# Patient Record
Sex: FEMALE | Race: WHITE | NOT HISPANIC OR LATINO | Employment: OTHER | URBAN - METROPOLITAN AREA
[De-identification: names, ages, dates, MRNs, and addresses within clinical notes are randomized per-mention and may not be internally consistent; named-entity substitution may affect disease eponyms.]

---

## 2024-01-01 ENCOUNTER — TELEPHONE (OUTPATIENT)
Dept: ONCOLOGY | Facility: CLINIC | Age: 59
End: 2024-01-01

## 2024-01-01 ENCOUNTER — APPOINTMENT (OUTPATIENT)
Dept: CT IMAGING | Facility: HOSPITAL | Age: 59
DRG: 871 | End: 2024-01-01
Payer: MEDICARE

## 2024-01-01 ENCOUNTER — LAB (OUTPATIENT)
Dept: LAB | Facility: HOSPITAL | Age: 59
End: 2024-01-01
Payer: MEDICARE

## 2024-01-01 ENCOUNTER — OFFICE VISIT (OUTPATIENT)
Dept: ONCOLOGY | Facility: CLINIC | Age: 59
End: 2024-01-01
Payer: MEDICARE

## 2024-01-01 ENCOUNTER — APPOINTMENT (OUTPATIENT)
Dept: RADIATION ONCOLOGY | Facility: HOSPITAL | Age: 59
End: 2024-01-01
Payer: MEDICARE

## 2024-01-01 ENCOUNTER — HOSPITAL ENCOUNTER (INPATIENT)
Facility: HOSPITAL | Age: 59
LOS: 1 days | DRG: 951 | End: 2024-11-03
Attending: INTERNAL MEDICINE | Admitting: INTERNAL MEDICINE
Payer: MEDICARE

## 2024-01-01 ENCOUNTER — APPOINTMENT (OUTPATIENT)
Dept: MRI IMAGING | Facility: HOSPITAL | Age: 59
DRG: 871 | End: 2024-01-01
Payer: MEDICARE

## 2024-01-01 ENCOUNTER — APPOINTMENT (OUTPATIENT)
Dept: GENERAL RADIOLOGY | Facility: HOSPITAL | Age: 59
DRG: 871 | End: 2024-01-01
Payer: MEDICARE

## 2024-01-01 ENCOUNTER — APPOINTMENT (OUTPATIENT)
Dept: ULTRASOUND IMAGING | Facility: HOSPITAL | Age: 59
DRG: 871 | End: 2024-01-01
Payer: MEDICARE

## 2024-01-01 ENCOUNTER — READMISSION MANAGEMENT (OUTPATIENT)
Dept: CALL CENTER | Facility: HOSPITAL | Age: 59
End: 2024-01-01
Payer: MEDICARE

## 2024-01-01 ENCOUNTER — HOSPITAL ENCOUNTER (INPATIENT)
Facility: HOSPITAL | Age: 59
LOS: 3 days | Discharge: HOSPICE | DRG: 871 | End: 2024-11-02
Attending: EMERGENCY MEDICINE | Admitting: INTERNAL MEDICINE
Payer: MEDICARE

## 2024-01-01 ENCOUNTER — HOSPITAL ENCOUNTER (OUTPATIENT)
Dept: RADIATION ONCOLOGY | Facility: HOSPITAL | Age: 59
Setting detail: RADIATION/ONCOLOGY SERIES
End: 2024-01-01
Payer: MEDICARE

## 2024-01-01 ENCOUNTER — APPOINTMENT (OUTPATIENT)
Dept: CARDIOLOGY | Facility: HOSPITAL | Age: 59
DRG: 871 | End: 2024-01-01
Payer: MEDICARE

## 2024-01-01 VITALS
SYSTOLIC BLOOD PRESSURE: 129 MMHG | BODY MASS INDEX: 44.47 KG/M2 | DIASTOLIC BLOOD PRESSURE: 86 MMHG | HEIGHT: 63 IN | RESPIRATION RATE: 18 BRPM | TEMPERATURE: 98.6 F | WEIGHT: 251 LBS | HEART RATE: 104 BPM | OXYGEN SATURATION: 92 %

## 2024-01-01 VITALS
HEIGHT: 63 IN | DIASTOLIC BLOOD PRESSURE: 28 MMHG | HEART RATE: 153 BPM | TEMPERATURE: 97.5 F | SYSTOLIC BLOOD PRESSURE: 52 MMHG | WEIGHT: 264 LBS | BODY MASS INDEX: 46.78 KG/M2 | RESPIRATION RATE: 6 BRPM | OXYGEN SATURATION: 89 %

## 2024-01-01 VITALS
RESPIRATION RATE: 21 BRPM | DIASTOLIC BLOOD PRESSURE: 27 MMHG | HEART RATE: 111 BPM | OXYGEN SATURATION: 83 % | SYSTOLIC BLOOD PRESSURE: 60 MMHG | TEMPERATURE: 96.9 F

## 2024-01-01 DIAGNOSIS — R65.21 SEPTIC SHOCK: ICD-10-CM

## 2024-01-01 DIAGNOSIS — C78.00 MALIGNANT NEOPLASM METASTATIC TO LUNG, UNSPECIFIED LATERALITY: ICD-10-CM

## 2024-01-01 DIAGNOSIS — S09.90XA INJURY OF HEAD, INITIAL ENCOUNTER: ICD-10-CM

## 2024-01-01 DIAGNOSIS — E87.20 METABOLIC ACIDOSIS: ICD-10-CM

## 2024-01-01 DIAGNOSIS — D64.9 ANEMIA, UNSPECIFIED TYPE: Primary | ICD-10-CM

## 2024-01-01 DIAGNOSIS — I48.91 ATRIAL FIBRILLATION WITH RAPID VENTRICULAR RESPONSE: ICD-10-CM

## 2024-01-01 DIAGNOSIS — E87.1 HYPONATREMIA: ICD-10-CM

## 2024-01-01 DIAGNOSIS — N17.9 AKI (ACUTE KIDNEY INJURY): ICD-10-CM

## 2024-01-01 DIAGNOSIS — C54.1 MALIGNANT NEOPLASM OF ENDOMETRIUM: Primary | ICD-10-CM

## 2024-01-01 DIAGNOSIS — C54.9 MALIGNANT NEOPLASM OF BODY OF UTERUS, UNSPECIFIED SITE: Primary | ICD-10-CM

## 2024-01-01 DIAGNOSIS — D50.0 IRON DEFICIENCY ANEMIA DUE TO CHRONIC BLOOD LOSS: ICD-10-CM

## 2024-01-01 DIAGNOSIS — A41.9 SEPTIC SHOCK: ICD-10-CM

## 2024-01-01 DIAGNOSIS — Z45.2 ENCOUNTER FOR VENOUS ACCESS DEVICE CARE: ICD-10-CM

## 2024-01-01 LAB
ACETONE BLD QL: NEGATIVE
ALBUMIN SERPL-MCNC: 2.1 G/DL (ref 3.5–5.2)
ALBUMIN SERPL-MCNC: 2.3 G/DL (ref 3.5–5.2)
ALBUMIN SERPL-MCNC: 2.3 G/DL (ref 3.5–5.2)
ALBUMIN SERPL-MCNC: 2.4 G/DL (ref 3.5–5.2)
ALBUMIN SERPL-MCNC: 2.6 G/DL (ref 3.5–5.2)
ALBUMIN/GLOB SERPL: 0.9 G/DL
ALBUMIN/GLOB SERPL: 1 G/DL
ALBUMIN/GLOB SERPL: 1.1 G/DL
ALBUMIN/GLOB SERPL: 1.1 G/DL
ALP SERPL-CCNC: 67 U/L (ref 39–117)
ALP SERPL-CCNC: 77 U/L (ref 39–117)
ALP SERPL-CCNC: 82 U/L (ref 39–117)
ALP SERPL-CCNC: 84 U/L (ref 39–117)
ALT SERPL W P-5'-P-CCNC: 13 U/L (ref 1–33)
ALT SERPL W P-5'-P-CCNC: 14 U/L (ref 1–33)
ALT SERPL W P-5'-P-CCNC: 18 U/L (ref 1–33)
ALT SERPL W P-5'-P-CCNC: 18 U/L (ref 1–33)
ANION GAP SERPL CALCULATED.3IONS-SCNC: 14.2 MMOL/L (ref 5–15)
ANION GAP SERPL CALCULATED.3IONS-SCNC: 19.7 MMOL/L (ref 5–15)
ANION GAP SERPL CALCULATED.3IONS-SCNC: 20 MMOL/L (ref 10–20)
ANION GAP SERPL CALCULATED.3IONS-SCNC: 20 MMOL/L (ref 5–15)
ANION GAP SERPL CALCULATED.3IONS-SCNC: 24.1 MMOL/L (ref 5–15)
ANION GAP SERPL CALCULATED.3IONS-SCNC: 24.1 MMOL/L (ref 5–15)
ANION GAP SERPL CALCULATED.3IONS-SCNC: 29.9 MMOL/L (ref 5–15)
ANION GAP SERPL CALCULATED.3IONS-SCNC: 30 MMOL/L (ref 5–15)
ANION GAP SERPL CALCULATED.3IONS-SCNC: NORMAL MMOL/L
ANISOCYTOSIS BLD QL: ABNORMAL
AORTIC DIMENSIONLESS INDEX: 0.55 (DI)
ARTERIAL PATENCY WRIST A: POSITIVE
AST SERPL-CCNC: 23 U/L (ref 1–32)
AST SERPL-CCNC: 23 U/L (ref 1–32)
AST SERPL-CCNC: 24 U/L (ref 1–32)
AST SERPL-CCNC: 27 U/L (ref 1–32)
ATMOSPHERIC PRESS: ABNORMAL MM[HG]
B PARAPERT DNA SPEC QL NAA+PROBE: NOT DETECTED
B PERT DNA SPEC QL NAA+PROBE: NOT DETECTED
BACTERIA SPEC AEROBE CULT: ABNORMAL
BACTERIA UR QL AUTO: ABNORMAL /HPF
BASE EXCESS BLDA CALC-SCNC: -14.7 MMOL/L (ref 0–3)
BASOPHILS # BLD AUTO: 0.01 10*3/MM3 (ref 0–0.2)
BASOPHILS NFR BLD AUTO: 0.1 % (ref 0–1.5)
BDY SITE: ABNORMAL
BH CV ECHO MEAS - ACS: 1.5 CM
BH CV ECHO MEAS - AO MAX PG: 47.3 MMHG
BH CV ECHO MEAS - AO MEAN PG: 25 MMHG
BH CV ECHO MEAS - AO V2 MAX: 344 CM/SEC
BH CV ECHO MEAS - AO V2 VTI: 42.8 CM
BH CV ECHO MEAS - AVA(I,D): 1.15 CM2
BH CV ECHO MEAS - EDV(CUBED): 42.9 ML
BH CV ECHO MEAS - EDV(MOD-SP4): 42.2 ML
BH CV ECHO MEAS - EF(MOD-BP): 55 %
BH CV ECHO MEAS - EF(MOD-SP4): 55.2 %
BH CV ECHO MEAS - ESV(CUBED): 17.6 ML
BH CV ECHO MEAS - ESV(MOD-SP4): 18.9 ML
BH CV ECHO MEAS - FS: 25.7 %
BH CV ECHO MEAS - IVS/LVPW: 1.31 CM
BH CV ECHO MEAS - IVSD: 1.7 CM
BH CV ECHO MEAS - LA DIMENSION: 2.9 CM
BH CV ECHO MEAS - LAT PEAK E' VEL: 12 CM/SEC
BH CV ECHO MEAS - LV DIASTOLIC VOL/BSA (35-75): 19.4 CM2
BH CV ECHO MEAS - LV MASS(C)D: 193.4 GRAMS
BH CV ECHO MEAS - LV MAX PG: 14.4 MMHG
BH CV ECHO MEAS - LV MEAN PG: 7 MMHG
BH CV ECHO MEAS - LV SYSTOLIC VOL/BSA (12-30): 8.7 CM2
BH CV ECHO MEAS - LV V1 MAX: 190 CM/SEC
BH CV ECHO MEAS - LV V1 VTI: 19.3 CM
BH CV ECHO MEAS - LVIDD: 3.5 CM
BH CV ECHO MEAS - LVIDS: 2.6 CM
BH CV ECHO MEAS - LVOT AREA: 2.5 CM2
BH CV ECHO MEAS - LVOT DIAM: 1.8 CM
BH CV ECHO MEAS - LVPWD: 1.3 CM
BH CV ECHO MEAS - MED PEAK E' VEL: 8.5 CM/SEC
BH CV ECHO MEAS - MR MAX PG: 114.5 MMHG
BH CV ECHO MEAS - MR MAX VEL: 535 CM/SEC
BH CV ECHO MEAS - MV A MAX VEL: 104 CM/SEC
BH CV ECHO MEAS - MV DEC SLOPE: 408 CM/SEC2
BH CV ECHO MEAS - MV DEC TIME: 0.14 SEC
BH CV ECHO MEAS - MV E MAX VEL: 46.1 CM/SEC
BH CV ECHO MEAS - MV E/A: 0.44
BH CV ECHO MEAS - MV MAX PG: 2.9 MMHG
BH CV ECHO MEAS - MV MEAN PG: 1 MMHG
BH CV ECHO MEAS - MV P1/2T: 41.5 MSEC
BH CV ECHO MEAS - MV V2 VTI: 12.8 CM
BH CV ECHO MEAS - MVA(P1/2T): 5.3 CM2
BH CV ECHO MEAS - MVA(VTI): 3.8 CM2
BH CV ECHO MEAS - PA ACC TIME: 0.09 SEC
BH CV ECHO MEAS - PA V2 MAX: 133 CM/SEC
BH CV ECHO MEAS - RAP SYSTOLE: 3 MMHG
BH CV ECHO MEAS - RV MAX PG: 4.7 MMHG
BH CV ECHO MEAS - RV V1 MAX: 108 CM/SEC
BH CV ECHO MEAS - RV V1 VTI: 12 CM
BH CV ECHO MEAS - RVDD: 2.3 CM
BH CV ECHO MEAS - RVSP: 34.6 MMHG
BH CV ECHO MEAS - SV(LVOT): 49.1 ML
BH CV ECHO MEAS - SV(MOD-SP4): 23.3 ML
BH CV ECHO MEAS - SVI(LVOT): 22.6 ML/M2
BH CV ECHO MEAS - SVI(MOD-SP4): 10.7 ML/M2
BH CV ECHO MEAS - TAPSE (>1.6): 1.57 CM
BH CV ECHO MEAS - TR MAX PG: 31.6 MMHG
BH CV ECHO MEAS - TR MAX VEL: 281 CM/SEC
BH CV ECHO MEASUREMENTS AVERAGE E/E' RATIO: 4.5
BH CV ECHO SHUNT ASSESSMENT PERFORMED (HIDDEN SCRIPTING): 1
BH CV XLRA - TDI S': 15.3 CM/SEC
BILIRUB SERPL-MCNC: 0.2 MG/DL (ref 0–1.2)
BILIRUB SERPL-MCNC: 0.2 MG/DL (ref 0–1.2)
BILIRUB SERPL-MCNC: 0.3 MG/DL (ref 0–1.2)
BILIRUB SERPL-MCNC: 0.3 MG/DL (ref 0–1.2)
BILIRUB UR QL STRIP: NEGATIVE
BUN BLDA-MCNC: 49 MG/DL (ref 8–26)
BUN SERPL-MCNC: 35 MG/DL (ref 8–23)
BUN SERPL-MCNC: 59 MG/DL (ref 8–23)
BUN SERPL-MCNC: 59 MG/DL (ref 8–23)
BUN SERPL-MCNC: 67 MG/DL (ref 8–23)
BUN SERPL-MCNC: 67 MG/DL (ref 8–23)
BUN SERPL-MCNC: 68 MG/DL (ref 8–23)
BUN SERPL-MCNC: 77 MG/DL (ref 8–23)
BUN SERPL-MCNC: NORMAL MG/DL
BUN/CREAT SERPL: 10.2 (ref 7–25)
BUN/CREAT SERPL: 7.9 (ref 7–25)
BUN/CREAT SERPL: 8.9 (ref 7–25)
BUN/CREAT SERPL: 8.9 (ref 7–25)
BUN/CREAT SERPL: 9.1 (ref 7–25)
BUN/CREAT SERPL: 9.2 (ref 7–25)
BUN/CREAT SERPL: 9.2 (ref 7–25)
BUN/CREAT SERPL: NORMAL
BURR CELLS BLD QL SMEAR: ABNORMAL
C PNEUM DNA NPH QL NAA+NON-PROBE: NOT DETECTED
C3 FRG RBC-MCNC: ABNORMAL
CA-I BLDA-SCNC: 1.08 MMOL/L (ref 1.12–1.32)
CA-I SERPL ISE-MCNC: 0.94 MMOL/L (ref 1.15–1.3)
CALCIUM SPEC-SCNC: 7.1 MG/DL (ref 8.6–10.5)
CALCIUM SPEC-SCNC: 7.8 MG/DL (ref 8.6–10.5)
CALCIUM SPEC-SCNC: 9 MG/DL (ref 8.6–10.5)
CALCIUM SPEC-SCNC: 9.1 MG/DL (ref 8.6–10.5)
CALCIUM SPEC-SCNC: NORMAL MMOL/L
CANCER AG125 SERPL QL: 260 U/ML (ref 0–38.1)
CHLORIDE BLDA-SCNC: 99 MMOL/L (ref 98–109)
CHLORIDE SERPL-SCNC: 100 MMOL/L (ref 98–107)
CHLORIDE SERPL-SCNC: 92 MMOL/L (ref 98–107)
CHLORIDE SERPL-SCNC: 93 MMOL/L (ref 98–107)
CHLORIDE SERPL-SCNC: 95 MMOL/L (ref 98–107)
CHLORIDE SERPL-SCNC: 96 MMOL/L (ref 98–107)
CHLORIDE SERPL-SCNC: NORMAL MMOL/L
CHOLEST SERPL-MCNC: 66 MG/DL (ref 0–200)
CHOLEST SERPL-MCNC: 71 MG/DL (ref 0–200)
CK SERPL-CCNC: 35 U/L (ref 20–180)
CLARITY UR: ABNORMAL
CO2 BLDA-SCNC: 11 MMOL/L (ref 24–29)
CO2 BLDA-SCNC: 9.7 MMOL/L (ref 22–29)
CO2 SERPL-SCNC: 10 MMOL/L (ref 22–29)
CO2 SERPL-SCNC: 11.9 MMOL/L (ref 22–29)
CO2 SERPL-SCNC: 12 MMOL/L (ref 22–29)
CO2 SERPL-SCNC: 19.8 MMOL/L (ref 22–29)
CO2 SERPL-SCNC: 21.3 MMOL/L (ref 22–29)
CO2 SERPL-SCNC: 9.1 MMOL/L (ref 22–29)
CO2 SERPL-SCNC: 9.9 MMOL/L (ref 22–29)
CO2 SERPL-SCNC: NORMAL MMOL/L
COLOR UR: ABNORMAL
CREAT BLDA-MCNC: 8.3 MG/DL (ref 0.6–1.3)
CREAT SERPL-MCNC: 3.82 MG/DL (ref 0.57–1)
CREAT SERPL-MCNC: 6.43 MG/DL (ref 0.57–1)
CREAT SERPL-MCNC: 7.35 MG/DL (ref 0.57–1)
CREAT SERPL-MCNC: 7.47 MG/DL (ref 0.57–1)
CREAT SERPL-MCNC: 7.52 MG/DL (ref 0.57–1)
CREAT SERPL-MCNC: 7.56 MG/DL (ref 0.57–1)
CREAT SERPL-MCNC: 7.65 MG/DL (ref 0.57–1)
CREAT SERPL-MCNC: NORMAL MG/DL
CREAT UR-MCNC: 68.8 MG/DL
D-LACTATE SERPL-SCNC: 10 MMOL/L (ref 0.5–2)
D-LACTATE SERPL-SCNC: 2.6 MMOL/L (ref 0.5–2)
D-LACTATE SERPL-SCNC: 2.9 MMOL/L (ref 0.5–2)
D-LACTATE SERPL-SCNC: 4.2 MMOL/L (ref 0.5–2)
D-LACTATE SERPL-SCNC: 4.7 MMOL/L (ref 0.3–2)
D-LACTATE SERPL-SCNC: 6.2 MMOL/L (ref 0.5–2)
DEPRECATED RDW RBC AUTO: 55.3 FL (ref 37–54)
DEPRECATED RDW RBC AUTO: 57.2 FL (ref 37–54)
DEPRECATED RDW RBC AUTO: 59.4 FL (ref 37–54)
DEPRECATED RDW RBC AUTO: 60.8 FL (ref 37–54)
DIGOXIN SERPL-MCNC: 0.57 NG/ML (ref 0.6–1.2)
EGFRCR SERPLBLD CKD-EPI 2021: 12.9 ML/MIN/1.73
EGFRCR SERPLBLD CKD-EPI 2021: 5.1 ML/MIN/1.73
EGFRCR SERPLBLD CKD-EPI 2021: 5.6 ML/MIN/1.73
EGFRCR SERPLBLD CKD-EPI 2021: 5.7 ML/MIN/1.73
EGFRCR SERPLBLD CKD-EPI 2021: 5.7 ML/MIN/1.73
EGFRCR SERPLBLD CKD-EPI 2021: 5.8 ML/MIN/1.73
EGFRCR SERPLBLD CKD-EPI 2021: 5.9 ML/MIN/1.73
EGFRCR SERPLBLD CKD-EPI 2021: 6.9 ML/MIN/1.73
EGFRCR SERPLBLD CKD-EPI 2021: NORMAL ML/MIN/{1.73_M2}
ELLIPTOCYTES BLD QL SMEAR: ABNORMAL
EOSINOPHIL # BLD AUTO: 0.06 10*3/MM3 (ref 0–0.4)
EOSINOPHIL NFR BLD AUTO: 0.3 % (ref 0.3–6.2)
ERYTHROCYTE [DISTWIDTH] IN BLOOD BY AUTOMATED COUNT: 21.4 % (ref 12.3–15.4)
ERYTHROCYTE [DISTWIDTH] IN BLOOD BY AUTOMATED COUNT: 21.6 % (ref 12.3–15.4)
ERYTHROCYTE [DISTWIDTH] IN BLOOD BY AUTOMATED COUNT: 21.7 % (ref 12.3–15.4)
ERYTHROCYTE [DISTWIDTH] IN BLOOD BY AUTOMATED COUNT: 21.9 % (ref 12.3–15.4)
FLUAV SUBTYP SPEC NAA+PROBE: NOT DETECTED
FLUBV RNA ISLT QL NAA+PROBE: NOT DETECTED
GEN 5 2HR TROPONIN T REFLEX: 129 NG/L
GIANT PLATELETS: ABNORMAL
GLOBULIN UR ELPH-MCNC: 2.2 GM/DL
GLOBULIN UR ELPH-MCNC: 2.2 GM/DL
GLOBULIN UR ELPH-MCNC: 2.3 GM/DL
GLOBULIN UR ELPH-MCNC: 2.7 GM/DL
GLUCOSE BLDC GLUCOMTR-MCNC: 127 MG/DL (ref 70–105)
GLUCOSE BLDC GLUCOMTR-MCNC: 128 MG/DL (ref 70–105)
GLUCOSE BLDC GLUCOMTR-MCNC: 179 MG/DL (ref 70–105)
GLUCOSE BLDC GLUCOMTR-MCNC: 191 MG/DL (ref 70–105)
GLUCOSE BLDC GLUCOMTR-MCNC: 196 MG/DL (ref 70–105)
GLUCOSE BLDC GLUCOMTR-MCNC: 197 MG/DL (ref 70–105)
GLUCOSE BLDC GLUCOMTR-MCNC: 216 MG/DL (ref 70–105)
GLUCOSE BLDC GLUCOMTR-MCNC: 218 MG/DL (ref 70–105)
GLUCOSE BLDC GLUCOMTR-MCNC: 222 MG/DL (ref 70–105)
GLUCOSE BLDC GLUCOMTR-MCNC: 228 MG/DL (ref 70–105)
GLUCOSE SERPL-MCNC: 123 MG/DL (ref 65–99)
GLUCOSE SERPL-MCNC: 132 MG/DL (ref 65–99)
GLUCOSE SERPL-MCNC: 163 MG/DL (ref 65–99)
GLUCOSE SERPL-MCNC: 223 MG/DL (ref 65–99)
GLUCOSE SERPL-MCNC: 225 MG/DL (ref 65–99)
GLUCOSE SERPL-MCNC: 228 MG/DL (ref 65–99)
GLUCOSE SERPL-MCNC: 243 MG/DL (ref 65–99)
GLUCOSE SERPL-MCNC: NORMAL MG/DL
GLUCOSE UR STRIP-MCNC: ABNORMAL MG/DL
HADV DNA SPEC NAA+PROBE: NOT DETECTED
HBA1C MFR BLD: 6.68 % (ref 4.8–5.6)
HCO3 BLDA-SCNC: 9.1 MMOL/L (ref 21–28)
HCOV 229E RNA SPEC QL NAA+PROBE: NOT DETECTED
HCOV HKU1 RNA SPEC QL NAA+PROBE: NOT DETECTED
HCOV NL63 RNA SPEC QL NAA+PROBE: NOT DETECTED
HCOV OC43 RNA SPEC QL NAA+PROBE: NOT DETECTED
HCT VFR BLD AUTO: 37.4 % (ref 34–46.6)
HCT VFR BLD AUTO: 37.8 % (ref 34–46.6)
HCT VFR BLD AUTO: 39.1 % (ref 34–46.6)
HCT VFR BLD AUTO: 40.1 % (ref 34–46.6)
HCT VFR BLDA CALC: 40 % (ref 38–51)
HDLC SERPL-MCNC: 10 MG/DL (ref 40–60)
HDLC SERPL-MCNC: 12 MG/DL (ref 40–60)
HEMODILUTION: NO
HGB BLD-MCNC: 11.4 G/DL (ref 12–15.9)
HGB BLD-MCNC: 12.4 G/DL (ref 12–15.9)
HGB BLD-MCNC: 12.5 G/DL (ref 12–15.9)
HGB BLD-MCNC: 12.6 G/DL (ref 12–15.9)
HGB BLDA-MCNC: 13.6 G/DL (ref 12–17)
HGB UR QL STRIP.AUTO: ABNORMAL
HMPV RNA NPH QL NAA+NON-PROBE: NOT DETECTED
HOLD SPECIMEN: NORMAL
HPIV1 RNA ISLT QL NAA+PROBE: NOT DETECTED
HPIV2 RNA SPEC QL NAA+PROBE: NOT DETECTED
HPIV3 RNA NPH QL NAA+PROBE: NOT DETECTED
HPIV4 P GENE NPH QL NAA+PROBE: NOT DETECTED
HYALINE CASTS UR QL AUTO: ABNORMAL /LPF
INHALED O2 CONCENTRATION: 44 %
KETONES UR QL STRIP: ABNORMAL
L PNEUMO1 AG UR QL IA: POSITIVE
LARGE PLATELETS: ABNORMAL
LARGE PLATELETS: ABNORMAL
LDLC SERPL CALC-MCNC: 26 MG/DL (ref 0–100)
LDLC SERPL CALC-MCNC: 28 MG/DL (ref 0–100)
LDLC/HDLC SERPL: 1.6 {RATIO}
LDLC/HDLC SERPL: 2 {RATIO}
LEUKOCYTE ESTERASE UR QL STRIP.AUTO: ABNORMAL
LYMPHOCYTES # BLD AUTO: 0.66 10*3/MM3 (ref 0.7–3.1)
LYMPHOCYTES # BLD MANUAL: 0 10*3/MM3 (ref 0.7–3.1)
LYMPHOCYTES # BLD MANUAL: 0.44 10*3/MM3 (ref 0.7–3.1)
LYMPHOCYTES # BLD MANUAL: 0.89 10*3/MM3 (ref 0.7–3.1)
LYMPHOCYTES NFR BLD AUTO: 3.3 % (ref 19.6–45.3)
LYMPHOCYTES NFR BLD MANUAL: 3 % (ref 5–12)
LYMPHOCYTES NFR BLD MANUAL: 3 % (ref 5–12)
LYMPHOCYTES NFR BLD MANUAL: 8 % (ref 5–12)
Lab: ABNORMAL
M PNEUMO IGG SER IA-ACNC: NOT DETECTED
MAGNESIUM SERPL-MCNC: 1.8 MG/DL (ref 1.6–2.4)
MAGNESIUM SERPL-MCNC: 2.1 MG/DL (ref 1.6–2.4)
MCH RBC QN AUTO: 23.9 PG (ref 26.6–33)
MCH RBC QN AUTO: 24.5 PG (ref 26.6–33)
MCHC RBC AUTO-ENTMCNC: 30.5 G/DL (ref 31.5–35.7)
MCHC RBC AUTO-ENTMCNC: 31.2 G/DL (ref 31.5–35.7)
MCHC RBC AUTO-ENTMCNC: 31.7 G/DL (ref 31.5–35.7)
MCHC RBC AUTO-ENTMCNC: 33.3 G/DL (ref 31.5–35.7)
MCV RBC AUTO: 73.4 FL (ref 79–97)
MCV RBC AUTO: 77.3 FL (ref 79–97)
MCV RBC AUTO: 78.6 FL (ref 79–97)
MCV RBC AUTO: 78.6 FL (ref 79–97)
METAMYELOCYTES NFR BLD MANUAL: 2 % (ref 0–0)
METAMYELOCYTES NFR BLD MANUAL: 5 % (ref 0–0)
MICROCYTES BLD QL: ABNORMAL
MODALITY: ABNORMAL
MONOCYTES # BLD AUTO: 1.47 10*3/MM3 (ref 0.1–0.9)
MONOCYTES # BLD: 0.67 10*3/MM3 (ref 0.1–0.9)
MONOCYTES # BLD: 0.78 10*3/MM3 (ref 0.1–0.9)
MONOCYTES # BLD: 1.78 10*3/MM3 (ref 0.1–0.9)
MONOCYTES NFR BLD AUTO: 7.4 % (ref 5–12)
MYELOCYTES NFR BLD MANUAL: 1 % (ref 0–0)
NEUTROPHILS # BLD AUTO: 18.52 10*3/MM3 (ref 1.7–7)
NEUTROPHILS # BLD AUTO: 20.86 10*3/MM3 (ref 1.7–7)
NEUTROPHILS # BLD AUTO: 24.58 10*3/MM3 (ref 1.7–7)
NEUTROPHILS NFR BLD AUTO: 17.59 10*3/MM3 (ref 1.7–7)
NEUTROPHILS NFR BLD AUTO: 88.9 % (ref 42.7–76)
NEUTROPHILS NFR BLD MANUAL: 71 % (ref 42.7–76)
NEUTROPHILS NFR BLD MANUAL: 77 % (ref 42.7–76)
NEUTROPHILS NFR BLD MANUAL: 86 % (ref 42.7–76)
NEUTS BAND NFR BLD MANUAL: 12 % (ref 0–5)
NEUTS BAND NFR BLD MANUAL: 18 % (ref 0–5)
NEUTS BAND NFR BLD MANUAL: 8 % (ref 0–5)
NEUTS VAC BLD QL SMEAR: ABNORMAL
NITRITE UR QL STRIP: NEGATIVE
NOTIFIED WHO: ABNORMAL
NRBC SPEC MANUAL: 1 /100 WBC (ref 0–0.2)
NT-PROBNP SERPL-MCNC: 1560 PG/ML (ref 0–900)
OSMOLALITY UR: 244 MOSM/KG (ref 300–800)
PCO2 BLDA: 17.7 MM HG (ref 35–48)
PH BLDA: 7.32 PH UNITS (ref 7.35–7.45)
PH UR STRIP.AUTO: 5.5 [PH] (ref 5–8)
PHOSPHATE SERPL-MCNC: 6.8 MG/DL (ref 2.5–4.5)
PHOSPHATE SERPL-MCNC: 6.9 MG/DL (ref 2.5–4.5)
PHOSPHATE SERPL-MCNC: 7 MG/DL (ref 2.5–4.5)
PLAT MORPH BLD: NORMAL
PLATELET # BLD AUTO: 143 10*3/MM3 (ref 140–450)
PLATELET # BLD AUTO: 192 10*3/MM3 (ref 140–450)
PLATELET # BLD AUTO: 195 10*3/MM3 (ref 140–450)
PLATELET # BLD AUTO: 248 10*3/MM3 (ref 140–450)
PMV BLD AUTO: 10.5 FL (ref 6–12)
PMV BLD AUTO: 9.8 FL (ref 6–12)
PMV BLD AUTO: 9.8 FL (ref 6–12)
PMV BLD AUTO: ABNORMAL FL
PO2 BLD: 173 MM[HG] (ref 0–500)
PO2 BLDA: 76.3 MM HG (ref 83–108)
POIKILOCYTOSIS BLD QL SMEAR: ABNORMAL
POTASSIUM BLDA-SCNC: 3.8 MMOL/L (ref 3.5–4.9)
POTASSIUM SERPL-SCNC: 3.6 MMOL/L (ref 3.5–5.2)
POTASSIUM SERPL-SCNC: 3.7 MMOL/L (ref 3.5–5.2)
POTASSIUM SERPL-SCNC: 3.8 MMOL/L (ref 3.5–5.2)
POTASSIUM SERPL-SCNC: 4.1 MMOL/L (ref 3.5–5.2)
POTASSIUM SERPL-SCNC: 4.1 MMOL/L (ref 3.5–5.2)
POTASSIUM SERPL-SCNC: NORMAL MMOL/L
PROCALCITONIN SERPL-MCNC: 4.82 NG/ML (ref 0–0.25)
PROCALCITONIN SERPL-MCNC: 5.1 NG/ML (ref 0–0.25)
PROT ?TM UR-MCNC: 688 MG/DL
PROT SERPL-MCNC: 4.3 G/DL (ref 6–8.5)
PROT SERPL-MCNC: 4.6 G/DL (ref 6–8.5)
PROT SERPL-MCNC: 4.9 G/DL (ref 6–8.5)
PROT SERPL-MCNC: 5 G/DL (ref 6–8.5)
PROT UR QL STRIP: ABNORMAL
PROT/CREAT UR: ABNORMAL MG/G CREA (ref 0–200)
QT INTERVAL: 325 MS
QTC INTERVAL: 503 MS
RBC # BLD AUTO: 4.76 10*6/MM3 (ref 3.77–5.28)
RBC # BLD AUTO: 5.06 10*6/MM3 (ref 3.77–5.28)
RBC # BLD AUTO: 5.1 10*6/MM3 (ref 3.77–5.28)
RBC # BLD AUTO: 5.15 10*6/MM3 (ref 3.77–5.28)
RBC # UR STRIP: ABNORMAL /HPF
READ BACK: ABNORMAL
REF LAB TEST METHOD: ABNORMAL
RENAL EPI CELLS #/AREA URNS HPF: ABNORMAL /HPF
RHINOVIRUS RNA SPEC NAA+PROBE: NOT DETECTED
RSV RNA NPH QL NAA+NON-PROBE: NOT DETECTED
S PNEUM AG SPEC QL LA: NEGATIVE
SAO2 % BLDCOA: 94.5 % (ref 94–98)
SARS-COV-2 RNA NPH QL NAA+NON-PROBE: NOT DETECTED
SCAN SLIDE: NORMAL
SINUS: 2.7 CM
SMALL PLATELETS BLD QL SMEAR: ADEQUATE
SODIUM BLD-SCNC: 125 MMOL/L (ref 138–146)
SODIUM SERPL-SCNC: 126 MMOL/L (ref 136–145)
SODIUM SERPL-SCNC: 129 MMOL/L (ref 136–145)
SODIUM SERPL-SCNC: 130 MMOL/L (ref 136–145)
SODIUM SERPL-SCNC: 132 MMOL/L (ref 136–145)
SODIUM SERPL-SCNC: 132 MMOL/L (ref 136–145)
SODIUM SERPL-SCNC: 133 MMOL/L (ref 136–145)
SODIUM SERPL-SCNC: 136 MMOL/L (ref 136–145)
SODIUM SERPL-SCNC: NORMAL MMOL/L
SODIUM UR-SCNC: 67 MMOL/L
SP GR UR STRIP: 1.02 (ref 1–1.03)
SQUAMOUS #/AREA URNS HPF: ABNORMAL /HPF
STJ: 2.4 CM
TOXIC GRANULATION: ABNORMAL
TRANS CELLS #/AREA URNS HPF: ABNORMAL /HPF
TRIGL SERPL-MCNC: 150 MG/DL (ref 0–150)
TRIGL SERPL-MCNC: 225 MG/DL (ref 0–150)
TROPONIN T DELTA: -40 NG/L
TROPONIN T SERPL HS-MCNC: 151 NG/L
TROPONIN T SERPL HS-MCNC: 169 NG/L
TSH SERPL DL<=0.05 MIU/L-ACNC: 4.5 UIU/ML (ref 0.27–4.2)
URATE SERPL-MCNC: 9.4 MG/DL (ref 2.4–5.7)
UROBILINOGEN UR QL STRIP: ABNORMAL
VARIANT LYMPHS NFR BLD MANUAL: 0 % (ref 19.6–45.3)
VARIANT LYMPHS NFR BLD MANUAL: 2 % (ref 19.6–45.3)
VARIANT LYMPHS NFR BLD MANUAL: 4 % (ref 19.6–45.3)
VLDLC SERPL-MCNC: 26 MG/DL (ref 5–40)
VLDLC SERPL-MCNC: 35 MG/DL (ref 5–40)
WBC # UR STRIP: ABNORMAL /HPF
WBC MORPH BLD: NORMAL
WBC MORPH BLD: NORMAL
WBC NRBC COR # BLD AUTO: 19.79 10*3/MM3 (ref 3.4–10.8)
WBC NRBC COR # BLD AUTO: 22.19 10*3/MM3 (ref 3.4–10.8)
WBC NRBC COR # BLD AUTO: 22.31 10*3/MM3 (ref 3.4–10.8)
WBC NRBC COR # BLD AUTO: 25.87 10*3/MM3 (ref 3.4–10.8)
WHOLE BLOOD HOLD COAG: NORMAL
WHOLE BLOOD HOLD SPECIMEN: NORMAL

## 2024-01-01 PROCEDURE — 87449 NOS EACH ORGANISM AG IA: CPT | Performed by: INTERNAL MEDICINE

## 2024-01-01 PROCEDURE — 1125F AMNT PAIN NOTED PAIN PRSNT: CPT | Performed by: INTERNAL MEDICINE

## 2024-01-01 PROCEDURE — 25010000002 VASOPRESSIN 20-5 UT/100ML-% SOLUTION: Performed by: NURSE PRACTITIONER

## 2024-01-01 PROCEDURE — 80053 COMPREHEN METABOLIC PANEL: CPT | Performed by: INTERNAL MEDICINE

## 2024-01-01 PROCEDURE — 25010000002 HEPARIN (PORCINE) PER 1000 UNITS

## 2024-01-01 PROCEDURE — 99222 1ST HOSP IP/OBS MODERATE 55: CPT | Performed by: PSYCHIATRY & NEUROLOGY

## 2024-01-01 PROCEDURE — 77300 RADIATION THERAPY DOSE PLAN: CPT | Performed by: INTERNAL MEDICINE

## 2024-01-01 PROCEDURE — 25010000002 ALBUMIN HUMAN 5% PER 50 ML: Performed by: INTERNAL MEDICINE

## 2024-01-01 PROCEDURE — 83036 HEMOGLOBIN GLYCOSYLATED A1C: CPT | Performed by: STUDENT IN AN ORGANIZED HEALTH CARE EDUCATION/TRAINING PROGRAM

## 2024-01-01 PROCEDURE — 36415 COLL VENOUS BLD VENIPUNCTURE: CPT

## 2024-01-01 PROCEDURE — 25010000002 MORPHINE PER 10 MG: Performed by: INTERNAL MEDICINE

## 2024-01-01 PROCEDURE — 84145 PROCALCITONIN (PCT): CPT | Performed by: EMERGENCY MEDICINE

## 2024-01-01 PROCEDURE — 93005 ELECTROCARDIOGRAM TRACING: CPT | Performed by: EMERGENCY MEDICINE

## 2024-01-01 PROCEDURE — 84100 ASSAY OF PHOSPHORUS: CPT

## 2024-01-01 PROCEDURE — 63710000001 INSULIN LISPRO (HUMAN) PER 5 UNITS: Performed by: INTERNAL MEDICINE

## 2024-01-01 PROCEDURE — 85025 COMPLETE CBC W/AUTO DIFF WBC: CPT | Performed by: EMERGENCY MEDICINE

## 2024-01-01 PROCEDURE — 82570 ASSAY OF URINE CREATININE: CPT | Performed by: STUDENT IN AN ORGANIZED HEALTH CARE EDUCATION/TRAINING PROGRAM

## 2024-01-01 PROCEDURE — 83735 ASSAY OF MAGNESIUM: CPT

## 2024-01-01 PROCEDURE — 63710000001 INSULIN LISPRO (HUMAN) PER 5 UNITS

## 2024-01-01 PROCEDURE — 70551 MRI BRAIN STEM W/O DYE: CPT

## 2024-01-01 PROCEDURE — 80162 ASSAY OF DIGOXIN TOTAL: CPT | Performed by: INTERNAL MEDICINE

## 2024-01-01 PROCEDURE — 82803 BLOOD GASES ANY COMBINATION: CPT | Performed by: EMERGENCY MEDICINE

## 2024-01-01 PROCEDURE — 81001 URINALYSIS AUTO W/SCOPE: CPT

## 2024-01-01 PROCEDURE — 77387 GUIDANCE FOR RADJ TX DLVR: CPT | Performed by: INTERNAL MEDICINE

## 2024-01-01 PROCEDURE — 71045 X-RAY EXAM CHEST 1 VIEW: CPT

## 2024-01-01 PROCEDURE — 25010000002 LORAZEPAM PER 2 MG: Performed by: NURSE PRACTITIONER

## 2024-01-01 PROCEDURE — 85025 COMPLETE CBC W/AUTO DIFF WBC: CPT

## 2024-01-01 PROCEDURE — 82948 REAGENT STRIP/BLOOD GLUCOSE: CPT

## 2024-01-01 PROCEDURE — 80053 COMPREHEN METABOLIC PANEL: CPT | Performed by: EMERGENCY MEDICINE

## 2024-01-01 PROCEDURE — 77334 RADIATION TREATMENT AID(S): CPT | Performed by: INTERNAL MEDICINE

## 2024-01-01 PROCEDURE — 84443 ASSAY THYROID STIM HORMONE: CPT | Performed by: EMERGENCY MEDICINE

## 2024-01-01 PROCEDURE — 25010000002 HYDROMORPHONE 1 MG/ML SOLUTION: Performed by: NURSE PRACTITIONER

## 2024-01-01 PROCEDURE — 70544 MR ANGIOGRAPHY HEAD W/O DYE: CPT

## 2024-01-01 PROCEDURE — 25810000003 SODIUM CHLORIDE 0.9 % SOLUTION 250 ML FLEX CONT

## 2024-01-01 PROCEDURE — 71250 CT THORAX DX C-: CPT

## 2024-01-01 PROCEDURE — C1751 CATH, INF, PER/CENT/MIDLINE: HCPCS

## 2024-01-01 PROCEDURE — 25010000002 AZITHROMYCIN PER 500 MG

## 2024-01-01 PROCEDURE — 77412 RADIATION TX DELIVERY LVL 3: CPT | Performed by: INTERNAL MEDICINE

## 2024-01-01 PROCEDURE — 70450 CT HEAD/BRAIN W/O DYE: CPT

## 2024-01-01 PROCEDURE — 25010000002 CALCIUM GLUCONATE-NACL 1-0.675 GM/50ML-% SOLUTION: Performed by: NURSE PRACTITIONER

## 2024-01-01 PROCEDURE — 99233 SBSQ HOSP IP/OBS HIGH 50: CPT | Performed by: STUDENT IN AN ORGANIZED HEALTH CARE EDUCATION/TRAINING PROGRAM

## 2024-01-01 PROCEDURE — 83605 ASSAY OF LACTIC ACID: CPT | Performed by: EMERGENCY MEDICINE

## 2024-01-01 PROCEDURE — 0 DEXTROSE 5 % SOLUTION 1,000 ML FLEX CONT: Performed by: NURSE PRACTITIONER

## 2024-01-01 PROCEDURE — 80061 LIPID PANEL: CPT | Performed by: STUDENT IN AN ORGANIZED HEALTH CARE EDUCATION/TRAINING PROGRAM

## 2024-01-01 PROCEDURE — 99285 EMERGENCY DEPT VISIT HI MDM: CPT

## 2024-01-01 PROCEDURE — 77295 3-D RADIOTHERAPY PLAN: CPT | Performed by: INTERNAL MEDICINE

## 2024-01-01 PROCEDURE — 25010000002 PHENYLEPHRINE 10 MG/ML SOLUTION: Performed by: NURSE PRACTITIONER

## 2024-01-01 PROCEDURE — 25810000003 SEPSIS FLUID NS 0.9 % SOLUTION: Performed by: EMERGENCY MEDICINE

## 2024-01-01 PROCEDURE — 99231 SBSQ HOSP IP/OBS SF/LOW 25: CPT | Performed by: NURSE PRACTITIONER

## 2024-01-01 PROCEDURE — 80069 RENAL FUNCTION PANEL: CPT | Performed by: INTERNAL MEDICINE

## 2024-01-01 PROCEDURE — 99497 ADVNCD CARE PLAN 30 MIN: CPT | Performed by: NURSE PRACTITIONER

## 2024-01-01 PROCEDURE — 77014 CHG CT GUIDANCE RADIATION THERAPY FLDS PLACEMENT: CPT | Performed by: INTERNAL MEDICINE

## 2024-01-01 PROCEDURE — 1159F MED LIST DOCD IN RCRD: CPT | Performed by: INTERNAL MEDICINE

## 2024-01-01 PROCEDURE — 25010000002 FENTANYL CITRATE (PF) 50 MCG/ML SOLUTION: Performed by: NURSE PRACTITIONER

## 2024-01-01 PROCEDURE — 77280 THER RAD SIMULAJ FIELD SMPL: CPT | Performed by: INTERNAL MEDICINE

## 2024-01-01 PROCEDURE — 25010000002 VANCOMYCIN 1.75-0.9 GM/500ML-% SOLUTION: Performed by: EMERGENCY MEDICINE

## 2024-01-01 PROCEDURE — 25010000002 CEFEPIME PER 500 MG: Performed by: EMERGENCY MEDICINE

## 2024-01-01 PROCEDURE — 25010000002 CEFEPIME PER 500 MG

## 2024-01-01 PROCEDURE — 25010000002 PHENYLEPHRINE 10 MG/ML SOLUTION 5 ML VIAL: Performed by: NURSE PRACTITIONER

## 2024-01-01 PROCEDURE — P9041 ALBUMIN (HUMAN),5%, 50ML: HCPCS | Performed by: INTERNAL MEDICINE

## 2024-01-01 PROCEDURE — 76775 US EXAM ABDO BACK WALL LIM: CPT

## 2024-01-01 PROCEDURE — 84550 ASSAY OF BLOOD/URIC ACID: CPT | Performed by: STUDENT IN AN ORGANIZED HEALTH CARE EDUCATION/TRAINING PROGRAM

## 2024-01-01 PROCEDURE — 87040 BLOOD CULTURE FOR BACTERIA: CPT | Performed by: EMERGENCY MEDICINE

## 2024-01-01 PROCEDURE — 84300 ASSAY OF URINE SODIUM: CPT | Performed by: STUDENT IN AN ORGANIZED HEALTH CARE EDUCATION/TRAINING PROGRAM

## 2024-01-01 PROCEDURE — 25810000003 SODIUM CHLORIDE 0.9 % SOLUTION: Performed by: NURSE PRACTITIONER

## 2024-01-01 PROCEDURE — 83935 ASSAY OF URINE OSMOLALITY: CPT | Performed by: STUDENT IN AN ORGANIZED HEALTH CARE EDUCATION/TRAINING PROGRAM

## 2024-01-01 PROCEDURE — 84484 ASSAY OF TROPONIN QUANT: CPT | Performed by: EMERGENCY MEDICINE

## 2024-01-01 PROCEDURE — 77336 RADIATION PHYSICS CONSULT: CPT | Performed by: INTERNAL MEDICINE

## 2024-01-01 PROCEDURE — 82009 KETONE BODYS QUAL: CPT | Performed by: EMERGENCY MEDICINE

## 2024-01-01 PROCEDURE — 1160F RVW MEDS BY RX/DR IN RCRD: CPT | Performed by: INTERNAL MEDICINE

## 2024-01-01 PROCEDURE — 0202U NFCT DS 22 TRGT SARS-COV-2: CPT | Performed by: EMERGENCY MEDICINE

## 2024-01-01 PROCEDURE — 83880 ASSAY OF NATRIURETIC PEPTIDE: CPT | Performed by: EMERGENCY MEDICINE

## 2024-01-01 PROCEDURE — 74176 CT ABD & PELVIS W/O CONTRAST: CPT

## 2024-01-01 PROCEDURE — 92610 EVALUATE SWALLOWING FUNCTION: CPT

## 2024-01-01 PROCEDURE — 84156 ASSAY OF PROTEIN URINE: CPT | Performed by: STUDENT IN AN ORGANIZED HEALTH CARE EDUCATION/TRAINING PROGRAM

## 2024-01-01 PROCEDURE — 87086 URINE CULTURE/COLONY COUNT: CPT

## 2024-01-01 PROCEDURE — 93306 TTE W/DOPPLER COMPLETE: CPT | Performed by: INTERNAL MEDICINE

## 2024-01-01 PROCEDURE — 87899 AGENT NOS ASSAY W/OPTIC: CPT | Performed by: INTERNAL MEDICINE

## 2024-01-01 PROCEDURE — 87186 SC STD MICRODIL/AGAR DIL: CPT

## 2024-01-01 PROCEDURE — 80047 BASIC METABLC PNL IONIZED CA: CPT

## 2024-01-01 PROCEDURE — 99223 1ST HOSP IP/OBS HIGH 75: CPT | Performed by: INTERNAL MEDICINE

## 2024-01-01 PROCEDURE — 83735 ASSAY OF MAGNESIUM: CPT | Performed by: EMERGENCY MEDICINE

## 2024-01-01 PROCEDURE — 85007 BL SMEAR W/DIFF WBC COUNT: CPT

## 2024-01-01 PROCEDURE — 04HY32Z INSERTION OF MONITORING DEVICE INTO LOWER ARTERY, PERCUTANEOUS APPROACH: ICD-10-PCS | Performed by: NURSE PRACTITIONER

## 2024-01-01 PROCEDURE — 82330 ASSAY OF CALCIUM: CPT | Performed by: INTERNAL MEDICINE

## 2024-01-01 PROCEDURE — 84484 ASSAY OF TROPONIN QUANT: CPT

## 2024-01-01 PROCEDURE — 99221 1ST HOSP IP/OBS SF/LOW 40: CPT | Performed by: NURSE PRACTITIONER

## 2024-01-01 PROCEDURE — 80048 BASIC METABOLIC PNL TOTAL CA: CPT | Performed by: STUDENT IN AN ORGANIZED HEALTH CARE EDUCATION/TRAINING PROGRAM

## 2024-01-01 PROCEDURE — 70547 MR ANGIOGRAPHY NECK W/O DYE: CPT

## 2024-01-01 PROCEDURE — 83735 ASSAY OF MAGNESIUM: CPT | Performed by: INTERNAL MEDICINE

## 2024-01-01 PROCEDURE — 87077 CULTURE AEROBIC IDENTIFY: CPT

## 2024-01-01 PROCEDURE — 84145 PROCALCITONIN (PCT): CPT | Performed by: INTERNAL MEDICINE

## 2024-01-01 PROCEDURE — 85007 BL SMEAR W/DIFF WBC COUNT: CPT | Performed by: EMERGENCY MEDICINE

## 2024-01-01 PROCEDURE — 25810000003 SODIUM CHLORIDE 0.9 % SOLUTION: Performed by: INTERNAL MEDICINE

## 2024-01-01 PROCEDURE — 80061 LIPID PANEL: CPT

## 2024-01-01 PROCEDURE — 99291 CRITICAL CARE FIRST HOUR: CPT

## 2024-01-01 PROCEDURE — 80053 COMPREHEN METABOLIC PANEL: CPT

## 2024-01-01 PROCEDURE — 99221 1ST HOSP IP/OBS SF/LOW 40: CPT | Performed by: INTERNAL MEDICINE

## 2024-01-01 PROCEDURE — 25010000002 DIGOXIN PER 500 MCG: Performed by: NURSE PRACTITIONER

## 2024-01-01 PROCEDURE — 99231 SBSQ HOSP IP/OBS SF/LOW 25: CPT | Performed by: INTERNAL MEDICINE

## 2024-01-01 PROCEDURE — 36600 WITHDRAWAL OF ARTERIAL BLOOD: CPT | Performed by: EMERGENCY MEDICINE

## 2024-01-01 PROCEDURE — 82550 ASSAY OF CK (CPK): CPT | Performed by: NURSE PRACTITIONER

## 2024-01-01 PROCEDURE — 25010000002 LORAZEPAM PER 2 MG: Performed by: INTERNAL MEDICINE

## 2024-01-01 PROCEDURE — 86304 IMMUNOASSAY TUMOR CA 125: CPT | Performed by: INTERNAL MEDICINE

## 2024-01-01 PROCEDURE — 77427 RADIATION TX MANAGEMENT X5: CPT | Performed by: INTERNAL MEDICINE

## 2024-01-01 PROCEDURE — 99214 OFFICE O/P EST MOD 30 MIN: CPT | Performed by: INTERNAL MEDICINE

## 2024-01-01 PROCEDURE — 93306 TTE W/DOPPLER COMPLETE: CPT

## 2024-01-01 PROCEDURE — 51702 INSERT TEMP BLADDER CATH: CPT

## 2024-01-01 PROCEDURE — 85014 HEMATOCRIT: CPT

## 2024-01-01 PROCEDURE — 25810000003 SODIUM CHLORIDE 0.9 % SOLUTION 250 ML FLEX CONT: Performed by: NURSE PRACTITIONER

## 2024-01-01 RX ORDER — LORAZEPAM 2 MG/ML
2 INJECTION INTRAMUSCULAR
Status: DISCONTINUED | OUTPATIENT
Start: 2024-01-01 | End: 2024-01-01 | Stop reason: HOSPADM

## 2024-01-01 RX ORDER — INSULIN LISPRO 100 [IU]/ML
2-9 INJECTION, SOLUTION INTRAVENOUS; SUBCUTANEOUS EVERY 6 HOURS SCHEDULED
Status: DISCONTINUED | OUTPATIENT
Start: 2024-01-01 | End: 2024-01-01

## 2024-01-01 RX ORDER — CALCIUM GLUCONATE 20 MG/ML
1000 INJECTION, SOLUTION INTRAVENOUS ONCE
Status: COMPLETED | OUTPATIENT
Start: 2024-01-01 | End: 2024-01-01

## 2024-01-01 RX ORDER — SODIUM CHLORIDE 0.9 % (FLUSH) 0.9 %
10 SYRINGE (ML) INJECTION EVERY 12 HOURS SCHEDULED
Status: DISCONTINUED | OUTPATIENT
Start: 2024-01-01 | End: 2024-01-01

## 2024-01-01 RX ORDER — RISPERIDONE 1 MG/1
2 TABLET ORAL EVERY EVENING
Status: DISCONTINUED | OUTPATIENT
Start: 2024-01-01 | End: 2024-01-01

## 2024-01-01 RX ORDER — AMLODIPINE BESYLATE 5 MG/1
5 TABLET ORAL
Status: DISCONTINUED | OUTPATIENT
Start: 2024-01-01 | End: 2024-01-01 | Stop reason: HOSPADM

## 2024-01-01 RX ORDER — MORPHINE SULFATE 20 MG/ML
10 SOLUTION ORAL
Status: DISCONTINUED | OUTPATIENT
Start: 2024-01-01 | End: 2024-01-01 | Stop reason: HOSPADM

## 2024-01-01 RX ORDER — NOREPINEPHRINE BITARTRATE 0.03 MG/ML
.02-.5 INJECTION, SOLUTION INTRAVENOUS
Status: DISCONTINUED | OUTPATIENT
Start: 2024-01-01 | End: 2024-01-01

## 2024-01-01 RX ORDER — SODIUM CHLORIDE 0.9 % (FLUSH) 0.9 %
10 SYRINGE (ML) INJECTION AS NEEDED
Status: DISCONTINUED | OUTPATIENT
Start: 2024-01-01 | End: 2024-01-01

## 2024-01-01 RX ORDER — SODIUM CHLORIDE 9 MG/ML
40 INJECTION, SOLUTION INTRAVENOUS AS NEEDED
Status: DISCONTINUED | OUTPATIENT
Start: 2024-01-01 | End: 2024-01-01

## 2024-01-01 RX ORDER — MORPHINE SULFATE 2 MG/ML
1 INJECTION, SOLUTION INTRAMUSCULAR; INTRAVENOUS
Status: DISCONTINUED | OUTPATIENT
Start: 2024-01-01 | End: 2024-01-01 | Stop reason: HOSPADM

## 2024-01-01 RX ORDER — ALBUMIN, HUMAN INJ 5% 5 %
1000 SOLUTION INTRAVENOUS ONCE
Status: COMPLETED | OUTPATIENT
Start: 2024-01-01 | End: 2024-01-01

## 2024-01-01 RX ORDER — CALCIUM CHLORIDE, MAGNESIUM CHLORIDE, SODIUM CHLORIDE, SODIUM BICARBONATE, POTASSIUM CHLORIDE AND SODIUM PHOSPHATE DIBASIC DIHYDRATE 3.68; 3.05; 6.34; 3.09; .314; .187 G/L; G/L; G/L; G/L; G/L; G/L
1000 INJECTION INTRAVENOUS CONTINUOUS
Status: DISCONTINUED | OUTPATIENT
Start: 2024-01-01 | End: 2024-01-01

## 2024-01-01 RX ORDER — LORAZEPAM 1 MG/1
2 TABLET ORAL
Status: DISCONTINUED | OUTPATIENT
Start: 2024-01-01 | End: 2024-01-01 | Stop reason: HOSPADM

## 2024-01-01 RX ORDER — ACETAMINOPHEN 650 MG/1
650 SUPPOSITORY RECTAL EVERY 4 HOURS PRN
Status: DISCONTINUED | OUTPATIENT
Start: 2024-01-01 | End: 2024-01-01 | Stop reason: HOSPADM

## 2024-01-01 RX ORDER — LORAZEPAM 2 MG/ML
2 CONCENTRATE ORAL
Status: DISCONTINUED | OUTPATIENT
Start: 2024-01-01 | End: 2024-01-01 | Stop reason: HOSPADM

## 2024-01-01 RX ORDER — ACETAMINOPHEN 325 MG/1
650 TABLET ORAL EVERY 4 HOURS PRN
Status: DISCONTINUED | OUTPATIENT
Start: 2024-01-01 | End: 2024-01-01

## 2024-01-01 RX ORDER — HALOPERIDOL 1 MG/1
1 TABLET ORAL EVERY 4 HOURS PRN
Status: DISCONTINUED | OUTPATIENT
Start: 2024-01-01 | End: 2024-01-01 | Stop reason: HOSPADM

## 2024-01-01 RX ORDER — ACETAMINOPHEN 650 MG/1
650 SUPPOSITORY RECTAL EVERY 4 HOURS PRN
Status: DISCONTINUED | OUTPATIENT
Start: 2024-01-01 | End: 2024-01-01

## 2024-01-01 RX ORDER — LISINOPRIL 20 MG/1
40 TABLET ORAL DAILY
Status: DISCONTINUED | OUTPATIENT
Start: 2024-01-01 | End: 2024-01-01

## 2024-01-01 RX ORDER — BISACODYL 5 MG/1
5 TABLET, DELAYED RELEASE ORAL DAILY PRN
Status: DISCONTINUED | OUTPATIENT
Start: 2024-01-01 | End: 2024-01-01

## 2024-01-01 RX ORDER — LORAZEPAM 2 MG/ML
0.5 INJECTION INTRAMUSCULAR
Status: DISCONTINUED | OUTPATIENT
Start: 2024-01-01 | End: 2024-01-01 | Stop reason: HOSPADM

## 2024-01-01 RX ORDER — IBUPROFEN 600 MG/1
1 TABLET ORAL
Status: DISCONTINUED | OUTPATIENT
Start: 2024-01-01 | End: 2024-01-01

## 2024-01-01 RX ORDER — ONDANSETRON 4 MG/1
4 TABLET, ORALLY DISINTEGRATING ORAL EVERY 6 HOURS PRN
Start: 2024-01-01

## 2024-01-01 RX ORDER — LORAZEPAM 2 MG/ML
1 CONCENTRATE ORAL
Status: DISCONTINUED | OUTPATIENT
Start: 2024-01-01 | End: 2024-01-01 | Stop reason: HOSPADM

## 2024-01-01 RX ORDER — FENTANYL CITRATE 50 UG/ML
25 INJECTION, SOLUTION INTRAMUSCULAR; INTRAVENOUS
Status: DISCONTINUED | OUTPATIENT
Start: 2024-01-01 | End: 2024-01-01

## 2024-01-01 RX ORDER — ALUMINA, MAGNESIA, AND SIMETHICONE 2400; 2400; 240 MG/30ML; MG/30ML; MG/30ML
15 SUSPENSION ORAL EVERY 6 HOURS PRN
Status: DISCONTINUED | OUTPATIENT
Start: 2024-01-01 | End: 2024-01-01

## 2024-01-01 RX ORDER — SODIUM CHLORIDE 0.9 % (FLUSH) 0.9 %
20 SYRINGE (ML) INJECTION AS NEEDED
Status: DISCONTINUED | OUTPATIENT
Start: 2024-01-01 | End: 2024-01-01

## 2024-01-01 RX ORDER — VANCOMYCIN 1.75 GRAM/500 ML IN 0.9 % SODIUM CHLORIDE INTRAVENOUS
22 ONCE
Status: COMPLETED | OUTPATIENT
Start: 2024-01-01 | End: 2024-01-01

## 2024-01-01 RX ORDER — NICOTINE POLACRILEX 4 MG
15 LOZENGE BUCCAL
Status: DISCONTINUED | OUTPATIENT
Start: 2024-01-01 | End: 2024-01-01

## 2024-01-01 RX ORDER — METOPROLOL SUCCINATE 50 MG/1
50 TABLET, EXTENDED RELEASE ORAL
Status: DISCONTINUED | OUTPATIENT
Start: 2024-01-01 | End: 2024-01-01

## 2024-01-01 RX ORDER — SODIUM CHLORIDE 0.9 % (FLUSH) 0.9 %
10 SYRINGE (ML) INJECTION AS NEEDED
Status: DISCONTINUED | OUTPATIENT
Start: 2024-01-01 | End: 2024-01-01 | Stop reason: HOSPADM

## 2024-01-01 RX ORDER — LORAZEPAM 2 MG/ML
1 INJECTION INTRAMUSCULAR ONCE
Status: COMPLETED | OUTPATIENT
Start: 2024-01-01 | End: 2024-01-01

## 2024-01-01 RX ORDER — ASPIRIN 300 MG/1
300 SUPPOSITORY RECTAL DAILY
Status: DISCONTINUED | OUTPATIENT
Start: 2024-01-01 | End: 2024-01-01

## 2024-01-01 RX ORDER — DIGOXIN 0.25 MG/ML
250 INJECTION INTRAMUSCULAR; INTRAVENOUS ONCE
Status: COMPLETED | OUTPATIENT
Start: 2024-01-01 | End: 2024-01-01

## 2024-01-01 RX ORDER — TRAMADOL HYDROCHLORIDE 50 MG/1
50 TABLET ORAL EVERY 6 HOURS PRN
Status: DISCONTINUED | OUTPATIENT
Start: 2024-01-01 | End: 2024-01-01

## 2024-01-01 RX ORDER — MONTELUKAST SODIUM 10 MG/1
10 TABLET ORAL NIGHTLY
Status: DISCONTINUED | OUTPATIENT
Start: 2024-01-01 | End: 2024-01-01

## 2024-01-01 RX ORDER — ACETAMINOPHEN 160 MG/5ML
650 SOLUTION ORAL EVERY 4 HOURS PRN
Status: DISCONTINUED | OUTPATIENT
Start: 2024-01-01 | End: 2024-01-01 | Stop reason: HOSPADM

## 2024-01-01 RX ORDER — ONDANSETRON 2 MG/ML
4 INJECTION INTRAMUSCULAR; INTRAVENOUS EVERY 6 HOURS PRN
Status: DISCONTINUED | OUTPATIENT
Start: 2024-01-01 | End: 2024-01-01 | Stop reason: HOSPADM

## 2024-01-01 RX ORDER — DIPHENOXYLATE HYDROCHLORIDE AND ATROPINE SULFATE 2.5; .025 MG/1; MG/1
1 TABLET ORAL
Status: DISCONTINUED | OUTPATIENT
Start: 2024-01-01 | End: 2024-01-01 | Stop reason: HOSPADM

## 2024-01-01 RX ORDER — LORAZEPAM 2 MG/ML
1 INJECTION INTRAMUSCULAR
Status: DISCONTINUED | OUTPATIENT
Start: 2024-01-01 | End: 2024-01-01 | Stop reason: HOSPADM

## 2024-01-01 RX ORDER — ERGOCALCIFEROL 1.25 MG/1
50000 CAPSULE, LIQUID FILLED ORAL
Status: DISCONTINUED | OUTPATIENT
Start: 2024-01-01 | End: 2024-01-01

## 2024-01-01 RX ORDER — NITROGLYCERIN 0.4 MG/1
0.4 TABLET SUBLINGUAL
Status: DISCONTINUED | OUTPATIENT
Start: 2024-01-01 | End: 2024-01-01

## 2024-01-01 RX ORDER — DIPHENOXYLATE HYDROCHLORIDE AND ATROPINE SULFATE 2.5; .025 MG/1; MG/1
1 TABLET ORAL DAILY
Status: DISCONTINUED | OUTPATIENT
Start: 2024-01-01 | End: 2024-01-01

## 2024-01-01 RX ORDER — VASOPRESSIN IN DEXTROSE 5 % 20/100 ML
0.03 PLASTIC BAG, INJECTION (ML) INTRAVENOUS CONTINUOUS
Status: DISCONTINUED | OUTPATIENT
Start: 2024-01-01 | End: 2024-01-01

## 2024-01-01 RX ORDER — POTASSIUM CHLORIDE 29.8 MG/ML
20 INJECTION INTRAVENOUS ONCE
Status: CANCELLED | OUTPATIENT
Start: 2024-01-01 | End: 2024-01-01

## 2024-01-01 RX ORDER — HEPARIN SODIUM 1000 [USP'U]/ML
INJECTION, SOLUTION INTRAVENOUS; SUBCUTANEOUS AS NEEDED
Status: DISCONTINUED | OUTPATIENT
Start: 2024-01-01 | End: 2024-01-01

## 2024-01-01 RX ORDER — LIDOCAINE HYDROCHLORIDE 20 MG/ML
5 SOLUTION OROPHARYNGEAL EVERY 4 HOURS PRN
Status: DISCONTINUED | OUTPATIENT
Start: 2024-01-01 | End: 2024-01-01 | Stop reason: HOSPADM

## 2024-01-01 RX ORDER — LORAZEPAM 2 MG/ML
0.5 CONCENTRATE ORAL
Status: DISCONTINUED | OUTPATIENT
Start: 2024-01-01 | End: 2024-01-01 | Stop reason: HOSPADM

## 2024-01-01 RX ORDER — LORAZEPAM 0.5 MG/1
0.5 TABLET ORAL
Status: DISCONTINUED | OUTPATIENT
Start: 2024-01-01 | End: 2024-01-01 | Stop reason: HOSPADM

## 2024-01-01 RX ORDER — HEPARIN SODIUM 5000 [USP'U]/ML
5000 INJECTION, SOLUTION INTRAVENOUS; SUBCUTANEOUS EVERY 12 HOURS SCHEDULED
Status: DISCONTINUED | OUTPATIENT
Start: 2024-01-01 | End: 2024-01-01

## 2024-01-01 RX ORDER — PAROXETINE 20 MG/1
30 TABLET, FILM COATED ORAL DAILY
Status: DISCONTINUED | OUTPATIENT
Start: 2024-01-01 | End: 2024-01-01

## 2024-01-01 RX ORDER — LORAZEPAM 2 MG/ML
1 INJECTION INTRAMUSCULAR
Start: 2024-01-01 | End: 2024-11-06

## 2024-01-01 RX ORDER — AMOXICILLIN 250 MG
2 CAPSULE ORAL 2 TIMES DAILY
Status: DISCONTINUED | OUTPATIENT
Start: 2024-01-01 | End: 2024-01-01

## 2024-01-01 RX ORDER — INSULIN LISPRO 100 [IU]/ML
2-7 INJECTION, SOLUTION INTRAVENOUS; SUBCUTANEOUS
Status: DISCONTINUED | OUTPATIENT
Start: 2024-01-01 | End: 2024-01-01

## 2024-01-01 RX ORDER — HYDROXYZINE HYDROCHLORIDE 25 MG/1
50 TABLET, FILM COATED ORAL NIGHTLY
Status: DISCONTINUED | OUTPATIENT
Start: 2024-01-01 | End: 2024-01-01

## 2024-01-01 RX ORDER — LORAZEPAM 1 MG/1
1 TABLET ORAL
Status: DISCONTINUED | OUTPATIENT
Start: 2024-01-01 | End: 2024-01-01 | Stop reason: HOSPADM

## 2024-01-01 RX ORDER — SCOLOPAMINE TRANSDERMAL SYSTEM 1 MG/1
1 PATCH, EXTENDED RELEASE TRANSDERMAL
Status: DISCONTINUED | OUTPATIENT
Start: 2024-01-01 | End: 2024-01-01 | Stop reason: HOSPADM

## 2024-01-01 RX ORDER — ATORVASTATIN CALCIUM 40 MG/1
80 TABLET, FILM COATED ORAL NIGHTLY
Status: DISCONTINUED | OUTPATIENT
Start: 2024-01-01 | End: 2024-01-01

## 2024-01-01 RX ORDER — ONDANSETRON 2 MG/ML
4 INJECTION INTRAMUSCULAR; INTRAVENOUS EVERY 6 HOURS PRN
Start: 2024-01-01

## 2024-01-01 RX ORDER — ONDANSETRON 4 MG/1
4 TABLET, ORALLY DISINTEGRATING ORAL EVERY 6 HOURS PRN
Status: DISCONTINUED | OUTPATIENT
Start: 2024-01-01 | End: 2024-01-01 | Stop reason: HOSPADM

## 2024-01-01 RX ORDER — DILTIAZEM HCL/D5W 125 MG/125
5-15 PLASTIC BAG, INJECTION (ML) INTRAVENOUS CONTINUOUS
Status: DISCONTINUED | OUTPATIENT
Start: 2024-01-01 | End: 2024-01-01

## 2024-01-01 RX ORDER — ZINC SULFATE 50(220)MG
220 CAPSULE ORAL DAILY
Status: DISCONTINUED | OUTPATIENT
Start: 2024-01-01 | End: 2024-01-01

## 2024-01-01 RX ORDER — TRAMADOL HYDROCHLORIDE 50 MG/1
50 TABLET ORAL EVERY 6 HOURS PRN
COMMUNITY
End: 2024-01-01

## 2024-01-01 RX ORDER — BISACODYL 10 MG
10 SUPPOSITORY, RECTAL RECTAL DAILY PRN
Status: DISCONTINUED | OUTPATIENT
Start: 2024-01-01 | End: 2024-01-01

## 2024-01-01 RX ORDER — ACETAMINOPHEN 325 MG/1
650 TABLET ORAL EVERY 4 HOURS PRN
Status: DISCONTINUED | OUTPATIENT
Start: 2024-01-01 | End: 2024-01-01 | Stop reason: HOSPADM

## 2024-01-01 RX ORDER — HALOPERIDOL 5 MG/ML
1 INJECTION INTRAMUSCULAR EVERY 4 HOURS PRN
Status: DISCONTINUED | OUTPATIENT
Start: 2024-01-01 | End: 2024-01-01 | Stop reason: HOSPADM

## 2024-01-01 RX ORDER — ASPIRIN 81 MG/1
81 TABLET, CHEWABLE ORAL DAILY
Status: DISCONTINUED | OUTPATIENT
Start: 2024-01-01 | End: 2024-01-01

## 2024-01-01 RX ORDER — POLYETHYLENE GLYCOL 3350 17 G/17G
17 POWDER, FOR SOLUTION ORAL DAILY PRN
Status: DISCONTINUED | OUTPATIENT
Start: 2024-01-01 | End: 2024-01-01

## 2024-01-01 RX ORDER — MORPHINE SULFATE 2 MG/ML
2 INJECTION, SOLUTION INTRAMUSCULAR; INTRAVENOUS
Status: ACTIVE | OUTPATIENT
Start: 2024-01-01 | End: 2024-11-07

## 2024-01-01 RX ORDER — DEXTROSE MONOHYDRATE 25 G/50ML
25 INJECTION, SOLUTION INTRAVENOUS
Status: DISCONTINUED | OUTPATIENT
Start: 2024-01-01 | End: 2024-01-01

## 2024-01-01 RX ADMIN — HEPARIN SODIUM 5000 UNITS: 5000 INJECTION INTRAVENOUS; SUBCUTANEOUS at 10:48

## 2024-01-01 RX ADMIN — NOREPINEPHRINE BITARTRATE 0.4 MCG/KG/MIN: 0.06 INJECTION, SOLUTION INTRAVENOUS at 06:13

## 2024-01-01 RX ADMIN — Medication 1750 MG: at 07:46

## 2024-01-01 RX ADMIN — LORAZEPAM 1 MG: 2 INJECTION INTRAMUSCULAR; INTRAVENOUS at 21:50

## 2024-01-01 RX ADMIN — VASOPRESSIN 0.03 UNITS/MIN: 0.2 INJECTION INTRAVENOUS at 08:51

## 2024-01-01 RX ADMIN — Medication 10 ML: at 08:36

## 2024-01-01 RX ADMIN — INSULIN LISPRO 4 UNITS: 100 INJECTION, SOLUTION INTRAVENOUS; SUBCUTANEOUS at 13:30

## 2024-01-01 RX ADMIN — CALCIUM GLUCONATE 1000 MG: 20 INJECTION, SOLUTION INTRAVENOUS at 09:56

## 2024-01-01 RX ADMIN — Medication 10 ML: at 10:48

## 2024-01-01 RX ADMIN — Medication 10 ML: at 22:41

## 2024-01-01 RX ADMIN — NOREPINEPHRINE BITARTRATE 0.3 MCG/KG/MIN: 0.03 INJECTION, SOLUTION INTRAVENOUS at 02:46

## 2024-01-01 RX ADMIN — SODIUM CHLORIDE 2310 ML: 9 INJECTION, SOLUTION INTRAVENOUS at 06:39

## 2024-01-01 RX ADMIN — Medication 5 MG/HR: at 05:49

## 2024-01-01 RX ADMIN — Medication 10 ML: at 10:49

## 2024-01-01 RX ADMIN — VASOPRESSIN 0.03 UNITS/MIN: 0.2 INJECTION INTRAVENOUS at 21:07

## 2024-01-01 RX ADMIN — MORPHINE SULFATE 1 MG: 2 INJECTION, SOLUTION INTRAMUSCULAR; INTRAVENOUS at 00:42

## 2024-01-01 RX ADMIN — INSULIN LISPRO 4 UNITS: 100 INJECTION, SOLUTION INTRAVENOUS; SUBCUTANEOUS at 06:39

## 2024-01-01 RX ADMIN — Medication 10 ML: at 20:19

## 2024-01-01 RX ADMIN — SODIUM BICARBONATE 150 MEQ: 84 INJECTION INTRAVENOUS at 16:25

## 2024-01-01 RX ADMIN — HYDROMORPHONE HYDROCHLORIDE 1 MG: 1 INJECTION, SOLUTION INTRAMUSCULAR; INTRAVENOUS; SUBCUTANEOUS at 23:17

## 2024-01-01 RX ADMIN — INSULIN LISPRO 2 UNITS: 100 INJECTION, SOLUTION INTRAVENOUS; SUBCUTANEOUS at 00:22

## 2024-01-01 RX ADMIN — HYDROMORPHONE HYDROCHLORIDE 1 MG: 1 INJECTION, SOLUTION INTRAMUSCULAR; INTRAVENOUS; SUBCUTANEOUS at 06:14

## 2024-01-01 RX ADMIN — SODIUM BICARBONATE 150 MEQ: 84 INJECTION INTRAVENOUS at 07:50

## 2024-01-01 RX ADMIN — HYDROMORPHONE HYDROCHLORIDE 1 MG: 1 INJECTION, SOLUTION INTRAMUSCULAR; INTRAVENOUS; SUBCUTANEOUS at 00:29

## 2024-01-01 RX ADMIN — LORAZEPAM 2 MG: 2 INJECTION INTRAMUSCULAR; INTRAVENOUS at 15:46

## 2024-01-01 RX ADMIN — FENTANYL CITRATE 25 MCG: 50 INJECTION, SOLUTION INTRAMUSCULAR; INTRAVENOUS at 10:36

## 2024-01-01 RX ADMIN — PHENYLEPHRINE HYDROCHLORIDE 3 MCG/KG/MIN: 10 INJECTION INTRAVENOUS at 00:23

## 2024-01-01 RX ADMIN — LORAZEPAM 1 MG: 2 INJECTION INTRAMUSCULAR; INTRAVENOUS at 09:42

## 2024-01-01 RX ADMIN — SODIUM BICARBONATE 150 MEQ: 84 INJECTION INTRAVENOUS at 23:24

## 2024-01-01 RX ADMIN — MUPIROCIN 1 APPLICATION: 20 OINTMENT TOPICAL at 10:48

## 2024-01-01 RX ADMIN — LORAZEPAM 1 MG: 2 INJECTION INTRAMUSCULAR; INTRAVENOUS at 19:23

## 2024-01-01 RX ADMIN — HEPARIN SODIUM 5000 UNITS: 5000 INJECTION INTRAVENOUS; SUBCUTANEOUS at 08:35

## 2024-01-01 RX ADMIN — FENTANYL CITRATE 25 MCG: 50 INJECTION, SOLUTION INTRAMUSCULAR; INTRAVENOUS at 19:41

## 2024-01-01 RX ADMIN — SODIUM BICARBONATE 150 MEQ: 84 INJECTION INTRAVENOUS at 11:08

## 2024-01-01 RX ADMIN — HYDROMORPHONE HYDROCHLORIDE 1 MG: 1 INJECTION, SOLUTION INTRAMUSCULAR; INTRAVENOUS; SUBCUTANEOUS at 21:26

## 2024-01-01 RX ADMIN — Medication 10 ML: at 10:05

## 2024-01-01 RX ADMIN — HEPARIN SODIUM 5000 UNITS: 5000 INJECTION INTRAVENOUS; SUBCUTANEOUS at 23:26

## 2024-01-01 RX ADMIN — NOREPINEPHRINE BITARTRATE 0.5 MCG/KG/MIN: 0.06 INJECTION, SOLUTION INTRAVENOUS at 09:56

## 2024-01-01 RX ADMIN — THERA TABS 1 TABLET: TAB at 15:53

## 2024-01-01 RX ADMIN — HYDROMORPHONE HYDROCHLORIDE 1 MG: 1 INJECTION, SOLUTION INTRAMUSCULAR; INTRAVENOUS; SUBCUTANEOUS at 21:50

## 2024-01-01 RX ADMIN — FENTANYL CITRATE 25 MCG: 50 INJECTION, SOLUTION INTRAMUSCULAR; INTRAVENOUS at 13:03

## 2024-01-01 RX ADMIN — VASOPRESSIN 0.03 UNITS/MIN: 0.2 INJECTION INTRAVENOUS at 23:42

## 2024-01-01 RX ADMIN — HYDROMORPHONE HYDROCHLORIDE 1 MG: 1 INJECTION, SOLUTION INTRAMUSCULAR; INTRAVENOUS; SUBCUTANEOUS at 22:22

## 2024-01-01 RX ADMIN — MUPIROCIN 1 APPLICATION: 20 OINTMENT TOPICAL at 11:07

## 2024-01-01 RX ADMIN — NOREPINEPHRINE BITARTRATE 0.28 MCG/KG/MIN: 0.03 INJECTION, SOLUTION INTRAVENOUS at 22:17

## 2024-01-01 RX ADMIN — SODIUM CHLORIDE 1000 ML: 9 INJECTION, SOLUTION INTRAVENOUS at 11:09

## 2024-01-01 RX ADMIN — INSULIN LISPRO 2 UNITS: 100 INJECTION, SOLUTION INTRAVENOUS; SUBCUTANEOUS at 18:43

## 2024-01-01 RX ADMIN — PHENYLEPHRINE HYDROCHLORIDE 5 MCG/KG/MIN: 10 INJECTION INTRAVENOUS at 06:12

## 2024-01-01 RX ADMIN — PHENYLEPHRINE HYDROCHLORIDE 2.5 MCG/KG/MIN: 10 INJECTION INTRAVENOUS at 04:37

## 2024-01-01 RX ADMIN — PAROXETINE HYDROCHLORIDE 30 MG: 20 TABLET, FILM COATED ORAL at 15:53

## 2024-01-01 RX ADMIN — MORPHINE SULFATE 1 MG: 2 INJECTION, SOLUTION INTRAMUSCULAR; INTRAVENOUS at 18:24

## 2024-01-01 RX ADMIN — MUPIROCIN 1 APPLICATION: 20 OINTMENT TOPICAL at 21:51

## 2024-01-01 RX ADMIN — CEFEPIME 2000 MG: 2 INJECTION, POWDER, FOR SOLUTION INTRAVENOUS at 07:47

## 2024-01-01 RX ADMIN — MUPIROCIN 1 APPLICATION: 20 OINTMENT TOPICAL at 08:35

## 2024-01-01 RX ADMIN — NOREPINEPHRINE BITARTRATE 0.02 MCG/KG/MIN: 0.03 INJECTION, SOLUTION INTRAVENOUS at 08:57

## 2024-01-01 RX ADMIN — PHENYLEPHRINE HYDROCHLORIDE 0.5 MCG/KG/MIN: 10 INJECTION INTRAVENOUS at 01:14

## 2024-01-01 RX ADMIN — RISPERIDONE 2 MG: 1 TABLET, FILM COATED ORAL at 17:11

## 2024-01-01 RX ADMIN — CEFEPIME 2000 MG: 2 INJECTION, POWDER, FOR SOLUTION INTRAVENOUS at 15:53

## 2024-01-01 RX ADMIN — NOREPINEPHRINE BITARTRATE 0.3 MCG/KG/MIN: 0.06 INJECTION, SOLUTION INTRAVENOUS at 17:21

## 2024-01-01 RX ADMIN — SODIUM BICARBONATE 150 MEQ: 84 INJECTION INTRAVENOUS at 03:08

## 2024-01-01 RX ADMIN — INSULIN LISPRO 4 UNITS: 100 INJECTION, SOLUTION INTRAVENOUS; SUBCUTANEOUS at 11:46

## 2024-01-01 RX ADMIN — HYDROMORPHONE HYDROCHLORIDE 1 MG: 1 INJECTION, SOLUTION INTRAMUSCULAR; INTRAVENOUS; SUBCUTANEOUS at 09:42

## 2024-01-01 RX ADMIN — NOREPINEPHRINE BITARTRATE 0.25 MCG/KG/MIN: 0.06 INJECTION, SOLUTION INTRAVENOUS at 07:39

## 2024-01-01 RX ADMIN — PHENYLEPHRINE HYDROCHLORIDE 5 MCG/KG/MIN: 10 INJECTION INTRAVENOUS at 13:30

## 2024-01-01 RX ADMIN — PHENYLEPHRINE HYDROCHLORIDE 3 MCG/KG/MIN: 10 INJECTION INTRAVENOUS at 19:35

## 2024-01-01 RX ADMIN — INSULIN LISPRO 2 UNITS: 100 INJECTION, SOLUTION INTRAVENOUS; SUBCUTANEOUS at 17:21

## 2024-01-01 RX ADMIN — LORAZEPAM 1 MG: 2 INJECTION INTRAMUSCULAR; INTRAVENOUS at 00:29

## 2024-01-01 RX ADMIN — Medication 220 MG: at 15:53

## 2024-01-01 RX ADMIN — Medication 10 ML: at 20:20

## 2024-01-01 RX ADMIN — HYDROMORPHONE HYDROCHLORIDE 2 MG: 1 INJECTION, SOLUTION INTRAMUSCULAR; INTRAVENOUS; SUBCUTANEOUS at 18:45

## 2024-01-01 RX ADMIN — LORAZEPAM 1 MG: 2 INJECTION INTRAMUSCULAR; INTRAVENOUS at 06:14

## 2024-01-01 RX ADMIN — PHENYLEPHRINE HYDROCHLORIDE 2 MCG/KG/MIN: 10 INJECTION INTRAVENOUS at 11:48

## 2024-01-01 RX ADMIN — SODIUM BICARBONATE 50 MEQ: 84 INJECTION INTRAVENOUS at 11:05

## 2024-01-01 RX ADMIN — INSULIN LISPRO 3 UNITS: 100 INJECTION, SOLUTION INTRAVENOUS; SUBCUTANEOUS at 23:25

## 2024-01-01 RX ADMIN — VASOPRESSIN 0.03 UNITS/MIN: 0.2 INJECTION INTRAVENOUS at 08:35

## 2024-01-01 RX ADMIN — PHENYLEPHRINE HYDROCHLORIDE 3 MCG/KG/MIN: 10 INJECTION INTRAVENOUS at 05:14

## 2024-01-01 RX ADMIN — MORPHINE SULFATE 1 MG: 2 INJECTION, SOLUTION INTRAMUSCULAR; INTRAVENOUS at 20:14

## 2024-01-01 RX ADMIN — LORAZEPAM 1 MG: 2 INJECTION INTRAMUSCULAR; INTRAVENOUS at 20:22

## 2024-01-01 RX ADMIN — MUPIROCIN 1 APPLICATION: 20 OINTMENT TOPICAL at 23:26

## 2024-01-01 RX ADMIN — HEPARIN SODIUM 5000 UNITS: 5000 INJECTION INTRAVENOUS; SUBCUTANEOUS at 12:21

## 2024-01-01 RX ADMIN — SCOPALAMINE 1 PATCH: 1 PATCH, EXTENDED RELEASE TRANSDERMAL at 20:27

## 2024-01-01 RX ADMIN — HYDROMORPHONE HYDROCHLORIDE 1 MG: 1 INJECTION, SOLUTION INTRAMUSCULAR; INTRAVENOUS; SUBCUTANEOUS at 03:47

## 2024-01-01 RX ADMIN — HEPARIN SODIUM 5000 UNITS: 5000 INJECTION INTRAVENOUS; SUBCUTANEOUS at 21:52

## 2024-01-01 RX ADMIN — HYDROMORPHONE HYDROCHLORIDE 1 MG: 1 INJECTION, SOLUTION INTRAMUSCULAR; INTRAVENOUS; SUBCUTANEOUS at 20:22

## 2024-01-01 RX ADMIN — Medication 10 ML: at 10:04

## 2024-01-01 RX ADMIN — ASPIRIN 300 MG: 300 SUPPOSITORY RECTAL at 13:30

## 2024-01-01 RX ADMIN — SODIUM BICARBONATE 150 MEQ: 84 INJECTION INTRAVENOUS at 22:17

## 2024-01-01 RX ADMIN — SODIUM BICARBONATE 150 MEQ: 84 INJECTION INTRAVENOUS at 14:56

## 2024-01-01 RX ADMIN — ALBUMIN (HUMAN) 1000 ML: 12.5 INJECTION, SOLUTION INTRAVENOUS at 11:08

## 2024-01-01 RX ADMIN — MORPHINE SULFATE 1 MG: 2 INJECTION, SOLUTION INTRAMUSCULAR; INTRAVENOUS at 15:46

## 2024-01-01 RX ADMIN — DIGOXIN 250 MCG: 0.25 INJECTION INTRAMUSCULAR; INTRAVENOUS at 12:21

## 2024-01-01 RX ADMIN — AZITHROMYCIN MONOHYDRATE 500 MG: 500 INJECTION, POWDER, LYOPHILIZED, FOR SOLUTION INTRAVENOUS at 08:34

## 2024-01-01 RX ADMIN — LORAZEPAM 2 MG: 2 INJECTION INTRAMUSCULAR; INTRAVENOUS at 22:43

## 2024-01-01 RX ADMIN — PHENYLEPHRINE HYDROCHLORIDE 5 MCG/KG/MIN: 10 INJECTION INTRAVENOUS at 09:56

## 2024-01-01 RX ADMIN — SODIUM BICARBONATE 150 MEQ: 84 INJECTION INTRAVENOUS at 06:10

## 2024-01-01 RX ADMIN — NOREPINEPHRINE BITARTRATE 0.35 MCG/KG/MIN: 0.06 INJECTION, SOLUTION INTRAVENOUS at 00:23

## 2024-01-01 RX ADMIN — HYDROMORPHONE HYDROCHLORIDE 1 MG: 1 INJECTION, SOLUTION INTRAMUSCULAR; INTRAVENOUS; SUBCUTANEOUS at 20:58

## 2024-01-01 RX ADMIN — ASPIRIN 300 MG: 300 SUPPOSITORY RECTAL at 08:35

## 2024-01-01 RX ADMIN — INSULIN LISPRO 3 UNITS: 100 INJECTION, SOLUTION INTRAVENOUS; SUBCUTANEOUS at 07:30

## 2024-01-01 RX ADMIN — CEFEPIME 2000 MG: 2 INJECTION, POWDER, FOR SOLUTION INTRAVENOUS at 16:23

## 2024-01-01 RX ADMIN — NOREPINEPHRINE BITARTRATE 0.25 MCG/KG/MIN: 0.03 INJECTION, SOLUTION INTRAVENOUS at 18:43

## 2024-01-01 RX ADMIN — NOREPINEPHRINE BITARTRATE 0.2 MCG/KG/MIN: 0.03 INJECTION, SOLUTION INTRAVENOUS at 14:14

## 2024-08-06 ENCOUNTER — TELEPHONE (OUTPATIENT)
Dept: ONCOLOGY | Facility: CLINIC | Age: 59
End: 2024-08-06
Payer: MEDICARE

## 2024-08-19 NOTE — PROGRESS NOTES
HEMATOLOGY ONCOLOGY OUTPATIENT CONSULTATION       Patient name: Felisha Mukherjee  : 1965  MRN: [id number]  Primary Care Physician: Nichol Lopez  Referring Physician: Jesus Robbins MD  Reason For Consult:       History of Present Illness:  Patient is a 59 y.o. female who has been referred to us for further evaluation and management of anemia.  She was  Recently seen by Dr. Robbins for CKD and was noted to have significant anemia.  Most recent labs are reviewed as follows:    2024:  CBC: 13.5/7.1/27.6/300 [MCV 64, MCH 16.4, RDW 19.0] [ANC 9.3, ALC 2.7]  Iron 12, transferrin 371, iron saturation 2.3%    7/3/2024:  CBC: 9.0/7.2/26.5/322.  CMP: Unremarkable except for BUN/creat= 1.21 [eGFR 52]    Previously a urinalysis on 7/3/2024 was consistent with gross hematuria.  Workup for plasma cell disorders was reported negative    Patient has been started on oral iron supplementation for iron deficiency anemia.    Of note, the patient has history of persistent vaginal bleeding for almost 6 months to 1 year and patient reported that bleeding is significant at times.    She has been recently diagnosed with uterine Adenocarcinoma which was discovered incidentally on a Renal US while being evaluated for CKD. She has established care with [facility]yn On[facility] team for the same.    24: Endometrial Biopsy  Endometrial adenocarcinoma, endometrioid type,   FIGO grade 1.       FAMILY HISTORY:  MGM: ovarian cancer  GGM:: ovarian cancer     PMH: DM, HTN, CKD         Subjective:  Patient presents for initial consultation today. She reported having ongoing fatigue and weakness. No other significant symptoms reported. Denied any history of RBC and /or Iron infusions.    She is being worked up at [facility] as described and is planned for pelvic MRI with tentative plans for Surgery.      Past Medical History:   Diagnosis Date    Diabetes mellitus     Hypertension     Uterine mass         Past Surgical History:   Procedure Laterality Date    CHOLECYSTECTOMY           Current Outpatient Medications:     docusate sodium 100 MG capsule, Take 1 capsule by mouth Daily., Disp: , Rfl:     Farxiga 10 MG tablet, Take 10 mg by mouth Daily., Disp: , Rfl:     FeroSul 325 (65 Fe) MG tablet, Take  by mouth See Admin Instructions., Disp: , Rfl:     hydrOXYzine (ATARAX) 50 MG tablet, Take 1 tablet by mouth Every Night., Disp: , Rfl:     lisinopril (PRINIVIL,ZESTRIL) 40 MG tablet, Take 1 tablet by mouth Daily., Disp: , Rfl:     metFORMIN (GLUCOPHAGE) 500 MG tablet, Take 1 tablet by mouth 2 (Two) Times a Day., Disp: , Rfl:     metoprolol succinate XL (TOPROL-XL) 50 MG 24 hr tablet, Take 1 tablet by mouth every night at bedtime., Disp: , Rfl:     PARoxetine (PAXIL) 30 MG tablet, Take 1 tablet by mouth., Disp: , Rfl:     risperiDONE (risperDAL) 2 MG tablet, Take 2 tablets by mouth., Disp: , Rfl:     vitamin D (ERGOCALCIFEROL) 1.25 MG (55697 UT) capsule capsule, TAKE ONE CAPSULE BY MOUTH ONCE A WEEK ON WEDNESDAY morning, Disp: , Rfl:     No Known Allergies    Family History   Problem Relation Age of Onset    Diabetes Mother        Cancer-related family history is not on file.         Social History     Social History Narrative    Not on file       ROS:   Review of Systems   Constitutional:  Positive for fatigue and unexpected weight change.   HENT: Negative.     Eyes: Negative.    Respiratory:  Positive for shortness of breath.    Cardiovascular: Negative.    Gastrointestinal: Negative.    Endocrine: Negative.    Genitourinary:  Positive for pelvic pain and vaginal bleeding.   Musculoskeletal: Negative.    Skin: Negative.    Allergic/Immunologic: Negative.    Neurological:  Positive for dizziness and weakness.   Hematological: Negative.    Psychiatric/Behavioral: Negative.           Objective:    Vital Signs:  Vitals:    08/20/24 1059   BP: 116/84   Pulse: 74   SpO2: 97%   Weight: 115 kg (252 lb 9.6 oz)   Height:  "160 cm (63\")   PainSc: 0-No pain     Body mass index is 44.75 kg/m².    ECOG  (1) Restricted in physically strenuous activity, ambulatory and able to do work of light nature    Physical Exam:   Physical Exam  Constitutional:       Appearance: Normal appearance. She is normal weight.   HENT:      Head: Normocephalic and atraumatic.      Right Ear: External ear normal.      Left Ear: External ear normal.      Nose: Nose normal.      Mouth/Throat:      Mouth: Mucous membranes are moist.      Pharynx: Oropharynx is clear.   Eyes:      Extraocular Movements: Extraocular movements intact.      Conjunctiva/sclera: Conjunctivae normal.      Pupils: Pupils are equal, round, and reactive to light.   Cardiovascular:      Rate and Rhythm: Normal rate.      Pulses: Normal pulses.   Pulmonary:      Effort: Pulmonary effort is normal.   Abdominal:      General: Abdomen is flat.      Palpations: Abdomen is soft.   Musculoskeletal:         General: Normal range of motion.      Cervical back: Normal range of motion and neck supple.   Skin:     General: Skin is warm.   Neurological:      Mental Status: She is alert.   Psychiatric:         Mood and Affect: Mood normal.         Behavior: Behavior normal.         Thought Content: Thought content normal.         Judgment: Judgment normal.         Lab Results - Last 18 Months   Lab Units 08/20/24  1150   WBC 10*3/mm3 10.43   HEMOGLOBIN g/dL 9.2*   HEMATOCRIT % 32.6*   PLATELETS 10*3/mm3 377   MCV fL 69.2*     Lab Results - Last 18 Months   Lab Units 08/20/24  1150   SODIUM mmol/L 139   POTASSIUM mmol/L 4.2   CHLORIDE mmol/L 104   CO2 mmol/L 23.7   BUN mg/dL 9   CREATININE mg/dL 1.06*   CALCIUM mg/dL 9.5   BILIRUBIN mg/dL 0.4   ALK PHOS U/L 82   ALT (SGPT) U/L 8   AST (SGOT) U/L 12   GLUCOSE mg/dL 130*       No results found for: \"GLUCOSE\", \"BUN\", \"CREATININE\", \"EGFRIFNONA\", \"EGFRIFAFRI\", \"BCR\", \"K\", \"CO2\", \"CALCIUM\", \"PROTENTOTREF\", \"ALBUMIN\", \"LABIL2\", \"BILIRUBIN\", \"AST\", \"ALT\"    No " "results for input(s): \"APTT\", \"INR\", \"PTT\" in the last 80415 hours.    Lab Results   Component Value Date    IRON 25 (L) 08/20/2024    TIBC 477 08/20/2024    FERRITIN 18.20 08/20/2024       Lab Results   Component Value Date    FOLATE 8.06 08/20/2024       No results found for: \"OCCULTBLD\"    Lab Results   Component Value Date    RETICCTPCT 3.61 (H) 08/20/2024     Lab Results   Component Value Date    [id number] 331 08/20/2024     No results found for: \"SPEP\", \"UPEP\"  No results found for: \"LDH\", \"URICACID\"  No results found for: \"BLU\", \"RF\", \"SEDRATE\"  No results found for: \"FIBRINOGEN\", \"HAPTOGLOBIN\"  No results found for: \"PTT\", \"INR\"  No results found for: \"\"  No results found for: \"CEA\"  No components found for: \"CA-19-9\"  No results found for: \"PSA\"  No results found for: \"SEDRATE\"       Assessment & Plan     Iron Deficiency Anemia:  Likely secondary to Vaginal Bleeding secondary to endometrial cancer as above.  -CBC today showed Hb/hct 9.2/32.6, Iron panel consistent with Iron Deficiency anemia with S. Ferritin 18 and TSAT 5%  -She is on oral iron Supplementation for almost 1 month with ongoing persistent AMRIEL indicating poor/inadequate response to oral iron.   -Normal B12, Folate levels, hemolysis labs negative. Retic counts signifciantly elevated indicating adequate BM response.  -Will plan to start her on IV iron supplementation with Ferumoxytol X 2 doses.  -continue to montior CBC and iron panel      Endometrial Adenocarcinoma:  Incidental diagnosis on Renal US  -patient has ongoing significant vaginal bleeding associated with severe Iron Deficiency anemia  -continue to follow up with GYN ONC at [facility] as planned.      Follow up in 3 weeks with treatment, sooner as needed.      Thank you very much for providing the opportunity to participate in this patient’s care. Please do not hesitate to call if there are any other questions.    "

## 2024-08-20 ENCOUNTER — CONSULT (OUTPATIENT)
Dept: ONCOLOGY | Facility: CLINIC | Age: 59
End: 2024-08-20
Payer: MEDICARE

## 2024-08-20 ENCOUNTER — LAB (OUTPATIENT)
Dept: LAB | Facility: HOSPITAL | Age: 59
End: 2024-08-20
Payer: MEDICARE

## 2024-08-20 VITALS
SYSTOLIC BLOOD PRESSURE: 116 MMHG | OXYGEN SATURATION: 97 % | WEIGHT: 252.6 LBS | DIASTOLIC BLOOD PRESSURE: 84 MMHG | HEIGHT: 63 IN | BODY MASS INDEX: 44.76 KG/M2 | HEART RATE: 74 BPM

## 2024-08-20 DIAGNOSIS — D64.9 ANEMIA, UNSPECIFIED TYPE: ICD-10-CM

## 2024-08-20 DIAGNOSIS — D50.0 IRON DEFICIENCY ANEMIA DUE TO CHRONIC BLOOD LOSS: ICD-10-CM

## 2024-08-20 DIAGNOSIS — C54.9 MALIGNANT NEOPLASM OF BODY OF UTERUS, UNSPECIFIED SITE: ICD-10-CM

## 2024-08-20 DIAGNOSIS — D64.9 ANEMIA, UNSPECIFIED TYPE: Primary | ICD-10-CM

## 2024-08-20 LAB
ALBUMIN SERPL-MCNC: 3.9 G/DL (ref 3.5–5.2)
ALBUMIN/GLOB SERPL: 1.3 G/DL
ALP SERPL-CCNC: 82 U/L (ref 39–117)
ALT SERPL W P-5'-P-CCNC: 8 U/L (ref 1–33)
ANION GAP SERPL CALCULATED.3IONS-SCNC: 11.3 MMOL/L (ref 5–15)
AST SERPL-CCNC: 12 U/L (ref 1–32)
BASOPHILS # BLD AUTO: 0.03 10*3/MM3 (ref 0–0.2)
BASOPHILS NFR BLD AUTO: 0.3 % (ref 0–1.5)
BILIRUB SERPL-MCNC: 0.4 MG/DL (ref 0–1.2)
BUN SERPL-MCNC: 9 MG/DL (ref 8–23)
BUN/CREAT SERPL: 8.5 (ref 7–25)
CALCIUM SPEC-SCNC: 9.5 MG/DL (ref 8.6–10.5)
CHLORIDE SERPL-SCNC: 104 MMOL/L (ref 98–107)
CO2 SERPL-SCNC: 23.7 MMOL/L (ref 22–29)
CREAT SERPL-MCNC: 1.06 MG/DL (ref 0.57–1)
CRP SERPL-MCNC: 1.25 MG/DL (ref 0–0.5)
DEPRECATED RDW RBC AUTO: 67.8 FL (ref 37–54)
EGFRCR SERPLBLD CKD-EPI 2021: 60.3 ML/MIN/1.73
EOSINOPHIL # BLD AUTO: 0.18 10*3/MM3 (ref 0–0.4)
EOSINOPHIL NFR BLD AUTO: 1.7 % (ref 0.3–6.2)
ERYTHROCYTE [DISTWIDTH] IN BLOOD BY AUTOMATED COUNT: 28.8 % (ref 12.3–15.4)
ERYTHROCYTE [SEDIMENTATION RATE] IN BLOOD: 32 MM/HR (ref 0–30)
FERRITIN SERPL-MCNC: 18.2 NG/ML (ref 13–150)
FOLATE SERPL-MCNC: 8.06 NG/ML (ref 4.78–24.2)
GLOBULIN UR ELPH-MCNC: 2.9 GM/DL
GLUCOSE SERPL-MCNC: 130 MG/DL (ref 65–99)
HAPTOGLOB SERPL-MCNC: 212 MG/DL (ref 30–200)
HCT VFR BLD AUTO: 32.6 % (ref 34–46.6)
HGB BLD-MCNC: 9.2 G/DL (ref 12–15.9)
IRON 24H UR-MRATE: 25 MCG/DL (ref 37–145)
IRON SATN MFR SERPL: 5 % (ref 20–50)
LDH SERPL-CCNC: 192 U/L (ref 135–214)
LYMPHOCYTES # BLD AUTO: 2.21 10*3/MM3 (ref 0.7–3.1)
LYMPHOCYTES NFR BLD AUTO: 21.2 % (ref 19.6–45.3)
MCH RBC QN AUTO: 19.5 PG (ref 26.6–33)
MCHC RBC AUTO-ENTMCNC: 28.2 G/DL (ref 31.5–35.7)
MCV RBC AUTO: 69.2 FL (ref 79–97)
MONOCYTES # BLD AUTO: 0.99 10*3/MM3 (ref 0.1–0.9)
MONOCYTES NFR BLD AUTO: 9.5 % (ref 5–12)
NEUTROPHILS NFR BLD AUTO: 67.3 % (ref 42.7–76)
NEUTROPHILS NFR BLD AUTO: 7.02 10*3/MM3 (ref 1.7–7)
PLATELET # BLD AUTO: 377 10*3/MM3 (ref 140–450)
PMV BLD AUTO: 9.3 FL (ref 6–12)
POTASSIUM SERPL-SCNC: 4.2 MMOL/L (ref 3.5–5.2)
PROT SERPL-MCNC: 6.8 G/DL (ref 6–8.5)
RBC # BLD AUTO: 4.71 10*6/MM3 (ref 3.77–5.28)
RETICS # AUTO: 0.17 10*6/MM3 (ref 0.02–0.13)
RETICS/RBC NFR AUTO: 3.61 % (ref 0.7–1.9)
SODIUM SERPL-SCNC: 139 MMOL/L (ref 136–145)
TIBC SERPL-MCNC: 477 MCG/DL (ref 298–536)
TRANSFERRIN SERPL-MCNC: 320 MG/DL (ref 200–360)
VIT B12 BLD-MCNC: 331 PG/ML (ref 211–946)
WBC NRBC COR # BLD AUTO: 10.43 10*3/MM3 (ref 3.4–10.8)

## 2024-08-20 PROCEDURE — 36415 COLL VENOUS BLD VENIPUNCTURE: CPT

## 2024-08-20 PROCEDURE — 85652 RBC SED RATE AUTOMATED: CPT | Performed by: STUDENT IN AN ORGANIZED HEALTH CARE EDUCATION/TRAINING PROGRAM

## 2024-08-20 PROCEDURE — 85045 AUTOMATED RETICULOCYTE COUNT: CPT | Performed by: STUDENT IN AN ORGANIZED HEALTH CARE EDUCATION/TRAINING PROGRAM

## 2024-08-20 PROCEDURE — 83540 ASSAY OF IRON: CPT | Performed by: STUDENT IN AN ORGANIZED HEALTH CARE EDUCATION/TRAINING PROGRAM

## 2024-08-20 PROCEDURE — 82746 ASSAY OF FOLIC ACID SERUM: CPT | Performed by: STUDENT IN AN ORGANIZED HEALTH CARE EDUCATION/TRAINING PROGRAM

## 2024-08-20 PROCEDURE — 82607 VITAMIN B-12: CPT | Performed by: STUDENT IN AN ORGANIZED HEALTH CARE EDUCATION/TRAINING PROGRAM

## 2024-08-20 PROCEDURE — 84466 ASSAY OF TRANSFERRIN: CPT | Performed by: STUDENT IN AN ORGANIZED HEALTH CARE EDUCATION/TRAINING PROGRAM

## 2024-08-20 PROCEDURE — 85025 COMPLETE CBC W/AUTO DIFF WBC: CPT

## 2024-08-20 PROCEDURE — 82728 ASSAY OF FERRITIN: CPT | Performed by: STUDENT IN AN ORGANIZED HEALTH CARE EDUCATION/TRAINING PROGRAM

## 2024-08-20 PROCEDURE — 80053 COMPREHEN METABOLIC PANEL: CPT | Performed by: STUDENT IN AN ORGANIZED HEALTH CARE EDUCATION/TRAINING PROGRAM

## 2024-08-20 PROCEDURE — 83010 ASSAY OF HAPTOGLOBIN QUANT: CPT | Performed by: STUDENT IN AN ORGANIZED HEALTH CARE EDUCATION/TRAINING PROGRAM

## 2024-08-20 PROCEDURE — 83615 LACTATE (LD) (LDH) ENZYME: CPT | Performed by: STUDENT IN AN ORGANIZED HEALTH CARE EDUCATION/TRAINING PROGRAM

## 2024-08-20 PROCEDURE — 86140 C-REACTIVE PROTEIN: CPT | Performed by: STUDENT IN AN ORGANIZED HEALTH CARE EDUCATION/TRAINING PROGRAM

## 2024-08-20 RX ORDER — HYDROXYZINE 50 MG/1
1 TABLET, FILM COATED ORAL NIGHTLY
COMMUNITY
Start: 2024-06-27

## 2024-08-20 RX ORDER — FERROUS SULFATE 325(65) MG
TABLET ORAL SEE ADMIN INSTRUCTIONS
COMMUNITY
Start: 2024-07-16

## 2024-08-20 RX ORDER — DAPAGLIFLOZIN 10 MG/1
10 TABLET, FILM COATED ORAL DAILY
COMMUNITY
Start: 2024-06-26

## 2024-08-20 RX ORDER — LISINOPRIL 40 MG/1
40 TABLET ORAL DAILY
COMMUNITY

## 2024-08-20 RX ORDER — PAROXETINE 30 MG/1
30 TABLET, FILM COATED ORAL
COMMUNITY
Start: 2024-06-12

## 2024-08-20 RX ORDER — ERGOCALCIFEROL 1.25 MG/1
CAPSULE ORAL
COMMUNITY

## 2024-08-20 RX ORDER — PSEUDOEPHEDRINE HCL 30 MG
100 TABLET ORAL DAILY
COMMUNITY
Start: 2024-06-26

## 2024-08-20 RX ORDER — METOPROLOL SUCCINATE 50 MG/1
50 TABLET, EXTENDED RELEASE ORAL
COMMUNITY

## 2024-08-20 RX ORDER — RISPERIDONE 2 MG/1
4 TABLET ORAL
COMMUNITY
Start: 2024-05-30

## 2024-08-23 PROBLEM — C54.9 MALIGNANT NEOPLASM OF BODY OF UTERUS: Status: ACTIVE | Noted: 2024-08-23

## 2024-08-23 PROBLEM — D50.0 IRON DEFICIENCY ANEMIA DUE TO CHRONIC BLOOD LOSS: Status: ACTIVE | Noted: 2024-08-23

## 2024-08-23 RX ORDER — SODIUM CHLORIDE 9 MG/ML
20 INJECTION, SOLUTION INTRAVENOUS ONCE
OUTPATIENT
Start: 2024-08-28

## 2024-08-23 RX ORDER — SODIUM CHLORIDE 9 MG/ML
20 INJECTION, SOLUTION INTRAVENOUS ONCE
OUTPATIENT
Start: 2024-09-04

## 2024-08-26 ENCOUNTER — TELEPHONE (OUTPATIENT)
Dept: ONCOLOGY | Facility: CLINIC | Age: 59
End: 2024-08-26
Payer: MEDICARE

## 2024-08-28 ENCOUNTER — TELEPHONE (OUTPATIENT)
Dept: ONCOLOGY | Facility: CLINIC | Age: 59
End: 2024-08-28
Payer: MEDICARE

## 2024-08-29 ENCOUNTER — TELEPHONE (OUTPATIENT)
Dept: ONCOLOGY | Facility: CLINIC | Age: 59
End: 2024-08-29

## 2024-09-05 ENCOUNTER — TELEPHONE (OUTPATIENT)
Dept: ONCOLOGY | Facility: CLINIC | Age: 59
End: 2024-09-05

## 2024-09-05 NOTE — TELEPHONE ENCOUNTER
Caller: GIO FERRER    Relationship to patient:     Best call back number: [phone]    Type of visit: INFUSION    Requested date: PLEASE CALL TO R/S.     If rescheduling, when is the original appointment: 09/16

## 2024-09-10 ENCOUNTER — CONSULT (OUTPATIENT)
Dept: ONCOLOGY | Facility: CLINIC | Age: 59
End: 2024-09-10
Payer: MEDICARE

## 2024-09-10 VITALS
HEART RATE: 67 BPM | WEIGHT: 252 LBS | TEMPERATURE: 97.1 F | HEIGHT: 63 IN | OXYGEN SATURATION: 98 % | BODY MASS INDEX: 44.65 KG/M2

## 2024-09-10 DIAGNOSIS — D64.9 ANEMIA, UNSPECIFIED TYPE: Primary | ICD-10-CM

## 2024-09-10 DIAGNOSIS — D50.0 IRON DEFICIENCY ANEMIA DUE TO CHRONIC BLOOD LOSS: ICD-10-CM

## 2024-09-10 PROCEDURE — 1126F AMNT PAIN NOTED NONE PRSNT: CPT | Performed by: INTERNAL MEDICINE

## 2024-09-10 PROCEDURE — 1160F RVW MEDS BY RX/DR IN RCRD: CPT | Performed by: INTERNAL MEDICINE

## 2024-09-10 PROCEDURE — 99213 OFFICE O/P EST LOW 20 MIN: CPT | Performed by: INTERNAL MEDICINE

## 2024-09-10 PROCEDURE — 1159F MED LIST DOCD IN RCRD: CPT | Performed by: INTERNAL MEDICINE

## 2024-09-10 NOTE — PROGRESS NOTES
HEMATOLOGY ONCOLOGY OUTPATIENT FOLLOW-UP      Patient name: Felisha Mukherjee  : 1965  MRN: [id number]  Primary Care Physician: Nichol Lopez  Referring Physician: Nichol oLpez APRN  Reason For Consult:       History of Present Illness:    2024: Ms. Hwang was referred for the investigation and treatment of anemia.  Heme in 2024 for blood count revealed a hemoglobin of 7.1 g/dL with microcytic red cells.  There was clear evidence of iron deficiency with a total iron binding capacity saturation of 2.3%.  She was started on oral iron and was in early 2024 identified as having endometrial adenocarcinoma of the endometrioid type with a FIGO grade 1.  The iron deficiency was attributed to menorrhagia that was felt to be the result of the malignancy.  With persistent anemia a decision was made to treat her with intravenous iron.    9/10/2024: For the first time at the [place] office with the above.  She has yet to receive intravenous iron but is scheduled to do so in the near future.  She is also being followed by Dr. Matthews at the [facility] gynecology/oncology program.  Apparently surgery is not in the near future plans.  Generally she feels well at this time although she continues to be fatigued and weak at times.  She describes a good appetite for the most part and eats frequently during the day.  Her weight is stable.  She has not experienced any chest pains, cough or dyspnea.  She has intermittent abdominal pain and describes abnormal defecations that happen every 2 or 3 days but several times during that day at times with soft stools but never liquid stools.  She has some dysuria.  She has continued to have vaginal bleeding but it has not been as intense as before.  She has no peripheral edema.  On exam she seems chronically ill.  She is not in distress.  She is conversant and oriented.  No jaundice.  The lungs are diminished bilaterally  and the heart regular.  The abdomen is soft nontender and there is no edema.  Laboratory exams reviewed.  She has yet to receive intravenous iron.  I have asked her to see me again after completing the intravenous iron with new laboratory exams.    FAMILY HISTORY:  MGM: ovarian cancer  GGM:: ovarian cancer     PMH: DM, HTN, CKD     She is being worked up at [facility] On[facility] as described and is planned for pelvic MRI with tentative plans for Surgery.    Past Medical History:   Diagnosis Date    Diabetes mellitus     Hypertension     Uterine mass      Past Surgical History:   Procedure Laterality Date    CHOLECYSTECTOMY         Current Outpatient Medications:     docusate sodium 100 MG capsule, Take 1 capsule by mouth Daily., Disp: , Rfl:     Farxiga 10 MG tablet, Take 10 mg by mouth Daily., Disp: , Rfl:     FeroSul 325 (65 Fe) MG tablet, Take  by mouth See Admin Instructions., Disp: , Rfl:     hydrOXYzine (ATARAX) 50 MG tablet, Take 1 tablet by mouth Every Night., Disp: , Rfl:     lisinopril (PRINIVIL,ZESTRIL) 40 MG tablet, Take 1 tablet by mouth Daily., Disp: , Rfl:     metFORMIN (GLUCOPHAGE) 500 MG tablet, Take 1 tablet by mouth 2 (Two) Times a Day., Disp: , Rfl:     metoprolol succinate XL (TOPROL-XL) 50 MG 24 hr tablet, Take 1 tablet by mouth every night at bedtime., Disp: , Rfl:     PARoxetine (PAXIL) 30 MG tablet, Take 1 tablet by mouth., Disp: , Rfl:     risperiDONE (risperDAL) 2 MG tablet, Take 2 tablets by mouth., Disp: , Rfl:     vitamin D (ERGOCALCIFEROL) 1.25 MG (84934 UT) capsule capsule, TAKE ONE CAPSULE BY MOUTH ONCE A WEEK ON WEDNESDAY morning, Disp: , Rfl:     No Known Allergies    Family History   Problem Relation Age of Onset    Diabetes Mother      Cancer-related family history is not on file.    Social History     Tobacco Use    Smoking status: Never   Vaping Use    Vaping status: Never Used   Substance Use Topics    Alcohol use: Never    Drug use: Never     Social History     Social History  Narrative    Not on file     ROS:   Review of Systems   Constitutional:  Positive for fatigue and unexpected weight change. Negative for activity change, appetite change, chills, diaphoresis and fever.   HENT: Negative.  Negative for congestion, dental problem, drooling, ear discharge, ear pain, facial swelling, hearing loss, mouth sores, nosebleeds, postnasal drip, rhinorrhea, sinus pressure, sinus pain, sneezing, sore throat, tinnitus, trouble swallowing and voice change.    Eyes: Negative.  Negative for photophobia, pain, discharge, redness, itching and visual disturbance.   Respiratory:  Positive for shortness of breath. Negative for apnea, cough, choking, chest tightness, wheezing and stridor.    Cardiovascular: Negative.  Negative for chest pain, palpitations and leg swelling.   Gastrointestinal: Negative.  Negative for abdominal distention, abdominal pain, anal bleeding, blood in stool, constipation, diarrhea, nausea, rectal pain and vomiting.   Endocrine: Negative.  Negative for cold intolerance, heat intolerance, polydipsia and polyuria.   Genitourinary:  Positive for pelvic pain and vaginal bleeding. Negative for decreased urine volume, difficulty urinating, dysuria, flank pain, frequency, genital sores, hematuria and urgency.   Musculoskeletal: Negative.  Negative for arthralgias, back pain, gait problem, joint swelling, myalgias, neck pain and neck stiffness.   Skin: Negative.  Negative for color change, pallor and rash.   Allergic/Immunologic: Negative.    Neurological:  Positive for dizziness and weakness. Negative for tremors, seizures, syncope, facial asymmetry, speech difficulty, light-headedness, numbness and headaches.   Hematological: Negative.  Negative for adenopathy. Does not bruise/bleed easily.   Psychiatric/Behavioral: Negative.  Negative for agitation, behavioral problems, confusion, decreased concentration, hallucinations, self-injury, sleep disturbance and suicidal ideas. The patient is  "not nervous/anxious.      Objective:    Vital Signs:  Vitals:    09/10/24 1258   Pulse: 67   Temp: 97.1 °F (36.2 °C)   SpO2: 98%   Weight: 114 kg (252 lb)   Height: 160 cm (63\")   PainSc: 0-No pain     Body mass index is 44.64 kg/m².    ECOG  (1) Restricted in physically strenuous activity, ambulatory and able to do work of light nature    Physical Exam:   Physical Exam  Constitutional:       General: She is not in acute distress.     Appearance: She is normal weight. She is ill-appearing. She is not toxic-appearing or diaphoretic.      Comments: Conversant, oriented and ill-appearing.  No distress.  No jaundice but she is pale.   HENT:      Head: Normocephalic and atraumatic.      Right Ear: External ear normal.      Left Ear: External ear normal.      Nose: Nose normal.      Mouth/Throat:      Mouth: Mucous membranes are moist.      Pharynx: Oropharynx is clear. No oropharyngeal exudate or posterior oropharyngeal erythema.   Eyes:      General: No scleral icterus.        Right eye: No discharge.         Left eye: No discharge.      Extraocular Movements: Extraocular movements intact.      Conjunctiva/sclera: Conjunctivae normal.      Pupils: Pupils are equal, round, and reactive to light.   Cardiovascular:      Rate and Rhythm: Normal rate and regular rhythm.      Pulses: Normal pulses.      Heart sounds: No murmur heard.     No friction rub. No gallop.   Pulmonary:      Effort: Pulmonary effort is normal. No respiratory distress.      Breath sounds: No stridor. No wheezing, rhonchi or rales.   Abdominal:      General: Bowel sounds are normal. There is no distension.      Palpations: Abdomen is soft. There is no mass.      Tenderness: There is no abdominal tenderness. There is no right CVA tenderness, left CVA tenderness, guarding or rebound.      Hernia: No hernia is present.      Comments: Protuberant, soft nontender.  The liver and spleen do not appear enlarged.   Musculoskeletal:         General: No swelling, " tenderness, deformity or signs of injury. Normal range of motion.      Cervical back: Normal range of motion and neck supple. No rigidity.      Right lower leg: No edema.      Left lower leg: No edema.   Lymphadenopathy:      Cervical: No cervical adenopathy.   Skin:     General: Skin is warm.      Coloration: Skin is not jaundiced.      Findings: No bruising, lesion or rash.   Neurological:      General: No focal deficit present.      Mental Status: She is alert and oriented to person, place, and time.      Cranial Nerves: No cranial nerve deficit.      Motor: No weakness.      Gait: Gait normal.   Psychiatric:         Mood and Affect: Mood normal.         Behavior: Behavior normal.         Judgment: Judgment normal.     Jacinto Del Real MD performed the physical exam on 9/10/2024 as documented above.    Lab Results - Last 18 Months   Lab Units 08/20/24  1150   WBC 10*3/mm3 10.43   HEMOGLOBIN g/dL 9.2*   HEMATOCRIT % 32.6*   PLATELETS 10*3/mm3 377   MCV fL 69.2*     Lab Results - Last 18 Months   Lab Units 08/20/24  1150   SODIUM mmol/L 139   POTASSIUM mmol/L 4.2   CHLORIDE mmol/L 104   CO2 mmol/L 23.7   BUN mg/dL 9   CREATININE mg/dL 1.06*   CALCIUM mg/dL 9.5   BILIRUBIN mg/dL 0.4   ALK PHOS U/L 82   ALT (SGPT) U/L 8   AST (SGOT) U/L 12   GLUCOSE mg/dL 130*     Lab Results   Component Value Date    GLUCOSE 130 (H) 08/20/2024    BUN 9 08/20/2024    CREATININE 1.06 (H) 08/20/2024    BCR 8.5 08/20/2024    K 4.2 08/20/2024    CO2 23.7 08/20/2024    CALCIUM 9.5 08/20/2024    ALBUMIN 3.9 08/20/2024    AST 12 08/20/2024    ALT 8 08/20/2024     Lab Results   Component Value Date    IRON 25 (L) 08/20/2024    TIBC 477 08/20/2024    FERRITIN 18.20 08/20/2024     Lab Results   Component Value Date    FOLATE 8.06 08/20/2024     Lab Results   Component Value Date    RETICCTPCT 3.61 (H) 08/20/2024     Lab Results   Component Value Date    [id number] 331 08/20/2024     LDH   Date Value Ref Range Status   08/20/2024 133 803 - 466  U/L Final     Lab Results   Component Value Date    SEDRATE 32 (H) 08/20/2024     Lab Results   Component Value Date    HAPTOGLOBIN 212 (H) 08/20/2024     Lab Results   Component Value Date    SEDRATE 32 (H) 08/20/2024          Assessment & Plan     1.  Iron deficiency anemia: The oratory exams are unequivocal of iron deficiency.  Oral iron does not seem to have solved the problem.  She has been scheduled to receive intravenous iron and will do so approximately 48 hours from now.  Discussed with her.  Will follow closely.  2.  Adenocarcinoma of the endometrium: Undergoing investigations and treatment planning at the [facility] gynecologic oncology program.  I will continue to monitor this, as well.  3.  Reviewed all records including notes from [facility].  Reviewed the laboratory exams and discussed with her.  4.  See me again in approximately 6 weeks with results.    Jacinto Del Real MD on 9/10/2024 at 1337.

## 2024-09-23 ENCOUNTER — HOSPITAL ENCOUNTER (OUTPATIENT)
Dept: ONCOLOGY | Facility: HOSPITAL | Age: 59
Discharge: HOME | End: 2024-09-23
Admitting: STUDENT IN AN ORGANIZED HEALTH CARE EDUCATION/TRAINING PROGRAM
Payer: MEDICARE

## 2024-09-23 VITALS
BODY MASS INDEX: 43.94 KG/M2 | TEMPERATURE: 97.6 F | WEIGHT: 248 LBS | RESPIRATION RATE: 16 BRPM | OXYGEN SATURATION: 98 % | HEART RATE: 71 BPM | SYSTOLIC BLOOD PRESSURE: 136 MMHG | DIASTOLIC BLOOD PRESSURE: 87 MMHG | HEIGHT: 63 IN

## 2024-09-23 DIAGNOSIS — D50.0 IRON DEFICIENCY ANEMIA DUE TO CHRONIC BLOOD LOSS: Primary | ICD-10-CM

## 2024-09-23 DIAGNOSIS — C54.9 MALIGNANT NEOPLASM OF BODY OF UTERUS, UNSPECIFIED SITE: ICD-10-CM

## 2024-09-23 PROCEDURE — 96365 THER/PROPH/DIAG IV INF INIT: CPT

## 2024-09-23 PROCEDURE — 25010000002 FERUMOXYTOL 510 MG/17ML SOLUTION 17 ML VIAL: Performed by: STUDENT IN AN ORGANIZED HEALTH CARE EDUCATION/TRAINING PROGRAM

## 2024-09-23 PROCEDURE — 96374 THER/PROPH/DIAG INJ IV PUSH: CPT

## 2024-09-23 PROCEDURE — 25810000003 SODIUM CHLORIDE 0.9 % SOLUTION: Performed by: STUDENT IN AN ORGANIZED HEALTH CARE EDUCATION/TRAINING PROGRAM

## 2024-09-23 RX ORDER — SODIUM CHLORIDE 9 MG/ML
20 INJECTION, SOLUTION INTRAVENOUS ONCE
Status: COMPLETED | OUTPATIENT
Start: 2024-09-23 | End: 2024-09-23

## 2024-09-23 RX ADMIN — SODIUM CHLORIDE 20 ML/HR: 9 INJECTION, SOLUTION INTRAVENOUS at 13:44

## 2024-09-23 RX ADMIN — FERUMOXYTOL 510 MG: 510 INJECTION INTRAVENOUS at 13:44

## 2024-09-30 ENCOUNTER — HOSPITAL ENCOUNTER (OUTPATIENT)
Dept: ONCOLOGY | Facility: HOSPITAL | Age: 59
Discharge: HOME | End: 2024-09-30
Admitting: STUDENT IN AN ORGANIZED HEALTH CARE EDUCATION/TRAINING PROGRAM
Payer: MEDICARE

## 2024-09-30 ENCOUNTER — CONSULT (OUTPATIENT)
Dept: RADIATION ONCOLOGY | Facility: HOSPITAL | Age: 59
End: 2024-09-30
Payer: MEDICARE

## 2024-09-30 VITALS
TEMPERATURE: 97.8 F | SYSTOLIC BLOOD PRESSURE: 143 MMHG | OXYGEN SATURATION: 96 % | HEIGHT: 63 IN | RESPIRATION RATE: 20 BRPM | WEIGHT: 247.8 LBS | DIASTOLIC BLOOD PRESSURE: 81 MMHG | HEART RATE: 76 BPM | BODY MASS INDEX: 43.91 KG/M2

## 2024-09-30 VITALS
DIASTOLIC BLOOD PRESSURE: 75 MMHG | WEIGHT: 248 LBS | HEART RATE: 81 BPM | RESPIRATION RATE: 18 BRPM | OXYGEN SATURATION: 95 % | SYSTOLIC BLOOD PRESSURE: 119 MMHG | BODY MASS INDEX: 43.93 KG/M2

## 2024-09-30 DIAGNOSIS — C54.1 MALIGNANT NEOPLASM OF ENDOMETRIUM: Primary | ICD-10-CM

## 2024-09-30 DIAGNOSIS — D50.0 IRON DEFICIENCY ANEMIA DUE TO CHRONIC BLOOD LOSS: Primary | ICD-10-CM

## 2024-09-30 DIAGNOSIS — C54.9 MALIGNANT NEOPLASM OF BODY OF UTERUS, UNSPECIFIED SITE: ICD-10-CM

## 2024-09-30 PROCEDURE — 96374 THER/PROPH/DIAG INJ IV PUSH: CPT

## 2024-09-30 PROCEDURE — 96365 THER/PROPH/DIAG IV INF INIT: CPT

## 2024-09-30 PROCEDURE — 25810000003 SODIUM CHLORIDE 0.9 % SOLUTION: Performed by: STUDENT IN AN ORGANIZED HEALTH CARE EDUCATION/TRAINING PROGRAM

## 2024-09-30 PROCEDURE — 25010000002 FERUMOXYTOL 510 MG/17ML SOLUTION 17 ML VIAL: Performed by: STUDENT IN AN ORGANIZED HEALTH CARE EDUCATION/TRAINING PROGRAM

## 2024-09-30 PROCEDURE — G0463 HOSPITAL OUTPT CLINIC VISIT: HCPCS | Performed by: INTERNAL MEDICINE

## 2024-09-30 RX ORDER — SODIUM CHLORIDE 9 MG/ML
20 INJECTION, SOLUTION INTRAVENOUS ONCE
Status: COMPLETED | OUTPATIENT
Start: 2024-09-30 | End: 2024-09-30

## 2024-09-30 RX ADMIN — FERUMOXYTOL 510 MG: 510 INJECTION INTRAVENOUS at 12:57

## 2024-09-30 RX ADMIN — SODIUM CHLORIDE 20 ML/HR: 9 INJECTION, SOLUTION INTRAVENOUS at 12:56

## 2024-09-30 NOTE — PROGRESS NOTES
[facility] RADIATION ONCOLOGY  CONSULTATION NOTE    NAME: Felisha Mukherjee  YOB: 1965  MRN #: [id number]  DATE OF SERVICE: 9/30/2024  REFERRING PROVIDER: Provider, No Known   PRIMARY CARE PROVIDER: Provider, No Known    CHIEF COMPLAINT:    Endometrioid adenocarcinoma, not a surgical candidate.    DIAGNOSIS:    Encounter Diagnosis   Name Primary?    Malignant neoplasm of endometrium Yes      Cancer Staging   FIGO Stage IVB (cT3b, cN0, pM1) endometrioid endometrial adenocarcinoma  Pacemaker?:  no   Prior XRT?:  no   Contraindications?:  no  Pregnancy Test?:  No, patient is female and >55 years and/or has undergone hysterectomy       HISTORY OF PRESENT ILLNESS     Felisha Mukherjee is a 59 y.o. female who presents with a history of FIGO Stage IVB (cT3b, cN0, pM1) endometrioid endometrial adenocarcinoma.  She has been deemed a poor candidate for surgical management.  She was referred for definitive radiation therapy management.    Earlier this summer, an ultrasound of the kidneys was obtained by the patient's nephrologist.  An incidental finding of a uterus mass that measured 6.2 x 5.7 x 7.4 cm was noted.  There was no evidence of hydronephrosis.  She was referred to Rusty Shee[facility] for further workup.    8/2/2024 the patient was referred to [facility] for evaluation of a pelvic mass. Dr. Matthews recommended MRI of the pelvis for further evaluation followed by D&C, biopsy, cystoscopy, and proctoscopy for further diagnosis and staging.    8/2/2024 biopsy was performed in the office.  Pathology showed endometrial adenocarcinoma, endometrioid type, FIGO grade 1.    8/19/2024 MRI of the pelvis was performed.  Imaging showed a large cervical mass in keeping with primary malignancy.  The tumor showed parametrial invasion, involvement of the lower uterus with palpable infiltration of the endometrial canal.  There was no suspicious pelvic adenopathy.  Omental carcinomatosis was better seen on a prior CT  image.  A complex cystic lesion of the left ovary containing eccentric enhancing soft tissue nodule was present.  This could reflect ovarian metastasis versus a primary ovarian neoplasm.    8/29/2024 patient underwent exam under anesthesia with cystoscopy, D&C, and proctoscopy.  Surgical findings identified a large tumor extending through the cervix and filling the vagina.  The bladder and rectum were both noted to be free from tumor involvement.  Pathology noted endometrial adenocarcinoma, endometrioid type, FIGO grade 2.  MSI was intact.    9/16/2024 the patient had follow-up with Dr. Matthews to discuss work-up and treatment recommendations.  Surgery was not recommended due to the advanced nature of her tumor.  Definitive treatment with radiation was recommended.  The patient was referred to [facility] due to proximity to her home.    The patient reports symptoms of pelvic pressure, vaginal bleeding, and constipation.       IMAGING     CT AP With Contrast  Facility: [facility]  Date: 8/19/2024  Impression: 1. Lower uterine versus cervical mass. There is a nonspecific left ovarian cyst. See same-day pelvic MRI report for additional details. 2. Numerous mesenteric masses and nodules consistent with carcinomatosis. Electronically Signed: Kishore Stapleton MD 8/20/2024 8:38 EDT Workstation ID: [id number]    MRI Pelvis With and Without Contrast  Facility: [facility]  Date: 8/19/2024  Impression: 1. Large cervical mass in keeping with primary malignancy. Tumor shows parametrial invasion, involvement of lower uterus with probable infiltration into the endometrial canal.  2. No suspicious pelvic adenopathy. Omental carcinomatosis better seen on prior CT comparison.  3. Complex cystic lesion of the left ovary containing eccentric enhancing soft tissue nodule. This could reflect ovarian metastases versus potentially a primary ovarian neoplasm.  4. Probable small external hemorrhoid.  5. Other ancillary  findings as above.        Electronically Signed: Jose Francisco Cross MD  8/21/2024 9:09 EDT  Workstation ID: [id number]      PATHOLOGY     8/29/2024   Final Pathology  Diagnosis:   A.  Uterus, endometrial curettings:   - Endometrial adenocarcinoma, endometrioid type, FIGO grade 2, see note     Note:   - On immunostaining, the tumor cells are positive for PAX8, ER (70%, 2-3+), and vimentin supportive of above diagnosis.   - Immunohistochemical staining for mismatch repair proteins demonstrates INTACT expression of MLH1, PMS2, MSH2, and MSH6.   - Immunohistochemical staining for 53 demonstrates a wild-type expression.          LABS     HEMATOLOGY:  WBC   Date Value Ref Range Status   08/20/2024 10.43 3.40 - 10.80 10*3/mm3 Final     RBC   Date Value Ref Range Status   08/20/2024 4.71 3.77 - 5.28 10*6/mm3 Final     Hemoglobin   Date Value Ref Range Status   08/20/2024 9.2 (L) 12.0 - 15.9 g/dL Final     Hematocrit   Date Value Ref Range Status   08/20/2024 32.6 (L) 34.0 - 46.6 % Final     Platelets   Date Value Ref Range Status   08/20/2024 377 140 - 450 10*3/mm3 Final     CHEMISTRY:  Lab Results   Component Value Date    GLUCOSE 130 (H) 08/20/2024    BUN 9 08/20/2024    CREATININE 1.06 (H) 08/20/2024    BCR 8.5 08/20/2024    K 4.2 08/20/2024    CO2 23.7 08/20/2024    CALCIUM 9.5 08/20/2024    ALBUMIN 3.9 08/20/2024    AST 12 08/20/2024    ALT 8 08/20/2024       PROBLEM LIST     Patient Active Problem List   Diagnosis    Malignant neoplasm of body of uterus    Iron deficiency anemia due to chronic blood loss        CURRENT MEDICATIONS     Current Outpatient Medications   Medication Instructions    docusate sodium 100 mg, Oral, Daily    Farxiga 10 mg, Oral, Daily    FeroSul 325 (65 Fe) MG tablet Oral, See Admin Instructions    hydrOXYzine (ATARAX) 50 MG tablet 1 tablet, Oral, Nightly    lisinopril (PRINIVIL,ZESTRIL) 40 mg, Oral, Daily    metFORMIN (GLUCOPHAGE) 500 mg, Oral, 2 Times Daily    metoprolol succinate XL (TOPROL-XL)  50 mg, Oral, Every Night at Bedtime    PARoxetine (PAXIL) 30 mg, Oral    risperiDONE (RISPERDAL) 4 mg, Oral    vitamin D (ERGOCALCIFEROL) 1.25 MG (43745 UT) capsule capsule TAKE ONE CAPSULE BY MOUTH ONCE A WEEK ON WEDNESDAY morning        ALLERGIES     No Known Allergies      FAMILY HISTORY     Family History   Problem Relation Age of Onset    Diabetes Mother         SOCIAL HISTORY     Social History     Tobacco Use    Smoking status: Never   Vaping Use    Vaping status: Never Used   Substance Use Topics    Alcohol use: Never    Drug use: Never        REVIEW OF SYSTEMS     Review of Systems   Constitutional:  Positive for activity change and fatigue.   Gastrointestinal:  Positive for constipation.   Genitourinary:  Positive for pelvic pain, vaginal bleeding and vaginal discharge.   Musculoskeletal:  Positive for arthralgias.      Vitals:    09/30/24 1114   BP: 119/75   Pulse: 81   Resp: 18   SpO2: 95%      Physical Exam  Exam conducted with a chaperone present.   Constitutional:       General: She is not in acute distress.  HENT:      Head: Normocephalic and atraumatic.   Pulmonary:      Effort: Pulmonary effort is normal. No respiratory distress.   Genitourinary:     Comments: Digital exam identified a large, firm, mass at the cervix that filled the vaginal vault. Speculum exam identified the mass filling the vaginal vault with small amounts of old blood and discharge.   Lymphadenopathy:      Lower Body: No right inguinal adenopathy. No left inguinal adenopathy.   Neurological:      Mental Status: She is alert and oriented to person, place, and time. Mental status is at baseline.   Psychiatric:         Mood and Affect: Mood normal.         Behavior: Behavior normal.        ECOG:  Ambulatory and capable of all selfcare but unable to carry out any work activities; up and about more than 50% of waking hours = 2      ASSESSMENT AND PLAN     ASSESSMENT:    Felisha Mukherjee is a 59 y.o. female who presents with a history  of FIGO Stage IVB (cT3b, cN0, pM1) endometrioid endometrial adenocarcinoma.  She has been deemed a poor candidate for surgical management.  She was referred for definitive radiation therapy management.      Diagnoses and all orders for this visit:    1. Malignant neoplasm of endometrium (Primary)  -     NM Pet Skull Base To Mid Thigh; Future    Other orders  -     SCANNED PATHOLOGY  -     IMAGING SCANNED       PLAN:      Orders:  - PET imaging to complete staging  - Will review case in multidisciplinary fashion to finalize radiation and chemotherapy treatment options    We reviewed the patient's diagnosis and workup to date.  We discussed the findings of an endometrioid adenocarcinoma invading the cervix and vagina.  We discussed the patient does not appear to be a surgical candidate at the moment.  We discussed the role of radiation therapy in the definitive management of her disease.  We discussed plans to complete her staging with a PET scan and the potential need for biopsy of her omental masses.  We discussed plans to review her case with her team at [facility] including Dr. Matthews about chemotherapy options and Dr. Pritchett about brachytherapy and management options.  After staging is complete and treatment recommendations have been finalized, we will follow-up and review recommendations with the patient and her family. They are encouraged to reach out with questions or concerns prior to their next appointment.     I spent 60 minutes caring for Felisha on this date of service. This time includes time spent by me in the following activities:preparing for the visit, reviewing tests, obtaining and/or reviewing a separately obtained history, performing a medically appropriate examination and/or evaluation , counseling and educating the patient/family/caregiver, ordering medications, tests, or procedures, referring and communicating with other health care professionals , documenting information in the medical record, and  independently interpreting results and communicating that information with the patient/family/caregiver    FOLLOW UP     No follow-ups on file.     CC: Provider, No Known Provider, No Known

## 2024-10-03 ENCOUNTER — HOSPITAL ENCOUNTER (OUTPATIENT)
Dept: PET IMAGING | Facility: HOSPITAL | Age: 59
Discharge: HOME | End: 2024-10-03
Payer: MEDICARE

## 2024-10-03 DIAGNOSIS — C54.1 MALIGNANT NEOPLASM OF ENDOMETRIUM: ICD-10-CM

## 2024-10-03 LAB — GLUCOSE BLDC GLUCOMTR-MCNC: 121 MG/DL (ref 70–105)

## 2024-10-03 PROCEDURE — 78815 PET IMAGE W/CT SKULL-THIGH: CPT

## 2024-10-03 PROCEDURE — A9552 F18 FDG: HCPCS | Performed by: INTERNAL MEDICINE

## 2024-10-03 PROCEDURE — 82948 REAGENT STRIP/BLOOD GLUCOSE: CPT

## 2024-10-03 PROCEDURE — 0 FLUDEOXYGLUCOSE F18 SOLUTION: Performed by: INTERNAL MEDICINE

## 2024-10-03 RX ADMIN — FLUDEOXYGLUCOSE F 18 1 DOSE: 200 INJECTION, SOLUTION INTRAVENOUS at 13:32

## 2024-10-04 NOTE — PROGRESS NOTES
[facility] RADIATION ONCOLOGY  FOLLOW-UP NOTE    NAME: Felisha Mukherjee  YOB: 1965  MRN #: [id number]  DATE OF SERVICE: 10/08/2024  REFERRING PROVIDER: Provider, No Known   PRIMARY CARE PROVIDER: Hanh Granados APRN    CHIEF COMPLAINT:  PET/CT F/U    DIAGNOSIS:   Encounter Diagnosis   Name Primary?    Malignant neoplasm of endometrium Yes     INTERVAL HISTORY     Felisha Mukherjee is a 59 y.o. female who was last seen in our office on 09/30/2024 for consultation. The plan was to have PET/CT imaging to complete staging, discuss her case on multidisciplinary board to finalize radiation and chemotherapy treatment options, and follow up here after imaging.    Since her last appointment, the patient has developed a severe skin reaction in her grown region and under her panus. The skin is very raw and sensitive. She continues to have some discharge that is yellowish red. She is here with her sister and her  to discuss the PET and recommended treatment options.     IMAGING     NM PET/CT SKULL BASE TO MID THIGH  DATE OF EXAM: 10/03/2024  [facility]AC[facility]  FINDINGS:  Head & Neck:  No hypermetabolic adenopathy in the neck.  Chest:  Pre-carinal lymph node measuring 10 mm in short axis has SV measuring 2.8 which is similar to mediastinal blood pool. Subcarinal node measuring 9 mm in short axis has SUV 3.6 concerning for metastatic adenopathy (image 52). No axillary adenopathy.  Innumerable bilateral pulmonary nodules most consistent with pulmonary metastatic disease. Representative right lower lobe nodule measuring 10 mm with SUV measuring 3.1 (image 50). Right lower lobe nodule measuring 10 mm with SUV measuring 2.1 (image 55). Negative for pericardial effusion or pleural effusion. Trachea and mainstem bronchi are patent. No findings of pneumonia. No pneumothorax.  Abdomen & Pelvis:  Uterus is enlarged measuring up to 15.8 x 8.4 x 10.5 cm. Large  hypermetabolic mass again noted involving the majority of the uterus and cervix consistent with known malignancy with diffuse hypermetabolic uptake throughout the uterus with max SUV 23.8. Mass extends into the cervix and vagina.  There is new abnormal nodular soft tissue involving the posterior aspect of the bladder concerning for direct invasion of the bladder nodular component noted within the lumen of the posterior bladder on the right measuring 2.7 x 2.3 cm (image 132). Re-demonstration of a cystic lesion involving the left adnexa which measures 8.6 x 8.4 cm. This has a mural nodular hypermetabolic component along left aspect of this cystic lesion with SUV 16.9 (image 114).  Hypermetabolic right pelvic sidewall internal iliac chain lymph nodes for example measuring 2.3 x 1.3 cm with max SUV measuring 12 (image 125). Hypermetabolic left internal iliac chain node measuring 1.1 x 2.3 cm on image 121 with max SUV measuring 8.7. Hypermetabolic para-aortic and aortocaval lymph nodes are present for example left para-aortic node measuring 2.3 x 1.7 cm with SUV measuring 17.7.  There is new moderate bilateral hydroureteronephrosis likely secondary to mass involving the left and right UVJ. Multiple omental soft tissue nodules consistent with carcinomatosis largest in the left upper quadrant measures 1.4 x 3.0 cm on image 104 with SUV measuring 10.9.  The non-contrast liver, spleen, adrenal glands, and pancreas are without acute abnormality. Gallbladder absent. Mildly dilated common bile duct likely related to post-cholecystectomy state. Negative for pneumoperitoneum. No findings of bowel obstruction.  Osseous Structures:  No hypermetabolic osseous lesion or definite findings to osseous metastatic disease.  IMPRESSION:  Large hypermetabolic mass involving the uterus extending into the cervix and vagina consistent with known endometrial malignancy.  New hypermetabolic soft tissue involving posterior bladder wall  concerning for direct tumor invasion of the bladder.  New moderate bilateral hydroureteronephrosis likely secondary to mass involving left and right UVJ.  Hypermetabolic iliac and retroperitoneal adenopathy consistent with metastatic adenopathy. Hypermetabolic small subcarinal node also concerning for metastatic hypermetabolic adenopathy.  Multiple pulmonary metastatic nodules.  Omental carcinomatosis.  Cystic 8.6 x 8.4 cm left adnexal lesion with hypermetabolic peripheral nodule which could relate to ovarian metastasis or primary ovarian neoplasm as described on prior pelvic MRI.    PATHOLOGY     No recent pathology to review.    LABS     HEMATOLOGY:  WBC   Date Value Ref Range Status   10/08/2024 14.33 (H) 3.40 - 10.80 10*3/mm3 Final     RBC   Date Value Ref Range Status   10/08/2024 4.63 3.77 - 5.28 10*6/mm3 Final     Hemoglobin   Date Value Ref Range Status   10/08/2024 10.9 (L) 12.0 - 15.9 g/dL Final     Hematocrit   Date Value Ref Range Status   10/08/2024 36.1 34.0 - 46.6 % Final     Platelets   Date Value Ref Range Status   10/08/2024 393 140 - 450 10*3/mm3 Final     CHEMISTRY:  Lab Results   Component Value Date    GLUCOSE 121 (H) 10/08/2024    BUN 17 10/08/2024    CREATININE 1.40 (H) 10/08/2024    BCR 12.1 10/08/2024    K 4.3 10/08/2024    CO2 19.0 (L) 10/08/2024    CALCIUM 10.1 10/08/2024    ALBUMIN 3.2 (L) 10/08/2024    AST 29 10/08/2024    ALT 17 10/08/2024     PROBLEM LIST     Patient Active Problem List   Diagnosis    Malignant neoplasm of body of uterus    Iron deficiency anemia due to chronic blood loss    Cellulitis of abdominal wall      CURRENT MEDICATIONS     Current Outpatient Medications   Medication Instructions    docusate sodium 100 mg, Oral, Daily    Farxiga 10 mg, Oral, Daily    ferrous sulfate 325 mg, Oral, 5 Times Weekly, Monday, Tuesday, Wednesday, Thursday and Friday     hydrOXYzine (ATARAX) 50 MG tablet 1 tablet, Oral, Nightly    lisinopril (PRINIVIL,ZESTRIL) 40 mg, Oral, Daily     metFORMIN (GLUCOPHAGE) 500 mg, Oral, 2 Times Daily    metoprolol succinate XL (TOPROL-XL) 50 mg, Oral, Every Night at Bedtime    montelukast (SINGULAIR) 10 mg, Oral, Nightly    PARoxetine (PAXIL) 30 mg, Oral    risperiDONE (RISPERDAL) 4 mg, Oral, Every Evening    vitamin D (ERGOCALCIFEROL) 1.25 MG (11404 UT) capsule capsule Take 1 capsule by mouth Every 7 (Seven) Days. Wednesdays      ALLERGIES     No Known Allergies    REVIEW OF SYSTEMS     Review of Systems   Constitutional:  Positive for activity change and fatigue.   Genitourinary:  Positive for difficulty urinating, pelvic pain, vaginal bleeding, vaginal discharge and vaginal pain.   Skin:  Positive for rash and wound.        Vitals:    10/08/24 0929   BP: 102/66   Pulse: 93   Resp: 18   Temp: 98.1 °F (36.7 °C)   SpO2: 98%      Physical Exam  Exam conducted with a chaperone present.   Constitutional:       General: She is not in acute distress.  HENT:      Head: Normocephalic and atraumatic.   Pulmonary:      Effort: Pulmonary effort is normal. No respiratory distress.   Genitourinary:     Comments: Skin under panus and in grown is red, raw over a large area, and very tender.   Neurological:      Mental Status: She is alert and oriented to person, place, and time. Mental status is at baseline.   Psychiatric:         Mood and Affect: Mood normal.         Behavior: Behavior normal.           ECOG:  Restricted in physically strenuous activity but ambulatory and able to carry out work of a light or sedentary nature, e.g., light house work, office work = 1      ASSESSMENT AND PLAN     ASSESSMENT:      Felisha Mukherjee is a 59 y.o. female who presents with a history of FIGO Stage IVB (cT3b, cN0, pM1) endometrioid endometrial adenocarcinoma.  She has been deemed a poor candidate for surgical management.  She returns to review imaging.     Diagnoses and all orders for this visit:    1. Malignant neoplasm of endometrium (Primary)       PLAN:      Orders:  - Refer to ED  for work-up of skin lesion/potential cellulitis infection  - CT simulation for Quad Shot radiation of the pelvis  - Refer back to Med Onc at [facility] for initiation of systemic therapy    The patient has a significant skin reaction in her groin/panus region. Due to the concern for cellulitis, I recommended the patient go to the emergency department for further work-up and management. We also discussed plans for CT simulation to start planning of palliative radiation. We discussed plans for Quad Shot after discussion with Dr. Pritchett at [facility]. We discussed how this is planned and delivered. We will work with Social Work and our  to help plan logistics for treatment. We discussed potential acute and late side effects. The patient and family are in agreement with our plan.     I spent 40 minutes caring for Felisha on this date of service. This time includes time spent by me in the following activities:preparing for the visit, reviewing tests, obtaining and/or reviewing a separately obtained history, performing a medically appropriate examination and/or evaluation , counseling and educating the patient/family/caregiver, ordering medications, tests, or procedures, referring and communicating with other health care professionals , documenting information in the medical record, and independently interpreting results and communicating that information with the patient/family/caregiver    FOLLOW UP     No follow-ups on file.     CC: Provider, No Known Hanh Granados APRN

## 2024-10-08 ENCOUNTER — OFFICE VISIT (OUTPATIENT)
Dept: RADIATION ONCOLOGY | Facility: HOSPITAL | Age: 59
End: 2024-10-08
Payer: MEDICARE

## 2024-10-08 ENCOUNTER — HOSPITAL ENCOUNTER (INPATIENT)
Facility: HOSPITAL | Age: 59
LOS: 10 days | Discharge: HOME | DRG: 580 | End: 2024-10-18
Attending: INTERNAL MEDICINE | Admitting: INTERNAL MEDICINE
Payer: MEDICARE

## 2024-10-08 ENCOUNTER — APPOINTMENT (OUTPATIENT)
Dept: CT IMAGING | Facility: HOSPITAL | Age: 59
DRG: 580 | End: 2024-10-08
Payer: MEDICARE

## 2024-10-08 VITALS
OXYGEN SATURATION: 98 % | HEIGHT: 63 IN | BODY MASS INDEX: 44.61 KG/M2 | TEMPERATURE: 98.1 F | SYSTOLIC BLOOD PRESSURE: 102 MMHG | WEIGHT: 251.8 LBS | DIASTOLIC BLOOD PRESSURE: 66 MMHG | RESPIRATION RATE: 18 BRPM | HEART RATE: 93 BPM

## 2024-10-08 DIAGNOSIS — N85.8 UTERINE MASS: ICD-10-CM

## 2024-10-08 DIAGNOSIS — L03.314 CELLULITIS OF GROIN, LEFT: ICD-10-CM

## 2024-10-08 DIAGNOSIS — C54.1 MALIGNANT NEOPLASM OF ENDOMETRIUM: Primary | ICD-10-CM

## 2024-10-08 DIAGNOSIS — L03.311 ABDOMINAL WALL CELLULITIS: Primary | ICD-10-CM

## 2024-10-08 DIAGNOSIS — R33.9 URINARY RETENTION: ICD-10-CM

## 2024-10-08 LAB
ALBUMIN SERPL-MCNC: 3.2 G/DL (ref 3.5–5.2)
ALBUMIN/GLOB SERPL: 1.1 G/DL
ALP SERPL-CCNC: 68 U/L (ref 39–117)
ALT SERPL W P-5'-P-CCNC: 17 U/L (ref 1–33)
ANION GAP SERPL CALCULATED.3IONS-SCNC: 15 MMOL/L (ref 5–15)
AST SERPL-CCNC: 29 U/L (ref 1–32)
BASOPHILS # BLD AUTO: 0.03 10*3/MM3 (ref 0–0.2)
BASOPHILS NFR BLD AUTO: 0.2 % (ref 0–1.5)
BILIRUB SERPL-MCNC: 0.4 MG/DL (ref 0–1.2)
BUN SERPL-MCNC: 17 MG/DL (ref 8–23)
BUN/CREAT SERPL: 12.1 (ref 7–25)
CALCIUM SPEC-SCNC: 10.1 MG/DL (ref 8.6–10.5)
CHLORIDE SERPL-SCNC: 94 MMOL/L (ref 98–107)
CK SERPL-CCNC: 168 U/L (ref 20–180)
CO2 SERPL-SCNC: 19 MMOL/L (ref 22–29)
CREAT SERPL-MCNC: 1.4 MG/DL (ref 0.57–1)
D-LACTATE SERPL-SCNC: 1.6 MMOL/L (ref 0.5–2)
D-LACTATE SERPL-SCNC: 2.1 MMOL/L (ref 0.3–2)
DEPRECATED RDW RBC AUTO: 62.2 FL (ref 37–54)
EGFRCR SERPLBLD CKD-EPI 2021: 43.2 ML/MIN/1.73
EOSINOPHIL # BLD AUTO: 0.1 10*3/MM3 (ref 0–0.4)
EOSINOPHIL NFR BLD AUTO: 0.7 % (ref 0.3–6.2)
ERYTHROCYTE [DISTWIDTH] IN BLOOD BY AUTOMATED COUNT: 22.2 % (ref 12.3–15.4)
GLOBULIN UR ELPH-MCNC: 3 GM/DL
GLUCOSE BLDC GLUCOMTR-MCNC: 134 MG/DL (ref 70–105)
GLUCOSE BLDC GLUCOMTR-MCNC: 72 MG/DL (ref 70–105)
GLUCOSE SERPL-MCNC: 121 MG/DL (ref 65–99)
HCT VFR BLD AUTO: 36.1 % (ref 34–46.6)
HGB BLD-MCNC: 10.9 G/DL (ref 12–15.9)
IMM GRANULOCYTES # BLD AUTO: 0.05 10*3/MM3 (ref 0–0.05)
IMM GRANULOCYTES NFR BLD AUTO: 0.3 % (ref 0–0.5)
LYMPHOCYTES # BLD AUTO: 1.05 10*3/MM3 (ref 0.7–3.1)
LYMPHOCYTES NFR BLD AUTO: 7.3 % (ref 19.6–45.3)
MCH RBC QN AUTO: 23.5 PG (ref 26.6–33)
MCHC RBC AUTO-ENTMCNC: 30.2 G/DL (ref 31.5–35.7)
MCV RBC AUTO: 78 FL (ref 79–97)
MONOCYTES # BLD AUTO: 1.61 10*3/MM3 (ref 0.1–0.9)
MONOCYTES NFR BLD AUTO: 11.2 % (ref 5–12)
NEUTROPHILS NFR BLD AUTO: 11.49 10*3/MM3 (ref 1.7–7)
NEUTROPHILS NFR BLD AUTO: 80.3 % (ref 42.7–76)
NRBC BLD AUTO-RTO: 0 /100 WBC (ref 0–0.2)
PLATELET # BLD AUTO: 393 10*3/MM3 (ref 140–450)
PMV BLD AUTO: 8.5 FL (ref 6–12)
POTASSIUM SERPL-SCNC: 4.3 MMOL/L (ref 3.5–5.2)
PROCALCITONIN SERPL-MCNC: 0.45 NG/ML (ref 0–0.25)
PROT SERPL-MCNC: 6.2 G/DL (ref 6–8.5)
RBC # BLD AUTO: 4.63 10*6/MM3 (ref 3.77–5.28)
SODIUM SERPL-SCNC: 128 MMOL/L (ref 136–145)
WBC NRBC COR # BLD AUTO: 14.33 10*3/MM3 (ref 3.4–10.8)

## 2024-10-08 PROCEDURE — 25010000002 CLINDAMYCIN PER 300 MG: Performed by: PHYSICIAN ASSISTANT

## 2024-10-08 PROCEDURE — 87077 CULTURE AEROBIC IDENTIFY: CPT | Performed by: PHYSICIAN ASSISTANT

## 2024-10-08 PROCEDURE — 85025 COMPLETE CBC W/AUTO DIFF WBC: CPT | Performed by: PHYSICIAN ASSISTANT

## 2024-10-08 PROCEDURE — 82550 ASSAY OF CK (CPK): CPT | Performed by: PHYSICIAN ASSISTANT

## 2024-10-08 PROCEDURE — 84145 PROCALCITONIN (PCT): CPT | Performed by: PHYSICIAN ASSISTANT

## 2024-10-08 PROCEDURE — 36415 COLL VENOUS BLD VENIPUNCTURE: CPT

## 2024-10-08 PROCEDURE — 82948 REAGENT STRIP/BLOOD GLUCOSE: CPT

## 2024-10-08 PROCEDURE — 83605 ASSAY OF LACTIC ACID: CPT

## 2024-10-08 PROCEDURE — 74177 CT ABD & PELVIS W/CONTRAST: CPT

## 2024-10-08 PROCEDURE — 87040 BLOOD CULTURE FOR BACTERIA: CPT | Performed by: PHYSICIAN ASSISTANT

## 2024-10-08 PROCEDURE — 25010000002 VANCOMYCIN 1.75-0.9 GM/500ML-% SOLUTION: Performed by: PHYSICIAN ASSISTANT

## 2024-10-08 PROCEDURE — 25810000003 SODIUM CHLORIDE 0.9 % SOLUTION: Performed by: EMERGENCY MEDICINE

## 2024-10-08 PROCEDURE — 25010000002 PIPERACILLIN SOD-TAZOBACTAM PER 1 G: Performed by: PHYSICIAN ASSISTANT

## 2024-10-08 PROCEDURE — 51702 INSERT TEMP BLADDER CATH: CPT

## 2024-10-08 PROCEDURE — G0463 HOSPITAL OUTPT CLINIC VISIT: HCPCS | Performed by: INTERNAL MEDICINE

## 2024-10-08 PROCEDURE — 25510000001 IOPAMIDOL PER 1 ML: Performed by: PHYSICIAN ASSISTANT

## 2024-10-08 PROCEDURE — 25010000002 CEFTRIAXONE PER 250 MG: Performed by: INTERNAL MEDICINE

## 2024-10-08 PROCEDURE — 87070 CULTURE OTHR SPECIMN AEROBIC: CPT | Performed by: PHYSICIAN ASSISTANT

## 2024-10-08 PROCEDURE — 80053 COMPREHEN METABOLIC PANEL: CPT | Performed by: PHYSICIAN ASSISTANT

## 2024-10-08 PROCEDURE — 87205 SMEAR GRAM STAIN: CPT | Performed by: PHYSICIAN ASSISTANT

## 2024-10-08 PROCEDURE — 99285 EMERGENCY DEPT VISIT HI MDM: CPT

## 2024-10-08 PROCEDURE — 87186 SC STD MICRODIL/AGAR DIL: CPT | Performed by: PHYSICIAN ASSISTANT

## 2024-10-08 RX ORDER — SODIUM CHLORIDE 0.9 % (FLUSH) 0.9 %
10 SYRINGE (ML) INJECTION EVERY 12 HOURS SCHEDULED
Status: DISCONTINUED | OUTPATIENT
Start: 2024-10-08 | End: 2024-10-18 | Stop reason: HOSPADM

## 2024-10-08 RX ORDER — FERROUS SULFATE 325(65) MG
325 TABLET ORAL
COMMUNITY
End: 2024-11-02

## 2024-10-08 RX ORDER — INSULIN LISPRO 100 [IU]/ML
2-9 INJECTION, SOLUTION INTRAVENOUS; SUBCUTANEOUS
Status: DISCONTINUED | OUTPATIENT
Start: 2024-10-08 | End: 2024-10-18 | Stop reason: HOSPADM

## 2024-10-08 RX ORDER — POLYETHYLENE GLYCOL 3350 17 G/17G
17 POWDER, FOR SOLUTION ORAL DAILY PRN
Status: DISCONTINUED | OUTPATIENT
Start: 2024-10-08 | End: 2024-10-18 | Stop reason: HOSPADM

## 2024-10-08 RX ORDER — SODIUM CHLORIDE 0.9 % (FLUSH) 0.9 %
10 SYRINGE (ML) INJECTION AS NEEDED
Status: DISCONTINUED | OUTPATIENT
Start: 2024-10-08 | End: 2024-10-18 | Stop reason: HOSPADM

## 2024-10-08 RX ORDER — ACETAMINOPHEN 500 MG
1000 TABLET ORAL EVERY 8 HOURS PRN
Status: DISCONTINUED | OUTPATIENT
Start: 2024-10-08 | End: 2024-10-18 | Stop reason: HOSPADM

## 2024-10-08 RX ORDER — MONTELUKAST SODIUM 10 MG/1
10 TABLET ORAL NIGHTLY
COMMUNITY
End: 2024-11-02

## 2024-10-08 RX ORDER — ENOXAPARIN SODIUM 100 MG/ML
40 INJECTION SUBCUTANEOUS EVERY 12 HOURS
Status: DISCONTINUED | OUTPATIENT
Start: 2024-10-08 | End: 2024-10-18 | Stop reason: HOSPADM

## 2024-10-08 RX ORDER — RISPERIDONE 1 MG/1
4 TABLET ORAL EVERY EVENING
Status: DISCONTINUED | OUTPATIENT
Start: 2024-10-08 | End: 2024-10-18 | Stop reason: HOSPADM

## 2024-10-08 RX ORDER — HYDROXYZINE HYDROCHLORIDE 25 MG/1
50 TABLET, FILM COATED ORAL NIGHTLY
Status: DISCONTINUED | OUTPATIENT
Start: 2024-10-08 | End: 2024-10-18 | Stop reason: HOSPADM

## 2024-10-08 RX ORDER — MONTELUKAST SODIUM 10 MG/1
10 TABLET ORAL NIGHTLY
Status: DISCONTINUED | OUTPATIENT
Start: 2024-10-08 | End: 2024-10-18 | Stop reason: HOSPADM

## 2024-10-08 RX ORDER — CLINDAMYCIN PHOSPHATE 900 MG/50ML
900 INJECTION, SOLUTION INTRAVENOUS ONCE
Status: COMPLETED | OUTPATIENT
Start: 2024-10-08 | End: 2024-10-08

## 2024-10-08 RX ORDER — VANCOMYCIN 1.75 GRAM/500 ML IN 0.9 % SODIUM CHLORIDE INTRAVENOUS
1750 ONCE
Status: COMPLETED | OUTPATIENT
Start: 2024-10-08 | End: 2024-10-08

## 2024-10-08 RX ORDER — TRAMADOL HYDROCHLORIDE 50 MG/1
50 TABLET ORAL EVERY 6 HOURS PRN
Status: DISPENSED | OUTPATIENT
Start: 2024-10-08 | End: 2024-10-17

## 2024-10-08 RX ORDER — FERROUS SULFATE 324(65)MG
324 TABLET, DELAYED RELEASE (ENTERIC COATED) ORAL
Status: DISCONTINUED | OUTPATIENT
Start: 2024-10-09 | End: 2024-10-18 | Stop reason: HOSPADM

## 2024-10-08 RX ORDER — SODIUM CHLORIDE 9 MG/ML
40 INJECTION, SOLUTION INTRAVENOUS AS NEEDED
Status: DISCONTINUED | OUTPATIENT
Start: 2024-10-08 | End: 2024-10-18 | Stop reason: HOSPADM

## 2024-10-08 RX ORDER — BISACODYL 5 MG/1
5 TABLET, DELAYED RELEASE ORAL DAILY PRN
Status: DISCONTINUED | OUTPATIENT
Start: 2024-10-08 | End: 2024-10-18 | Stop reason: HOSPADM

## 2024-10-08 RX ORDER — AMOXICILLIN 250 MG
2 CAPSULE ORAL 2 TIMES DAILY PRN
Status: DISCONTINUED | OUTPATIENT
Start: 2024-10-08 | End: 2024-10-18 | Stop reason: HOSPADM

## 2024-10-08 RX ORDER — IOPAMIDOL 755 MG/ML
100 INJECTION, SOLUTION INTRAVASCULAR
Status: COMPLETED | OUTPATIENT
Start: 2024-10-08 | End: 2024-10-08

## 2024-10-08 RX ORDER — NICOTINE POLACRILEX 4 MG
15 LOZENGE BUCCAL
Status: DISCONTINUED | OUTPATIENT
Start: 2024-10-08 | End: 2024-10-18 | Stop reason: HOSPADM

## 2024-10-08 RX ORDER — BISACODYL 10 MG
10 SUPPOSITORY, RECTAL RECTAL DAILY PRN
Status: DISCONTINUED | OUTPATIENT
Start: 2024-10-08 | End: 2024-10-18 | Stop reason: HOSPADM

## 2024-10-08 RX ORDER — IBUPROFEN 600 MG/1
1 TABLET ORAL
Status: DISCONTINUED | OUTPATIENT
Start: 2024-10-08 | End: 2024-10-18 | Stop reason: HOSPADM

## 2024-10-08 RX ORDER — NYSTATIN 100000 [USP'U]/G
POWDER TOPICAL EVERY 12 HOURS SCHEDULED
Status: DISCONTINUED | OUTPATIENT
Start: 2024-10-08 | End: 2024-10-18 | Stop reason: HOSPADM

## 2024-10-08 RX ORDER — DEXTROSE MONOHYDRATE 25 G/50ML
25 INJECTION, SOLUTION INTRAVENOUS
Status: DISCONTINUED | OUTPATIENT
Start: 2024-10-08 | End: 2024-10-18 | Stop reason: HOSPADM

## 2024-10-08 RX ADMIN — CLINDAMYCIN PHOSPHATE 900 MG: 900 INJECTION, SOLUTION INTRAVENOUS at 12:31

## 2024-10-08 RX ADMIN — MONTELUKAST 10 MG: 10 TABLET, FILM COATED ORAL at 20:36

## 2024-10-08 RX ADMIN — CEFTRIAXONE 2000 MG: 2 INJECTION, POWDER, FOR SOLUTION INTRAMUSCULAR; INTRAVENOUS at 20:36

## 2024-10-08 RX ADMIN — Medication 10 ML: at 20:36

## 2024-10-08 RX ADMIN — IOPAMIDOL 100 ML: 755 INJECTION, SOLUTION INTRAVENOUS at 14:29

## 2024-10-08 RX ADMIN — Medication 1750 MG: at 13:23

## 2024-10-08 RX ADMIN — NYSTATIN: 100000 POWDER TOPICAL at 20:40

## 2024-10-08 RX ADMIN — RISPERIDONE 4 MG: 1 TABLET, FILM COATED ORAL at 20:40

## 2024-10-08 RX ADMIN — HYDROXYZINE HYDROCHLORIDE 50 MG: 25 TABLET, FILM COATED ORAL at 20:36

## 2024-10-08 RX ADMIN — PIPERACILLIN AND TAZOBACTAM 4.5 G: 4; .5 INJECTION, POWDER, FOR SOLUTION INTRAVENOUS at 12:31

## 2024-10-08 RX ADMIN — SODIUM CHLORIDE 500 ML: 9 INJECTION, SOLUTION INTRAVENOUS at 12:38

## 2024-10-08 RX ADMIN — ACETAMINOPHEN 1000 MG: 500 TABLET, FILM COATED ORAL at 18:42

## 2024-10-08 NOTE — Clinical Note
Level of Care: Telemetry [5]   Admitting Physician: VERNA ISLAS [[id number]]   Attending Physician: VERNA ISLAS [[id number]]

## 2024-10-08 NOTE — ED PROVIDER NOTES
Subjective   History of Present Illness  Chief Complaint: Rash    Patient is a 59-year-old female who appears older than her stated age with history of hypertension diabetes, endometrial cancer presents to the ER with complaints of rash to her lower abdomen and her upper legs for 6 days.  She is a very poor historian.  She reports no fall trauma or injury.  She states that she was seen at her oncologist office today and was told to come the ER for evaluation.  Patient states that she had a PET scan 9/30/2024 but has not had any radiation treatments yet.  She reports some burning to the skin in these areas but no sharp pain.  She denies placing any heating pad creams or lotions in this area.  No abdominal pain nausea vomiting diarrhea.  She reports some vaginal bleeding that she attributes to the known endometrial cancer.  No chest pain shortness breath headache or fever.    PCP: Robin Granados    History provided by:  Patient      Review of Systems   Constitutional:  Positive for fatigue. Negative for chills and fever.   HENT:  Negative for sore throat and trouble swallowing.    Respiratory:  Negative for shortness of breath and wheezing.    Cardiovascular:  Negative for chest pain.   Gastrointestinal:  Positive for abdominal pain and nausea. Negative for diarrhea and vomiting.   Genitourinary:  Negative for dysuria.   Musculoskeletal:  Negative for myalgias.   Skin:  Positive for color change, rash and wound.   Neurological:  Negative for headaches.   Psychiatric/Behavioral:  Negative for behavioral problems.    All other systems reviewed and are negative.      Past Medical History:   Diagnosis Date    Diabetes mellitus     Hypertension     Uterine mass        No Known Allergies    Past Surgical History:   Procedure Laterality Date    CHOLECYSTECTOMY         Family History   Problem Relation Age of Onset    Diabetes Mother        Social History     Socioeconomic History    Marital status:    Tobacco Use     "Smoking status: Never   Vaping Use    Vaping status: Never Used   Substance and Sexual Activity    Alcohol use: Never    Drug use: Never           Objective   Physical Exam  Vitals and nursing note reviewed.   Constitutional:       Appearance: Normal appearance. She is well-developed. She is obese. She is ill-appearing. She is not toxic-appearing.   HENT:      Head: Normocephalic and atraumatic.   Eyes:      Extraocular Movements: Extraocular movements intact.      Pupils: Pupils are equal, round, and reactive to light.   Cardiovascular:      Rate and Rhythm: Normal rate and regular rhythm.      Pulses: Normal pulses.      Heart sounds: Normal heart sounds. No murmur heard.  Pulmonary:      Effort: Pulmonary effort is normal. No respiratory distress.      Breath sounds: Normal breath sounds. No wheezing.   Abdominal:      General: Bowel sounds are normal. There is distension.      Palpations: Abdomen is soft.      Tenderness: There is abdominal tenderness. There is no right CVA tenderness or left CVA tenderness.   Skin:     General: Skin is warm and dry.      Capillary Refill: Capillary refill takes less than 2 seconds.      Findings: Erythema and rash present.      Comments: See below photo - severe cellulitic changes with diffuse erythema and drainage   Neurological:      General: No focal deficit present.      Mental Status: She is alert and oriented to person, place, and time.      Motor: No weakness.   Psychiatric:         Mood and Affect: Mood normal.         Behavior: Behavior normal.                   Procedures           ED Course  ED Course as of 10/08/24 2202   Tue Oct 08, 2024   1259 Waiting for patient to go to CT []   1313 Lactate(!!): 2.1 []   1550 I spoke with Dr. Castelan regarding hospital admission []      ED Course User Index  [] Rissa Feldman PA    /56 (BP Location: Right arm, Patient Position: Lying)   Pulse 86   Temp 97.9 °F (36.6 °C) (Oral)   Resp 14   Ht 160 cm (63\")   " "Wt 114 kg (251 lb 15.8 oz)   SpO2 94%   BMI 44.64 kg/m²   Labs Reviewed   COMPREHENSIVE METABOLIC PANEL - Abnormal; Notable for the following components:       Result Value    Glucose 121 (*)     Creatinine 1.40 (*)     Sodium 128 (*)     Chloride 94 (*)     CO2 19.0 (*)     Albumin 3.2 (*)     eGFR 43.2 (*)     All other components within normal limits    Narrative:     GFR Normal >60  Chronic Kidney Disease <60  Kidney Failure <15     PROCALCITONIN - Abnormal; Notable for the following components:    Procalcitonin 0.45 (*)     All other components within normal limits    Narrative:     As a Marker for Sepsis (Non-Neonates):    1. <0.5 ng/mL represents a low risk of severe sepsis and/or septic shock.  2. >2 ng/mL represents a high risk of severe sepsis and/or septic shock.    As a Marker for Lower Respiratory Tract Infections that require antibiotic therapy:    PCT on Admission    Antibiotic Therapy       6-12 Hrs later    >0.5                Strongly Recommended  >0.25 - <0.5        Recommended   0.1 - 0.25          Discouraged              Remeasure/reassess PCT  <0.1                Strongly Discouraged     Remeasure/reassess PCT    As 28 day mortality risk marker: \"Change in Procalcitonin Result\" (>80% or <=80%) if Day 0 (or Day 1) and Day 4 values are available. Refer to http://www.[employer]-pct-calculator.com    Change in PCT <=80%  A decrease of PCT levels below or equal to 80% defines a positive change in PCT test result representing a higher risk for 28-day all-cause mortality of patients diagnosed with severe sepsis for septic shock.    Change in PCT >80%  A decrease of PCT levels of more than 80% defines a negative change in PCT result representing a lower risk for 28-day all-cause mortality of patients diagnosed with severe sepsis or septic shock.      CBC WITH AUTO DIFFERENTIAL - Abnormal; Notable for the following components:    WBC 14.33 (*)     Hemoglobin 10.9 (*)     MCV 78.0 (*)     MCH 23.5 (*)     " MCHC 30.2 (*)     RDW 22.2 (*)     RDW-SD 62.2 (*)     Neutrophil % 80.3 (*)     Lymphocyte % 7.3 (*)     Neutrophils, Absolute 11.49 (*)     Monocytes, Absolute 1.61 (*)     All other components within normal limits   POC LACTATE - Abnormal; Notable for the following components:    Lactate 2.1 (*)     All other components within normal limits   POCT GLUCOSE FINGERSTICK - Abnormal; Notable for the following components:    Glucose 134 (*)     All other components within normal limits   CK - Normal   LACTIC ACID, REFLEX - Normal   POCT GLUCOSE FINGERSTICK - Normal   WOUND CULTURE   BLOOD CULTURE   BLOOD CULTURE   POC LACTATE   POCT GLUCOSE FINGERSTICK   POCT GLUCOSE FINGERSTICK   POCT GLUCOSE FINGERSTICK   POCT GLUCOSE FINGERSTICK   CBC AND DIFFERENTIAL    Narrative:     The following orders were created for panel order CBC & Differential.  Procedure                               Abnormality         Status                     ---------                               -----------         ------                     CBC Auto Differential[[id number]]        Abnormal            Final result                 Please view results for these tests on the individual orders.     Medications   sodium chloride 0.9 % flush 10 mL (has no administration in time range)   sodium chloride 0.9 % flush 10 mL (10 mL Intravenous Given 10/8/24 2036)   sodium chloride 0.9 % flush 10 mL (has no administration in time range)   sodium chloride 0.9 % infusion 40 mL (has no administration in time range)   Pharmacy to Dose enoxaparin (LOVENOX) (has no administration in time range)   Potassium Replacement - Follow Nurse / BPA Driven Protocol (has no administration in time range)   Magnesium Standard Dose Replacement - Follow Nurse / BPA Driven Protocol (has no administration in time range)   Phosphorus Replacement - Follow Nurse / BPA Driven Protocol (has no administration in time range)   Calcium Replacement - Follow Nurse / BPA Driven Protocol (has no  administration in time range)   sennosides-docusate (PERICOLACE) 8.6-50 MG per tablet 2 tablet (has no administration in time range)     And   polyethylene glycol (MIRALAX) packet 17 g (has no administration in time range)     And   bisacodyl (DULCOLAX) EC tablet 5 mg (has no administration in time range)     And   bisacodyl (DULCOLAX) suppository 10 mg (has no administration in time range)   acetaminophen (TYLENOL) tablet 1,000 mg (1,000 mg Oral Given 10/8/24 1842)   melatonin tablet 5 mg (has no administration in time range)   Enoxaparin Sodium (LOVENOX) syringe 40 mg (40 mg Subcutaneous Not Given 10/8/24 2105)   cefTRIAXone (ROCEPHIN) 2,000 mg in sodium chloride 0.9 % 100 mL MBP (2,000 mg Intravenous New Bag 10/8/24 2036)   ferrous sulfate EC tablet 324 mg (has no administration in time range)   hydrOXYzine (ATARAX) tablet 50 mg (50 mg Oral Given 10/8/24 2036)   montelukast (SINGULAIR) tablet 10 mg (10 mg Oral Given 10/8/24 2036)   PARoxetine (PAXIL) tablet 30 mg (has no administration in time range)   risperiDONE (risperDAL) tablet 4 mg (4 mg Oral Given 10/8/24 2040)   dextrose (GLUTOSE) oral gel 15 g (has no administration in time range)   dextrose (D50W) (25 g/50 mL) IV injection 25 g (has no administration in time range)   glucagon (GLUCAGEN) injection 1 mg (has no administration in time range)   insulin lispro (HUMALOG/ADMELOG) injection 2-9 Units ( Subcutaneous Not Given 10/8/24 2044)   nystatin (MYCOSTATIN) powder ( Topical Given 10/8/24 2040)   traMADol (ULTRAM) tablet 50 mg (has no administration in time range)   piperacillin-tazobactam (ZOSYN) 4.5 g IVPB in 100 mL NS MBP (CD) (0 g Intravenous Stopped 10/8/24 1306)   vancomycin IVPB 1750 mg in 0.9% Sodium Chloride (premix) 500 mL (0 mg Intravenous Stopped 10/8/24 1522)   clindamycin (CLEOCIN) 900 mg in sodium chloride 0.9% 50 mL IVPB (premix) (0 mg Intravenous Stopped 10/8/24 1306)   sodium chloride 0.9 % bolus 500 mL (0 mL Intravenous Stopped 10/8/24  1307)   iopamidol (ISOVUE-370) 76 % injection 100 mL (100 mL Intravenous Given 10/8/24 1425)     CT Abdomen Pelvis With Contrast    Result Date: 10/8/2024  Impression: 1.Superficial soft tissue edema/induration within the lower abdominal wall and left flank, compatible with cellulitis in the appropriate clinical setting. No subcutaneous gas is seen. No associated discrete fluid collection is seen. 2.Large, heterogeneous mass of the uterus/cervix with probable invasion of the right posterior urinary bladder. Please refer to the PET/CT from 5 days prior. 3.Mild bilateral hydroureteronephrosis with moderately distended urinary bladder. Ureteral obstruction or bladder outlet obstruction cannot be excluded. 4.Numerous pulmonary nodules, retroperitoneal/iliac chain lymphadenopathy, and left-sided omental metastatic implants as noted on recent PET/CT. 5.Multiple small hypoattenuating liver lesions, particularly within the right hepatic lobe, concerning for metastatic disease. These may be further characterized with liver MRI if clinically indicated. 6.9 cm hypodense lesion within the left adnexa. Please refer to the MRI of the pelvis from 8/19/2024. 7.Additional findings as detailed above. Electronically Signed: Sushil Benítez MD  10/8/2024 2:51 PM EDT  Workstation ID: [id number]                                            Medical Decision Making  Differential Dx (Includes but not limited to): Cellulitis necrotizing fasciitis abdominal wall abscess    Chart Review: Patient had PET scan 10/3/24  IMPRESSION:  Impression:  1. Large hypermetabolic mass involving uterus extending into cervix and vagina consistent with known endometrial malignancy.  2. New hypermetabolic soft tissue involving posterior bladder wall concerning for direct tumor invasion of the bladder.  3. New moderate bilateral hydroureteronephrosis likely secondary to mass involving left and right UVJ.  4. Hypermetabolic iliac and retroperitoneal adenopathy  consistent with metastatic adenopathy. Hypermetabolic small subcarinal node also concerning for metastatic hypermetabolic adenopathy.  5. Multiple pulmonary metastatic nodules.  6. Omental carcinomatosis.  7. Cystic 8.6 x 8.4 cm left adnexal lesion with hypermetabolic peripheral nodule which could relate to ovarian metastasis or primary ovarian neoplasm as described on prior pelvic MRI.     Electronically Signed: Tom Bennett MD       While in the ED IV was placed and labs were obtained appropriate PPE was worn during exam and throughout all encounters with the patient.  Patient had the above evaluation.  She is afebrile, she is ill-appearing vital signs are stable.  Patient has a severe erythematous abdominal wall cellulitis extends into the pannus, groin folds and between her legs.  There is no drainable abscess noted.  No crepitus under the skin.  Lactate 2.1 blood cultures pending.  CBC shows leukocytosis 14,000 with an elevated procalcitonin.  CT imaging obtained to rule out necrotizing fasciitis.  There is no subcutaneous gas that was seen but there is superficial soft tissue edema and induration consistent with cellulitis.  Mild bilateral hydroureteronephrosis with moderate distended urinary bladder, there is likely bladder outlet obstruction secondary to the known uterine mass.  Order for [place] placement was placed to alleviate the bladder distention and to prevent contamination of urine onto the this near patient's groin.  Patient started on triple antibiotic therapy for suspicion of necrotizing fasciitis initially, Zosyn Vanco and clindamycin.  These will likely be de-escalated.  Patient will be admitted for further IV antibiotics and treatment per medicine team.  Spoke with Dr. Dougherty regarding admission.    Problems Addressed:  Abdominal wall cellulitis: acute illness or injury  Cellulitis of groin, left: acute illness or injury  Urinary retention: acute illness or injury  Uterine mass: acute illness or  injury    Amount and/or Complexity of Data Reviewed  External Data Reviewed: radiology.  Labs: ordered. Decision-making details documented in ED Course.  Radiology: ordered. Decision-making details documented in ED Course.  ECG/medicine tests: ordered. Decision-making details documented in ED Course.    Risk  Prescription drug management.  Decision regarding hospitalization.        Final diagnoses:   Abdominal wall cellulitis   Cellulitis of groin, left   Urinary retention   Uterine mass       ED Disposition  ED Disposition       ED Disposition   Decision to Admit    Condition   --    Comment   Level of Care: Med/Surg [1]   Diagnosis: Cellulitis of abdominal wall [022708]   Admitting Physician: VERNA ISLAS [[id number]]   Attending Physician: VERNA ISLAS [[id number]]   Certification: I Certify That Inpatient Hospital Services Are Medically Necessary For Greater Than 2 Midnights                 No follow-up provider specified.       Medication List        ASK your doctor about these medications      ferrous sulfate 325 (65 FE) MG tablet  Ask about: Which instructions should I use?                 Rissa Feldman PA  10/08/24 0434       Rissa Feldman PA  10/08/24 1169

## 2024-10-08 NOTE — LETTER
EMS Transport Request  For use at [facility], [place], [place], [place], and [place] only   Patient Name: Felisha Mukherjee : 1965   Weight:114 kg (251 lb 15.8 oz) Pick-up Location:  ([facility]) BLS/ALS: BLS/ALS: BLS   Insurance: MEDICARE Auth End Date: 10/18/2024   Pre-Cert #: D/C Summary complete:    Destination: Other [facility]   Contact Precautions: None   Equipment (O2, Fluids, etc.): Urinary Catheter   Arrive By Date/Time: 10/18/2024 1500 Stretcher/WC: Wheelchair   CM Requesting: Sarah Cormier RN Ext: [phone]   Notes/Medical Necessity: 251 lbs, RA, A&O, assist of 1     ______________________________________________________________________    *Only 2 patient bags OR 1 carry-on size bag are permitted.  Wheelchairs and walkers CANNOT transported with the patient. Acknowledge: Yes

## 2024-10-08 NOTE — CASE MANAGEMENT/SOCIAL WORK
Discharge Planning Assessment   [place]     Patient Name: Felisha Mukherjee  MRN: [id number]  Today's Date: 10/8/2024    Admit Date: 10/8/2024    Plan: Return Home with caregiver, sister Andressa assists as needed   Discharge Needs Assessment       Row Name 10/08/24 1749       Discharge Needs Assessment    Outpatient/Agency/Support Group Needs other (see comments)  Caregiver services      Row Name 10/08/24 1748       Living Environment    People in Home alone    Current Living Arrangements apartment    In the past 12 months has the electric, gas, oil, or water company threatened to shut off services in your home? No    Primary Care Provided by homecare agency  Caregiver Crystal and sister- Andressa    Provides Primary Care For no one    Family Caregiver if Needed sibling(s)    Family Caregiver Names Andressa and caregiver    Quality of Family Relationships helpful;involved;supportive    Able to Return to Prior Arrangements yes       Resource/Environmental Concerns    Resource/Environmental Concerns none    Transportation Concerns none       Transportation Needs    In the past 12 months, has lack of transportation kept you from medical appointments or from getting medications? no    In the past 12 months, has lack of transportation kept you from meetings, work, or from getting things needed for daily living? No       Food Insecurity    Within the past 12 months, you worried that your food would run out before you got the money to buy more. Never true    Within the past 12 months, the food you bought just didn't last and you didn't have money to get more. Never true       Transition Planning    Patient/Family Anticipates Transition to home;home with help/services    Patient/Family Anticipated Services at Transition none    Transportation Anticipated family or friend will provide       Discharge Needs Assessment    Readmission Within the Last 30 Days no previous admission in last 30 days    Equipment Currently Used at Home  cane, quad    Concerns to be Addressed discharge planning                   Discharge Plan       Row Name 10/08/24 1745       Plan    Plan Return Home with caregiver, sister Andressa assists as needed    Plan Comments Spoke with patient at bedside, states lives alone.She has a caregiver daily 8-12. Her sister is availalbe to assist as needed.Currently uses a quad cane when needed. Verifieid PCP and pharmacy.DC Barrier: ID consult, IVAB's.PT Consult                  Continued Care and Services - Admitted Since 10/8/2024    No active coordination exists for this encounter.       Expected Discharge Date and Time       Expected Discharge Date Expected Discharge Time    Oct 10, 2024            Demographic Summary       Row Name 10/08/24 1741       General Information    Admission Type inpatient    Arrived From home    Required Notices Provided Important Message from Medicare    Referral Source patient    Reason for Consult discharge planning    Preferred Language English                   Functional Status       Row Name 10/08/24 1741       Functional Status    Usual Activity Tolerance moderate    Current Activity Tolerance moderate       Functional Status, IADL    Medications assistive person    Meal Preparation assistive person    Housekeeping assistive person    Laundry assistive person    Shopping assistive person       Mental Status    General Appearance WDL WDL                Patient Forms       Row Name 10/08/24 1740       Patient Forms    Important Message from Medicare (IMM) Delivered  10/08 Registration           Met with patient in room wearing PPE: mask, face shield/goggles, gloves, gown.      Maintained distance greater than six feet and spent less than 15 minutes in the room.  ;gisele Chu RN

## 2024-10-08 NOTE — LETTER
EMS Transport Request  For use at [facility], [place], [place], [place], and [place] only   Patient Name: Felisha Mukherjee : 1965   Weight:114 kg (251 lb 15.8 oz) Pick-up Location:  BLS/ALS: BLS/ALS: BLS   Insurance: MEDICARE Auth End Date: 10/17/2024   Pre-Cert #: D/C Summary complete:    [facility]   Contact Precautions: None   Equipment (O2, Fluids, etc.): None   Arrive By Date/Time: 10/17/2024 1130 prompt Stretcher/WC: Wheelchair   CM Requesting: Sarah Cormier RN Ext: [phone]   Notes/Medical Necessity: 251 lbs, RA, A&O, standby assist. [facility] for radiation with return to [facility] room      ______________________________________________________________________    *Only 2 patient bags OR 1 carry-on size bag are permitted.  Wheelchairs and walkers CANNOT transported with the patient. Acknowledge: Yes

## 2024-10-08 NOTE — H&P
[facility] Services  History & Physical    Patient Name: Felisha Mukherjee  : 1965  MRN: [id number]  Primary Care Physician:  Hanh Granados APRN  Date of admission: 10/8/2024  Date and Time of Service: 10/8/2024 at 1400    Subjective      Chief Complaint: Rash on lower abdomen legs    History of Present Illness: Felisha Mukherjee is a 59 y.o. female with a CMH of diabetes, hypertension, uterine cancer who presented to [facility] on 10/8/2024 with rash on abdomen and lower legs.    Patient with history of uterine cancer, was seen in office by her radiation oncologist today when he noticed a developing rash on abdomen legs and thighs.  Patient states that the rash started last Thursday and continues to worsen.  Of note, she did not start any radiation treatment in these areas at this time.  She was sent to ED for evaluation.    ED evaluation, workup-CT abdomen with evidence of abdominal wall cellulitis and leukocytosis.  Patient was given IV fluids empiric antibiotics, IV fluids, infectious workup.        Review of Systems   Constitutional:  Negative for chills and fever.   Skin:  Positive for rash.       Personal History     Past Medical History:   Diagnosis Date    Diabetes mellitus     Hypertension     Uterine mass        Past Surgical History:   Procedure Laterality Date    CHOLECYSTECTOMY         Family History: family history includes Diabetes in her mother. Otherwise pertinent FHx was reviewed and not pertinent to current issue.    Social History:  reports that she has never smoked. She does not have any smokeless tobacco history on file. She reports that she does not drink alcohol and does not use drugs.    Home Medications:  Prior to Admission Medications       Prescriptions Last Dose Informant Patient Reported? Taking?    docusate sodium 100 MG capsule 10/8/2024  Yes Yes    Take 1 capsule by mouth Daily.    Farxiga 10 MG tablet 10/8/2024  Yes Yes    Take 10 mg by  mouth Daily.    ferrous sulfate 325 (65 FE) MG tablet 10/8/2024  Yes Yes    Take 1 tablet by mouth 5 (Five) Times a Week. Monday, Tuesday, Wednesday, Thursday and Friday    hydrOXYzine (ATARAX) 50 MG tablet 10/7/2024  Yes Yes    Take 1 tablet by mouth Every Night.    lisinopril (PRINIVIL,ZESTRIL) 40 MG tablet 10/8/2024  Yes Yes    Take 1 tablet by mouth Daily.    metFORMIN (GLUCOPHAGE) 500 MG tablet 10/8/2024  Yes Yes    Take 1 tablet by mouth 2 (Two) Times a Day.    metoprolol succinate XL (TOPROL-XL) 50 MG 24 hr tablet 10/7/2024  Yes Yes    Take 1 tablet by mouth every night at bedtime.    montelukast (SINGULAIR) 10 MG tablet 10/7/2024  Yes Yes    Take 1 tablet by mouth Every Night.    PARoxetine (PAXIL) 30 MG tablet 10/7/2024  Yes Yes    Take 1 tablet by mouth.    risperiDONE (risperDAL) 2 MG tablet 10/7/2024  Yes Yes    Take 2 tablets by mouth Every Evening.    vitamin D (ERGOCALCIFEROL) 1.25 MG (43416 UT) capsule capsule 10/2/2024  Yes No    Take 1 capsule by mouth Every 7 (Seven) Days. Wednesdays              Allergies:  No Known Allergies    Objective      Vitals:   Temp:  [97.4 °F (36.3 °C)-98.1 °F (36.7 °C)] 97.4 °F (36.3 °C)  Heart Rate:  [77-93] 77  Resp:  [16-20] 18  BP: ()/(46-71) 116/47  Body mass index is 44.64 kg/m².  Physical Exam  Constitutional:       General: She is not in acute distress.     Appearance: She is obese.   Cardiovascular:      Rate and Rhythm: Normal rate and regular rhythm.      Heart sounds: No murmur heard.     No friction rub. No gallop.   Pulmonary:      Effort: Pulmonary effort is normal. No respiratory distress.      Breath sounds: Normal breath sounds. No wheezing or rales.   Abdominal:      General: Bowel sounds are normal.      Palpations: Abdomen is soft.   Skin:     Findings: Erythema and rash present.   Neurological:      Mental Status: She is alert.         Diagnostic Data:  Lab Results (last 24 hours)       Procedure Component Value Units Date/Time    STAT  "Lactic Acid, Reflex [[id number]]  (Normal) Collected: 10/08/24 1524    Specimen: Blood from Arm, Left Updated: 10/08/24 1553     Lactate 1.6 mmol/L     POC Lactate [[id number]]  (Abnormal) Collected: 10/08/24 1219    Specimen: Blood Updated: 10/08/24 1307     Lactate 2.1 mmol/L      Comment: Serial Number: [id number]:  [id number]       Comprehensive Metabolic Panel [[id number]]  (Abnormal) Collected: 10/08/24 1213    Specimen: Blood from Arm, Left Updated: 10/08/24 1257     Glucose 121 mg/dL      BUN 17 mg/dL      Creatinine 1.40 mg/dL      Sodium 128 mmol/L      Potassium 4.3 mmol/L      Chloride 94 mmol/L      CO2 19.0 mmol/L      Calcium 10.1 mg/dL      Total Protein 6.2 g/dL      Albumin 3.2 g/dL      ALT (SGPT) 17 U/L      AST (SGOT) 29 U/L      Alkaline Phosphatase 68 U/L      Total Bilirubin 0.4 mg/dL      Globulin 3.0 gm/dL      A/G Ratio 1.1 g/dL      BUN/Creatinine Ratio 12.1     Anion Gap 15.0 mmol/L      eGFR 43.2 mL/min/1.73     Narrative:      GFR Normal >60  Chronic Kidney Disease <60  Kidney Failure <15      Procalcitonin [[id number]]  (Abnormal) Collected: 10/08/24 1213    Specimen: Blood from Arm, Left Updated: 10/08/24 1257     Procalcitonin 0.45 ng/mL     Narrative:      As a Marker for Sepsis (Non-Neonates):    1. <0.5 ng/mL represents a low risk of severe sepsis and/or septic shock.  2. >2 ng/mL represents a high risk of severe sepsis and/or septic shock.    As a Marker for Lower Respiratory Tract Infections that require antibiotic therapy:    PCT on Admission    Antibiotic Therapy       6-12 Hrs later    >0.5                Strongly Recommended  >0.25 - <0.5        Recommended   0.1 - 0.25          Discouraged              Remeasure/reassess PCT  <0.1                Strongly Discouraged     Remeasure/reassess PCT    As 28 day mortality risk marker: \"Change in Procalcitonin Result\" (>80% or <=80%) if Day 0 (or Day 1) and Day 4 values are available. Refer to " http://www.[facility]-pct-calculator.com    Change in PCT <=80%  A decrease of PCT levels below or equal to 80% defines a positive change in PCT test result representing a higher risk for 28-day all-cause mortality of patients diagnosed with severe sepsis for septic shock.    Change in PCT >80%  A decrease of PCT levels of more than 80% defines a negative change in PCT result representing a lower risk for 28-day all-cause mortality of patients diagnosed with severe sepsis or septic shock.       CK [[id number]]  (Normal) Collected: 10/08/24 1213    Specimen: Blood from Arm, Left Updated: 10/08/24 1257     Creatine Kinase 168 U/L     Wound Culture - Swab, Abdominal Wall [[id number]] Collected: 10/08/24 1216    Specimen: Swab from Abdominal Wall Updated: 10/08/24 1256     Gram Stain Moderate (3+) WBCs per low power field      Few (2+) Gram positive bacilli resembling diphtheroids      Rare (1+) Gram positive cocci in pairs    Blood Culture - Blood, Arm, Left [[id number]] Collected: 10/08/24 1213    Specimen: Blood from Arm, Left Updated: 10/08/24 1225    Blood Culture - Blood, Wrist, Left [[id number]] Collected: 10/08/24 1213    Specimen: Blood from Wrist, Left Updated: 10/08/24 1225    CBC & Differential [[id number]]  (Abnormal) Collected: 10/08/24 1213    Specimen: Blood from Arm, Left Updated: 10/08/24 1224    Narrative:      The following orders were created for panel order CBC & Differential.  Procedure                               Abnormality         Status                     ---------                               -----------         ------                     CBC Auto Differential[[id number]]        Abnormal            Final result                 Please view results for these tests on the individual orders.    CBC Auto Differential [[id number]]  (Abnormal) Collected: 10/08/24 1213    Specimen: Blood from Arm, Left Updated: 10/08/24 1224     WBC 14.33 10*3/mm3      RBC 4.63 10*6/mm3      Hemoglobin 10.9 g/dL       Hematocrit 36.1 %      MCV 78.0 fL      MCH 23.5 pg      MCHC 30.2 g/dL      RDW 22.2 %      RDW-SD 62.2 fl      MPV 8.5 fL      Platelets 393 10*3/mm3      Neutrophil % 80.3 %      Lymphocyte % 7.3 %      Monocyte % 11.2 %      Eosinophil % 0.7 %      Basophil % 0.2 %      Immature Grans % 0.3 %      Neutrophils, Absolute 11.49 10*3/mm3      Lymphocytes, Absolute 1.05 10*3/mm3      Monocytes, Absolute 1.61 10*3/mm3      Eosinophils, Absolute 0.10 10*3/mm3      Basophils, Absolute 0.03 10*3/mm3      Immature Grans, Absolute 0.05 10*3/mm3      nRBC 0.0 /100 WBC              Imaging Results (Last 24 Hours)       Procedure Component Value Units Date/Time    CT Abdomen Pelvis With Contrast [[id number]] Collected: 10/08/24 1432     Updated: 10/08/24 1453    Narrative:      CT ABDOMEN PELVIS W CONTRAST    Date of Exam: 10/8/2024 2:26 PM EDT    Indication: Sever rash abdomen with necrotic tissue, r/o abscess/nec fas.    Comparison: CT of the abdomen and pelvis dated 8/19/2024, PET/CT dated 10/3/2024    Technique: Axial CT images were obtained of the abdomen and pelvis following the uneventful intravenous administration of iodinated contrast. Sagittal and coronal reconstructions were performed.  Automated exposure control and iterative reconstruction   methods were used.        Findings:    Liver: The liver is unremarkable in morphology. There are multiple small hypoattenuating liver lesions, particular within the right hepatic lobe, concerning for metastatic disease. Biliary prominence may be related to prior cholecystectomy.    Gallbladder: Surgically absent.    Pancreas: Unremarkable.    Spleen: Unremarkable.    Adrenal glands: Unremarkable.    Genitourinary tract: Enlarged uterus containing a large, heterogeneous uterine/cervical mass with probable invasion of the right posterior urinary bladder. This lesion is better characterized on the PET/CT from 10/8/2024. There is mild bilateral   hydroureteronephrosis, and there  is moderate distention of the urinary bladder. Ureteral obstruction or bladder outlet obstruction not excluded. 9 cm hypodense lesion within the left adnexa.    Gastrointestinal tract: Colonic diverticulosis is present. There is no evidence of bowel obstruction.    Appendix: No findings to suggest acute appendicitis.    Other findings: Multiple omental metastatic implants are seen, largest within the left lower quadrant measuring approximately 4.5 cm. Enlarged retroperitoneal and iliac chain lymph nodes are seen. The abdominal aorta and IVC appear unremarkable.    Bones and soft tissues: No acute osseous lesion is identified. Superficial soft tissue edema/induration within the lower abdominal wall and left flank, compatible with cellulitis in the appropriate clinical setting. No subcutaneous gas is seen. No   associated discrete fluid collection is seen. Prominent inguinal lymph nodes, left greater than right.    Lung bases: Innumerable bilateral lung base nodules, concerning for metastatic disease.      Impression:      Impression:  1.Superficial soft tissue edema/induration within the lower abdominal wall and left flank, compatible with cellulitis in the appropriate clinical setting. No subcutaneous gas is seen. No associated discrete fluid collection is seen.  2.Large, heterogeneous mass of the uterus/cervix with probable invasion of the right posterior urinary bladder. Please refer to the PET/CT from 5 days prior.  3.Mild bilateral hydroureteronephrosis with moderately distended urinary bladder. Ureteral obstruction or bladder outlet obstruction cannot be excluded.  4.Numerous pulmonary nodules, retroperitoneal/iliac chain lymphadenopathy, and left-sided omental metastatic implants as noted on recent PET/CT.  5.Multiple small hypoattenuating liver lesions, particularly within the right hepatic lobe, concerning for metastatic disease. These may be further characterized with liver MRI if clinically indicated.  6.9  cm hypodense lesion within the left adnexa. Please refer to the MRI of the pelvis from 8/19/2024.  7.Additional findings as detailed above.      Electronically Signed: Sushil Benítez MD    10/8/2024 2:51 PM EDT    Workstation ID: [id number]              Assessment & Plan        This is a 59 y.o. female with:    Active and Resolved Problems  Active Hospital Problems    Diagnosis  POA    **Cellulitis of abdominal wall [L03.311]  Yes      Resolved Hospital Problems   No resolved problems to display.       Diagnoses  Cellulitis of abdominal wall  Leukocytosis  Lactic acidosis, resolved  Hyponatremia  HASMUKH  Mild bilateral hydroureteronephrosis with moderately distended urinary bladder  Microcytic anemia  Numerous pulmonary nodules, retroperitoneal/iliac chain lymphadenopathy, and left-sided omental metastatic implants as noted on recent PET/CT   Multiple small hypoattenuating liver lesions   History of colon diverticulitis    //PLAN  Patient with cellulitis of abdominal wall with leukocytosis.  Initial elevated lactate resolved with IV fluid hydration.  CT with soft tissue edema lower abdominal wall and left flank consistent with cellulitis.  No evidence of subcutaneous gas on CT, no concern for necrotizing fasciitis at this time.  Patient follows with oncology and was planned for radiation however she did not have radiation on the areas of cellulitis.  Wound culture and blood cultures pending. IV antibiotics for treatment of cellulitis, wound care consult and ID consult.         VTE Prophylaxis:  Pharmacologic VTE prophylaxis orders are present.        The patient desires to be as follows:    CODE STATUS:    Level Of Support Discussed With: Patient  Code Status (Patient has no pulse and is not breathing): CPR (Attempt to Resuscitate)  Medical Interventions (Patient has pulse or is breathing): Full Support        Andressa her sister, who can be contacted at [phone], is the designated person to make medical decisions on  the patient's behalf if She is incapable of doing so. This was clarified with patient and/or next of kin on 10/8/2024 during the course of this H&P.    Admission Status:  I believe this patient meets admission status.    Expected Length of Stay: 2 days    PDMP and Medication Dispenses via Sidebar reviewed and consistent with patient reported medications.    I discussed the patient's findings and my recommendations with patient and family.      Signature:     This document has been electronically signed by Samm Castelan MD on October 8, 2024 17:50 EDT   [facility] Hospitalist Team

## 2024-10-08 NOTE — PLAN OF CARE
Goal Outcome Evaluation:         Pt A&Ox4, VS stable, pt on RA. Pt here for cellulitis. ID and wound care consulted. Dr. Garzon is pt's radiation oncologist and Dr. Del Real is medical oncologist. Will continue to monitor.

## 2024-10-09 ENCOUNTER — APPOINTMENT (OUTPATIENT)
Dept: ULTRASOUND IMAGING | Facility: HOSPITAL | Age: 59
DRG: 580 | End: 2024-10-09
Payer: MEDICARE

## 2024-10-09 ENCOUNTER — TELEPHONE (OUTPATIENT)
Dept: RADIATION ONCOLOGY | Facility: HOSPITAL | Age: 59
End: 2024-10-09
Payer: MEDICARE

## 2024-10-09 LAB
ANION GAP SERPL CALCULATED.3IONS-SCNC: 9.6 MMOL/L (ref 5–15)
BASOPHILS # BLD AUTO: 0.03 10*3/MM3 (ref 0–0.2)
BASOPHILS NFR BLD AUTO: 0.3 % (ref 0–1.5)
BUN SERPL-MCNC: 18 MG/DL (ref 8–23)
BUN/CREAT SERPL: 13.1 (ref 7–25)
CALCIUM SPEC-SCNC: 9.3 MG/DL (ref 8.6–10.5)
CHLORIDE SERPL-SCNC: 100 MMOL/L (ref 98–107)
CO2 SERPL-SCNC: 22.4 MMOL/L (ref 22–29)
CREAT SERPL-MCNC: 1.37 MG/DL (ref 0.57–1)
DEPRECATED RDW RBC AUTO: 63.5 FL (ref 37–54)
EGFRCR SERPLBLD CKD-EPI 2021: 44.3 ML/MIN/1.73
EOSINOPHIL # BLD AUTO: 0.39 10*3/MM3 (ref 0–0.4)
EOSINOPHIL NFR BLD AUTO: 4.1 % (ref 0.3–6.2)
ERYTHROCYTE [DISTWIDTH] IN BLOOD BY AUTOMATED COUNT: 21.8 % (ref 12.3–15.4)
GLUCOSE BLDC GLUCOMTR-MCNC: 110 MG/DL (ref 70–105)
GLUCOSE BLDC GLUCOMTR-MCNC: 116 MG/DL (ref 70–105)
GLUCOSE BLDC GLUCOMTR-MCNC: 149 MG/DL (ref 70–105)
GLUCOSE BLDC GLUCOMTR-MCNC: 229 MG/DL (ref 70–105)
GLUCOSE SERPL-MCNC: 123 MG/DL (ref 65–99)
HCT VFR BLD AUTO: 31.2 % (ref 34–46.6)
HGB BLD-MCNC: 9.4 G/DL (ref 12–15.9)
IMM GRANULOCYTES # BLD AUTO: 0.05 10*3/MM3 (ref 0–0.05)
IMM GRANULOCYTES NFR BLD AUTO: 0.5 % (ref 0–0.5)
LYMPHOCYTES # BLD AUTO: 1.36 10*3/MM3 (ref 0.7–3.1)
LYMPHOCYTES NFR BLD AUTO: 14.4 % (ref 19.6–45.3)
MAGNESIUM SERPL-MCNC: 1.9 MG/DL (ref 1.6–2.4)
MCH RBC QN AUTO: 23.8 PG (ref 26.6–33)
MCHC RBC AUTO-ENTMCNC: 30.1 G/DL (ref 31.5–35.7)
MCV RBC AUTO: 79 FL (ref 79–97)
MONOCYTES # BLD AUTO: 1.27 10*3/MM3 (ref 0.1–0.9)
MONOCYTES NFR BLD AUTO: 13.4 % (ref 5–12)
NEUTROPHILS NFR BLD AUTO: 6.37 10*3/MM3 (ref 1.7–7)
NEUTROPHILS NFR BLD AUTO: 67.3 % (ref 42.7–76)
NRBC BLD AUTO-RTO: 0 /100 WBC (ref 0–0.2)
PLATELET # BLD AUTO: 320 10*3/MM3 (ref 140–450)
PMV BLD AUTO: 8.6 FL (ref 6–12)
POTASSIUM SERPL-SCNC: 3.8 MMOL/L (ref 3.5–5.2)
RBC # BLD AUTO: 3.95 10*6/MM3 (ref 3.77–5.28)
SODIUM SERPL-SCNC: 132 MMOL/L (ref 136–145)
WBC NRBC COR # BLD AUTO: 9.47 10*3/MM3 (ref 3.4–10.8)

## 2024-10-09 PROCEDURE — 97162 PT EVAL MOD COMPLEX 30 MIN: CPT

## 2024-10-09 PROCEDURE — 77334 RADIATION TREATMENT AID(S): CPT | Performed by: INTERNAL MEDICINE

## 2024-10-09 PROCEDURE — 25010000002 ENOXAPARIN PER 10 MG: Performed by: INTERNAL MEDICINE

## 2024-10-09 PROCEDURE — 85025 COMPLETE CBC W/AUTO DIFF WBC: CPT | Performed by: INTERNAL MEDICINE

## 2024-10-09 PROCEDURE — 63710000001 INSULIN LISPRO (HUMAN) PER 5 UNITS: Performed by: INTERNAL MEDICINE

## 2024-10-09 PROCEDURE — 97166 OT EVAL MOD COMPLEX 45 MIN: CPT

## 2024-10-09 PROCEDURE — 77290 THER RAD SIMULAJ FIELD CPLX: CPT | Performed by: INTERNAL MEDICINE

## 2024-10-09 PROCEDURE — 77263 THER RADIOLOGY TX PLNG CPLX: CPT | Performed by: INTERNAL MEDICINE

## 2024-10-09 PROCEDURE — 82948 REAGENT STRIP/BLOOD GLUCOSE: CPT | Performed by: INTERNAL MEDICINE

## 2024-10-09 PROCEDURE — 80048 BASIC METABOLIC PNL TOTAL CA: CPT | Performed by: INTERNAL MEDICINE

## 2024-10-09 PROCEDURE — 76775 US EXAM ABDO BACK WALL LIM: CPT

## 2024-10-09 PROCEDURE — 82948 REAGENT STRIP/BLOOD GLUCOSE: CPT

## 2024-10-09 PROCEDURE — 83735 ASSAY OF MAGNESIUM: CPT | Performed by: INTERNAL MEDICINE

## 2024-10-09 RX ORDER — HYDROCODONE BITARTRATE AND ACETAMINOPHEN 5; 325 MG/1; MG/1
1 TABLET ORAL EVERY 4 HOURS PRN
Status: DISPENSED | OUTPATIENT
Start: 2024-10-09 | End: 2024-10-14

## 2024-10-09 RX ORDER — FLUCONAZOLE 100 MG/1
200 TABLET ORAL EVERY 24 HOURS
Status: COMPLETED | OUTPATIENT
Start: 2024-10-09 | End: 2024-10-15

## 2024-10-09 RX ORDER — LISINOPRIL 20 MG/1
40 TABLET ORAL DAILY
Status: DISCONTINUED | OUTPATIENT
Start: 2024-10-09 | End: 2024-10-18 | Stop reason: HOSPADM

## 2024-10-09 RX ORDER — METOPROLOL SUCCINATE 50 MG/1
50 TABLET, EXTENDED RELEASE ORAL
Status: DISCONTINUED | OUTPATIENT
Start: 2024-10-09 | End: 2024-10-18 | Stop reason: HOSPADM

## 2024-10-09 RX ORDER — DOXYCYCLINE 100 MG/1
100 CAPSULE ORAL EVERY 12 HOURS SCHEDULED
Status: COMPLETED | OUTPATIENT
Start: 2024-10-09 | End: 2024-10-16

## 2024-10-09 RX ADMIN — ENOXAPARIN SODIUM 40 MG: 100 INJECTION SUBCUTANEOUS at 08:35

## 2024-10-09 RX ADMIN — TRAMADOL HYDROCHLORIDE 50 MG: 50 TABLET ORAL at 18:51

## 2024-10-09 RX ADMIN — ZINC OXIDE 1 APPLICATION: 200 OINTMENT TOPICAL at 11:43

## 2024-10-09 RX ADMIN — Medication 10 ML: at 08:38

## 2024-10-09 RX ADMIN — FLUCONAZOLE 200 MG: 100 TABLET ORAL at 18:10

## 2024-10-09 RX ADMIN — MONTELUKAST 10 MG: 10 TABLET, FILM COATED ORAL at 20:26

## 2024-10-09 RX ADMIN — DOXYCYCLINE 100 MG: 100 CAPSULE ORAL at 20:26

## 2024-10-09 RX ADMIN — FERROUS SULFATE TAB EC 324 MG (65 MG FE EQUIVALENT) 324 MG: 324 (65 FE) TABLET DELAYED RESPONSE at 08:36

## 2024-10-09 RX ADMIN — Medication 10 ML: at 21:21

## 2024-10-09 RX ADMIN — RISPERIDONE 4 MG: 1 TABLET, FILM COATED ORAL at 18:10

## 2024-10-09 RX ADMIN — CALCIUM ACETATE MONOHYDRATE AND ALUMINUM SULFATE TETRADECAHYDRATE 1000 ML: 952; 1347 POWDER, FOR SOLUTION TOPICAL at 21:43

## 2024-10-09 RX ADMIN — CALCIUM ACETATE MONOHYDRATE AND ALUMINUM SULFATE TETRADECAHYDRATE 1000 ML: 952; 1347 POWDER, FOR SOLUTION TOPICAL at 20:26

## 2024-10-09 RX ADMIN — HYDROXYZINE HYDROCHLORIDE 50 MG: 25 TABLET, FILM COATED ORAL at 20:26

## 2024-10-09 RX ADMIN — ZINC OXIDE 1 APPLICATION: 200 OINTMENT TOPICAL at 18:11

## 2024-10-09 RX ADMIN — INSULIN LISPRO 4 UNITS: 100 INJECTION, SOLUTION INTRAVENOUS; SUBCUTANEOUS at 08:35

## 2024-10-09 RX ADMIN — METOPROLOL SUCCINATE 50 MG: 50 TABLET, EXTENDED RELEASE ORAL at 11:43

## 2024-10-09 RX ADMIN — NYSTATIN: 100000 POWDER TOPICAL at 08:34

## 2024-10-09 RX ADMIN — LISINOPRIL 40 MG: 20 TABLET ORAL at 11:43

## 2024-10-09 RX ADMIN — ZINC OXIDE 1 APPLICATION: 200 OINTMENT TOPICAL at 20:26

## 2024-10-09 RX ADMIN — PAROXETINE 30 MG: 10 TABLET, FILM COATED ORAL at 08:36

## 2024-10-09 RX ADMIN — NYSTATIN: 100000 POWDER TOPICAL at 20:26

## 2024-10-09 RX ADMIN — CALCIUM ACETATE MONOHYDRATE AND ALUMINUM SULFATE TETRADECAHYDRATE 1000 ML: 952; 1347 POWDER, FOR SOLUTION TOPICAL at 18:10

## 2024-10-09 NOTE — CONSULTS
[facility] CONSULT      Patient Identification:  NAME:  Felisha Mukherjee  Age:  59 y.o.   Sex:  female   :  1965   MRN:  [id number]       Chief complaint/Reason for consult: Hydronephrosis    History of present illness:  59 y.o. female with known endometrial cancer admitted 10/8/2024 complaints of rash.   consulted for hydronephrosis found on CT scan      Past medical history:  Past Medical History:   Diagnosis Date    Diabetes mellitus     Hypertension     Uterine mass        Past surgical history:  Past Surgical History:   Procedure Laterality Date    CHOLECYSTECTOMY         Allergies:  Patient has no known allergies.    Home medications:  Medications Prior to Admission   Medication Sig Dispense Refill Last Dose    docusate sodium 100 MG capsule Take 1 capsule by mouth Daily.   10/8/2024    Farxiga 10 MG tablet Take 10 mg by mouth Daily.   10/8/2024    ferrous sulfate 325 (65 FE) MG tablet Take 1 tablet by mouth 5 (Five) Times a Week. Monday, Tuesday, Wednesday, Thursday and Friday   10/8/2024    hydrOXYzine (ATARAX) 50 MG tablet Take 1 tablet by mouth Every Night.   10/7/2024    lisinopril (PRINIVIL,ZESTRIL) 40 MG tablet Take 1 tablet by mouth Daily.   10/8/2024    metFORMIN (GLUCOPHAGE) 500 MG tablet Take 1 tablet by mouth 2 (Two) Times a Day.   10/8/2024    metoprolol succinate XL (TOPROL-XL) 50 MG 24 hr tablet Take 1 tablet by mouth every night at bedtime.   10/7/2024    montelukast (SINGULAIR) 10 MG tablet Take 1 tablet by mouth Every Night.   10/7/2024    PARoxetine (PAXIL) 30 MG tablet Take 1 tablet by mouth.   10/7/2024    risperiDONE (risperDAL) 2 MG tablet Take 2 tablets by mouth Every Evening.   10/7/2024    vitamin D (ERGOCALCIFEROL) 1.25 MG (23001 UT) capsule capsule Take 1 capsule by mouth Every 7 (Seven) Days. Wednesdays   10/2/2024        Hospital medications:  cefTRIAXone, 2,000 mg, Intravenous, Q24H  enoxaparin, 40 mg, Subcutaneous, Q12H  ferrous sulfate, 324 mg, Oral, Once per  day on   hydrocortisone-bacitracin-zinc oxide-nystatin, 1 Application, Topical, 4x Daily  hydrOXYzine, 50 mg, Oral, Nightly  insulin lispro, 2-9 Units, Subcutaneous, 4x Daily AC & at Bedtime  lisinopril, 40 mg, Oral, Daily  metoprolol succinate XL, 50 mg, Oral, Q24H  montelukast, 10 mg, Oral, Nightly  nystatin, , Topical, Q12H  PARoxetine, 30 mg, Oral, Daily  risperiDONE, 4 mg, Oral, Q PM  sodium chloride, 10 mL, Intravenous, Q12H      Pharmacy to Dose enoxaparin (LOVENOX),         acetaminophen    senna-docusate sodium **AND** polyethylene glycol **AND** bisacodyl **AND** bisacodyl    Calcium Replacement - Follow Nurse / BPA Driven Protocol    dextrose    dextrose    glucagon (human recombinant)    Magnesium Standard Dose Replacement - Follow Nurse / BPA Driven Protocol    melatonin    Pharmacy to Dose enoxaparin (LOVENOX)    Phosphorus Replacement - Follow Nurse / BPA Driven Protocol    Potassium Replacement - Follow Nurse / BPA Driven Protocol    [COMPLETED] Insert Peripheral IV **AND** sodium chloride    sodium chloride    sodium chloride    traMADol    Family history:  Family History   Problem Relation Age of Onset    Diabetes Mother        Social history:  Social History     Tobacco Use    Smoking status: Never   Vaping Use    Vaping status: Never Used   Substance Use Topics    Alcohol use: Never    Drug use: Never       Objective:  TMax 24 hours:   Temp (24hrs), Av °F (36.7 °C), Min:97.9 °F (36.6 °C), Max:98.1 °F (36.7 °C)      Vitals Ranges:   Temp:  [97.9 °F (36.6 °C)-98.1 °F (36.7 °C)] 97.9 °F (36.6 °C)  Heart Rate:  [69-86] 81  Resp:  [14-28] 28  BP: ()/(42-68) 112/53    Intake/Output Last 3 shifts:  I/O last 3 completed shifts:  In: 1150 [IV Piggyback:1150]  Out: 1500 [Urine:1500]     Physical Exam:    General Appearance:    Alert, cooperative, NAD   Lungs:     Respirations unlabored, no audible wheezing    Heart:    No cyanosis   Abdomen:     Soft, ND     :    No suprapubic distention, West catheter in place draining clear yellow urine              Results review:   I reviewed the patient's new clinical results.    Data review:  Lab Results (last 24 hours)       Procedure Component Value Units Date/Time    Blood Culture - Blood, Arm, Left [[id number]]  (Normal) Collected: 10/08/24 1213    Specimen: Blood from Arm, Left Updated: 10/09/24 1230     Blood Culture No growth at 24 hours    Blood Culture - Blood, Wrist, Left [[id number]]  (Normal) Collected: 10/08/24 1213    Specimen: Blood from Wrist, Left Updated: 10/09/24 1230     Blood Culture No growth at 24 hours    POC Glucose 4x Daily Before Meals & at Bedtime [[id number]]  (Abnormal) Collected: 10/09/24 1134    Specimen: Blood Updated: 10/09/24 1135     Glucose 116 mg/dL      Comment: Serial Number: [id number]:  [id number]       Wound Culture - Swab, Abdominal Wall [[id number]]  (Abnormal) Collected: 10/08/24 1216    Specimen: Swab from Abdominal Wall Updated: 10/09/24 0932     Wound Culture Rare growth Gram Negative Bacilli     Gram Stain Moderate (3+) WBCs per low power field      Few (2+) Gram positive bacilli resembling diphtheroids      Rare (1+) Gram positive cocci in pairs    POC Glucose 4x Daily Before Meals & at Bedtime [[id number]]  (Abnormal) Collected: 10/09/24 0818    Specimen: Blood Updated: 10/09/24 0821     Glucose 229 mg/dL      Comment: Serial Number: [id number]:  [id number]       Basic Metabolic Panel [[id number]]  (Abnormal) Collected: 10/09/24 0254    Specimen: Blood from Arm, Right Updated: 10/09/24 0349     Glucose 123 mg/dL      BUN 18 mg/dL      Creatinine 1.37 mg/dL      Sodium 132 mmol/L      Potassium 3.8 mmol/L      Chloride 100 mmol/L      CO2 22.4 mmol/L      Calcium 9.3 mg/dL      BUN/Creatinine Ratio 13.1     Anion Gap 9.6 mmol/L      eGFR 44.3 mL/min/1.73     Narrative:      GFR Normal >60  Chronic Kidney Disease <60  Kidney Failure <15      Magnesium [[id number]]   (Normal) Collected: 10/09/24 0254    Specimen: Blood from Arm, Right Updated: 10/09/24 0349     Magnesium 1.9 mg/dL     CBC & Differential [[id number]]  (Abnormal) Collected: 10/09/24 0254    Specimen: Blood from Arm, Right Updated: 10/09/24 0323    Narrative:      The following orders were created for panel order CBC & Differential.  Procedure                               Abnormality         Status                     ---------                               -----------         ------                     CBC Auto Differential[[id number]]        Abnormal            Final result                 Please view results for these tests on the individual orders.    CBC Auto Differential [[id number]]  (Abnormal) Collected: 10/09/24 0254    Specimen: Blood from Arm, Right Updated: 10/09/24 0323     WBC 9.47 10*3/mm3      RBC 3.95 10*6/mm3      Hemoglobin 9.4 g/dL      Hematocrit 31.2 %      MCV 79.0 fL      MCH 23.8 pg      MCHC 30.1 g/dL      RDW 21.8 %      RDW-SD 63.5 fl      MPV 8.6 fL      Platelets 320 10*3/mm3      Neutrophil % 67.3 %      Lymphocyte % 14.4 %      Monocyte % 13.4 %      Eosinophil % 4.1 %      Basophil % 0.3 %      Immature Grans % 0.5 %      Neutrophils, Absolute 6.37 10*3/mm3      Lymphocytes, Absolute 1.36 10*3/mm3      Monocytes, Absolute 1.27 10*3/mm3      Eosinophils, Absolute 0.39 10*3/mm3      Basophils, Absolute 0.03 10*3/mm3      Immature Grans, Absolute 0.05 10*3/mm3      nRBC 0.0 /100 WBC     POC Glucose Once [[id number]]  (Abnormal) Collected: 10/08/24 2041    Specimen: Blood Updated: 10/08/24 2044     Glucose 134 mg/dL      Comment: Serial Number: [id number]:  [id number]       POC Glucose Once [[id number]]  (Normal) Collected: 10/08/24 1824    Specimen: Blood Updated: 10/08/24 1826     Glucose 72 mg/dL      Comment: Serial Number: [id number]:  [id number]       STAT Lactic Acid, Reflex [[id number]]  (Normal) Collected: 10/08/24 1524    Specimen: Blood from Arm, Left Updated: 10/08/24  1553     Lactate 1.6 mmol/L              Imaging:  Imaging Results (Last 24 Hours)       Procedure Component Value Units Date/Time    US Renal Bilateral [[id number]] Resulted: 10/09/24 1352     Updated: 10/09/24 1352    CT Abdomen Pelvis With Contrast [[id number]] Collected: 10/08/24 1432     Updated: 10/08/24 1453    Narrative:      CT ABDOMEN PELVIS W CONTRAST    Date of Exam: 10/8/2024 2:26 PM EDT    Indication: Sever rash abdomen with necrotic tissue, r/o abscess/nec fas.    Comparison: CT of the abdomen and pelvis dated 8/19/2024, PET/CT dated 10/3/2024    Technique: Axial CT images were obtained of the abdomen and pelvis following the uneventful intravenous administration of iodinated contrast. Sagittal and coronal reconstructions were performed.  Automated exposure control and iterative reconstruction   methods were used.        Findings:    Liver: The liver is unremarkable in morphology. There are multiple small hypoattenuating liver lesions, particular within the right hepatic lobe, concerning for metastatic disease. Biliary prominence may be related to prior cholecystectomy.    Gallbladder: Surgically absent.    Pancreas: Unremarkable.    Spleen: Unremarkable.    Adrenal glands: Unremarkable.    Genitourinary tract: Enlarged uterus containing a large, heterogeneous uterine/cervical mass with probable invasion of the right posterior urinary bladder. This lesion is better characterized on the PET/CT from 10/8/2024. There is mild bilateral   hydroureteronephrosis, and there is moderate distention of the urinary bladder. Ureteral obstruction or bladder outlet obstruction not excluded. 9 cm hypodense lesion within the left adnexa.    Gastrointestinal tract: Colonic diverticulosis is present. There is no evidence of bowel obstruction.    Appendix: No findings to suggest acute appendicitis.    Other findings: Multiple omental metastatic implants are seen, largest within the left lower quadrant measuring  approximately 4.5 cm. Enlarged retroperitoneal and iliac chain lymph nodes are seen. The abdominal aorta and IVC appear unremarkable.    Bones and soft tissues: No acute osseous lesion is identified. Superficial soft tissue edema/induration within the lower abdominal wall and left flank, compatible with cellulitis in the appropriate clinical setting. No subcutaneous gas is seen. No   associated discrete fluid collection is seen. Prominent inguinal lymph nodes, left greater than right.    Lung bases: Innumerable bilateral lung base nodules, concerning for metastatic disease.      Impression:      Impression:  1.Superficial soft tissue edema/induration within the lower abdominal wall and left flank, compatible with cellulitis in the appropriate clinical setting. No subcutaneous gas is seen. No associated discrete fluid collection is seen.  2.Large, heterogeneous mass of the uterus/cervix with probable invasion of the right posterior urinary bladder. Please refer to the PET/CT from 5 days prior.  3.Mild bilateral hydroureteronephrosis with moderately distended urinary bladder. Ureteral obstruction or bladder outlet obstruction cannot be excluded.  4.Numerous pulmonary nodules, retroperitoneal/iliac chain lymphadenopathy, and left-sided omental metastatic implants as noted on recent PET/CT.  5.Multiple small hypoattenuating liver lesions, particularly within the right hepatic lobe, concerning for metastatic disease. These may be further characterized with liver MRI if clinically indicated.  6.9 cm hypodense lesion within the left adnexa. Please refer to the MRI of the pelvis from 8/19/2024.  7.Additional findings as detailed above.      Electronically Signed: Sushil Benítez MD    10/8/2024 2:51 PM EDT    Workstation ID: [id number]               Assessment:       Cellulitis of abdominal wall      Leland hydronephrosis due to endometrial cancer possibly invading bladder    Plan:     CT images reviewed   Continue West  catheter  Follow kidney function  If worsening kidney function consider bilateral ureteral stent placement inpatient      SANTI Menendez  [facility]  [address], [address]  [place] [address]  Office: [phone]  Available via "[employer]  10/09/24  13:58 EDT       Agree with above exam, assessment and plan.       Philipp Jackson MD  [facility]  Office: [phone]  10/09/24  14:06 EDT

## 2024-10-09 NOTE — PLAN OF CARE
Goal Outcome Evaluation:  Plan of Care Reviewed With: patient           Outcome Evaluation: 58 y/o female admitted on 10/8 from oncologist office due to rash to lower abd and bilateral LE; diagnosed with cellulitis. Patient with uterine Ca and in process of mapping treatment plan. Patient lives alone and has caregiver that comes inconsistently to assist with household chores. Patient normally does not use an a.d. but occasionally uses quad cane. At time of eval, patient needed min A for supine to sit mostly with LE. CGA for transfers. Ambulated 60 ft initially with HHA of 1 but able to ambulate without assistance safely with decreased gait speed. Patient is close to her baseline for mobility however will need  nursing to assist with care of rash at discharge. Will follow while in hospital.      Anticipated Discharge Disposition (PT): home with home health

## 2024-10-09 NOTE — PLAN OF CARE
Goal Outcome Evaluation:              Outcome Evaluation: 58 y/o female admitted on 10/8 from oncologist office due to rash to lower abd and bilateral LE; diagnosed with cellulitis. Patient with uterine Ca and in process of mapping treatment plan. Patient lives alone and has caregiver that comes inconsistently to assist with household chores. Patient normally does not use an a.d. but occasionally uses quad cane. Typically independent with ADLs and uses shower chair and wears slip on shoes due to difficulty reaching feet.  This date pt able to mobilize with min/cga.  She has kinney cath and significant skin breakdown on lower abdomen and upper thighs.  She requires assist for socks, which is her baseline. She will require HH therapy at discharge.

## 2024-10-09 NOTE — CONSULTS
Hematology/Oncology Inpatient Consultation    Patient name: Felisha Mukherjee  : 1965  MRN: [id number]  Referring Provider: Dr. Castañeda  Reason for Consultation: Uterine cancer    Hematology/Oncology History (from record):  - Retroperitoneal US of kidneys obtained by nephrologist with incidental finding of uterine mass measuring up to 6.2 x 5.7 x 7.4 cm.  Uterus otherwise heterogeneous  No hydronephrosis.       Exam findings:  Cervix replaced by a large friable tumor.       - Biopsy performed on 2024  Endometrial adenocarcinoma, endometrioid type, FIGO grade 1.     MRI of the pelvis on 2024 at [facility]   Impression:  Large cervical mass in keeping with primary malignancy.  Tumor shows parametrial invasion, involvement of lower uterus with probable infiltration into the endometrial canal.  No suspicious pelvic adenopathy.  Omental carcinomatosis better seen on prior CT comparison.  Complex cystic lesion of the left ovary containing eccentric enhancing soft tissue nodule.  This could reflect ovarian metastases versus potentially a primary ovarian neoplasm.  Probable small external hemorrhoid.     2024: Ms. Hwang was referred for the investigation and treatment of anemia.  Heme in 2024 for blood count revealed a hemoglobin of 7.1 g/dL with microcytic red cells.  There was clear evidence of iron deficiency with a total iron binding capacity saturation of 2.3%.  She was started on oral iron and was in early 2024 identified as having endometrial adenocarcinoma of the endometrioid type with a FIGO grade 1.  The iron deficiency was attributed to menorrhagia that was felt to be the result of the malignancy.  With persistent anemia a decision was made to treat her with intravenous iron.    -Underwent exam under anesthesia, cystoscopy, D & C on 2024.     Surgical Findings:  Large tumor extending through the cervix filling the vagina.  The bladder was noted to be free  Addended by: Georgiana Gracia on: 6/19/2019 10:33 AM     Modules accepted: Orders of lesions and both ureteral orifices were identified.      Final Pathology  Diagnosis:   A.  Uterus, endometrial curettings:   - Endometrial adenocarcinoma, endometrioid type, FIGO grade 2, see note     Note:   - On immunostaining, the tumor cells are positive for PAX8, ER (70%, 2-3+), and vimentin supportive of above diagnosis.   - Immunohistochemical staining for mismatch repair proteins demonstrates INTACT expression of MLH1, PMS2, MSH2, and MSH6.   - Immunohistochemical staining for 53 demonstrates a wild-type expression.          9/10/2024: For the first time at the [place] office with the above.  She has yet to receive intravenous iron but is scheduled to do so in the near future.  She is also being followed by Dr. Matthews at the [facility] gynecology/oncology program.  Apparently surgery is not in the near future plans.  Generally she feels well at this time although she continues to be fatigued and weak at times.  She describes a good appetite for the most part and eats frequently during the day.  Her weight is stable.  She has not experienced any chest pains, cough or dyspnea.  She has intermittent abdominal pain and describes abnormal defecations that happen every 2 or 3 days but several times during that day at times with soft stools but never liquid stools.  She has some dysuria.  She has continued to have vaginal bleeding but it has not been as intense as before.  She has no peripheral edema.  On exam she seems chronically ill.  She is not in distress.  She is conversant and oriented.  No jaundice.  The lungs are diminished bilaterally and the heart regular.  The abdomen is soft nontender and there is no edema.  Laboratory exams reviewed.  She has yet to receive intravenous iron.  I have asked her to see me again after completing the intravenous iron with new laboratory exams.     FAMILY HISTORY:  MGM: ovarian cancer  GGM:: ovarian cancer     10/3/2024 patient had PET/CT imaging that  showed large hypermetabolic mass involving uterus extending into the cervix and vagina consistent with her known endometrial malignancy.  New hypermetabolic soft tissue involving posterior bladder wall concerning for tumor invasion, new moderate bilateral hydroureteronephrosis is likely secondary to the mass involving left and right UVJ.  There are hypermetabolic iliac, retroperitoneal and small subcarinal lymph nodes concerning for metastatic adenopathy.  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  Cystic 8.6 x 8.4 cm left adnexal lesion with hypermetabolic peripheral nodule could relate to ovarian metastases or primary ovarian neoplasm.    Chief complaint: Rash    History of present illness:    Felisha Mukherjee is a 59 y.o. female who presented to [facility] on 10/8/2024 with complaints of rash on lower legs and abdomen.  Patient was seen by her radiation oncologist, Dr. Garzon, when he noticed rash to abdomen legs and thighs.  Patient states it began last Thursday and is significantly worsened over the last several days.  The skin is raw and sensitive.  She also reports discharge that is yellow in color.  She was recommended to go to the ED for further evaluation.    CT imaging of the abdomen pelvis with contrast was completed on 10/8/2024.  This showed superficial soft tissue edema/induration within the lower abdominal wall and left flank, compatible with cellulitis.  Additional findings as previously noted on PET/CT imaging.    10/09/24  Hematology/Oncology was consulted.    He/She  has a past medical history of Diabetes mellitus, Hypertension, and Uterine mass.    PCP: Hanh Granados APRN    History:  Past Medical History:   Diagnosis Date    Diabetes mellitus     Hypertension     Uterine mass    ,   Past Surgical History:   Procedure Laterality Date    CHOLECYSTECTOMY     ,   Family History   Problem Relation Age of Onset    Diabetes Mother    ,   Social History      Tobacco Use    Smoking status: Never   Vaping Use    Vaping status: Never Used   Substance Use Topics    Alcohol use: Never    Drug use: Never   ,   Medications Prior to Admission   Medication Sig Dispense Refill Last Dose    docusate sodium 100 MG capsule Take 1 capsule by mouth Daily.   10/8/2024    Farxiga 10 MG tablet Take 10 mg by mouth Daily.   10/8/2024    ferrous sulfate 325 (65 FE) MG tablet Take 1 tablet by mouth 5 (Five) Times a Week. Monday, Tuesday, Wednesday, Thursday and Friday   10/8/2024    hydrOXYzine (ATARAX) 50 MG tablet Take 1 tablet by mouth Every Night.   10/7/2024    lisinopril (PRINIVIL,ZESTRIL) 40 MG tablet Take 1 tablet by mouth Daily.   10/8/2024    metFORMIN (GLUCOPHAGE) 500 MG tablet Take 1 tablet by mouth 2 (Two) Times a Day.   10/8/2024    metoprolol succinate XL (TOPROL-XL) 50 MG 24 hr tablet Take 1 tablet by mouth every night at bedtime.   10/7/2024    montelukast (SINGULAIR) 10 MG tablet Take 1 tablet by mouth Every Night.   10/7/2024    PARoxetine (PAXIL) 30 MG tablet Take 1 tablet by mouth.   10/7/2024    risperiDONE (risperDAL) 2 MG tablet Take 2 tablets by mouth Every Evening.   10/7/2024    vitamin D (ERGOCALCIFEROL) 1.25 MG (87953 UT) capsule capsule Take 1 capsule by mouth Every 7 (Seven) Days. Wednesdays   10/2/2024   , Scheduled Meds:  cefTRIAXone, 2,000 mg, Intravenous, Q24H  enoxaparin, 40 mg, Subcutaneous, Q12H  ferrous sulfate, 324 mg, Oral, Once per day on Monday Tuesday Wednesday Thursday Friday  hydrocortisone-bacitracin-zinc oxide-nystatin, 1 Application, Topical, 4x Daily  hydrOXYzine, 50 mg, Oral, Nightly  insulin lispro, 2-9 Units, Subcutaneous, 4x Daily AC & at Bedtime  lisinopril, 40 mg, Oral, Daily  metoprolol succinate XL, 50 mg, Oral, Q24H  montelukast, 10 mg, Oral, Nightly  nystatin, , Topical, Q12H  PARoxetine, 30 mg, Oral, Daily  risperiDONE, 4 mg, Oral, Q PM  sodium chloride, 10 mL, Intravenous, Q12H    , Continuous Infusions:  Pharmacy to Dose  "enoxaparin (LOVENOX),     , PRN Meds:    acetaminophen    senna-docusate sodium **AND** polyethylene glycol **AND** bisacodyl **AND** bisacodyl    Calcium Replacement - Follow Nurse / BPA Driven Protocol    dextrose    dextrose    glucagon (human recombinant)    Magnesium Standard Dose Replacement - Follow Nurse / BPA Driven Protocol    melatonin    Pharmacy to Dose enoxaparin (LOVENOX)    Phosphorus Replacement - Follow Nurse / BPA Driven Protocol    Potassium Replacement - Follow Nurse / BPA Driven Protocol    [COMPLETED] Insert Peripheral IV **AND** sodium chloride    sodium chloride    sodium chloride    traMADol   Allergies:  Patient has no known allergies.    Subjective     ROS:  Review of Systems     Objective   Vital Signs:   /68 (BP Location: Right arm, Patient Position: Lying)   Pulse 86   Temp 98.1 °F (36.7 °C) (Oral)   Resp 23   Ht 160 cm (63\")   Wt 114 kg (251 lb 15.8 oz)   SpO2 99%   BMI 44.64 kg/m²     Physical Exam: (performed by MD)  Physical Exam    Results Review:  Lab Results (last 48 hours)       Procedure Component Value Units Date/Time    POC Glucose 4x Daily Before Meals & at Bedtime [[id number]]  (Abnormal) Collected: 10/09/24 1134    Specimen: Blood Updated: 10/09/24 1135     Glucose 116 mg/dL      Comment: Serial Number: [id number]:  [id number]       Wound Culture - Swab, Abdominal Wall [[id number]]  (Abnormal) Collected: 10/08/24 1216    Specimen: Swab from Abdominal Wall Updated: 10/09/24 0932     Wound Culture Rare growth Gram Negative Bacilli     Gram Stain Moderate (3+) WBCs per low power field      Few (2+) Gram positive bacilli resembling diphtheroids      Rare (1+) Gram positive cocci in pairs    POC Glucose 4x Daily Before Meals & at Bedtime [[id number]]  (Abnormal) Collected: 10/09/24 0818    Specimen: Blood Updated: 10/09/24 0821     Glucose 229 mg/dL      Comment: Serial Number: [id number]:  [id number]       Basic Metabolic Panel [[id number]]  (Abnormal) " Collected: 10/09/24 0254    Specimen: Blood from Arm, Right Updated: 10/09/24 0349     Glucose 123 mg/dL      BUN 18 mg/dL      Creatinine 1.37 mg/dL      Sodium 132 mmol/L      Potassium 3.8 mmol/L      Chloride 100 mmol/L      CO2 22.4 mmol/L      Calcium 9.3 mg/dL      BUN/Creatinine Ratio 13.1     Anion Gap 9.6 mmol/L      eGFR 44.3 mL/min/1.73     Narrative:      GFR Normal >60  Chronic Kidney Disease <60  Kidney Failure <15      Magnesium [[id number]]  (Normal) Collected: 10/09/24 0254    Specimen: Blood from Arm, Right Updated: 10/09/24 0349     Magnesium 1.9 mg/dL     CBC & Differential [[id number]]  (Abnormal) Collected: 10/09/24 0254    Specimen: Blood from Arm, Right Updated: 10/09/24 0323    Narrative:      The following orders were created for panel order CBC & Differential.  Procedure                               Abnormality         Status                     ---------                               -----------         ------                     CBC Auto Differential[[id number]]        Abnormal            Final result                 Please view results for these tests on the individual orders.    CBC Auto Differential [[id number]]  (Abnormal) Collected: 10/09/24 0254    Specimen: Blood from Arm, Right Updated: 10/09/24 0323     WBC 9.47 10*3/mm3      RBC 3.95 10*6/mm3      Hemoglobin 9.4 g/dL      Hematocrit 31.2 %      MCV 79.0 fL      MCH 23.8 pg      MCHC 30.1 g/dL      RDW 21.8 %      RDW-SD 63.5 fl      MPV 8.6 fL      Platelets 320 10*3/mm3      Neutrophil % 67.3 %      Lymphocyte % 14.4 %      Monocyte % 13.4 %      Eosinophil % 4.1 %      Basophil % 0.3 %      Immature Grans % 0.5 %      Neutrophils, Absolute 6.37 10*3/mm3      Lymphocytes, Absolute 1.36 10*3/mm3      Monocytes, Absolute 1.27 10*3/mm3      Eosinophils, Absolute 0.39 10*3/mm3      Basophils, Absolute 0.03 10*3/mm3      Immature Grans, Absolute 0.05 10*3/mm3      nRBC 0.0 /100 WBC     POC Glucose Once [[id number]]  (Abnormal)  Collected: 10/08/24 2041    Specimen: Blood Updated: 10/08/24 2044     Glucose 134 mg/dL      Comment: Serial Number: [id number]:  [id number]       POC Glucose Once [[id number]]  (Normal) Collected: 10/08/24 1824    Specimen: Blood Updated: 10/08/24 1826     Glucose 72 mg/dL      Comment: Serial Number: [id number]:  [id number]       STAT Lactic Acid, Reflex [[id number]]  (Normal) Collected: 10/08/24 1524    Specimen: Blood from Arm, Left Updated: 10/08/24 1553     Lactate 1.6 mmol/L     POC Lactate [[id number]]  (Abnormal) Collected: 10/08/24 1219    Specimen: Blood Updated: 10/08/24 1307     Lactate 2.1 mmol/L      Comment: Serial Number: [id number]:  [id number]       Comprehensive Metabolic Panel [[id number]]  (Abnormal) Collected: 10/08/24 1213    Specimen: Blood from Arm, Left Updated: 10/08/24 1257     Glucose 121 mg/dL      BUN 17 mg/dL      Creatinine 1.40 mg/dL      Sodium 128 mmol/L      Potassium 4.3 mmol/L      Chloride 94 mmol/L      CO2 19.0 mmol/L      Calcium 10.1 mg/dL      Total Protein 6.2 g/dL      Albumin 3.2 g/dL      ALT (SGPT) 17 U/L      AST (SGOT) 29 U/L      Alkaline Phosphatase 68 U/L      Total Bilirubin 0.4 mg/dL      Globulin 3.0 gm/dL      A/G Ratio 1.1 g/dL      BUN/Creatinine Ratio 12.1     Anion Gap 15.0 mmol/L      eGFR 43.2 mL/min/1.73     Narrative:      GFR Normal >60  Chronic Kidney Disease <60  Kidney Failure <15      Procalcitonin [[id number]]  (Abnormal) Collected: 10/08/24 1213    Specimen: Blood from Arm, Left Updated: 10/08/24 1257     Procalcitonin 0.45 ng/mL     Narrative:      As a Marker for Sepsis (Non-Neonates):    1. <0.5 ng/mL represents a low risk of severe sepsis and/or septic shock.  2. >2 ng/mL represents a high risk of severe sepsis and/or septic shock.    As a Marker for Lower Respiratory Tract Infections that require antibiotic therapy:    PCT on Admission    Antibiotic Therapy       6-12 Hrs later    >0.5                Strongly  "Recommended  >0.25 - <0.5        Recommended   0.1 - 0.25          Discouraged              Remeasure/reassess PCT  <0.1                Strongly Discouraged     Remeasure/reassess PCT    As 28 day mortality risk marker: \"Change in Procalcitonin Result\" (>80% or <=80%) if Day 0 (or Day 1) and Day 4 values are available. Refer to http://www.[facility]-pct-calculator.com    Change in PCT <=80%  A decrease of PCT levels below or equal to 80% defines a positive change in PCT test result representing a higher risk for 28-day all-cause mortality of patients diagnosed with severe sepsis for septic shock.    Change in PCT >80%  A decrease of PCT levels of more than 80% defines a negative change in PCT result representing a lower risk for 28-day all-cause mortality of patients diagnosed with severe sepsis or septic shock.       CK [[id number]]  (Normal) Collected: 10/08/24 1213    Specimen: Blood from Arm, Left Updated: 10/08/24 1257     Creatine Kinase 168 U/L     Blood Culture - Blood, Arm, Left [[id number]] Collected: 10/08/24 1213    Specimen: Blood from Arm, Left Updated: 10/08/24 1225    Blood Culture - Blood, Wrist, Left [[id number]] Collected: 10/08/24 1213    Specimen: Blood from Wrist, Left Updated: 10/08/24 1225    CBC & Differential [[id number]]  (Abnormal) Collected: 10/08/24 1213    Specimen: Blood from Arm, Left Updated: 10/08/24 1224    Narrative:      The following orders were created for panel order CBC & Differential.  Procedure                               Abnormality         Status                     ---------                               -----------         ------                     CBC Auto Differential[[id number]]        Abnormal            Final result                 Please view results for these tests on the individual orders.    CBC Auto Differential [[id number]]  (Abnormal) Collected: 10/08/24 1213    Specimen: Blood from Arm, Left Updated: 10/08/24 1224     WBC 14.33 10*3/mm3      RBC 4.63 10*6/mm3     "  Hemoglobin 10.9 g/dL      Hematocrit 36.1 %      MCV 78.0 fL      MCH 23.5 pg      MCHC 30.2 g/dL      RDW 22.2 %      RDW-SD 62.2 fl      MPV 8.5 fL      Platelets 393 10*3/mm3      Neutrophil % 80.3 %      Lymphocyte % 7.3 %      Monocyte % 11.2 %      Eosinophil % 0.7 %      Basophil % 0.2 %      Immature Grans % 0.3 %      Neutrophils, Absolute 11.49 10*3/mm3      Lymphocytes, Absolute 1.05 10*3/mm3      Monocytes, Absolute 1.61 10*3/mm3      Eosinophils, Absolute 0.10 10*3/mm3      Basophils, Absolute 0.03 10*3/mm3      Immature Grans, Absolute 0.05 10*3/mm3      nRBC 0.0 /100 WBC              Pending Results:     Imaging Reviewed:   CT Abdomen Pelvis With Contrast    Result Date: 10/8/2024  Impression: 1.Superficial soft tissue edema/induration within the lower abdominal wall and left flank, compatible with cellulitis in the appropriate clinical setting. No subcutaneous gas is seen. No associated discrete fluid collection is seen. 2.Large, heterogeneous mass of the uterus/cervix with probable invasion of the right posterior urinary bladder. Please refer to the PET/CT from 5 days prior. 3.Mild bilateral hydroureteronephrosis with moderately distended urinary bladder. Ureteral obstruction or bladder outlet obstruction cannot be excluded. 4.Numerous pulmonary nodules, retroperitoneal/iliac chain lymphadenopathy, and left-sided omental metastatic implants as noted on recent PET/CT. 5.Multiple small hypoattenuating liver lesions, particularly within the right hepatic lobe, concerning for metastatic disease. These may be further characterized with liver MRI if clinically indicated. 6.9 cm hypodense lesion within the left adnexa. Please refer to the MRI of the pelvis from 8/19/2024. 7.Additional findings as detailed above. Electronically Signed: Sushil Benítez MD  10/8/2024 2:51 PM EDT  Workstation ID: [id number]    NM PET/CT Skull Base to Mid Thigh    Result Date: 10/7/2024  Impression: 1. Large hypermetabolic mass  involving uterus extending into cervix and vagina consistent with known endometrial malignancy. 2. New hypermetabolic soft tissue involving posterior bladder wall concerning for direct tumor invasion of the bladder. 3. New moderate bilateral hydroureteronephrosis likely secondary to mass involving left and right UVJ. 4. Hypermetabolic iliac and retroperitoneal adenopathy consistent with metastatic adenopathy. Hypermetabolic small subcarinal node also concerning for metastatic hypermetabolic adenopathy. 5. Multiple pulmonary metastatic nodules. 6. Omental carcinomatosis. 7. Cystic 8.6 x 8.4 cm left adnexal lesion with hypermetabolic peripheral nodule which could relate to ovarian metastasis or primary ovarian neoplasm as described on prior pelvic MRI. Electronically Signed: Tom Bennett MD  10/7/2024 12:42 PM EDT  Workstation ID: [id number]          Assessment & Plan   ASSESSMENT  Adenocarcinoma of the endometrium (FIGO stage Ivb - cT3b, CN0, pM1).  She has been undergoing investigation and treatment planning at the [facility] gynecologic oncology program. She has been deemed poor surgical candidate and is undergoing planning for radiation. Will likely need chemotherapy treatment. Further discussion.   Cellulitis of abdomen: Continue antibiotics. Once improvement in infection, will start chemotherapy, radiation treatments.   Iron deficiency anemia: She has received ferumoxytol 510 mg IV x 2 weekly doses.    PLAN  As above  Further recommendations per Dr. Del Real    Electronically signed by Samantha Dela Cruz PA-C, 10/09/24    10/10/2024: Ms. Mukherjee is a patient of mine. She was referred for iron deficiency that seemed to be the result of postmenopausal vaginal bleeding. This second was the result of endometrial carcinoma, which was being staged by Dr. Matthews at the [facility]. At the time of this admission she had been determined to have evidence of metastatic disease. She was seen  by Dr. Garzon who sent her to the hospital with cellulitis of the skin of the lower abdomen. Today, she reports she feels better and has had less pain at the place of the cellulitis. She has not been very active and she is weak. She has been afebrile. She has not had any dyspnea and has been eating well. On exam she is oriented. No jaundice and no oral lesions. The lungs are clear and the heart regular. The abdomen is protuberant and soft. There is indeed a large band of erythematous skin on the lover abdomen with areas of breakdown that is very tender. I have reviewed the imaging studies and discussed with Dr. Garzon. I believe that histologic confirmation of metastatic disease with an image guided biopsy is necessary. Will allow for the cellulitis to improve prior to the biopsy. I will continue to follow her.   I reviewed and discussed with Ms. Lanie PINEDA the records and concur with her. I formulated the analysis and the plans.     Jacinto Del Real MD on 10/11/2024 at 18:08

## 2024-10-09 NOTE — THERAPY EVALUATION
Patient Name: Felisha Mukherjee  : 1965    MRN: [id number]                              Today's Date: 10/9/2024       Admit Date: 10/8/2024    Visit Dx:     ICD-10-CM ICD-9-CM   1. Abdominal wall cellulitis  L03.311 682.2   2. Cellulitis of groin, left  L03.314 682.2   3. Urinary retention  R33.9 788.20   4. Uterine mass  N85.8 625.8     Patient Active Problem List   Diagnosis    Malignant neoplasm of body of uterus    Iron deficiency anemia due to chronic blood loss    Cellulitis of abdominal wall     Past Medical History:   Diagnosis Date    Diabetes mellitus     Hypertension     Uterine mass      Past Surgical History:   Procedure Laterality Date    CHOLECYSTECTOMY        General Information       Row Name 10/09/24 1354          OT Time and Intention    Document Type evaluation  -SR     Mode of Treatment occupational therapy  -SR       Row Name 10/09/24 1354          Occupational Profile    Reason for Services/Referral (Occupational Profile) 60 y/o female admitted on 10/8 from oncologist office due to rash to lower abd and bilateral LE; diagnosed with cellulitis. Patient with uterine Ca and in process of mapping treatment plan. Patient lives alone and has caregiver that comes inconsistently to assist with household chores. Patient normally does not use an a.d. but occasionally uses quad cane.  Typically independent with ADLs and uses shower chair and wears slip on shoes due to difficulty reaching feet.  -SR       Row Name 10/09/24 1354          Living Environment    People in Home alone  -SR       Row Name 10/09/24 1354          Cognition    Orientation Status (Cognition) oriented x 4  -SR               User Key  (r) = Recorded By, (t) = Taken By, (c) = Cosigned By      Initials Name Provider Type    SR Shelly Michael OT Occupational Therapist                     Mobility/ADL's       Row Name 10/09/24 1356          Bed Mobility    Bed Mobility supine-sit;sit-supine  -SR     Supine-Sit [place]  (Bed Mobility) minimum assist (75% patient effort);verbal cues  -SR     Sit-Supine [place] (Bed Mobility) standby assist  -SR     Assistive Device (Bed Mobility) bed rails  -SR       Row Name 10/09/24 1356          Bed-Chair Transfer    Bed-Chair [place] (Transfers) verbal cues;contact guard  -SR       Row Name 10/09/24 1356          Sit-Stand Transfer    Sit-Stand [place] (Transfers) contact guard  -SR       Row Name 10/09/24 1356          Activities of Daily Living    BADL Assessment/Intervention lower body dressing  -SR       Row Name 10/09/24 1356          Lower Body Dressing Assessment/Training    [place] Level (Lower Body Dressing) don;socks;dependent (less than 25% patient effort)  -SR               User Key  (r) = Recorded By, (t) = Taken By, (c) = Cosigned By      Initials Name Provider Type    SR Shelly Michael OT Occupational Therapist                   Obj/Interventions       Row Name 10/09/24 1358          Range of Motion Comprehensive    General Range of Motion no range of motion deficits identified  -SR       Row Name 10/09/24 1358          Strength Comprehensive (MMT)    General Manual Muscle Testing (MMT) Assessment no strength deficits identified  -SR       Row Name 10/09/24 1358          Balance    Static Standing Balance contact guard  -SR     Dynamic Standing Balance contact guard  -SR     Balance Interventions sitting;standing;sit to stand;supported;static;dynamic;minimal challenge  -SR               User Key  (r) = Recorded By, (t) = Taken By, (c) = Cosigned By      Initials Name Provider Type    SR Shelly Michael OT Occupational Therapist                   Goals/Plan       Row Name 10/09/24 1402          Bathing Goal 1 (OT)    Activity/Device (Bathing Goal 1, OT) bathing skills, all  -SR     [place] Level/Cues Needed (Bathing Goal 1, OT) supervision required  -SR     Time Frame (Bathing Goal 1, OT) 2 weeks  -SR       Row Name 10/09/24 1401           Toileting Goal 1 (OT)    Activity/Device (Toileting Goal 1, OT) toileting skills, all  -SR     [place] Level/Cues Needed (Toileting Goal 1, OT) modified independence  -SR     Time Frame (Toileting Goal 1, OT) 2 weeks  -SR       Row Name 10/09/24 2862          Therapy Assessment/Plan (OT)    Planned Therapy Interventions (OT) activity tolerance training;BADL retraining;IADL retraining;functional balance retraining;neuromuscular control/coordination retraining;ROM/therapeutic exercise;transfer/mobility retraining;strengthening exercise;occupation/activity based interventions  -SR               User Key  (r) = Recorded By, (t) = Taken By, (c) = Cosigned By      Initials Name Provider Type    SR Shelly Michael, OT Occupational Therapist                   Clinical Impression       Row Name 10/09/24 7997          Pain Assessment    Pretreatment Pain Rating 4/10  -SR     Posttreatment Pain Rating 4/10  -SR     Pain Location - abdomen  -SR       Row Name 10/09/24 7481          Plan of Care Review    Outcome Evaluation 58 y/o female admitted on 10/8 from oncologist office due to rash to lower abd and bilateral LE; diagnosed with cellulitis. Patient with uterine Ca and in process of mapping treatment plan. Patient lives alone and has caregiver that comes inconsistently to assist with household chores. Patient normally does not use an a.d. but occasionally uses quad cane. Typically independent with ADLs and uses shower chair and wears slip on shoes due to difficulty reaching feet.  This date pt able to mobilize with min/cga.  She has kinney cath and significant skin breakdown on lower abdomen and upper thighs.  She requires assist for socks, which is her baseline. She will require HH therapy at discharge.  -SR       Row Name 10/09/24 4112          Therapy Assessment/Plan (OT)    Rehab Potential (OT) good, to achieve stated therapy goals  -SR     Criteria for Skilled Therapeutic Interventions Met (OT) yes  -SR      Therapy Frequency (OT) 3 times/wk  -SR     Predicted Duration of Therapy Intervention (OT) Until discharge  -SR       Row Name 10/09/24 1358          Positioning and Restraints    Pre-Treatment Position in bed  -SR     Post Treatment Position bed  -SR     In Bed exit alarm on;patient within staff view;encouraged to call for assist  -SR               User Key  (r) = Recorded By, (t) = Taken By, (c) = Cosigned By      Initials Name Provider Type    SR Shelly Michael, OT Occupational Therapist                   Outcome Measures       Row Name 10/09/24 1256 10/09/24 0826       How much help from another person do you currently need...    Turning from your back to your side while in flat bed without using bedrails? 4  -CR 4  -CH    Moving from lying on back to sitting on the side of a flat bed without bedrails? 3  -CR 3  -CH    Moving to and from a bed to a chair (including a wheelchair)? 3  -CR 3  -CH    Standing up from a chair using your arms (e.g., wheelchair, bedside chair)? 3  -CR 3  -CH    Climbing 3-5 steps with a railing? 3  -CR 3  -CH    To walk in hospital room? 3  -CR 3  -CH    AM-PAC 6 Clicks Score (PT) 19  -CR 19  -CH    Highest Level of Mobility Goal 6 --> Walk 10 steps or more  -CR 6 --> Walk 10 steps or more  -CH              User Key  (r) = Recorded By, (t) = Taken By, (c) = Cosigned By      Initials Name Provider Type    CR Reyes, Carmela, PT Physical Therapist    CH Hosler, Catherine, RN Registered Nurse                    Occupational Therapy Education       Title: PT OT SLP Therapies (In Progress)       Topic: Occupational Therapy (In Progress)       Point: ADL training (In Progress)       Description:   Instruct learner(s) on proper safety adaptation and remediation techniques during self care or transfers.   Instruct in proper use of assistive devices.                  Learning Progress Summary             Patient Acceptance, E,TB, NR by SR at 10/9/2024 4410                         Point:  Home exercise program (Not Started)       Description:   Instruct learner(s) on appropriate technique for monitoring, assisting and/or progressing therapeutic exercises/activities.                  Learner Progress:  Not documented in this visit.              Point: Precautions (Not Started)       Description:   Instruct learner(s) on prescribed precautions during self-care and functional transfers.                  Learner Progress:  Not documented in this visit.              Point: Body mechanics (In Progress)       Description:   Instruct learner(s) on proper positioning and spine alignment during self-care, functional mobility activities and/or exercises.                  Learning Progress Summary             Patient Acceptance, E,TB, NR by SR at 10/9/2024 1403                                         User Key       Initials Effective Dates Name Provider Type Discipline     06/16/21 -  Shelly Michael, OT Occupational Therapist OT                  OT Recommendation and Plan  Planned Therapy Interventions (OT): activity tolerance training, BADL retraining, IADL retraining, functional balance retraining, neuromuscular control/coordination retraining, ROM/therapeutic exercise, transfer/mobility retraining, strengthening exercise, occupation/activity based interventions  Therapy Frequency (OT): 3 times/wk  Plan of Care Review  Outcome Evaluation: 60 y/o female admitted on 10/8 from oncologist office due to rash to lower abd and bilateral LE; diagnosed with cellulitis. Patient with uterine Ca and in process of mapping treatment plan. Patient lives alone and has caregiver that comes inconsistently to assist with household chores. Patient normally does not use an a.d. but occasionally uses quad cane. Typically independent with ADLs and uses shower chair and wears slip on shoes due to difficulty reaching feet.  This date pt able to mobilize with min/cga.  She has kinney cath and significant skin breakdown on  lower abdomen and upper thighs.  She requires assist for socks, which is her baseline. She will require HH therapy at discharge.     Time Calculation:         Time Calculation- OT       Row Name 10/09/24 1403             Time Calculation- OT    OT Start Time 1118  -SR      OT Stop Time 1135  -SR      OT Time Calculation (min) 17 min  -SR      Total Timed Code Minutes- OT 0 minute(s)  -SR      OT Received On 10/09/24  -SR      OT - Next Appointment 10/11/24  -SR      OT Goal Re-Cert Due Date 10/23/24  -SR                User Key  (r) = Recorded By, (t) = Taken By, (c) = Cosigned By      Initials Name Provider Type    SR Shelly Michael, OT Occupational Therapist                  Therapy Charges for Today       Code Description Service Date Service Provider Modifiers Qty    [id number] HC OT EVAL MOD COMPLEXITY 4 10/9/2024 Shelly Michael OT GO 1                 Shelly Michael OT  10/9/2024

## 2024-10-09 NOTE — PROGRESS NOTES
[facility] MEDICINE SERVICE  DAILY PROGRESS NOTE    NAME: Felisha Mukherjee  : 1965  MRN: [id number]      LOS: 1 day     PROVIDER OF SERVICE: Timothy Duane Brammell, MD    Chief Complaint: Cellulitis of abdominal wall    Subjective:     Interval History:  History taken from: patient    Patient with burning discomfort over lower abdominal irritation.  She feels it is worsened over the last several weeks.  She denies any bowel complaints.  Denies any urinary symptomatology.  Has noted CAT scan when seen by radiation oncologist yesterday with some evidence of hydronephrosis.  Plan radiation therapy.  Note reflects need for discussion with medical oncology.        Review of Systems:   Review of Systems   All other systems reviewed and are negative.      Objective:     Vital Signs  Temp:  [97.4 °F (36.3 °C)-98.1 °F (36.7 °C)] 98.1 °F (36.7 °C)  Heart Rate:  [69-90] 86  Resp:  [14-23] 23  BP: ()/(42-71) 132/68  Flow (L/min):  [2] 2   Body mass index is 44.64 kg/m².    Physical Exam  Physical Exam  Vitals reviewed.   Constitutional:       Appearance: Normal appearance. She is obese.   HENT:      Head: Normocephalic.   Cardiovascular:      Rate and Rhythm: Normal rate and regular rhythm.   Pulmonary:      Effort: Pulmonary effort is normal.      Breath sounds: Normal breath sounds.   Abdominal:      General: Bowel sounds are normal.      Palpations: Abdomen is soft.      Tenderness: There is no abdominal tenderness.   Musculoskeletal:         General: No swelling.   Skin:     Comments: Extensive area of skin disruption in the pannus of the lower abdomen with drainage and diffuse sloughing.   Neurological:      Mental Status: She is alert.            Diagnostic Data    Results from last 7 days   Lab Units 10/09/24  0254 10/08/24  1213   WBC 10*3/mm3 9.47 14.33*   HEMOGLOBIN g/dL 9.4* 10.9*   HEMATOCRIT % 31.2* 36.1   PLATELETS 10*3/mm3 320 393   GLUCOSE mg/dL 123* 121*   CREATININE mg/dL 1.37* 1.40*    BUN mg/dL 18 17   SODIUM mmol/L 132* 128*   POTASSIUM mmol/L 3.8 4.3   AST (SGOT) U/L  --  29   ALT (SGPT) U/L  --  17   ALK PHOS U/L  --  68   BILIRUBIN mg/dL  --  0.4   ANION GAP mmol/L 9.6 15.0       CT Abdomen Pelvis With Contrast    Result Date: 10/8/2024  Impression: 1.Superficial soft tissue edema/induration within the lower abdominal wall and left flank, compatible with cellulitis in the appropriate clinical setting. No subcutaneous gas is seen. No associated discrete fluid collection is seen. 2.Large, heterogeneous mass of the uterus/cervix with probable invasion of the right posterior urinary bladder. Please refer to the PET/CT from 5 days prior. 3.Mild bilateral hydroureteronephrosis with moderately distended urinary bladder. Ureteral obstruction or bladder outlet obstruction cannot be excluded. 4.Numerous pulmonary nodules, retroperitoneal/iliac chain lymphadenopathy, and left-sided omental metastatic implants as noted on recent PET/CT. 5.Multiple small hypoattenuating liver lesions, particularly within the right hepatic lobe, concerning for metastatic disease. These may be further characterized with liver MRI if clinically indicated. 6.9 cm hypodense lesion within the left adnexa. Please refer to the MRI of the pelvis from 8/19/2024. 7.Additional findings as detailed above. Electronically Signed: Sushil Benítez MD  10/8/2024 2:51 PM EDT  Workstation ID: [id number]           Assessment:    Lower abdominal wall cellulitis bacterial versus fungal.  Uterine carcinoma.  Hydronephrosis  Type 2 diabetes  Obesity  Anemia  Acute renal insufficiency     Plan.  Chart reviewed.  Topical cream to pannus area.  Infectious disease to evaluate need for antibiotics/antifungals.  Medical oncology to see.  Urology to see.  Active and Resolved Problems  Active Hospital Problems    Diagnosis  POA    **Cellulitis of abdominal wall [L03.311]  Yes      Resolved Hospital Problems   No resolved problems to display.            VTE Prophylaxis:  Pharmacologic VTE prophylaxis orders are present.             Disposition Planning:     Barriers to Discharge:clinical improvement and subspeciality evaluation  Anticipated Date of Discharge: 10/14  Place of Discharge: home      Time: 45 minutes     Code Status and Medical Interventions: CPR (Attempt to Resuscitate); Full Support   Ordered at: 10/08/24 1722     Level Of Support Discussed With:    Patient     Code Status (Patient has no pulse and is not breathing):    CPR (Attempt to Resuscitate)     Medical Interventions (Patient has pulse or is breathing):    Full Support       Signature: Electronically signed by Timothy Duane Brammell, MD, 10/09/24, 09:50 EDT.  [facility] Hospitalist Team

## 2024-10-09 NOTE — THERAPY EVALUATION
Patient Name: Felisha Mukherjee  : 1965    MRN: [id number]                              Today's Date: 10/9/2024       Admit Date: 10/8/2024    Visit Dx:     ICD-10-CM ICD-9-CM   1. Abdominal wall cellulitis  L03.311 682.2   2. Cellulitis of groin, left  L03.314 682.2   3. Urinary retention  R33.9 788.20   4. Uterine mass  N85.8 625.8     Patient Active Problem List   Diagnosis    Malignant neoplasm of body of uterus    Iron deficiency anemia due to chronic blood loss    Cellulitis of abdominal wall     Past Medical History:   Diagnosis Date    Diabetes mellitus     Hypertension     Uterine mass      Past Surgical History:   Procedure Laterality Date    CHOLECYSTECTOMY        General Information       Row Name 10/09/24 1249          Physical Therapy Time and Intention    Document Type evaluation  -CR     Mode of Treatment physical therapy  -CR       Row Name 10/09/24 1249          General Information    Patient Profile Reviewed yes  -CR     Prior Level of Function independent:;all household mobility  -CR     Existing Precautions/Restrictions --  new severe rash abd fold and inner thighs  -CR     Barriers to Rehab medically complex  -CR       Row Name 10/09/24 1249          Living Environment    People in Home alone  has caregivers to assist with household activities and some ADL's however caregiver has not been consistent  -CR       Row Name 10/09/24 1249          Home Main Entrance    Number of Stairs, Main Entrance one  -CR       Row Name 10/09/24 1249          Cognition    Orientation Status (Cognition) oriented x 4  -CR       Row Name 10/09/24 1249          Safety Issues, Functional Mobility    Impairments Affecting Function (Mobility) pain  -CR               User Key  (r) = Recorded By, (t) = Taken By, (c) = Cosigned By      Initials Name Provider Type    CR Reyes, Carmela, PT Physical Therapist                   Mobility       Row Name 10/09/24 1250          Bed Mobility    Bed Mobility  supine-sit;sit-supine  -CR     Supine-Sit [place] (Bed Mobility) minimum assist (75% patient effort);verbal cues  -CR     Sit-Supine [place] (Bed Mobility) standby assist  -CR     Assistive Device (Bed Mobility) bed rails  -CR       Row Name 10/09/24 1250          Bed-Chair Transfer    Bed-Chair [place] (Transfers) verbal cues;contact guard  -CR       Row Name 10/09/24 1250          Sit-Stand Transfer    Sit-Stand [place] (Transfers) contact guard  -CR       Row Name 10/09/24 1250          Gait/Stairs (Locomotion)    [place] Level (Gait) minimum assist (75% patient effort)  -CR     Assistive Device (Gait) --  HHA  -CR     Distance in Feet (Gait) 60  -CR     Deviations/Abnormal Patterns (Gait) gait speed decreased;base of support, wide  -CR               User Key  (r) = Recorded By, (t) = Taken By, (c) = Cosigned By      Initials Name Provider Type    CR Reyes, Carmela, PT Physical Therapist                   Obj/Interventions       Row Name 10/09/24 1251          Range of Motion Comprehensive    General Range of Motion bilateral lower extremity ROM WFL  -CR       Row Name 10/09/24 1251          Strength Comprehensive (MMT)    General Manual Muscle Testing (MMT) Assessment no strength deficits identified  -CR       Row Name 10/09/24 1251          Balance    Balance Assessment sitting static balance;standing static balance;standing dynamic balance  -CR     Static Sitting Balance independent  -CR     Position, Sitting Balance sitting edge of bed  -CR     Static Standing Balance contact guard  -CR     Dynamic Standing Balance contact guard  -CR       Row Name 10/09/24 1251          Sensory Assessment (Somatosensory)    Sensory Assessment (Somatosensory) sensation intact  -CR               User Key  (r) = Recorded By, (t) = Taken By, (c) = Cosigned By      Initials Name Provider Type    CR Reyes, Carmela, PT Physical Therapist                   Goals/Plan       Row Name 10/09/24 1256          Bed  Mobility Goal 1 (PT)    Activity/Assistive Device (Bed Mobility Goal 1, PT) supine to sit  -CR     [place] Level/Cues Needed (Bed Mobility Goal 1, PT) modified independence  -CR     Time Frame (Bed Mobility Goal 1, PT) long term goal (LTG);2 weeks  -CR       Row Name 10/09/24 1256          Gait Training Goal 1 (PT)    Activity/Assistive Device (Gait Training Goal 1, PT) gait (walking locomotion);improve balance and speed;increase endurance/gait distance  -CR     [place] Level (Gait Training Goal 1, PT) standby assist  -CR     Distance (Gait Training Goal 1, PT) 150  -CR     Time Frame (Gait Training Goal 1, PT) long term goal (LTG);2 weeks  -CR       Row Name 10/09/24 1256          Therapy Assessment/Plan (PT)    Planned Therapy Interventions (PT) bed mobility training;gait training;home exercise program;patient/family education  -CR               User Key  (r) = Recorded By, (t) = Taken By, (c) = Cosigned By      Initials Name Provider Type    CR Reyes, Carmela, PT Physical Therapist                   Clinical Impression       Row Name 10/09/24 1252          Pain    Pretreatment Pain Rating 4/10  -CR     Posttreatment Pain Rating 4/10  -CR     Pain Location - abdomen  -CR       Row Name 10/09/24 1252          Plan of Care Review    Plan of Care Reviewed With patient  -CR     Outcome Evaluation 58 y/o female admitted on 10/8 from oncologist office due to rash to lower abd and bilateral LE; diagnosed with cellulitis. Patient with uterine Ca and in process of mapping treatment plan. Patient lives alone and has caregiver that comes inconsistently to assist with household chores. Patient normally does not use an a.d. but occasionally uses quad cane. At time of eval, patient needed min A for supine to sit mostly with LE. CGA for transfers. Ambulated 60 ft initially with HHA of 1 but able to ambulate without assistance safely with decreased gait speed. Patient is close to her baseline for mobility however will  need  nursing to assist with care of rash at discharge. Will follow while in hospital.  -CR       Row Name 10/09/24 1252          Therapy Assessment/Plan (PT)    Patient/Family Therapy Goals Statement (PT) home  -CR     Rehab Potential (PT) good, to achieve stated therapy goals  -CR     Criteria for Skilled Interventions Met (PT) yes;skilled treatment is necessary  -CR     Therapy Frequency (PT) 3 times/wk  -CR     Predicted Duration of Therapy Intervention (PT) dc  -CR       Row Name 10/09/24 1252          Positioning and Restraints    Post Treatment Position bed  -CR     In Bed notified nsg;supine;call light within reach  -CR               User Key  (r) = Recorded By, (t) = Taken By, (c) = Cosigned By      Initials Name Provider Type    CR Reyes, Carmela, PT Physical Therapist                   Outcome Measures       Row Name 10/09/24 1256 10/09/24 0826       How much help from another person do you currently need...    Turning from your back to your side while in flat bed without using bedrails? 4  -CR 4  -CH    Moving from lying on back to sitting on the side of a flat bed without bedrails? 3  -CR 3  -CH    Moving to and from a bed to a chair (including a wheelchair)? 3  -CR 3  -CH    Standing up from a chair using your arms (e.g., wheelchair, bedside chair)? 3  -CR 3  -CH    Climbing 3-5 steps with a railing? 3  -CR 3  -CH    To walk in hospital room? 3  -CR 3  -CH    AM-PAC 6 Clicks Score (PT) 19  -CR 19  -CH    Highest Level of Mobility Goal 6 --> Walk 10 steps or more  -CR 6 --> Walk 10 steps or more  -CH              User Key  (r) = Recorded By, (t) = Taken By, (c) = Cosigned By      Initials Name Provider Type    CR Reyes, Carmela, PT Physical Therapist    CH Hosler, Catherine, RN Registered Nurse                                 Physical Therapy Education       Title: PT OT SLP Therapies (Done)       Topic: Physical Therapy (Done)       Point: Mobility training (Done)       Learning Progress Summary              Patient Acceptance, E, VU by CR at 10/9/2024 1257                                         User Key       Initials Effective Dates Name Provider Type Discipline    CR 06/16/21 -  Reyes, Carmela, PT Physical Therapist PT                  PT Recommendation and Plan  Planned Therapy Interventions (PT): bed mobility training, gait training, home exercise program, patient/family education  Plan of Care Reviewed With: patient  Outcome Evaluation: 58 y/o female admitted on 10/8 from oncologist office due to rash to lower abd and bilateral LE; diagnosed with cellulitis. Patient with uterine Ca and in process of mapping treatment plan. Patient lives alone and has caregiver that comes inconsistently to assist with household chores. Patient normally does not use an a.d. but occasionally uses quad cane. At time of eval, patient needed min A for supine to sit mostly with LE. CGA for transfers. Ambulated 60 ft initially with HHA of 1 but able to ambulate without assistance safely with decreased gait speed. Patient is close to her baseline for mobility however will need  nursing to assist with care of rash at discharge. Will follow while in hospital.     Time Calculation:         PT Charges       Row Name 10/09/24 1257             Time Calculation    Start Time 1031  -CR      Stop Time 1051  -CR      Time Calculation (min) 20 min  -CR      PT Received On 10/09/24  -CR      PT - Next Appointment 10/10/24  -CR      PT Goal Re-Cert Due Date 10/23/24  -CR         Time Calculation- PT    Total Timed Code Minutes- PT 0 minute(s)  -CR                User Key  (r) = Recorded By, (t) = Taken By, (c) = Cosigned By      Initials Name Provider Type    CR Reyes, Carmela, PT Physical Therapist                  Therapy Charges for Today       Code Description Service Date Service Provider Modifiers Qty    [id number]  PT EVAL MOD COMPLEXITY 4 10/9/2024 Reyes, Carmela, PT GP 1            PT G-Codes  AM-PAC 6 Clicks Score (PT):  19  PT Discharge Summary  Anticipated Discharge Disposition (PT): home with home health    Carmela Reyes, PT  10/9/2024

## 2024-10-09 NOTE — PLAN OF CARE
Goal Outcome Evaluation:            Patient admitted to 2A, patient has cellulitis of abdomen wall, area was cleaned and powder was applied, patient complained of pain at beginning of shift but after wound care was completed patient stated she felt better and more comfortable, call light in reach.

## 2024-10-09 NOTE — PLAN OF CARE
Goal Outcome Evaluation:        Problem: Adult Inpatient Plan of Care  Goal: Absence of Hospital-Acquired Illness or Injury  Intervention: Identify and Manage Fall Risk  Intervention: Prevent Skin Injury  Intervention: Prevent and Manage VTE (Venous Thromboembolism) Risk  Goal: Optimal Comfort and Wellbeing  Intervention: Provide Person-Centered Care     Problem: Impaired Wound Healing  Goal: Optimal Wound Healing  Intervention: Promote Wound Healing     Problem: Skin Injury Risk Increased  Goal: Skin Health and Integrity  Intervention: Optimize Skin Protection     Problem: Fall Injury Risk  Goal: Absence of Fall and Fall-Related Injury  Intervention: Identify and Manage Contributors  Intervention: Promote Injury-Free Environment         Patient A&O went to cancer center for scans. Call light and bedside table within reach. Bed alarm on.

## 2024-10-09 NOTE — CASE MANAGEMENT/SOCIAL WORK
Continued Stay Note   Jasbir     Patient Name: Felisha Mukherjee  MRN: [id number]  Today's Date: 10/9/2024    Admit Date: 10/8/2024    Plan: Return home with caregiver and sister Andressa for PRN needs. HCC choices pending (needs choices)   Discharge Plan       Row Name 10/09/24 1438       Plan    Plan Return home with caregiver and sister Andressa for PRN needs. HCC choices pending (needs choices)    Patient/Family in Agreement with Plan yes    Plan Comments DD barriers: Na 132, elevated Creat, pending blood culture results, pending wound culture results, CT abd= mass, IV abx. ID following. Needs C choices ([facility])             Sarah Cormier RN     Office phone: [phone]  Office fax: [phone]

## 2024-10-09 NOTE — TELEPHONE ENCOUNTER
Radiation Oncology Nurse Note    Returned call to [facility] Radiation Oncology regarding referral for brachytherapy to Dr. Patrick Pritchett. Discussed with Isabella that referral is no longer needed as patient has extensive metastatic disease. Stated Dr. Garzon has discussed patient's case with Dr. Pritchett and both are in agreement to eliminate brachytherapy from patient's treatment plan. Isabella verbalized understanding and agreed with plan.      Amy Hughes RN, BSN  Radiation Oncology Department  [facility]  [phone]

## 2024-10-09 NOTE — NURSING NOTE
WOCN note:    59 yr old female admitted 10/8/24 with abdominal wall cellulitis. Patient has a hx of DM, HTN and uterine cancer. WOCN consult received for extensive moisture associated skin damage to her pannus, thighs and perineum.   Patient was unavailable for assessment. Chart and photos reviewed and report received from primary RN. Recommend Domeboro's soaks which have been ordered. Magic barrier cream and Diflucan have also been ordered.   Will add a low air loss pump for microclimate moisture control. We will continue to follow.

## 2024-10-09 NOTE — CONSULTS
Infectious Diseases Consult Note    Referring Provider: Brammell, Timothy Duane,*    Reason for Consultation: cellulitis      Patient Care Team:  Hanh Granados APRN as PCP - General (Family Medicine)  Jesus Robibns MD as Consulting Physician (Nephrology)  Jacinto Del Real MD as Consulting Physician (Hematology and Oncology)    Chief complaint worsening rash to the thighs and abdomen    Subjective     History of present illness:      This is a 59-year-old female presents to the hospital on 10/8/2024 due to a worsening rash to her thighs, groin and abdomen.  Patient states the rash started Thursday and is continued to worsen.  Patient also has a rash to the palms of her hands.  Patient has been diagnosed with uterine cancer but has not started any radiation or chemotherapy.  Denies significant fever or chills, shortness of breath, GI symptoms or urinary symptoms.    Review of Systems   Review of Systems   Constitutional: Negative.  Positive for fatigue.   HENT: Negative.     Eyes: Negative.    Respiratory: Negative.     Cardiovascular: Negative.    Gastrointestinal: Negative.    Endocrine: Negative.    Genitourinary: Negative.    Musculoskeletal: Negative.    Skin: Negative.  Positive for rash.   Neurological: Negative.    Psychiatric/Behavioral: Negative.     All other systems reviewed and are negative.      Medications  Medications Prior to Admission   Medication Sig Dispense Refill Last Dose    docusate sodium 100 MG capsule Take 1 capsule by mouth Daily.   10/8/2024    Farxiga 10 MG tablet Take 10 mg by mouth Daily.   10/8/2024    ferrous sulfate 325 (65 FE) MG tablet Take 1 tablet by mouth 5 (Five) Times a Week. Monday, Tuesday, Wednesday, Thursday and Friday   10/8/2024    hydrOXYzine (ATARAX) 50 MG tablet Take 1 tablet by mouth Every Night.   10/7/2024    lisinopril (PRINIVIL,ZESTRIL) 40 MG tablet Take 1 tablet by mouth Daily.   10/8/2024    metFORMIN (GLUCOPHAGE) 500 MG tablet Take 1 tablet by  mouth 2 (Two) Times a Day.   10/8/2024    metoprolol succinate XL (TOPROL-XL) 50 MG 24 hr tablet Take 1 tablet by mouth every night at bedtime.   10/7/2024    montelukast (SINGULAIR) 10 MG tablet Take 1 tablet by mouth Every Night.   10/7/2024    PARoxetine (PAXIL) 30 MG tablet Take 1 tablet by mouth.   10/7/2024    risperiDONE (risperDAL) 2 MG tablet Take 2 tablets by mouth Every Evening.   10/7/2024    vitamin D (ERGOCALCIFEROL) 1.25 MG (60107 UT) capsule capsule Take 1 capsule by mouth Every 7 (Seven) Days. Wednesdays   10/2/2024       History  Past Medical History:   Diagnosis Date    Diabetes mellitus     Hypertension     Uterine mass      Past Surgical History:   Procedure Laterality Date    CHOLECYSTECTOMY         Family History  Family History   Problem Relation Age of Onset    Diabetes Mother        Social History   reports that she has never smoked. She does not have any smokeless tobacco history on file. She reports that she does not drink alcohol and does not use drugs.    Allergies  Patient has no known allergies.    Objective     Vital Signs   Vital Signs (last 24 hours)         10/08 0700  10/09 0659 10/09 0700  10/09 1359   Most Recent      Temp (°F) 97.4 -  97.9    97.9 -  98.1     97.9 (36.6) 10/09 1247    Heart Rate 69 -  90    81 -  86     81 10/09 1247    Resp 14 -  21    23 -  28     28 10/09 1247    BP 90/46 -  116/47    112/53 -  132/68     112/53 10/09 1247    SpO2 (%) 94 -  99    97 -  99     97 10/09 1247    Flow (L/min)   2      2     2 10/09 1247            Physical Exam:  Physical Exam  Vitals and nursing note reviewed.   Constitutional:       General: She is not in acute distress.     Appearance: She is well-developed. She is obese. She is ill-appearing. She is not diaphoretic.   HENT:      Head: Normocephalic and atraumatic.   Eyes:      General: No scleral icterus.     Extraocular Movements: Extraocular movements intact.      Conjunctiva/sclera: Conjunctivae normal.      Pupils:  Pupils are equal, round, and reactive to light.   Cardiovascular:      Rate and Rhythm: Normal rate and regular rhythm.      Heart sounds: Normal heart sounds, S1 normal and S2 normal. No murmur heard.  Pulmonary:      Effort: Pulmonary effort is normal. No respiratory distress.      Breath sounds: Normal breath sounds. No stridor. No wheezing or rales.   Chest:      Chest wall: No tenderness.   Abdominal:      General: Bowel sounds are normal. There is no distension.      Palpations: Abdomen is soft. There is no mass.      Tenderness: There is no abdominal tenderness. There is no guarding.   Genitourinary:     Comments: West catheter  Musculoskeletal:         General: No swelling, tenderness or deformity.      Cervical back: Neck supple.      Right lower leg: Edema present.      Left lower leg: Edema present.   Skin:     General: Skin is warm and dry.      Coloration: Skin is not pale.      Findings: Rash present. No bruising or erythema.      Comments: Significant rash and excoriation to the patient's inner thighs, groin and abdomen.    Patient also has a light petechial rash to the palms of her hands   Neurological:      Mental Status: She is alert and oriented to person, place, and time.         Microbiology  Microbiology Results (last 10 days)       Procedure Component Value - Date/Time    Wound Culture - Swab, Abdominal Wall [[id number]]  (Abnormal) Collected: 10/08/24 1216    Lab Status: Preliminary result Specimen: Swab from Abdominal Wall Updated: 10/09/24 0932     Wound Culture Rare growth Gram Negative Bacilli     Gram Stain Moderate (3+) WBCs per low power field      Few (2+) Gram positive bacilli resembling diphtheroids      Rare (1+) Gram positive cocci in pairs    Blood Culture - Blood, Arm, Left [[id number]]  (Normal) Collected: 10/08/24 1213    Lab Status: Preliminary result Specimen: Blood from Arm, Left Updated: 10/09/24 1230     Blood Culture No growth at 24 hours    Blood Culture - Blood, Wrist,  Left [[id number]]  (Normal) Collected: 10/08/24 1213    Lab Status: Preliminary result Specimen: Blood from Wrist, Left Updated: 10/09/24 1230     Blood Culture No growth at 24 hours            Laboratory  Results from last 7 days   Lab Units 10/09/24  0254   WBC 10*3/mm3 9.47   HEMOGLOBIN g/dL 9.4*   HEMATOCRIT % 31.2*   PLATELETS 10*3/mm3 320     Results from last 7 days   Lab Units 10/09/24  0254   SODIUM mmol/L 132*   POTASSIUM mmol/L 3.8   CHLORIDE mmol/L 100   CO2 mmol/L 22.4   BUN mg/dL 18   CREATININE mg/dL 1.37*   GLUCOSE mg/dL 123*   CALCIUM mg/dL 9.3     Results from last 7 days   Lab Units 10/09/24  0254   SODIUM mmol/L 132*   POTASSIUM mmol/L 3.8   CHLORIDE mmol/L 100   CO2 mmol/L 22.4   BUN mg/dL 18   CREATININE mg/dL 1.37*   GLUCOSE mg/dL 123*   CALCIUM mg/dL 9.3     Results from last 7 days   Lab Units 10/08/24  1213   CK TOTAL U/L 168               Radiology  Imaging Results (Last 72 Hours)       Procedure Component Value Units Date/Time    US Renal Bilateral [[id number]] Resulted: 10/09/24 1352     Updated: 10/09/24 1352    CT Abdomen Pelvis With Contrast [[id number]] Collected: 10/08/24 1432     Updated: 10/08/24 1453    Narrative:      CT ABDOMEN PELVIS W CONTRAST    Date of Exam: 10/8/2024 2:26 PM EDT    Indication: Sever rash abdomen with necrotic tissue, r/o abscess/nec fas.    Comparison: CT of the abdomen and pelvis dated 8/19/2024, PET/CT dated 10/3/2024    Technique: Axial CT images were obtained of the abdomen and pelvis following the uneventful intravenous administration of iodinated contrast. Sagittal and coronal reconstructions were performed.  Automated exposure control and iterative reconstruction   methods were used.        Findings:    Liver: The liver is unremarkable in morphology. There are multiple small hypoattenuating liver lesions, particular within the right hepatic lobe, concerning for metastatic disease. Biliary prominence may be related to prior  cholecystectomy.    Gallbladder: Surgically absent.    Pancreas: Unremarkable.    Spleen: Unremarkable.    Adrenal glands: Unremarkable.    Genitourinary tract: Enlarged uterus containing a large, heterogeneous uterine/cervical mass with probable invasion of the right posterior urinary bladder. This lesion is better characterized on the PET/CT from 10/8/2024. There is mild bilateral   hydroureteronephrosis, and there is moderate distention of the urinary bladder. Ureteral obstruction or bladder outlet obstruction not excluded. 9 cm hypodense lesion within the left adnexa.    Gastrointestinal tract: Colonic diverticulosis is present. There is no evidence of bowel obstruction.    Appendix: No findings to suggest acute appendicitis.    Other findings: Multiple omental metastatic implants are seen, largest within the left lower quadrant measuring approximately 4.5 cm. Enlarged retroperitoneal and iliac chain lymph nodes are seen. The abdominal aorta and IVC appear unremarkable.    Bones and soft tissues: No acute osseous lesion is identified. Superficial soft tissue edema/induration within the lower abdominal wall and left flank, compatible with cellulitis in the appropriate clinical setting. No subcutaneous gas is seen. No   associated discrete fluid collection is seen. Prominent inguinal lymph nodes, left greater than right.    Lung bases: Innumerable bilateral lung base nodules, concerning for metastatic disease.      Impression:      Impression:  1.Superficial soft tissue edema/induration within the lower abdominal wall and left flank, compatible with cellulitis in the appropriate clinical setting. No subcutaneous gas is seen. No associated discrete fluid collection is seen.  2.Large, heterogeneous mass of the uterus/cervix with probable invasion of the right posterior urinary bladder. Please refer to the PET/CT from 5 days prior.  3.Mild bilateral hydroureteronephrosis with moderately distended urinary bladder.  Ureteral obstruction or bladder outlet obstruction cannot be excluded.  4.Numerous pulmonary nodules, retroperitoneal/iliac chain lymphadenopathy, and left-sided omental metastatic implants as noted on recent PET/CT.  5.Multiple small hypoattenuating liver lesions, particularly within the right hepatic lobe, concerning for metastatic disease. These may be further characterized with liver MRI if clinically indicated.  6.9 cm hypodense lesion within the left adnexa. Please refer to the MRI of the pelvis from 8/19/2024.  7.Additional findings as detailed above.      Electronically Signed: Sushil Benítez MD    10/8/2024 2:51 PM EDT    Workstation ID: [id number]            Cardiology      Results Review:  I have reviewed all clinical data, test, lab, and imaging results.       Schedule Meds  cefTRIAXone, 2,000 mg, Intravenous, Q24H  enoxaparin, 40 mg, Subcutaneous, Q12H  ferrous sulfate, 324 mg, Oral, Once per day on Monday Tuesday Wednesday Thursday Friday  hydrocortisone-bacitracin-zinc oxide-nystatin, 1 Application, Topical, 4x Daily  hydrOXYzine, 50 mg, Oral, Nightly  insulin lispro, 2-9 Units, Subcutaneous, 4x Daily AC & at Bedtime  lisinopril, 40 mg, Oral, Daily  metoprolol succinate XL, 50 mg, Oral, Q24H  montelukast, 10 mg, Oral, Nightly  nystatin, , Topical, Q12H  PARoxetine, 30 mg, Oral, Daily  risperiDONE, 4 mg, Oral, Q PM  sodium chloride, 10 mL, Intravenous, Q12H        Infusion Meds  Pharmacy to Dose enoxaparin (LOVENOX),         PRN Meds    acetaminophen    senna-docusate sodium **AND** polyethylene glycol **AND** bisacodyl **AND** bisacodyl    Calcium Replacement - Follow Nurse / BPA Driven Protocol    dextrose    dextrose    glucagon (human recombinant)    Magnesium Standard Dose Replacement - Follow Nurse / BPA Driven Protocol    melatonin    Pharmacy to Dose enoxaparin (LOVENOX)    Phosphorus Replacement - Follow Nurse / BPA Driven Protocol    Potassium Replacement - Follow Nurse / BPA Driven Protocol     [COMPLETED] Insert Peripheral IV **AND** sodium chloride    sodium chloride    sodium chloride    traMADol      Assessment & Plan       Assessment    Groin/abdomen excoriation mostly consistent with candidiasis.  Possible secondary bacterial infection.  CT soft with cellulitic changes but no abscess.      Rash on the hands started after patient had a an iron infusion.    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  CT shows bilateral hydroureteronephrosis possibly due to obstruction from uterine/cervix mass.  There is some concern the cancer has invaded the urinary bladder.  Also some concern of liver metastasis.  Urology following    Type 2 diabetes    Plan    Discontinue IV Rocephin   P.o. doxycycline 100 mg twice daily for 7 days  Start Augmentin 875 mg p.o. twice daily for 7 days  Start p.o. Diflucan 200 mg daily x 7 days  EKG for QTc interval  Domeboro soaks  Continue to use Magic barrier between the Domeboro soaks  Wound care following  Continue supportive care  Case discussed with RN and hospitalist  Case discussed with patient and daughter at bedside    Not much more to add from infectious disease standpoint-we will sign off at this time-please call with any questions.  If skin condition does not improve then recommend to refer patient to dermatology service    Nisha Quintana, SANTI  10/09/24  13:59 EDT    Note is dictated utilizing voice recognition software/Dragon

## 2024-10-10 LAB
ANION GAP SERPL CALCULATED.3IONS-SCNC: 9.7 MMOL/L (ref 5–15)
BACTERIA SPEC AEROBE CULT: ABNORMAL
BACTERIA SPEC AEROBE CULT: ABNORMAL
BASOPHILS # BLD AUTO: 0.02 10*3/MM3 (ref 0–0.2)
BASOPHILS NFR BLD AUTO: 0.2 % (ref 0–1.5)
BUN SERPL-MCNC: 10 MG/DL (ref 8–23)
BUN/CREAT SERPL: 11.8 (ref 7–25)
CALCIUM SPEC-SCNC: 9.1 MG/DL (ref 8.6–10.5)
CHLORIDE SERPL-SCNC: 103 MMOL/L (ref 98–107)
CO2 SERPL-SCNC: 21.3 MMOL/L (ref 22–29)
CREAT SERPL-MCNC: 0.85 MG/DL (ref 0.57–1)
DEPRECATED RDW RBC AUTO: 63.1 FL (ref 37–54)
EGFRCR SERPLBLD CKD-EPI 2021: 78.5 ML/MIN/1.73
EOSINOPHIL # BLD AUTO: 0.3 10*3/MM3 (ref 0–0.4)
EOSINOPHIL NFR BLD AUTO: 3.4 % (ref 0.3–6.2)
ERYTHROCYTE [DISTWIDTH] IN BLOOD BY AUTOMATED COUNT: 21.7 % (ref 12.3–15.4)
GLUCOSE BLDC GLUCOMTR-MCNC: 112 MG/DL (ref 70–105)
GLUCOSE BLDC GLUCOMTR-MCNC: 155 MG/DL (ref 70–105)
GLUCOSE BLDC GLUCOMTR-MCNC: 159 MG/DL (ref 70–105)
GLUCOSE BLDC GLUCOMTR-MCNC: 92 MG/DL (ref 70–105)
GLUCOSE SERPL-MCNC: 180 MG/DL (ref 65–99)
GRAM STN SPEC: ABNORMAL
HCT VFR BLD AUTO: 33.9 % (ref 34–46.6)
HGB BLD-MCNC: 9.7 G/DL (ref 12–15.9)
IMM GRANULOCYTES # BLD AUTO: 0.03 10*3/MM3 (ref 0–0.05)
IMM GRANULOCYTES NFR BLD AUTO: 0.3 % (ref 0–0.5)
LYMPHOCYTES # BLD AUTO: 1 10*3/MM3 (ref 0.7–3.1)
LYMPHOCYTES NFR BLD AUTO: 11.3 % (ref 19.6–45.3)
MAGNESIUM SERPL-MCNC: 1.7 MG/DL (ref 1.6–2.4)
MCH RBC QN AUTO: 22.7 PG (ref 26.6–33)
MCHC RBC AUTO-ENTMCNC: 28.6 G/DL (ref 31.5–35.7)
MCV RBC AUTO: 79.4 FL (ref 79–97)
MONOCYTES # BLD AUTO: 0.9 10*3/MM3 (ref 0.1–0.9)
MONOCYTES NFR BLD AUTO: 10.2 % (ref 5–12)
NEUTROPHILS NFR BLD AUTO: 6.6 10*3/MM3 (ref 1.7–7)
NEUTROPHILS NFR BLD AUTO: 74.6 % (ref 42.7–76)
NRBC BLD AUTO-RTO: 0 /100 WBC (ref 0–0.2)
PLATELET # BLD AUTO: 381 10*3/MM3 (ref 140–450)
PMV BLD AUTO: 8.4 FL (ref 6–12)
POTASSIUM SERPL-SCNC: 3.7 MMOL/L (ref 3.5–5.2)
RBC # BLD AUTO: 4.27 10*6/MM3 (ref 3.77–5.28)
SODIUM SERPL-SCNC: 134 MMOL/L (ref 136–145)
WBC NRBC COR # BLD AUTO: 8.85 10*3/MM3 (ref 3.4–10.8)

## 2024-10-10 PROCEDURE — 63710000001 INSULIN LISPRO (HUMAN) PER 5 UNITS: Performed by: INTERNAL MEDICINE

## 2024-10-10 PROCEDURE — 82948 REAGENT STRIP/BLOOD GLUCOSE: CPT | Performed by: INTERNAL MEDICINE

## 2024-10-10 PROCEDURE — 99221 1ST HOSP IP/OBS SF/LOW 40: CPT | Performed by: INTERNAL MEDICINE

## 2024-10-10 PROCEDURE — 80048 BASIC METABOLIC PNL TOTAL CA: CPT | Performed by: INTERNAL MEDICINE

## 2024-10-10 PROCEDURE — 82948 REAGENT STRIP/BLOOD GLUCOSE: CPT

## 2024-10-10 PROCEDURE — 83735 ASSAY OF MAGNESIUM: CPT | Performed by: INTERNAL MEDICINE

## 2024-10-10 PROCEDURE — 85025 COMPLETE CBC W/AUTO DIFF WBC: CPT | Performed by: INTERNAL MEDICINE

## 2024-10-10 PROCEDURE — 25010000002 ENOXAPARIN PER 10 MG: Performed by: INTERNAL MEDICINE

## 2024-10-10 RX ADMIN — Medication 10 ML: at 08:33

## 2024-10-10 RX ADMIN — ENOXAPARIN SODIUM 40 MG: 100 INJECTION SUBCUTANEOUS at 20:12

## 2024-10-10 RX ADMIN — NYSTATIN: 100000 POWDER TOPICAL at 08:36

## 2024-10-10 RX ADMIN — INSULIN LISPRO 2 UNITS: 100 INJECTION, SOLUTION INTRAVENOUS; SUBCUTANEOUS at 20:15

## 2024-10-10 RX ADMIN — ZINC OXIDE 1 APPLICATION: 200 OINTMENT TOPICAL at 12:30

## 2024-10-10 RX ADMIN — Medication 10 ML: at 20:13

## 2024-10-10 RX ADMIN — AMOXICILLIN AND CLAVULANATE POTASSIUM 1 TABLET: 875; 125 TABLET, FILM COATED ORAL at 20:12

## 2024-10-10 RX ADMIN — RISPERIDONE 4 MG: 1 TABLET, FILM COATED ORAL at 16:22

## 2024-10-10 RX ADMIN — INSULIN LISPRO 2 UNITS: 100 INJECTION, SOLUTION INTRAVENOUS; SUBCUTANEOUS at 12:30

## 2024-10-10 RX ADMIN — FERROUS SULFATE TAB EC 324 MG (65 MG FE EQUIVALENT) 324 MG: 324 (65 FE) TABLET DELAYED RESPONSE at 08:31

## 2024-10-10 RX ADMIN — METOPROLOL SUCCINATE 50 MG: 50 TABLET, EXTENDED RELEASE ORAL at 08:32

## 2024-10-10 RX ADMIN — HYDROXYZINE HYDROCHLORIDE 50 MG: 25 TABLET, FILM COATED ORAL at 20:11

## 2024-10-10 RX ADMIN — ZINC OXIDE 1 APPLICATION: 200 OINTMENT TOPICAL at 17:16

## 2024-10-10 RX ADMIN — HYDROCODONE BITARTRATE AND ACETAMINOPHEN 1 TABLET: 5; 325 TABLET ORAL at 02:49

## 2024-10-10 RX ADMIN — FLUCONAZOLE 200 MG: 100 TABLET ORAL at 16:22

## 2024-10-10 RX ADMIN — DOXYCYCLINE 100 MG: 100 CAPSULE ORAL at 08:31

## 2024-10-10 RX ADMIN — CALCIUM ACETATE MONOHYDRATE AND ALUMINUM SULFATE TETRADECAHYDRATE 1000 ML: 952; 1347 POWDER, FOR SOLUTION TOPICAL at 14:33

## 2024-10-10 RX ADMIN — ENOXAPARIN SODIUM 40 MG: 100 INJECTION SUBCUTANEOUS at 08:31

## 2024-10-10 RX ADMIN — AMOXICILLIN AND CLAVULANATE POTASSIUM 1 TABLET: 875; 125 TABLET, FILM COATED ORAL at 12:30

## 2024-10-10 RX ADMIN — CALCIUM ACETATE MONOHYDRATE AND ALUMINUM SULFATE TETRADECAHYDRATE 1000 ML: 952; 1347 POWDER, FOR SOLUTION TOPICAL at 06:31

## 2024-10-10 RX ADMIN — ZINC OXIDE 1 APPLICATION: 200 OINTMENT TOPICAL at 08:32

## 2024-10-10 RX ADMIN — ZINC OXIDE 1 APPLICATION: 200 OINTMENT TOPICAL at 20:12

## 2024-10-10 RX ADMIN — NYSTATIN: 100000 POWDER TOPICAL at 20:12

## 2024-10-10 RX ADMIN — PAROXETINE 30 MG: 10 TABLET, FILM COATED ORAL at 08:32

## 2024-10-10 RX ADMIN — DOXYCYCLINE 100 MG: 100 CAPSULE ORAL at 20:12

## 2024-10-10 RX ADMIN — LISINOPRIL 40 MG: 20 TABLET ORAL at 08:31

## 2024-10-10 RX ADMIN — MONTELUKAST 10 MG: 10 TABLET, FILM COATED ORAL at 20:15

## 2024-10-10 RX ADMIN — CALCIUM ACETATE MONOHYDRATE AND ALUMINUM SULFATE TETRADECAHYDRATE 1000 ML: 952; 1347 POWDER, FOR SOLUTION TOPICAL at 22:27

## 2024-10-10 NOTE — DISCHARGE PLACEMENT REQUEST
"Enedina Feng (59 y.o. Female)       Date of Birth   1965    Social Security Number       Address   [address] [place] [address]    Home Phone       MRN   [id number]       Tenriism   Unknown    Marital Status                               Admission Date   10/8/24    Admission Type   Emergency    Admitting Provider   Samm Castelan MD    Attending Provider   Brammell, Timothy Duane, MD    Department, Room/Bed   [facility] MEDICAL INPATIENT, 202/1       Discharge Date       Discharge Disposition       Discharge Destination                                 Attending Provider: Brammell, Timothy Duane, MD    Allergies: No Known Allergies    Isolation: None   Infection: None   Code Status: CPR    Ht: 160 cm (63\")   Wt: 114 kg (251 lb 15.8 oz)    Admission Cmt: None   Principal Problem: Cellulitis of abdominal wall [L03.311]                   Active Insurance as of 10/8/2024       Primary Coverage       Payor Plan Insurance Group Employer/Plan Group    MEDICARE MEDICARE A & B        Payor Plan Address Payor Plan Phone Number Payor Plan Fax Number Effective Dates    [address] [phone]  6/1/1995 - None Entered    [facility] [id number]         Subscriber Name Subscriber Birth Date Member ID       ENEDINA FENG 1965 [id number]               Secondary Coverage       Payor Plan Insurance Group Employer/Plan Group    [facility] COMMUNITY PLAN OF IN [facility] COMMUNITY PLAN PATHWAYS IN        Payor Plan Address Payor Plan Phone Number Payor Plan Fax Number Effective Dates    [address]   8/1/2024 - None Entered    [facility] [address] [address]         Subscriber Name Subscriber Birth Date Member ID       ENEDINA FENG 1965 [id number]                     Emergency Contacts        (Rel.) Home Phone Work Phone Mobile Phone    Andressa Momin (Sister) -- -- [phone]    Hamzah Crowley () -- [phone] --                "

## 2024-10-10 NOTE — PLAN OF CARE
Goal Outcome Evaluation:            Patient has been complaining of pain this shift, pain medication increased from tramadol to norco. Wound treatment completed as ordered. No other complaints noted.

## 2024-10-10 NOTE — PLAN OF CARE
Problem: Adult Inpatient Plan of Care  Goal: Plan of Care Review  Outcome: Progressing  Goal: Patient-Specific Goal (Individualized)  Outcome: Progressing  Goal: Absence of Hospital-Acquired Illness or Injury  Outcome: Progressing  Intervention: Identify and Manage Fall Risk  Recent Flowsheet Documentation  Taken 10/10/2024 1529 by Olinda Raines RN  Safety Promotion/Fall Prevention: safety round/check completed  Taken 10/10/2024 1400 by Olinda Raines RN  Safety Promotion/Fall Prevention: safety round/check completed  Taken 10/10/2024 1215 by Olinda Raines RN  Safety Promotion/Fall Prevention: safety round/check completed  Taken 10/10/2024 1015 by Olinda Raines RN  Safety Promotion/Fall Prevention: safety round/check completed  Taken 10/10/2024 0848 by Olinda Raines RN  Safety Promotion/Fall Prevention: safety round/check completed  Intervention: Prevent Skin Injury  Recent Flowsheet Documentation  Taken 10/10/2024 0848 by Olinda Raines RN  Body Position: position changed independently  Skin Protection:   adhesive use limited   drying agents applied   incontinence pads utilized   skin-to-skin areas padded   tubing/devices free from skin contact  Intervention: Prevent and Manage VTE (Venous Thromboembolism) Risk  Recent Flowsheet Documentation  Taken 10/10/2024 0848 by Olinda Raines RN  Activity Management: activity encouraged  VTE Prevention/Management:   bilateral   sequential compression devices off   patient refused intervention  Range of Motion: active ROM (range of motion) encouraged  Intervention: Prevent Infection  Recent Flowsheet Documentation  Taken 10/10/2024 0848 by Olinda Raines RN  Infection Prevention: single patient room provided  Goal: Optimal Comfort and Wellbeing  Outcome: Progressing  Intervention: Provide Person-Centered Care  Recent Flowsheet Documentation  Taken 10/10/2024 0848 by Olinda Raines RN  Trust Relationship/Rapport:   care explained    choices provided  Goal: Readiness for Transition of Care  Outcome: Progressing     Problem: Impaired Wound Healing  Goal: Optimal Wound Healing  Outcome: Progressing  Intervention: Promote Wound Healing  Recent Flowsheet Documentation  Taken 10/10/2024 0848 by Olinda Raines RN  Activity Management: activity encouraged     Problem: Skin Injury Risk Increased  Goal: Skin Health and Integrity  Outcome: Progressing  Intervention: Optimize Skin Protection  Recent Flowsheet Documentation  Taken 10/10/2024 0848 by Olinda Raines RN  Pressure Reduction Techniques: frequent weight shift encouraged  Head of Bed (HOB) Positioning: HOB elevated  Pressure Reduction Devices: pressure-redistributing mattress utilized  Skin Protection:   adhesive use limited   drying agents applied   incontinence pads utilized   skin-to-skin areas padded   tubing/devices free from skin contact     Problem: Fall Injury Risk  Goal: Absence of Fall and Fall-Related Injury  Outcome: Progressing  Intervention: Identify and Manage Contributors  Recent Flowsheet Documentation  Taken 10/10/2024 0848 by Olinda Raines RN  Medication Review/Management: medications reviewed  Intervention: Promote Injury-Free Environment  Recent Flowsheet Documentation  Taken 10/10/2024 1529 by Olinda Raines RN  Safety Promotion/Fall Prevention: safety round/check completed  Taken 10/10/2024 1400 by Olinda Raines RN  Safety Promotion/Fall Prevention: safety round/check completed  Taken 10/10/2024 1215 by Olinda Raines RN  Safety Promotion/Fall Prevention: safety round/check completed  Taken 10/10/2024 1015 by Olinda Raines RN  Safety Promotion/Fall Prevention: safety round/check completed  Taken 10/10/2024 0848 by Olinda Raines RN  Safety Promotion/Fall Prevention: safety round/check completed   Goal Outcome Evaluation:   Pt received sliding scale insulin with blood sugar checks ACHS. She received doxycyline and augmentin PO. For her  wound, she received magic barrier, nystatin powder, and domeboro. Pt denied pain today. Pt is waiting on possible home health acceptance. Sat down with pt and discussed the treatment of her cellulitis. No concerns and call light within reach.

## 2024-10-10 NOTE — PROGRESS NOTES
[facility] MEDICINE SERVICE  DAILY PROGRESS NOTE    NAME: Felisha Mukherjee  : 1965  MRN: [id number]      LOS: 2 days     PROVIDER OF SERVICE: Timothy Duane Brammell, MD    Chief Complaint: Cellulitis of abdominal wall    Subjective:     Interval History:  History taken from: patient    Patient with less discomfort of her lower abdominal skin irritation than prior.  She is eating without issue.  She has had bowel movement.  Denies any urinary symptoms.  Denies any other additional acute issues.        Review of Systems:   Review of Systems   All other systems reviewed and are negative.      Objective:     Vital Signs  Temp:  [97.8 °F (36.6 °C)-98.3 °F (36.8 °C)] 98 °F (36.7 °C)  Heart Rate:  [67-81] 67  Resp:  [13-28] 21  BP: (112-138)/(53-79) 128/79  Flow (L/min):  [2] 2   Body mass index is 44.64 kg/m².    Physical Exam  Physical Exam  Vitals reviewed.   Constitutional:       Appearance: Normal appearance. She is obese.   HENT:      Head: Normocephalic.   Cardiovascular:      Rate and Rhythm: Normal rate and regular rhythm.   Pulmonary:      Effort: Pulmonary effort is normal.      Breath sounds: Normal breath sounds.   Abdominal:      General: There is distension.      Palpations: Abdomen is soft.      Comments: Palpable uterine enlargement   Musculoskeletal:         General: Swelling present.   Neurological:      General: No focal deficit present.      Mental Status: She is alert.            Diagnostic Data    Results from last 7 days   Lab Units 10/10/24  0307 10/09/24  0254 10/08/24  1213   WBC 10*3/mm3 8.85   < > 14.33*   HEMOGLOBIN g/dL 9.7*   < > 10.9*   HEMATOCRIT % 33.9*   < > 36.1   PLATELETS 10*3/mm3 381   < > 393   GLUCOSE mg/dL 180*   < > 121*   CREATININE mg/dL 0.85   < > 1.40*   BUN mg/dL 10   < > 17   SODIUM mmol/L 134*   < > 128*   POTASSIUM mmol/L 3.7   < > 4.3   AST (SGOT) U/L  --   --  29   ALT (SGPT) U/L  --   --  17   ALK PHOS U/L  --   --  68   BILIRUBIN mg/dL  --   --  0.4    ANION GAP mmol/L 9.7   < > 15.0    < > = values in this interval not displayed.       US Renal Bilateral    Result Date: 10/9/2024  1. Moderate dilation of the right kidney and mild dilation of the left kidney 2. Evaluation of the bladder is limited by placement of West catheter Electronically Signed: Twan Garza MD  10/9/2024 2:25 PM EDT  Workstation ID: [id number]    CT Abdomen Pelvis With Contrast    Result Date: 10/8/2024  Impression: 1.Superficial soft tissue edema/induration within the lower abdominal wall and left flank, compatible with cellulitis in the appropriate clinical setting. No subcutaneous gas is seen. No associated discrete fluid collection is seen. 2.Large, heterogeneous mass of the uterus/cervix with probable invasion of the right posterior urinary bladder. Please refer to the PET/CT from 5 days prior. 3.Mild bilateral hydroureteronephrosis with moderately distended urinary bladder. Ureteral obstruction or bladder outlet obstruction cannot be excluded. 4.Numerous pulmonary nodules, retroperitoneal/iliac chain lymphadenopathy, and left-sided omental metastatic implants as noted on recent PET/CT. 5.Multiple small hypoattenuating liver lesions, particularly within the right hepatic lobe, concerning for metastatic disease. These may be further characterized with liver MRI if clinically indicated. 6.9 cm hypodense lesion within the left adnexa. Please refer to the MRI of the pelvis from 8/19/2024. 7.Additional findings as detailed above. Electronically Signed: Sushil Benítez MD  10/8/2024 2:51 PM EDT  Workstation ID: [id number]           Assessment:   Lower abdominal wall cellulitis bacterial versus fungal.  Uterine carcinoma.  Hydronephrosis  Type 2 diabetes  Obesity  Anemia  Acute renal insufficiency       Plan: Appreciate infectious disease input.  Ongoing topical wound care.  Seen by radiation oncology and unable to initiate radiation without skin clearing.  Case management to discuss with  family need for skin care and wound management with plan on discharging to home with family for topical care.      Active and Resolved Problems  Active Hospital Problems    Diagnosis  POA    **Cellulitis of abdominal wall [L03.311]  Yes      Resolved Hospital Problems   No resolved problems to display.           VTE Prophylaxis:  Pharmacologic VTE prophylaxis orders are present.             Disposition Planning:     Barriers to Discharge:wound care  Anticipated Date of Discharge: 10/11  Place of Discharge: home      Time: 30 minutes     Code Status and Medical Interventions: CPR (Attempt to Resuscitate); Full Support   Ordered at: 10/08/24 1722     Level Of Support Discussed With:    Patient     Code Status (Patient has no pulse and is not breathing):    CPR (Attempt to Resuscitate)     Medical Interventions (Patient has pulse or is breathing):    Full Support       Signature: Electronically signed by Timothy Duane Brammell, MD, 10/10/24, 11:52 EDT.  [facility], [place] Hospitalist Team

## 2024-10-10 NOTE — CASE MANAGEMENT/SOCIAL WORK
Continued Stay Note   Jasbir     Patient Name: Felisha Mukherjee  MRN: [id number]  Today's Date: 10/10/2024    Admit Date: 10/8/2024    Plan: Return home with cargiver and sister Andressa for PRN needs. [facility] (pending acceptance.)   Discharge Plan       Row Name 10/10/24 1329       Plan    Plan Return home with tim and sister Andressa for PRN needs. [facility] (pending acceptance.)    Plan Comments CM met with pt to discuss HHC choices and pt has no preference. [facility] does not cover [facility], [facility] referral placed and liaison Gabriela contacted (pending acceptance)                 Sarah Cormier RN      Office phone: [phone]  Office fax: [phone]    Pt and family aware of plan of care to observe pt for 2 hours post last neb

## 2024-10-10 NOTE — PROGRESS NOTES
[facility] DAILY PROGRESS NOTE    Patient Identification  Name: Felisha Mukherjee  Age: 59 y.o.  Sex: female  :  1965  MRN: [id number]    Date: 10/10/2024             Subjective:  Interval History: Creatinine down    Objective:    Scheduled Meds:aluminum sulfate-calcium acetate 1:40, 1,000 mL, Topical, Q8H  amoxicillin-clavulanate, 1 tablet, Oral, Q12H  doxycycline, 100 mg, Oral, Q12H  enoxaparin, 40 mg, Subcutaneous, Q12H  ferrous sulfate, 324 mg, Oral, Once per day on   fluconazole, 200 mg, Oral, Q24H  hydrocortisone-bacitracin-zinc oxide-nystatin, 1 Application, Topical, 4x Daily  hydrOXYzine, 50 mg, Oral, Nightly  insulin lispro, 2-9 Units, Subcutaneous, 4x Daily AC & at Bedtime  lisinopril, 40 mg, Oral, Daily  metoprolol succinate XL, 50 mg, Oral, Q24H  montelukast, 10 mg, Oral, Nightly  nystatin, , Topical, Q12H  PARoxetine, 30 mg, Oral, Daily  risperiDONE, 4 mg, Oral, Q PM  sodium chloride, 10 mL, Intravenous, Q12H      Continuous Infusions:Pharmacy to Dose enoxaparin (LOVENOX),       PRN Meds:  acetaminophen    senna-docusate sodium **AND** polyethylene glycol **AND** bisacodyl **AND** bisacodyl    Calcium Replacement - Follow Nurse / BPA Driven Protocol    dextrose    dextrose    glucagon (human recombinant)    HYDROcodone-acetaminophen    Magnesium Standard Dose Replacement - Follow Nurse / BPA Driven Protocol    melatonin    Pharmacy to Dose enoxaparin (LOVENOX)    Phosphorus Replacement - Follow Nurse / BPA Driven Protocol    Potassium Replacement - Follow Nurse / BPA Driven Protocol    [COMPLETED] Insert Peripheral IV **AND** sodium chloride    sodium chloride    sodium chloride    traMADol    Vital signs in last 24 hours:  Temp:  [97.8 °F (36.6 °C)-98.3 °F (36.8 °C)] 98 °F (36.7 °C)  Heart Rate:  [67-76] 67  Resp:  [13-25] 21  BP: (113-138)/(55-79) 128/79    Intake/Output:    Intake/Output Summary (Last 24 hours) at 10/10/2024 1311  Last data filed at  "10/10/2024 1150  Gross per 24 hour   Intake 480 ml   Output 2000 ml   Net -1520 ml       Exam:  /79 (BP Location: Right arm, Patient Position: Lying)   Pulse 67   Temp 98 °F (36.7 °C) (Oral)   Resp 21   Ht 160 cm (63\")   Wt 114 kg (251 lb 15.8 oz)   SpO2 92%   BMI 44.64 kg/m²     General Appearance:    Alert, cooperative, NAD   Lungs:     Respirations unlabored, no audible wheezing    Heart:    No cyanosis   Abdomen:     Soft, ND    :    No suprapubic distention, West clear            Data Review:  All labs (24hrs):   Recent Results (from the past 24 hour(s))   POC Glucose 4x Daily Before Meals & at Bedtime    Collection Time: 10/09/24  5:20 PM    Specimen: Blood   Result Value Ref Range    Glucose 110 (H) 70 - 105 mg/dL   POC Glucose Once    Collection Time: 10/09/24  9:09 PM    Specimen: Blood   Result Value Ref Range    Glucose 149 (H) 70 - 105 mg/dL   Basic Metabolic Panel    Collection Time: 10/10/24  3:07 AM    Specimen: Arm, Right; Blood   Result Value Ref Range    Glucose 180 (H) 65 - 99 mg/dL    BUN 10 8 - 23 mg/dL    Creatinine 0.85 0.57 - 1.00 mg/dL    Sodium 134 (L) 136 - 145 mmol/L    Potassium 3.7 3.5 - 5.2 mmol/L    Chloride 103 98 - 107 mmol/L    CO2 21.3 (L) 22.0 - 29.0 mmol/L    Calcium 9.1 8.6 - 10.5 mg/dL    BUN/Creatinine Ratio 11.8 7.0 - 25.0    Anion Gap 9.7 5.0 - 15.0 mmol/L    eGFR 78.5 >60.0 mL/min/1.73   Magnesium    Collection Time: 10/10/24  3:07 AM    Specimen: Arm, Right; Blood   Result Value Ref Range    Magnesium 1.7 1.6 - 2.4 mg/dL   CBC Auto Differential    Collection Time: 10/10/24  3:07 AM    Specimen: Arm, Right; Blood   Result Value Ref Range    WBC 8.85 3.40 - 10.80 10*3/mm3    RBC 4.27 3.77 - 5.28 10*6/mm3    Hemoglobin 9.7 (L) 12.0 - 15.9 g/dL    Hematocrit 33.9 (L) 34.0 - 46.6 %    MCV 79.4 79.0 - 97.0 fL    MCH 22.7 (L) 26.6 - 33.0 pg    MCHC 28.6 (L) 31.5 - 35.7 g/dL    RDW 21.7 (H) 12.3 - 15.4 %    RDW-SD 63.1 (H) 37.0 - 54.0 fl    MPV 8.4 6.0 - 12.0 fL "    Platelets 381 140 - 450 10*3/mm3    Neutrophil % 74.6 42.7 - 76.0 %    Lymphocyte % 11.3 (L) 19.6 - 45.3 %    Monocyte % 10.2 5.0 - 12.0 %    Eosinophil % 3.4 0.3 - 6.2 %    Basophil % 0.2 0.0 - 1.5 %    Immature Grans % 0.3 0.0 - 0.5 %    Neutrophils, Absolute 6.60 1.70 - 7.00 10*3/mm3    Lymphocytes, Absolute 1.00 0.70 - 3.10 10*3/mm3    Monocytes, Absolute 0.90 0.10 - 0.90 10*3/mm3    Eosinophils, Absolute 0.30 0.00 - 0.40 10*3/mm3    Basophils, Absolute 0.02 0.00 - 0.20 10*3/mm3    Immature Grans, Absolute 0.03 0.00 - 0.05 10*3/mm3    nRBC 0.0 0.0 - 0.2 /100 WBC   POC Glucose 4x Daily Before Meals & at Bedtime    Collection Time: 10/10/24  7:49 AM    Specimen: Blood   Result Value Ref Range    Glucose 112 (H) 70 - 105 mg/dL   POC Glucose 4x Daily Before Meals & at Bedtime    Collection Time: 10/10/24 11:45 AM    Specimen: Blood   Result Value Ref Range    Glucose 155 (H) 70 - 105 mg/dL      Imaging Results (Last 24 Hours)       Procedure Component Value Units Date/Time    MRI Outside Films [[id number]] Resulted: 10/10/24 0711     Updated: 10/10/24 0711    Narrative:      This procedure was auto-finalized with no dictation required.    CT Outside Films [[id number]] Resulted: 10/10/24 0711     Updated: 10/10/24 0711    Narrative:      This procedure was auto-finalized with no dictation required.    US Renal Bilateral [[id number]] Collected: 10/09/24 1421     Updated: 10/09/24 1427    Narrative:      US RENAL BILATERAL    Date of Exam: 10/9/2024 1:52 PM EDT    Indication: hydronephrois on scan and endometrial cancer.    Comparison: 10/8/2024 and prior    Technique: Grayscale and color Doppler ultrasound evaluation of the kidneys and urinary bladder was performed.        Findings:  The right kidney measures 10.5 x 5.2 x 6.1 cm in length and the left kidney measures 10.4 x 5.0 x 4.9 cm in length    Mild dilation of the left renal collecting system noted. Left kidney is somewhat limited in evaluation with no gross  acute abnormality otherwise noted. Moderate dilation of the right renal collecting system noted. Right kidney is otherwise grossly   unremarkable.    Bladder is decompressed with a West catheter and limited in evaluation      Impression:        1. Moderate dilation of the right kidney and mild dilation of the left kidney    2. Evaluation of the bladder is limited by placement of West catheter    Electronically Signed: Twan Garza MD    10/9/2024 2:25 PM EDT    Workstation ID: [id number]             Assessment:    Cellulitis of abdominal wall      [place] hydronephrosis due to endometrial cancer possibly invading bladder     Plan:      CT images reviewed   Creatinine down significantly with West in place, maintain West, no need for stents at this time  Depending on hospital course may have voiding trial prior to discharge or may follow-up with her oncology team for further management    Philipp Jackson MD  [facility]  [address] [place] [address], [address]  [place] [id number]  Office: [phone]  Available via [employer]  10/10/24  13:11 EDT

## 2024-10-11 LAB
ANION GAP SERPL CALCULATED.3IONS-SCNC: 11.7 MMOL/L (ref 5–15)
BASOPHILS # BLD AUTO: 0.03 10*3/MM3 (ref 0–0.2)
BASOPHILS NFR BLD AUTO: 0.3 % (ref 0–1.5)
BUN SERPL-MCNC: 9 MG/DL (ref 8–23)
BUN/CREAT SERPL: 9 (ref 7–25)
CALCIUM SPEC-SCNC: 9.6 MG/DL (ref 8.6–10.5)
CHLORIDE SERPL-SCNC: 104 MMOL/L (ref 98–107)
CO2 SERPL-SCNC: 23.3 MMOL/L (ref 22–29)
CREAT SERPL-MCNC: 1 MG/DL (ref 0.57–1)
DEPRECATED RDW RBC AUTO: 64.5 FL (ref 37–54)
EGFRCR SERPLBLD CKD-EPI 2021: 64.6 ML/MIN/1.73
EOSINOPHIL # BLD AUTO: 0.31 10*3/MM3 (ref 0–0.4)
EOSINOPHIL NFR BLD AUTO: 3.3 % (ref 0.3–6.2)
ERYTHROCYTE [DISTWIDTH] IN BLOOD BY AUTOMATED COUNT: 22.1 % (ref 12.3–15.4)
GLUCOSE BLDC GLUCOMTR-MCNC: 104 MG/DL (ref 70–105)
GLUCOSE BLDC GLUCOMTR-MCNC: 128 MG/DL (ref 70–105)
GLUCOSE BLDC GLUCOMTR-MCNC: 135 MG/DL (ref 70–105)
GLUCOSE BLDC GLUCOMTR-MCNC: 166 MG/DL (ref 70–105)
GLUCOSE SERPL-MCNC: 121 MG/DL (ref 65–99)
HCT VFR BLD AUTO: 37.2 % (ref 34–46.6)
HGB BLD-MCNC: 10.8 G/DL (ref 12–15.9)
IMM GRANULOCYTES # BLD AUTO: 0.04 10*3/MM3 (ref 0–0.05)
IMM GRANULOCYTES NFR BLD AUTO: 0.4 % (ref 0–0.5)
LYMPHOCYTES # BLD AUTO: 1.17 10*3/MM3 (ref 0.7–3.1)
LYMPHOCYTES NFR BLD AUTO: 12.6 % (ref 19.6–45.3)
MAGNESIUM SERPL-MCNC: 1.8 MG/DL (ref 1.6–2.4)
MCH RBC QN AUTO: 23.1 PG (ref 26.6–33)
MCHC RBC AUTO-ENTMCNC: 29 G/DL (ref 31.5–35.7)
MCV RBC AUTO: 79.5 FL (ref 79–97)
MONOCYTES # BLD AUTO: 0.88 10*3/MM3 (ref 0.1–0.9)
MONOCYTES NFR BLD AUTO: 9.5 % (ref 5–12)
NEUTROPHILS NFR BLD AUTO: 6.86 10*3/MM3 (ref 1.7–7)
NEUTROPHILS NFR BLD AUTO: 73.9 % (ref 42.7–76)
NRBC BLD AUTO-RTO: 0 /100 WBC (ref 0–0.2)
PLATELET # BLD AUTO: 430 10*3/MM3 (ref 140–450)
PMV BLD AUTO: 8.8 FL (ref 6–12)
POTASSIUM SERPL-SCNC: 3.9 MMOL/L (ref 3.5–5.2)
RBC # BLD AUTO: 4.68 10*6/MM3 (ref 3.77–5.28)
SODIUM SERPL-SCNC: 139 MMOL/L (ref 136–145)
WBC NRBC COR # BLD AUTO: 9.29 10*3/MM3 (ref 3.4–10.8)

## 2024-10-11 PROCEDURE — 63710000001 INSULIN LISPRO (HUMAN) PER 5 UNITS: Performed by: INTERNAL MEDICINE

## 2024-10-11 PROCEDURE — 97535 SELF CARE MNGMENT TRAINING: CPT

## 2024-10-11 PROCEDURE — 25010000002 ENOXAPARIN PER 10 MG: Performed by: INTERNAL MEDICINE

## 2024-10-11 PROCEDURE — 82948 REAGENT STRIP/BLOOD GLUCOSE: CPT

## 2024-10-11 PROCEDURE — 85025 COMPLETE CBC W/AUTO DIFF WBC: CPT | Performed by: INTERNAL MEDICINE

## 2024-10-11 PROCEDURE — 80048 BASIC METABOLIC PNL TOTAL CA: CPT | Performed by: INTERNAL MEDICINE

## 2024-10-11 PROCEDURE — 97116 GAIT TRAINING THERAPY: CPT

## 2024-10-11 PROCEDURE — 97110 THERAPEUTIC EXERCISES: CPT

## 2024-10-11 PROCEDURE — 82948 REAGENT STRIP/BLOOD GLUCOSE: CPT | Performed by: INTERNAL MEDICINE

## 2024-10-11 PROCEDURE — 99232 SBSQ HOSP IP/OBS MODERATE 35: CPT | Performed by: INTERNAL MEDICINE

## 2024-10-11 PROCEDURE — 97530 THERAPEUTIC ACTIVITIES: CPT

## 2024-10-11 PROCEDURE — 83735 ASSAY OF MAGNESIUM: CPT | Performed by: INTERNAL MEDICINE

## 2024-10-11 RX ADMIN — ENOXAPARIN SODIUM 40 MG: 100 INJECTION SUBCUTANEOUS at 23:00

## 2024-10-11 RX ADMIN — LISINOPRIL 40 MG: 20 TABLET ORAL at 09:50

## 2024-10-11 RX ADMIN — FERROUS SULFATE TAB EC 324 MG (65 MG FE EQUIVALENT) 324 MG: 324 (65 FE) TABLET DELAYED RESPONSE at 07:27

## 2024-10-11 RX ADMIN — MONTELUKAST 10 MG: 10 TABLET, FILM COATED ORAL at 23:27

## 2024-10-11 RX ADMIN — ZINC OXIDE 1 APPLICATION: 200 OINTMENT TOPICAL at 23:02

## 2024-10-11 RX ADMIN — METOPROLOL SUCCINATE 50 MG: 50 TABLET, EXTENDED RELEASE ORAL at 09:50

## 2024-10-11 RX ADMIN — ZINC OXIDE 1 APPLICATION: 200 OINTMENT TOPICAL at 17:40

## 2024-10-11 RX ADMIN — ZINC OXIDE 1 APPLICATION: 200 OINTMENT TOPICAL at 11:46

## 2024-10-11 RX ADMIN — ZINC OXIDE 1 APPLICATION: 200 OINTMENT TOPICAL at 07:27

## 2024-10-11 RX ADMIN — HYDROXYZINE HYDROCHLORIDE 50 MG: 25 TABLET, FILM COATED ORAL at 23:00

## 2024-10-11 RX ADMIN — RISPERIDONE 4 MG: 1 TABLET, FILM COATED ORAL at 17:39

## 2024-10-11 RX ADMIN — FLUCONAZOLE 200 MG: 100 TABLET ORAL at 14:15

## 2024-10-11 RX ADMIN — INSULIN LISPRO 2 UNITS: 100 INJECTION, SOLUTION INTRAVENOUS; SUBCUTANEOUS at 11:46

## 2024-10-11 RX ADMIN — NYSTATIN: 100000 POWDER TOPICAL at 23:02

## 2024-10-11 RX ADMIN — NYSTATIN: 100000 POWDER TOPICAL at 07:28

## 2024-10-11 RX ADMIN — DOXYCYCLINE 100 MG: 100 CAPSULE ORAL at 09:50

## 2024-10-11 RX ADMIN — DOXYCYCLINE 100 MG: 100 CAPSULE ORAL at 23:01

## 2024-10-11 RX ADMIN — AMOXICILLIN AND CLAVULANATE POTASSIUM 1 TABLET: 875; 125 TABLET, FILM COATED ORAL at 23:00

## 2024-10-11 RX ADMIN — CALCIUM ACETATE MONOHYDRATE AND ALUMINUM SULFATE TETRADECAHYDRATE 1000 ML: 952; 1347 POWDER, FOR SOLUTION TOPICAL at 14:15

## 2024-10-11 RX ADMIN — HYDROCODONE BITARTRATE AND ACETAMINOPHEN 1 TABLET: 5; 325 TABLET ORAL at 23:01

## 2024-10-11 RX ADMIN — PAROXETINE 30 MG: 10 TABLET, FILM COATED ORAL at 09:50

## 2024-10-11 RX ADMIN — AMOXICILLIN AND CLAVULANATE POTASSIUM 1 TABLET: 875; 125 TABLET, FILM COATED ORAL at 09:50

## 2024-10-11 RX ADMIN — ENOXAPARIN SODIUM 40 MG: 100 INJECTION SUBCUTANEOUS at 09:50

## 2024-10-11 RX ADMIN — Medication 10 ML: at 23:24

## 2024-10-11 RX ADMIN — Medication 10 ML: at 09:50

## 2024-10-11 RX ADMIN — CALCIUM ACETATE MONOHYDRATE AND ALUMINUM SULFATE TETRADECAHYDRATE 1000 ML: 952; 1347 POWDER, FOR SOLUTION TOPICAL at 23:02

## 2024-10-11 RX ADMIN — CALCIUM ACETATE MONOHYDRATE AND ALUMINUM SULFATE TETRADECAHYDRATE 1000 ML: 952; 1347 POWDER, FOR SOLUTION TOPICAL at 05:09

## 2024-10-11 NOTE — CASE MANAGEMENT/SOCIAL WORK
Continued Stay Note  SERA Martell     Patient Name: Felisha Mukherjee  MRN: [id number]  Today's Date: 10/11/2024    Admit Date: 10/8/2024    Plan: [facility] (accepting.) PASRR requested. No precert required.   Discharge Plan       Row Name 10/11/24 1516       Plan    Plan [facility] (accepting.) PASRR requested. No precert required.    Patient/Family in Agreement with Plan yes    Plan Comments DC barriers: wound care, FC anchored- pending voiding trial, pending placement. Urology, oncology, ID following                     Sarah Cormier RN     Office phone: [phone]  Office fax: [phone]

## 2024-10-11 NOTE — PROGRESS NOTES
Hematology/Oncology Inpatient Progress Note    PATIENT NAME: Felisha Mukherjee  : 1965  MRN: [id number]    CHIEF COMPLAINT: Abdominal pain.     HISTORY OF PRESENT ILLNESS:      2024: Ms. Hwang was referred for the investigation and treatment of anemia.  Heme in 2024 for blood count revealed a hemoglobin of 7.1 g/dL with microcytic red cells.  There was clear evidence of iron deficiency with a total iron binding capacity saturation of 2.3%.  She was started on oral iron and was in early 2024 identified as having endometrial adenocarcinoma of the endometrioid type with a FIGO grade 1.  The iron deficiency was attributed to menorrhagia that was felt to be the result of the malignancy.  With persistent anemia a decision was made to treat her with intravenous iron.     -Underwent exam under anesthesia, cystoscopy, D & C on 2024.     Surgical Findings:  Large tumor extending through the cervix filling the vagina.  The bladder was noted to be free of lesions and both ureteral orifices were identified.      Final Pathology  Diagnosis:   A.  Uterus, endometrial curettings:   - Endometrial adenocarcinoma, endometrioid type, FIGO grade 2, see note     Note:   - On immunostaining, the tumor cells are positive for PAX8, ER (70%, 2-3+), and vimentin supportive of above diagnosis.   - Immunohistochemical staining for mismatch repair proteins demonstrates INTACT expression of MLH1, PMS2, MSH2, and MSH6.   - Immunohistochemical staining for 53 demonstrates a wild-type expression.      9/10/2024: For the first time at the [place] office with the above.  She has yet to receive intravenous iron but is scheduled to do so in the near future.  She is also being followed by Dr. Matthews at the [facility] gynecology/oncology program.  Apparently surgery is not in the near future plans.  Generally she feels well at this time although she continues to be fatigued and weak at times.  She  describes a good appetite for the most part and eats frequently during the day.  Her weight is stable.  She has not experienced any chest pains, cough or dyspnea.  She has intermittent abdominal pain and describes abnormal defecations that happen every 2 or 3 days but several times during that day at times with soft stools but never liquid stools.  She has some dysuria.  She has continued to have vaginal bleeding but it has not been as intense as before.  She has no peripheral edema.  On exam she seems chronically ill.  She is not in distress.  She is conversant and oriented.  No jaundice.  The lungs are diminished bilaterally and the heart regular.  The abdomen is soft nontender and there is no edema.  Laboratory exams reviewed.  She has yet to receive intravenous iron.  I have asked her to see me again after completing the intravenous iron with new laboratory exams.     FAMILY HISTORY:  MGM: ovarian cancer  GGM:: ovarian cancer      10/3/2024 patient had PET/CT imaging that showed large hypermetabolic mass involving uterus extending into the cervix and vagina consistent with her known endometrial malignancy.  New hypermetabolic soft tissue involving posterior bladder wall concerning for tumor invasion, new moderate bilateral hydroureteronephrosis is likely secondary to the mass involving left and right UVJ.  There are hypermetabolic iliac, retroperitoneal and small subcarinal lymph nodes concerning for metastatic adenopathy.  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  Cystic 8.6 x 8.4 cm left adnexal lesion with hypermetabolic peripheral nodule could relate to ovarian metastases or primary ovarian neoplasm.    Subjective   10/11/2024: Feels better. Has had less pain at the site of cellulitis. Continues to be weak and not active at all.     ROS:  Review of Systems   Constitutional:  Positive for activity change and fatigue. Negative for appetite change, chills, diaphoresis, fever and  unexpected weight change.   HENT:  Negative for congestion, dental problem, drooling, ear discharge, ear pain, facial swelling, hearing loss, mouth sores, nosebleeds, postnasal drip, rhinorrhea, sinus pressure, sinus pain, sneezing, sore throat, tinnitus, trouble swallowing and voice change.    Eyes:  Negative for photophobia, pain, discharge, redness, itching and visual disturbance.   Respiratory:  Negative for apnea, cough, choking, chest tightness, shortness of breath, wheezing and stridor.    Cardiovascular:  Negative for chest pain, palpitations and leg swelling.   Gastrointestinal:  Negative for abdominal distention, abdominal pain, anal bleeding, blood in stool, constipation, diarrhea, nausea, rectal pain and vomiting.   Endocrine: Negative for cold intolerance, heat intolerance, polydipsia and polyuria.   Genitourinary:  Negative for decreased urine volume, difficulty urinating, dysuria, flank pain, frequency, genital sores, hematuria and urgency.   Musculoskeletal:  Negative for arthralgias, back pain, gait problem, joint swelling, myalgias, neck pain and neck stiffness.   Skin:  Negative for color change, pallor and rash.   Neurological:  Positive for weakness. Negative for dizziness, tremors, seizures, syncope, facial asymmetry, speech difficulty, light-headedness, numbness and headaches.   Hematological:  Negative for adenopathy. Does not bruise/bleed easily.   Psychiatric/Behavioral:  Negative for agitation, behavioral problems, confusion, decreased concentration, hallucinations, self-injury, sleep disturbance and suicidal ideas. The patient is not nervous/anxious.         MEDICATIONS:    Scheduled Meds:  aluminum sulfate-calcium acetate 1:40, 1,000 mL, Topical, Q8H  amoxicillin-clavulanate, 1 tablet, Oral, Q12H  doxycycline, 100 mg, Oral, Q12H  enoxaparin, 40 mg, Subcutaneous, Q12H  ferrous sulfate, 324 mg, Oral, Once per day on Monday Tuesday Wednesday Thursday Friday  fluconazole, 200 mg, Oral,  "Q24H  hydrocortisone-bacitracin-zinc oxide-nystatin, 1 Application, Topical, 4x Daily  hydrOXYzine, 50 mg, Oral, Nightly  insulin lispro, 2-9 Units, Subcutaneous, 4x Daily AC & at Bedtime  lisinopril, 40 mg, Oral, Daily  metoprolol succinate XL, 50 mg, Oral, Q24H  montelukast, 10 mg, Oral, Nightly  nystatin, , Topical, Q12H  PARoxetine, 30 mg, Oral, Daily  risperiDONE, 4 mg, Oral, Q PM  sodium chloride, 10 mL, Intravenous, Q12H       Continuous Infusions:  Pharmacy to Dose enoxaparin (LOVENOX),        PRN Meds:    acetaminophen    senna-docusate sodium **AND** polyethylene glycol **AND** bisacodyl **AND** bisacodyl    Calcium Replacement - Follow Nurse / BPA Driven Protocol    dextrose    dextrose    glucagon (human recombinant)    HYDROcodone-acetaminophen    Magnesium Standard Dose Replacement - Follow Nurse / BPA Driven Protocol    melatonin    Pharmacy to Dose enoxaparin (LOVENOX)    Phosphorus Replacement - Follow Nurse / BPA Driven Protocol    Potassium Replacement - Follow Nurse / BPA Driven Protocol    [COMPLETED] Insert Peripheral IV **AND** sodium chloride    sodium chloride    sodium chloride    traMADol     ALLERGIES:  No Known Allergies    Objective    VITALS:   /76 (BP Location: Right arm, Patient Position: Lying)   Pulse 77   Temp 98.5 °F (36.9 °C) (Oral)   Resp 18   Ht 160 cm (63\")   Wt 114 kg (251 lb 15.8 oz)   SpO2 94%   BMI 44.64 kg/m²     PHYSICAL EXAM: (performed by MD)  Physical Exam  Constitutional:       General: She is not in acute distress.     Appearance: She is ill-appearing. She is not toxic-appearing or diaphoretic.   HENT:      Head: Normocephalic and atraumatic.      Right Ear: External ear normal.      Left Ear: External ear normal.      Nose: Nose normal.      Mouth/Throat:      Mouth: Mucous membranes are moist.      Pharynx: Oropharynx is clear. No oropharyngeal exudate or posterior oropharyngeal erythema.   Eyes:      General: No scleral icterus.        Right eye: No " discharge.         Left eye: No discharge.      Conjunctiva/sclera: Conjunctivae normal.      Pupils: Pupils are equal, round, and reactive to light.   Cardiovascular:      Rate and Rhythm: Normal rate and regular rhythm.      Pulses: Normal pulses.      Heart sounds: No murmur heard.     No friction rub. No gallop.   Pulmonary:      Effort: No respiratory distress.      Breath sounds: No stridor. No wheezing, rhonchi or rales.   Abdominal:      General: Bowel sounds are normal. There is no distension.      Palpations: Abdomen is soft. There is no mass.      Tenderness: There is no abdominal tenderness. There is no right CVA tenderness, left CVA tenderness, guarding or rebound.      Hernia: No hernia is present.      Comments: Protuberant. Soft and not tender.    Musculoskeletal:         General: No tenderness, deformity or signs of injury.      Cervical back: No rigidity.      Right lower leg: No edema.      Left lower leg: No edema.   Lymphadenopathy:      Cervical: No cervical adenopathy.   Skin:     Coloration: Skin is not jaundiced or pale.      Findings: No bruising, lesion or rash.   Neurological:      General: No focal deficit present.      Mental Status: She is alert and oriented to person, place, and time.      Cranial Nerves: No cranial nerve deficit.   Psychiatric:         Mood and Affect: Mood normal.         Behavior: Behavior normal.         Thought Content: Thought content normal.         Judgment: Judgment normal.      SHAN Del Real MD performed the physical exam on 10/11/2024 as documented above.      RECENT LABS:  Lab Results (last 24 hours)       Procedure Component Value Units Date/Time    POC Glucose Once [[id number]]  (Abnormal) Collected: 10/11/24 1643    Specimen: Blood Updated: 10/11/24 1646     Glucose 128 mg/dL      Comment: Serial Number: [id number]:  [id number]       Blood Culture - Blood, Arm, Left [[id number]]  (Normal) Collected: 10/08/24 1213    Specimen: Blood from Arm,  Left Updated: 10/11/24 1231     Blood Culture No growth at 3 days    Blood Culture - Blood, Wrist, Left [[id number]]  (Normal) Collected: 10/08/24 1213    Specimen: Blood from Wrist, Left Updated: 10/11/24 1231     Blood Culture No growth at 3 days    POC Glucose Once [[id number]]  (Abnormal) Collected: 10/11/24 1139    Specimen: Blood Updated: 10/11/24 1141     Glucose 166 mg/dL      Comment: Serial Number: [id number]:  [id number]       POC Glucose 4x Daily Before Meals & at Bedtime [[id number]]  (Abnormal) Collected: 10/11/24 0758    Specimen: Blood Updated: 10/11/24 0800     Glucose 135 mg/dL      Comment: Serial Number: [id number]:  [id number]       Basic Metabolic Panel [[id number]]  (Abnormal) Collected: 10/11/24 0107    Specimen: Blood from Arm, Right Updated: 10/11/24 0245     Glucose 121 mg/dL      BUN 9 mg/dL      Creatinine 1.00 mg/dL      Sodium 139 mmol/L      Potassium 3.9 mmol/L      Chloride 104 mmol/L      CO2 23.3 mmol/L      Calcium 9.6 mg/dL      BUN/Creatinine Ratio 9.0     Anion Gap 11.7 mmol/L      eGFR 64.6 mL/min/1.73     Narrative:      GFR Normal >60  Chronic Kidney Disease <60  Kidney Failure <15      Magnesium [[id number]]  (Normal) Collected: 10/11/24 0107    Specimen: Blood from Arm, Right Updated: 10/11/24 0245     Magnesium 1.8 mg/dL     CBC & Differential [[id number]]  (Abnormal) Collected: 10/11/24 0107    Specimen: Blood from Arm, Right Updated: 10/11/24 0219    Narrative:      The following orders were created for panel order CBC & Differential.  Procedure                               Abnormality         Status                     ---------                               -----------         ------                     CBC Auto Differential[[id number]]        Abnormal            Final result                 Please view results for these tests on the individual orders.    CBC Auto Differential [[id number]]  (Abnormal) Collected: 10/11/24 0107    Specimen: Blood from Arm, Right  Updated: 10/11/24 0219     WBC 9.29 10*3/mm3      RBC 4.68 10*6/mm3      Hemoglobin 10.8 g/dL      Hematocrit 37.2 %      MCV 79.5 fL      MCH 23.1 pg      MCHC 29.0 g/dL      RDW 22.1 %      RDW-SD 64.5 fl      MPV 8.8 fL      Platelets 430 10*3/mm3      Neutrophil % 73.9 %      Lymphocyte % 12.6 %      Monocyte % 9.5 %      Eosinophil % 3.3 %      Basophil % 0.3 %      Immature Grans % 0.4 %      Neutrophils, Absolute 6.86 10*3/mm3      Lymphocytes, Absolute 1.17 10*3/mm3      Monocytes, Absolute 0.88 10*3/mm3      Eosinophils, Absolute 0.31 10*3/mm3      Basophils, Absolute 0.03 10*3/mm3      Immature Grans, Absolute 0.04 10*3/mm3      nRBC 0.0 /100 WBC     POC Glucose Once [[id number]]  (Abnormal) Collected: 10/10/24 1906    Specimen: Blood Updated: 10/10/24 1908     Glucose 159 mg/dL      Comment: Serial Number: [id number]:  [id number]             IMAGING REVIEWED:  No radiology results for the last day    Assessment & Plan   ASSESSMENT:  Apparent metastatic endometrial cancer: To obtain a biopsy for histologic confirmation before commencement of treatment. Discussed with her.   Cellulitis of the abdominal skin. Continue local treatment and antibiotic treatment.   Poor performance status.   Will continue to follow.     PLAN:  As above.     Jacinto Del Real MD on 10/11/2024 at 18:13

## 2024-10-11 NOTE — THERAPY TREATMENT NOTE
"Subjective: Pt agreeable to therapeutic plan of care.  Cognition: oriented to Person, Place, Time, and Situation    Objective:     Bed Mobility: Modified-Independent   Functional Transfers: SBA     Balance: unsupported SBA  Functional Ambulation: SBA    Grooming: Max-A  ADL Position: edge of bed sitting  ADL Comments: max A to brush hair secondary to tangles     Therapeutic Exercise - 5 Reps B LE AROM unsupported sitting / EOB    Vitals: WNL    Pain: 0 VAS  Location:   Interventions for pain: N/A  Education: Verbal/Tactile Cues, ADL training, and Transfer Training      Assessment: Felisha Mukherjee presents with ADL impairments affecting function including endurance / activity tolerance and strength. Pt with good tolerance for activity and OT anticipates she will be good to d/c home with HHOT. Demonstrated functioning below baseline abilities indicate the need for continued skilled intervention while inpatient. Tolerating session today without incident. Will continue to follow and progress as tolerated.     Plan/Recommendations:   Low Intensity Therapy recommended post-acute care - This is recommended as therapy feels this patient would require 2-3 visits per week. OP or HH would be the best option depending on patient's home bound status. Consider, if the patient has other  \"skilled\" needs such as wounds, IV antibiotics, etc. Combined with \"low intensity\" could also equate to a SNF. If patient is medically complex, consider LTAC.. Pt requires no DME at discharge.     Pt desires Home with Home Health at discharge. Pt cooperative; agreeable to therapeutic recommendations and plan of care.     Modified [place]: N/A = No pre-op stroke/TIA    Post-Tx Position: Supine with HOB Elevated, Alarms activated, and Call light and personal items within reach  PPE: gloves   "

## 2024-10-11 NOTE — PROGRESS NOTES
[facility] DAILY PROGRESS NOTE    Patient Identification  Name: Felisha Mukherjee  Age: 59 y.o.  Sex: female  :  1965  MRN: [id number]    Date: 10/11/2024             Subjective:  Interval History: Creatinine stable    Objective:    Scheduled Meds:aluminum sulfate-calcium acetate 1:40, 1,000 mL, Topical, Q8H  amoxicillin-clavulanate, 1 tablet, Oral, Q12H  doxycycline, 100 mg, Oral, Q12H  enoxaparin, 40 mg, Subcutaneous, Q12H  ferrous sulfate, 324 mg, Oral, Once per day on   fluconazole, 200 mg, Oral, Q24H  hydrocortisone-bacitracin-zinc oxide-nystatin, 1 Application, Topical, 4x Daily  hydrOXYzine, 50 mg, Oral, Nightly  insulin lispro, 2-9 Units, Subcutaneous, 4x Daily AC & at Bedtime  lisinopril, 40 mg, Oral, Daily  metoprolol succinate XL, 50 mg, Oral, Q24H  montelukast, 10 mg, Oral, Nightly  nystatin, , Topical, Q12H  PARoxetine, 30 mg, Oral, Daily  risperiDONE, 4 mg, Oral, Q PM  sodium chloride, 10 mL, Intravenous, Q12H      Continuous Infusions:Pharmacy to Dose enoxaparin (LOVENOX),       PRN Meds:  acetaminophen    senna-docusate sodium **AND** polyethylene glycol **AND** bisacodyl **AND** bisacodyl    Calcium Replacement - Follow Nurse / BPA Driven Protocol    dextrose    dextrose    glucagon (human recombinant)    HYDROcodone-acetaminophen    Magnesium Standard Dose Replacement - Follow Nurse / BPA Driven Protocol    melatonin    Pharmacy to Dose enoxaparin (LOVENOX)    Phosphorus Replacement - Follow Nurse / BPA Driven Protocol    Potassium Replacement - Follow Nurse / BPA Driven Protocol    [COMPLETED] Insert Peripheral IV **AND** sodium chloride    sodium chloride    sodium chloride    traMADol    Vital signs in last 24 hours:  Temp:  [98 °F (36.7 °C)-98.4 °F (36.9 °C)] 98.4 °F (36.9 °C)  Heart Rate:  [66-71] 71  Resp:  [16-28] 28  BP: (126-150)/(70-89) 150/77    Intake/Output:    Intake/Output Summary (Last 24 hours) at 10/11/2024 3274  Last data filed  "at 10/11/2024 0742  Gross per 24 hour   Intake 480 ml   Output 3200 ml   Net -2720 ml       Exam:  /77 (BP Location: Right arm, Patient Position: Lying)   Pulse 71   Temp 98.4 °F (36.9 °C) (Oral)   Resp 28   Ht 160 cm (63\")   Wt 114 kg (251 lb 15.8 oz)   SpO2 94%   BMI 44.64 kg/m²     General Appearance:    Alert, cooperative, NAD   Lungs:     Respirations unlabored, no audible wheezing    Heart:    No cyanosis   Abdomen:     Soft, ND    :    No suprapubic distention, West clear            Data Review:  All labs (24hrs):   Recent Results (from the past 24 hour(s))   POC Glucose Once    Collection Time: 10/10/24  4:13 PM    Specimen: Blood   Result Value Ref Range    Glucose 92 70 - 105 mg/dL   POC Glucose Once    Collection Time: 10/10/24  7:06 PM    Specimen: Blood   Result Value Ref Range    Glucose 159 (H) 70 - 105 mg/dL   Basic Metabolic Panel    Collection Time: 10/11/24  1:07 AM    Specimen: Arm, Right; Blood   Result Value Ref Range    Glucose 121 (H) 65 - 99 mg/dL    BUN 9 8 - 23 mg/dL    Creatinine 1.00 0.57 - 1.00 mg/dL    Sodium 139 136 - 145 mmol/L    Potassium 3.9 3.5 - 5.2 mmol/L    Chloride 104 98 - 107 mmol/L    CO2 23.3 22.0 - 29.0 mmol/L    Calcium 9.6 8.6 - 10.5 mg/dL    BUN/Creatinine Ratio 9.0 7.0 - 25.0    Anion Gap 11.7 5.0 - 15.0 mmol/L    eGFR 64.6 >60.0 mL/min/1.73   Magnesium    Collection Time: 10/11/24  1:07 AM    Specimen: Arm, Right; Blood   Result Value Ref Range    Magnesium 1.8 1.6 - 2.4 mg/dL   CBC Auto Differential    Collection Time: 10/11/24  1:07 AM    Specimen: Arm, Right; Blood   Result Value Ref Range    WBC 9.29 3.40 - 10.80 10*3/mm3    RBC 4.68 3.77 - 5.28 10*6/mm3    Hemoglobin 10.8 (L) 12.0 - 15.9 g/dL    Hematocrit 37.2 34.0 - 46.6 %    MCV 79.5 79.0 - 97.0 fL    MCH 23.1 (L) 26.6 - 33.0 pg    MCHC 29.0 (L) 31.5 - 35.7 g/dL    RDW 22.1 (H) 12.3 - 15.4 %    RDW-SD 64.5 (H) 37.0 - 54.0 fl    MPV 8.8 6.0 - 12.0 fL    Platelets 430 140 - 450 10*3/mm3    " Neutrophil % 73.9 42.7 - 76.0 %    Lymphocyte % 12.6 (L) 19.6 - 45.3 %    Monocyte % 9.5 5.0 - 12.0 %    Eosinophil % 3.3 0.3 - 6.2 %    Basophil % 0.3 0.0 - 1.5 %    Immature Grans % 0.4 0.0 - 0.5 %    Neutrophils, Absolute 6.86 1.70 - 7.00 10*3/mm3    Lymphocytes, Absolute 1.17 0.70 - 3.10 10*3/mm3    Monocytes, Absolute 0.88 0.10 - 0.90 10*3/mm3    Eosinophils, Absolute 0.31 0.00 - 0.40 10*3/mm3    Basophils, Absolute 0.03 0.00 - 0.20 10*3/mm3    Immature Grans, Absolute 0.04 0.00 - 0.05 10*3/mm3    nRBC 0.0 0.0 - 0.2 /100 WBC   POC Glucose 4x Daily Before Meals & at Bedtime    Collection Time: 10/11/24  7:58 AM    Specimen: Blood   Result Value Ref Range    Glucose 135 (H) 70 - 105 mg/dL   POC Glucose Once    Collection Time: 10/11/24 11:39 AM    Specimen: Blood   Result Value Ref Range    Glucose 166 (H) 70 - 105 mg/dL      Imaging Results (Last 24 Hours)       ** No results found for the last 24 hours. **             Assessment:    Cellulitis of abdominal wall      [place] hydronephrosis due to endometrial cancer possibly invading bladder     Plan:      CT images reviewed   Creatinine down significantly with West in place, maintain West, no need for stents at this time  Placement pending, recommend discharge with West in place may be removed for voiding trial at facility or in office at follow-up    Philipp Jackson MD  [facility]  [facility], [address]  [place] [address]  Office: [phone]  Available via Epic Secure Chat  10/11/24  12:24 EDT

## 2024-10-11 NOTE — PLAN OF CARE
"Assessment: Felisha Mukherjee presents with ADL impairments affecting function including endurance / activity tolerance and strength. Pt with good tolerance for activity and OT anticipates she will be good to d/c home with HHOT. Demonstrated functioning below baseline abilities indicate the need for continued skilled intervention while inpatient. Tolerating session today without incident. Will continue to follow and progress as tolerated.     Plan/Recommendations:   Low Intensity Therapy recommended post-acute care - This is recommended as therapy feels this patient would require 2-3 visits per week. OP or HH would be the best option depending on patient's home bound status. Consider, if the patient has other  \"skilled\" needs such as wounds, IV antibiotics, etc. Combined with \"low intensity\" could also equate to a SNF. If patient is medically complex, consider LTAC.. Pt requires no DME at discharge.                    " here with right sided abdominal pain in setting of recent uti treatment. differential diagnosis inclusive of Appendicitis, pyelonephritis, UTI, ovarian pathology.  Check labs, urine, CT, patient declines pain medication at this time.

## 2024-10-11 NOTE — CONSULTS
"Nutrition Services    Patient Name: Felisha Mukherjee  YOB: 1965  MRN: [id number]  Admission date: 10/8/2024    RD to add Boost Glucose Control BID (Provides 380 kcals, 32 g protein if consumed)       NUTRITION SCREENING      Trending Narrative: 10/11:  Pt presented to ED on 10/8 with complaints of rash to lower abdomen and her upper legs x 6 days. Pt has a history of HTN, DM, and endometrial cancer. Pt was seen at her oncologist office who advised her to go to the ER. Pt admitted due to cellulitis of abdominal wall. WOCN following. CT revealed possible signs of hydronephrosis.        PO Diet: Diet: Diabetic; Consistent Carbohydrate; Fluid Consistency: Thin (IDDSI 0)   PO Supplements: None    Trending PO Intake:  10/11:  100% intake x last 4 documented meals       Nutritionally-Pertinent Medications RDN Reviewed, C/W clinical course         Labs (reviewed below): Reviewed. Management per attending.      Results from last 7 days   Lab Units 10/11/24  0107 10/10/24  0307 10/09/24  0254 10/08/24  1213   SODIUM mmol/L 139 134* 132* 128*   POTASSIUM mmol/L 3.9 3.7 3.8 4.3   CHLORIDE mmol/L 104 103 100 94*   CO2 mmol/L 23.3 21.3* 22.4 19.0*   BUN mg/dL 9 10 18 17   CREATININE mg/dL 1.00 0.85 1.37* 1.40*   CALCIUM mg/dL 9.6 9.1 9.3 10.1   BILIRUBIN mg/dL  --   --   --  0.4   ALK PHOS U/L  --   --   --  68   ALT (SGPT) U/L  --   --   --  17   AST (SGOT) U/L  --   --   --  29   GLUCOSE mg/dL 121* 180* 123* 121*     Results from last 7 days   Lab Units 10/11/24  0107 10/10/24  0307 10/09/24  0254   MAGNESIUM mg/dL 1.8 1.7 1.9   HEMOGLOBIN g/dL 10.8* 9.7* 9.4*   HEMATOCRIT % 37.2 33.9* 31.2*     No results found for: \"HGBA1C\"       GI Function:  Last documented BM 10/11.        Skin: Extensive MASD to pannus, thighs, and perineum  1+ edema to L/R legs       Weight Review: Estimated body mass index is 44.64 kg/m² as calculated from the following:    Height as of this encounter: 160 cm (63\").    Weight as of this " encounter: 114 kg (251 lb 15.8 oz).    Comment:   10/11: (last weight 10/8) 251#  Current weight c/w 2 months ago    Wt Readings from Last 30 Encounters:   10/08/24 1106 114 kg (251 lb 15.8 oz)   10/08/24 0929 114 kg (251 lb 12.8 oz)   09/30/24 1244 112 kg (247 lb 12.8 oz)   09/30/24 1114 112 kg (248 lb)   09/23/24 1313 112 kg (248 lb)   09/10/24 1258 114 kg (252 lb)   08/20/24 1059 115 kg (252 lb 9.6 oz)          Estimated/Assessed Needs       Energy Requirements    EST Needs, Method, Wt used 1834 kcals (35 kcals/kg IBW 52.4 kg)       Protein Requirements    EST Needs, Method, Wt used 73-83 g PRO (1.4-1.6 g/kg IBW 52.4 kg)       Fluid Requirements     Estimated Needs (mL/day) 1 mL/kcal or per hydration status            Nutrition Problem Statement: Increased nutrient needs (kcal/protein) related to suspected hypermetabolism in the setting of cancer and skin breakdown as evidenced by extensive MASD present and dx of endometrial cancer.         Nutrition Intervention: Add Boost Glucose Control BID (Provides 380 kcals, 32 g protein if consumed)     Continue to encourage good po intake.           Monitoring/Evaluation Per protocol, I&O, PO intake, Supplement intake, Pertinent labs, Weight, Skin status, GI status, Symptoms            RD to follow up per protocol.    Electronically signed by:  Viki Bonilla RD  10/11/24 07:59 EDT

## 2024-10-11 NOTE — THERAPY TREATMENT NOTE
"Subjective: Pt agreeable to therapeutic plan of care. Pt expressed needing to use RR.    Objective:     Bed mobility - SBA  Transfers - CGA  Ambulation - 65 feet CGA    Therapeutic Exercise - Pt declined asking to return to bed    Vitals: WNL    Pain: 0 VAS   Location: N/a  Intervention for pain: N/A    Education: Provided education on the importance of mobility in the acute care setting, Verbal/Tactile Cues, Transfer Training, Gait Training, Energy conservation strategies, and HEP    Assessment: Felisha Mukherjee presents with functional mobility impairments which indicate the need for skilled intervention. Pt able to perform bed mobility and transfers with SBA-CGA. Pt required extended time on commode with this PTA to perform pericare. Pt ambulated in hallway without any LOB but limited due to decreased activity tolerance. Tolerating session today without incident. Will continue to follow and progress as tolerated.     Plan/Recommendations:   If medically appropriate, Low Intensity Therapy recommended post-acute care - This is recommended as therapy feels this patient would require 2-3 visits per week. OP or HH would be the best option depending on patient's home bound status. Consider, if the patient has other  \"skilled\" needs such as wounds, IV antibiotics, etc. Combined with \"low intensity\" could also equate to a SNF. If patient is medically complex, consider LTAC. Pt requires no DME at discharge.     Pt desires Home with Home Health at discharge. Pt cooperative; agreeable to therapeutic recommendations and plan of care.         Basic Mobility 6-click:  Rollin = Total, A lot = 2, A little = 3; 4 = None  Supine>Sit:   1 = Total, A lot = 2, A little = 3; 4 = None   Sit>Stand with arms:  1 = Total, A lot = 2, A little = 3; 4 = None  Bed>Chair:   1 = Total, A lot = 2, A little = 3; 4 = None  Ambulate in room:  1 = Total, A lot = 2, A little = 3; 4 = None  3-5 Steps with railin = Total, A lot = 2, A " little = 3; 4 = None  Score: 19    Modified [place]: N/A = No pre-op stroke/TIA    Post-Tx Position: Supine with HOB Elevated, Alarms activated, and Call light and personal items within reach  PPE: gloves and surgical mask

## 2024-10-11 NOTE — PLAN OF CARE
Goal Outcome Evaluation:      Pt is resting, vss, no complaints,  wound care and catheter care performed. Call light within reach, POC ongoing

## 2024-10-11 NOTE — PROGRESS NOTES
[facility] MEDICINE SERVICE  DAILY PROGRESS NOTE    NAME: Felisha Mukherjee  : 1965  MRN: [id number]      LOS: 3 days     PROVIDER OF SERVICE: Timothy Duane Brammell, MD    Chief Complaint: Cellulitis of abdominal wall    Subjective:     Interval History:  History taken from: patient    Patient really without any issues.  Denies any severe pain in association with her cellulitis.  Eating without issue.  West catheter remains in place.  Denies any shortness of breath or any other additional acute issues.        Review of Systems:   Review of Systems   All other systems reviewed and are negative.      Objective:     Vital Signs  Temp:  [98 °F (36.7 °C)-98.4 °F (36.9 °C)] 98.4 °F (36.9 °C)  Heart Rate:  [66-76] 76  Resp:  [16-28] 16  BP: (126-150)/(70-89) 135/83  Flow (L/min):  [2-3] 3   Body mass index is 44.64 kg/m².    Physical Exam  Physical Exam  Vitals reviewed.   Constitutional:       Appearance: Normal appearance. She is obese.   HENT:      Head: Normocephalic.   Cardiovascular:      Rate and Rhythm: Normal rate and regular rhythm.   Pulmonary:      Effort: Pulmonary effort is normal.      Breath sounds: Normal breath sounds.   Abdominal:      General: Bowel sounds are normal.      Palpations: Abdomen is soft.      Tenderness: There is no abdominal tenderness.   Skin:     Comments: Improving appearance of intertriginous rash of skin pannus   Neurological:      Mental Status: She is alert.            Diagnostic Data    Results from last 7 days   Lab Units 10/11/24  0107 10/09/24  0254 10/08/24  1213   WBC 10*3/mm3 9.29   < > 14.33*   HEMOGLOBIN g/dL 10.8*   < > 10.9*   HEMATOCRIT % 37.2   < > 36.1   PLATELETS 10*3/mm3 430   < > 393   GLUCOSE mg/dL 121*   < > 121*   CREATININE mg/dL 1.00   < > 1.40*   BUN mg/dL 9   < > 17   SODIUM mmol/L 139   < > 128*   POTASSIUM mmol/L 3.9   < > 4.3   AST (SGOT) U/L  --   --  29   ALT (SGPT) U/L  --   --  17   ALK PHOS U/L  --   --  68   BILIRUBIN mg/dL  --   --   0.4   ANION GAP mmol/L 11.7   < > 15.0    < > = values in this interval not displayed.       US Renal Bilateral    Result Date: 10/9/2024  1. Moderate dilation of the right kidney and mild dilation of the left kidney 2. Evaluation of the bladder is limited by placement of West catheter Electronically Signed: Twan Garza MD  10/9/2024 2:25 PM EDT  Workstation ID: [id number]           Assessment/Plan:   Lower abdominal wall cellulitis bacterial versus fungal.  Uterine carcinoma.  Hydronephrosis  Type 2 diabetes  Obesity  Anemia  Acute renal insufficiency, improved    Plan: Family apparently is not going to be able to aid patient in her care of her dermatitis which will probably require patient to be placed for assistance with the care that would allow her to have healing of her abdominal rash and subsequent allow her to have radiation began for her gynecologic malignancy.  Ongoing oral antibiotics and topical wound care as noted.  Encourage patient to be out of bed. Patient should need 30days or less of skilled nursing after discharge.      Active and Resolved Problems  Active Hospital Problems    Diagnosis  POA    **Cellulitis of abdominal wall [L03.311]  Yes      Resolved Hospital Problems   No resolved problems to display.           VTE Prophylaxis:  Pharmacologic VTE prophylaxis orders are present.             Disposition Planning:     Barriers to Discharge:wound care/mobility  Anticipated Date of Discharge: 10/14  Place of Discharge: SNF      Time: 30 minutes     Code Status and Medical Interventions: CPR (Attempt to Resuscitate); Full Support   Ordered at: 10/08/24 1722     Level Of Support Discussed With:    Patient     Code Status (Patient has no pulse and is not breathing):    CPR (Attempt to Resuscitate)     Medical Interventions (Patient has pulse or is breathing):    Full Support       Signature: Electronically signed by Timothy Duane Brammell, MD, 10/11/24, 13:18 EDT.  [facility] Hospitalist Team

## 2024-10-11 NOTE — PLAN OF CARE
Problem: Adult Inpatient Plan of Care  Goal: Plan of Care Review  Outcome: Progressing  Goal: Patient-Specific Goal (Individualized)  Outcome: Progressing  Goal: Absence of Hospital-Acquired Illness or Injury  Outcome: Progressing  Intervention: Identify and Manage Fall Risk  Recent Flowsheet Documentation  Taken 10/11/2024 1400 by Olinda Raines RN  Safety Promotion/Fall Prevention: safety round/check completed  Taken 10/11/2024 1219 by Olinda Raines RN  Safety Promotion/Fall Prevention: safety round/check completed  Taken 10/11/2024 1000 by Olinda Raines RN  Safety Promotion/Fall Prevention: safety round/check completed  Taken 10/11/2024 0729 by Olinda Raines RN  Safety Promotion/Fall Prevention: safety round/check completed  Intervention: Prevent Skin Injury  Recent Flowsheet Documentation  Taken 10/11/2024 0729 by Olinda Raines RN  Body Position: position changed independently  Skin Protection: adhesive use limited  Intervention: Prevent and Manage VTE (Venous Thromboembolism) Risk  Recent Flowsheet Documentation  Taken 10/11/2024 0729 by Olinda Raines RN  Activity Management: activity encouraged  VTE Prevention/Management:   bilateral   sequential compression devices off   patient refused intervention  Range of Motion: active ROM (range of motion) encouraged  Intervention: Prevent Infection  Recent Flowsheet Documentation  Taken 10/11/2024 0729 by Olinda Raines RN  Infection Prevention: single patient room provided  Goal: Optimal Comfort and Wellbeing  Outcome: Progressing  Intervention: Monitor Pain and Promote Comfort  Recent Flowsheet Documentation  Taken 10/11/2024 0729 by Olinda Raines RN  Pain Management Interventions: medication offered but refused  Intervention: Provide Person-Centered Care  Recent Flowsheet Documentation  Taken 10/11/2024 0729 by Olinda Raines RN  Trust Relationship/Rapport:   care explained   choices provided  Goal: Readiness for  Transition of Care  Outcome: Progressing     Problem: Impaired Wound Healing  Goal: Optimal Wound Healing  Outcome: Progressing  Intervention: Promote Wound Healing  Recent Flowsheet Documentation  Taken 10/11/2024 0729 by Olinda Raines RN  Activity Management: activity encouraged  Pain Management Interventions: medication offered but refused  Sleep/Rest Enhancement: awakenings minimized     Problem: Skin Injury Risk Increased  Goal: Skin Health and Integrity  Outcome: Progressing  Intervention: Optimize Skin Protection  Recent Flowsheet Documentation  Taken 10/11/2024 0729 by Olinda Raines RN  Pressure Reduction Techniques: frequent weight shift encouraged  Head of Bed (HOB) Positioning: HOB elevated  Pressure Reduction Devices: positioning supports utilized  Skin Protection: adhesive use limited     Problem: Fall Injury Risk  Goal: Absence of Fall and Fall-Related Injury  Outcome: Progressing  Intervention: Identify and Manage Contributors  Recent Flowsheet Documentation  Taken 10/11/2024 0729 by Olinda Raines RN  Medication Review/Management: medications reviewed  Self-Care Promotion: independence encouraged  Intervention: Promote Injury-Free Environment  Recent Flowsheet Documentation  Taken 10/11/2024 1400 by Olinda Raines RN  Safety Promotion/Fall Prevention: safety round/check completed  Taken 10/11/2024 1219 by Olinda Raines RN  Safety Promotion/Fall Prevention: safety round/check completed  Taken 10/11/2024 1000 by Olinda Raines RN  Safety Promotion/Fall Prevention: safety round/check completed  Taken 10/11/2024 0729 by Olinda Raines RN  Safety Promotion/Fall Prevention: safety round/check completed   Goal Outcome Evaluation:   Pt received Leah, Nystatin, and Domeboro on her cellulitis. She remains with a kinney catheter. Pt has had some episodes of diarrhea and Dr. Castañeda was notified of this. Pt has had her blood sugar checked achs and received sliding scale insulin.  She was seen by oncology today. Her IV has remained in her left AC. She is looking at possible skilled nursing placement for wound care. Pt denies pain medicine today and has received PO flagyl. Sat down with pt and discussed wound care. No concerns and call light within reach.

## 2024-10-11 NOTE — PLAN OF CARE
Goal Outcome Evaluation:                      Felisha Mukherjee presents with functional mobility impairments which indicate the need for skilled intervention. Pt able to perform bed mobility and transfers with SBA-CGA. Pt required extended time on commode with this PTA to perform pericare. Pt ambulated in hallway without any LOB but limited due to decreased activity tolerance. Tolerating session today without incident. Will continue to follow and progress as tolerated.

## 2024-10-12 LAB
GLUCOSE BLDC GLUCOMTR-MCNC: 111 MG/DL (ref 70–105)
GLUCOSE BLDC GLUCOMTR-MCNC: 138 MG/DL (ref 70–105)
GLUCOSE BLDC GLUCOMTR-MCNC: 169 MG/DL (ref 70–105)
GLUCOSE BLDC GLUCOMTR-MCNC: 170 MG/DL (ref 70–105)

## 2024-10-12 PROCEDURE — 82948 REAGENT STRIP/BLOOD GLUCOSE: CPT | Performed by: INTERNAL MEDICINE

## 2024-10-12 PROCEDURE — 99233 SBSQ HOSP IP/OBS HIGH 50: CPT | Performed by: INTERNAL MEDICINE

## 2024-10-12 PROCEDURE — 63710000001 INSULIN LISPRO (HUMAN) PER 5 UNITS: Performed by: INTERNAL MEDICINE

## 2024-10-12 PROCEDURE — 25010000002 ENOXAPARIN PER 10 MG: Performed by: INTERNAL MEDICINE

## 2024-10-12 PROCEDURE — 82948 REAGENT STRIP/BLOOD GLUCOSE: CPT

## 2024-10-12 RX ORDER — ZINC SULFATE 50(220)MG
220 CAPSULE ORAL DAILY
Status: DISCONTINUED | OUTPATIENT
Start: 2024-10-12 | End: 2024-10-18 | Stop reason: HOSPADM

## 2024-10-12 RX ORDER — DIPHENOXYLATE HYDROCHLORIDE AND ATROPINE SULFATE 2.5; .025 MG/1; MG/1
1 TABLET ORAL DAILY
Status: DISCONTINUED | OUTPATIENT
Start: 2024-10-12 | End: 2024-10-18 | Stop reason: HOSPADM

## 2024-10-12 RX ADMIN — ZINC OXIDE 1 APPLICATION: 200 OINTMENT TOPICAL at 13:10

## 2024-10-12 RX ADMIN — CALCIUM ACETATE MONOHYDRATE AND ALUMINUM SULFATE TETRADECAHYDRATE 1000 ML: 952; 1347 POWDER, FOR SOLUTION TOPICAL at 06:07

## 2024-10-12 RX ADMIN — HYDROXYZINE HYDROCHLORIDE 50 MG: 25 TABLET, FILM COATED ORAL at 21:10

## 2024-10-12 RX ADMIN — Medication 220 MG: at 10:48

## 2024-10-12 RX ADMIN — Medication 10 ML: at 21:11

## 2024-10-12 RX ADMIN — AMOXICILLIN AND CLAVULANATE POTASSIUM 1 TABLET: 875; 125 TABLET, FILM COATED ORAL at 21:10

## 2024-10-12 RX ADMIN — ZINC OXIDE 1 APPLICATION: 200 OINTMENT TOPICAL at 08:11

## 2024-10-12 RX ADMIN — DOXYCYCLINE 100 MG: 100 CAPSULE ORAL at 08:11

## 2024-10-12 RX ADMIN — NYSTATIN: 100000 POWDER TOPICAL at 08:11

## 2024-10-12 RX ADMIN — RISPERIDONE 4 MG: 1 TABLET, FILM COATED ORAL at 16:51

## 2024-10-12 RX ADMIN — METOPROLOL SUCCINATE 50 MG: 50 TABLET, EXTENDED RELEASE ORAL at 08:11

## 2024-10-12 RX ADMIN — INSULIN LISPRO 2 UNITS: 100 INJECTION, SOLUTION INTRAVENOUS; SUBCUTANEOUS at 13:10

## 2024-10-12 RX ADMIN — HYDROCODONE BITARTRATE AND ACETAMINOPHEN 1 TABLET: 5; 325 TABLET ORAL at 16:51

## 2024-10-12 RX ADMIN — ENOXAPARIN SODIUM 40 MG: 100 INJECTION SUBCUTANEOUS at 08:11

## 2024-10-12 RX ADMIN — CALCIUM ACETATE MONOHYDRATE AND ALUMINUM SULFATE TETRADECAHYDRATE 1000 ML: 952; 1347 POWDER, FOR SOLUTION TOPICAL at 16:52

## 2024-10-12 RX ADMIN — CALCIUM ACETATE MONOHYDRATE AND ALUMINUM SULFATE TETRADECAHYDRATE 1000 ML: 952; 1347 POWDER, FOR SOLUTION TOPICAL at 21:11

## 2024-10-12 RX ADMIN — NYSTATIN: 100000 POWDER TOPICAL at 21:11

## 2024-10-12 RX ADMIN — MONTELUKAST 10 MG: 10 TABLET, FILM COATED ORAL at 21:10

## 2024-10-12 RX ADMIN — PAROXETINE 30 MG: 10 TABLET, FILM COATED ORAL at 08:11

## 2024-10-12 RX ADMIN — AMOXICILLIN AND CLAVULANATE POTASSIUM 1 TABLET: 875; 125 TABLET, FILM COATED ORAL at 08:11

## 2024-10-12 RX ADMIN — ENOXAPARIN SODIUM 40 MG: 100 INJECTION SUBCUTANEOUS at 21:10

## 2024-10-12 RX ADMIN — THERA TABS 1 TABLET: TAB at 10:48

## 2024-10-12 RX ADMIN — DOXYCYCLINE 100 MG: 100 CAPSULE ORAL at 21:10

## 2024-10-12 RX ADMIN — FLUCONAZOLE 200 MG: 100 TABLET ORAL at 16:51

## 2024-10-12 RX ADMIN — ZINC OXIDE 1 APPLICATION: 200 OINTMENT TOPICAL at 17:57

## 2024-10-12 RX ADMIN — LISINOPRIL 40 MG: 20 TABLET ORAL at 08:11

## 2024-10-12 RX ADMIN — ZINC OXIDE 1 APPLICATION: 200 OINTMENT TOPICAL at 21:11

## 2024-10-12 RX ADMIN — Medication 10 ML: at 08:13

## 2024-10-12 NOTE — PLAN OF CARE
Problem: Adult Inpatient Plan of Care  Goal: Absence of Hospital-Acquired Illness or Injury  Intervention: Identify and Manage Fall Risk  Recent Flowsheet Documentation  Taken 10/12/2024 1811 by Crista Loyd RN  Safety Promotion/Fall Prevention: safety round/check completed  Taken 10/12/2024 1436 by Crista Loyd RN  Safety Promotion/Fall Prevention: safety round/check completed  Taken 10/12/2024 1228 by Crista Loyd RN  Safety Promotion/Fall Prevention: safety round/check completed  Taken 10/12/2024 1055 by Crista Loyd RN  Safety Promotion/Fall Prevention: safety round/check completed  Goal: Optimal Comfort and Wellbeing  Intervention: Monitor Pain and Promote Comfort  Recent Flowsheet Documentation  Taken 10/12/2024 1652 by Crista Loyd RN  Pain Management Interventions: pain medication given     Problem: Fall Injury Risk  Goal: Absence of Fall and Fall-Related Injury  Intervention: Promote Injury-Free Environment  Recent Flowsheet Documentation  Taken 10/12/2024 1811 by Crista Loyd RN  Safety Promotion/Fall Prevention: safety round/check completed  Taken 10/12/2024 1436 by Crista Loyd RN  Safety Promotion/Fall Prevention: safety round/check completed  Taken 10/12/2024 1228 by Crista Loyd RN  Safety Promotion/Fall Prevention: safety round/check completed  Taken 10/12/2024 1055 by Crista Loyd RN  Safety Promotion/Fall Prevention: safety round/check completed   Goal Outcome Evaluation:      Pt resting in bed with no complaints or questions at this time. Pt pleasant and cooperative. Vss. Bed in lowest position and call light within reach.

## 2024-10-12 NOTE — PROGRESS NOTES
[facility] MEDICINE SERVICE  DAILY PROGRESS NOTE    NAME: Felisha Mukherjee  : 1965  MRN: [id number]      LOS: 4 days     PROVIDER OF SERVICE: Timothy Duane Brammell, MD    Chief Complaint: Cellulitis of abdominal wall    Subjective:     Interval History:  History taken from: patient    Patient really without any acute issues.  Has improvement in discomfort of her skin.  Ambulated with physical therapy.  Eating without issue.  No difficulty with her West catheter.  Denies any bowel complaints.  Denies any other additional pain complaints or any other additional acute complaints.  Seeking short-term placement for wound care to allow initiation of radiation therapy.        Review of Systems:   Review of Systems   All other systems reviewed and are negative.      Objective:     Vital Signs  Temp:  [97.8 °F (36.6 °C)-98.6 °F (37 °C)] 98.3 °F (36.8 °C)  Heart Rate:  [67-82] 82  Resp:  [16-26] 26  BP: (131-151)/(70-84) 144/70  Flow (L/min) (Oxygen Therapy):  [3] 3   Body mass index is 44.64 kg/m².    Physical Exam  Physical Exam  Vitals reviewed.   Constitutional:       Appearance: Normal appearance. She is obese.   HENT:      Head: Normocephalic.   Cardiovascular:      Rate and Rhythm: Normal rate and regular rhythm.   Pulmonary:      Effort: Pulmonary effort is normal.      Breath sounds: Normal breath sounds.   Abdominal:      General: Bowel sounds are normal.      Palpations: Abdomen is soft.   Skin:     Comments: Consistent improvement in her rash of pannus area of lower abdomen.   Neurological:      General: No focal deficit present.      Mental Status: She is alert.            Diagnostic Data    Results from last 7 days   Lab Units 10/11/24  0107 10/09/24  0254 10/08/24  1213   WBC 10*3/mm3 9.29   < > 14.33*   HEMOGLOBIN g/dL 10.8*   < > 10.9*   HEMATOCRIT % 37.2   < > 36.1   PLATELETS 10*3/mm3 430   < > 393   GLUCOSE mg/dL 121*   < > 121*   CREATININE mg/dL 1.00   < > 1.40*   BUN mg/dL 9   < > 17    SODIUM mmol/L 139   < > 128*   POTASSIUM mmol/L 3.9   < > 4.3   AST (SGOT) U/L  --   --  29   ALT (SGPT) U/L  --   --  17   ALK PHOS U/L  --   --  68   BILIRUBIN mg/dL  --   --  0.4   ANION GAP mmol/L 11.7   < > 15.0    < > = values in this interval not displayed.       No radiology results for the last day          Assessment/Plan:   Lower abdominal wall cellulitis bacterial versus fungal.  Uterine carcinoma.  Hydronephrosis  Type 2 diabetes  Obesity  Anemia  Acute renal insufficiency, improved    Plan: Awaiting bed for short-term placement for wound care.  Planned radiation therapy when skin improves.  Encouraged out of bed.      Active and Resolved Problems  Active Hospital Problems    Diagnosis  POA    **Cellulitis of abdominal wall [L03.311]  Yes      Resolved Hospital Problems   No resolved problems to display.           VTE Prophylaxis:  Pharmacologic VTE prophylaxis orders are present.             Disposition Planning:     Barriers to Discharge:wound care/bed availabiltiy  Anticipated Date of Discharge: 10/14  Place of Discharge: SNF      Time: 30 minutes     Code Status and Medical Interventions: CPR (Attempt to Resuscitate); Full Support   Ordered at: 10/08/24 1722     Level Of Support Discussed With:    Patient     Code Status (Patient has no pulse and is not breathing):    CPR (Attempt to Resuscitate)     Medical Interventions (Patient has pulse or is breathing):    Full Support       Signature: Electronically signed by Timothy Duane Brammell, MD, 10/12/24, 09:01 EDT.  [facility] Hospitalist Team

## 2024-10-12 NOTE — PROGRESS NOTES
Hematology/Oncology Inpatient Progress Note    Patient name: Felisha Mukherjee  : 1965  MRN: [id number]      Subjective/interim summary:    10/12/2024: Feels better. Has had less pain at the site of cellulitis. Continues to be weak and not active at all. Is eating her breakfast at 10 AM and says she has a biopsy on Mon but she doesn't know what time.      History:  Past Medical History:   Diagnosis Date    Diabetes mellitus     Hypertension     Uterine mass    ,   Past Surgical History:   Procedure Laterality Date    CHOLECYSTECTOMY     ,   Family History   Problem Relation Age of Onset    Diabetes Mother    ,   Social History     Tobacco Use    Smoking status: Never   Vaping Use    Vaping status: Never Used   Substance Use Topics    Alcohol use: Never    Drug use: Never   ,   Medications Prior to Admission   Medication Sig Dispense Refill Last Dose/Taking    docusate sodium 100 MG capsule Take 1 capsule by mouth Daily.   10/8/2024    Farxiga 10 MG tablet Take 10 mg by mouth Daily.   10/8/2024    ferrous sulfate 325 (65 FE) MG tablet Take 1 tablet by mouth 5 (Five) Times a Week. Monday, Tuesday, Wednesday, Thursday and Friday   10/8/2024    hydrOXYzine (ATARAX) 50 MG tablet Take 1 tablet by mouth Every Night.   10/7/2024    lisinopril (PRINIVIL,ZESTRIL) 40 MG tablet Take 1 tablet by mouth Daily.   10/8/2024    metFORMIN (GLUCOPHAGE) 500 MG tablet Take 1 tablet by mouth 2 (Two) Times a Day.   10/8/2024    metoprolol succinate XL (TOPROL-XL) 50 MG 24 hr tablet Take 1 tablet by mouth every night at bedtime.   10/7/2024    montelukast (SINGULAIR) 10 MG tablet Take 1 tablet by mouth Every Night.   10/7/2024    PARoxetine (PAXIL) 30 MG tablet Take 1 tablet by mouth.   10/7/2024    risperiDONE (risperDAL) 2 MG tablet Take 2 tablets by mouth Every Evening.   10/7/2024    vitamin D (ERGOCALCIFEROL) 1.25 MG (76508 UT) capsule capsule Take 1 capsule by mouth Every 7 (Seven) Days. Wednesdays   10/2/2024   ,  Scheduled Meds:  aluminum sulfate-calcium acetate 1:40, 1,000 mL, Topical, Q8H  amoxicillin-clavulanate, 1 tablet, Oral, Q12H  doxycycline, 100 mg, Oral, Q12H  enoxaparin, 40 mg, Subcutaneous, Q12H  ferrous sulfate, 324 mg, Oral, Once per day on Monday Tuesday Wednesday Thursday Friday  fluconazole, 200 mg, Oral, Q24H  hydrocortisone-bacitracin-zinc oxide-nystatin, 1 Application, Topical, 4x Daily  hydrOXYzine, 50 mg, Oral, Nightly  insulin lispro, 2-9 Units, Subcutaneous, 4x Daily AC & at Bedtime  lisinopril, 40 mg, Oral, Daily  metoprolol succinate XL, 50 mg, Oral, Q24H  montelukast, 10 mg, Oral, Nightly  multivitamin, 1 tablet, Oral, Daily  nystatin, , Topical, Q12H  PARoxetine, 30 mg, Oral, Daily  risperiDONE, 4 mg, Oral, Q PM  sodium chloride, 10 mL, Intravenous, Q12H  zinc sulfate, 220 mg, Oral, Daily    , Continuous Infusions:  Pharmacy to Dose enoxaparin (LOVENOX),     , PRN Meds:    acetaminophen    senna-docusate sodium **AND** polyethylene glycol **AND** bisacodyl **AND** bisacodyl    Calcium Replacement - Follow Nurse / BPA Driven Protocol    dextrose    dextrose    glucagon (human recombinant)    HYDROcodone-acetaminophen    Magnesium Standard Dose Replacement - Follow Nurse / BPA Driven Protocol    melatonin    Pharmacy to Dose enoxaparin (LOVENOX)    Phosphorus Replacement - Follow Nurse / BPA Driven Protocol    Potassium Replacement - Follow Nurse / BPA Driven Protocol    [COMPLETED] Insert Peripheral IV **AND** sodium chloride    sodium chloride    sodium chloride    traMADol   Allergies:  Patient has no known allergies.    Subjective     ROS:  Review of Systems   Constitutional:  Positive for fatigue. Negative for chills and fever.   HENT: Negative.     Eyes: Negative.    Respiratory: Negative.     Cardiovascular: Negative.    Gastrointestinal: Negative.    Endocrine: Negative.    Genitourinary: Negative.    Musculoskeletal: Negative.    Skin:  Positive for color change and rash.        Pain and  "redness   Allergic/Immunologic: Negative.    Neurological: Negative.    Hematological: Negative.    Psychiatric/Behavioral: Negative.          Objective   Vital Signs:   /70 (BP Location: Right arm, Patient Position: Lying)   Pulse 82   Temp 98.3 °F (36.8 °C) (Oral)   Resp 26   Ht 160 cm (63\")   Wt 114 kg (251 lb 15.8 oz)   SpO2 91%   BMI 44.64 kg/m²     Physical Exam: (performed by MD)  Physical Exam  Vitals and nursing note reviewed.   Constitutional:       General: She is not in acute distress.     Appearance: Normal appearance. She is obese. She is ill-appearing. She is not toxic-appearing.   HENT:      Head: Normocephalic and atraumatic.      Right Ear: External ear normal.      Left Ear: External ear normal.      Nose: Nose normal.      Mouth/Throat:      Mouth: Mucous membranes are moist.      Pharynx: Oropharynx is clear.   Eyes:      Extraocular Movements: Extraocular movements intact.      Pupils: Pupils are equal, round, and reactive to light.   Cardiovascular:      Rate and Rhythm: Normal rate and regular rhythm.      Pulses: Normal pulses.      Heart sounds: Normal heart sounds.   Pulmonary:      Effort: Pulmonary effort is normal.      Breath sounds: Normal breath sounds.   Abdominal:      General: Bowel sounds are normal. There is no distension.      Palpations: Abdomen is soft.   Genitourinary:     Comments: Erythema, some open oozing and skin   Musculoskeletal:         General: Swelling present. Normal range of motion.      Cervical back: Normal range of motion.   Skin:     General: Skin is warm and dry.      Comments: Erythema, some open oozing and skin peeling under panniculus folds and b/l inner thighs; improved   Neurological:      General: No focal deficit present.      Mental Status: She is alert and oriented to person, place, and time. Mental status is at baseline.   Psychiatric:         Mood and Affect: Mood normal.         Behavior: Behavior normal.         Thought Content: " Thought content normal.         Judgment: Judgment normal.         Results Review:  Lab Results (last 48 hours)       Procedure Component Value Units Date/Time    POC Glucose Once [[id number]]  (Abnormal) Collected: 10/12/24 0749    Specimen: Blood Updated: 10/12/24 0751     Glucose 170 mg/dL      Comment: Serial Number: [id number]:  [id number]       POC Glucose Once [[id number]]  (Normal) Collected: 10/11/24 2045    Specimen: Blood Updated: 10/11/24 2047     Glucose 104 mg/dL      Comment: Serial Number: [id number]:  [id number]       POC Glucose Once [[id number]]  (Abnormal) Collected: 10/11/24 1643    Specimen: Blood Updated: 10/11/24 1646     Glucose 128 mg/dL      Comment: Serial Number: [id number]:  [id number]       Blood Culture - Blood, Arm, Left [[id number]]  (Normal) Collected: 10/08/24 1213    Specimen: Blood from Arm, Left Updated: 10/11/24 1231     Blood Culture No growth at 3 days    Blood Culture - Blood, Wrist, Left [[id number]]  (Normal) Collected: 10/08/24 1213    Specimen: Blood from Wrist, Left Updated: 10/11/24 1231     Blood Culture No growth at 3 days    POC Glucose Once [[id number]]  (Abnormal) Collected: 10/11/24 1139    Specimen: Blood Updated: 10/11/24 1141     Glucose 166 mg/dL      Comment: Serial Number: [id number]:  [id number]       POC Glucose 4x Daily Before Meals & at Bedtime [[id number]]  (Abnormal) Collected: 10/11/24 0758    Specimen: Blood Updated: 10/11/24 0800     Glucose 135 mg/dL      Comment: Serial Number: [id number]:  [id number]       Basic Metabolic Panel [[id number]]  (Abnormal) Collected: 10/11/24 0107    Specimen: Blood from Arm, Right Updated: 10/11/24 0245     Glucose 121 mg/dL      BUN 9 mg/dL      Creatinine 1.00 mg/dL      Sodium 139 mmol/L      Potassium 3.9 mmol/L      Chloride 104 mmol/L      CO2 23.3 mmol/L      Calcium 9.6 mg/dL      BUN/Creatinine Ratio 9.0     Anion Gap 11.7 mmol/L      eGFR 64.6 mL/min/1.73     Narrative:      GFR  Normal >60  Chronic Kidney Disease <60  Kidney Failure <15      Magnesium [[id number]]  (Normal) Collected: 10/11/24 0107    Specimen: Blood from Arm, Right Updated: 10/11/24 0245     Magnesium 1.8 mg/dL     CBC & Differential [[id number]]  (Abnormal) Collected: 10/11/24 0107    Specimen: Blood from Arm, Right Updated: 10/11/24 0219    Narrative:      The following orders were created for panel order CBC & Differential.  Procedure                               Abnormality         Status                     ---------                               -----------         ------                     CBC Auto Differential[[id number]]        Abnormal            Final result                 Please view results for these tests on the individual orders.    CBC Auto Differential [[id number]]  (Abnormal) Collected: 10/11/24 0107    Specimen: Blood from Arm, Right Updated: 10/11/24 0219     WBC 9.29 10*3/mm3      RBC 4.68 10*6/mm3      Hemoglobin 10.8 g/dL      Hematocrit 37.2 %      MCV 79.5 fL      MCH 23.1 pg      MCHC 29.0 g/dL      RDW 22.1 %      RDW-SD 64.5 fl      MPV 8.8 fL      Platelets 430 10*3/mm3      Neutrophil % 73.9 %      Lymphocyte % 12.6 %      Monocyte % 9.5 %      Eosinophil % 3.3 %      Basophil % 0.3 %      Immature Grans % 0.4 %      Neutrophils, Absolute 6.86 10*3/mm3      Lymphocytes, Absolute 1.17 10*3/mm3      Monocytes, Absolute 0.88 10*3/mm3      Eosinophils, Absolute 0.31 10*3/mm3      Basophils, Absolute 0.03 10*3/mm3      Immature Grans, Absolute 0.04 10*3/mm3      nRBC 0.0 /100 WBC     POC Glucose Once [[id number]]  (Abnormal) Collected: 10/10/24 1906    Specimen: Blood Updated: 10/10/24 1908     Glucose 159 mg/dL      Comment: Serial Number: [id number]:  [id number]       POC Glucose Once [[id number]]  (Normal) Collected: 10/10/24 1613    Specimen: Blood Updated: 10/10/24 1615     Glucose 92 mg/dL      Comment: Serial Number: [id number]:  [id number]       POC Glucose 4x Daily Before Meals &  at Bedtime [[id number]]  (Abnormal) Collected: 10/10/24 1145    Specimen: Blood Updated: 10/10/24 1149     Glucose 155 mg/dL      Comment: Serial Number: [id number]:  [id number]                Pending Results: biopsy to confirm stage IV    Imaging Reviewed:   US Renal Bilateral    Result Date: 10/9/2024  1. Moderate dilation of the right kidney and mild dilation of the left kidney 2. Evaluation of the bladder is limited by placement of West catheter Electronically Signed: Twan Garza MD  10/9/2024 2:25 PM EDT  Workstation ID: [id number]    CT Abdomen Pelvis With Contrast    Result Date: 10/8/2024  Impression: 1.Superficial soft tissue edema/induration within the lower abdominal wall and left flank, compatible with cellulitis in the appropriate clinical setting. No subcutaneous gas is seen. No associated discrete fluid collection is seen. 2.Large, heterogeneous mass of the uterus/cervix with probable invasion of the right posterior urinary bladder. Please refer to the PET/CT from 5 days prior. 3.Mild bilateral hydroureteronephrosis with moderately distended urinary bladder. Ureteral obstruction or bladder outlet obstruction cannot be excluded. 4.Numerous pulmonary nodules, retroperitoneal/iliac chain lymphadenopathy, and left-sided omental metastatic implants as noted on recent PET/CT. 5.Multiple small hypoattenuating liver lesions, particularly within the right hepatic lobe, concerning for metastatic disease. These may be further characterized with liver MRI if clinically indicated. 6.9 cm hypodense lesion within the left adnexa. Please refer to the MRI of the pelvis from 8/19/2024. 7.Additional findings as detailed above. Electronically Signed: Sushil Benítze MD  10/8/2024 2:51 PM EDT  Workstation ID: [id number]          Assessment & Plan   ASSESSMENT/PLAN  Apparent metastatic endometrial cancer: per GynOnc, unresectable. To obtain a biopsy for histologic confirmation before commencement of treatment-per  patient is going to be done sometime Monday.   -Discussed with her.   Cellulitis of the abdominal skin-per patient feels it is improving; no longer as sore and no longer getting chills.  - Continue local treatment and antibiotic treatment.   Poor performance status; poor treatment candidate  Will continue to follow.       Electronically signed by Alina Feng MD PhD, 10/12/24, 9:08 AM EDT.        Thank you for this consult. We will be happy to follow along with you.

## 2024-10-12 NOTE — PLAN OF CARE
Problem: Adult Inpatient Plan of Care  Goal: Plan of Care Review  Outcome: Progressing  Flowsheets (Taken 10/12/2024 0625)  Progress: no change  Goal: Patient-Specific Goal (Individualized)  Outcome: Progressing  Goal: Absence of Hospital-Acquired Illness or Injury  Outcome: Progressing  Intervention: Identify and Manage Fall Risk  Description: Perform standard risk assessment on admission using a validated tool or comprehensive approach appropriate to the patient; reassess fall risk frequently, with change in status or transfer to another level of care.  Communicate fall injury risk to interprofessional healthcare team.  Determine need for increased observation, equipment and environmental modification, such as low bed, signage and supportive, nonskid footwear.  Adjust safety measures to individual developmental age, stage and identified risk factors.  Reinforce the importance of safety and physical activity with patient and family.  Perform regular intentional rounding to assess need for position change, pain assessment and personal needs, including assistance with toileting.  Recent Flowsheet Documentation  Taken 10/12/2024 0607 by Giancarlo Chapman LPN  Safety Promotion/Fall Prevention:   assistive device/personal items within reach   clutter free environment maintained   room organization consistent   safety round/check completed  Taken 10/12/2024 0422 by Giancarlo Chapman LPN  Safety Promotion/Fall Prevention:   assistive device/personal items within reach   clutter free environment maintained   room organization consistent   safety round/check completed  Taken 10/12/2024 0200 by Giancarlo Chapman LPN  Safety Promotion/Fall Prevention:   assistive device/personal items within reach   clutter free environment maintained   room organization consistent   safety round/check completed  Taken 10/12/2024 0057 by Giancarlo Chapman LPN  Safety Promotion/Fall Prevention:   assistive device/personal items within reach    clutter free environment maintained   room organization consistent   safety round/check completed  Taken 10/12/2024 0017 by Giancarlo Chapman LPN  Safety Promotion/Fall Prevention:   assistive device/personal items within reach   clutter free environment maintained   room organization consistent   safety round/check completed  Taken 10/11/2024 2301 by Giancarlo Chapman LPN  Safety Promotion/Fall Prevention:   assistive device/personal items within reach   clutter free environment maintained   room organization consistent   safety round/check completed  Taken 10/11/2024 2259 by Giancarlo Chapman LPN  Safety Promotion/Fall Prevention:   assistive device/personal items within reach   clutter free environment maintained   room organization consistent   safety round/check completed  Taken 10/11/2024 2003 by Giancarlo Chapman LPN  Safety Promotion/Fall Prevention:   assistive device/personal items within reach   clutter free environment maintained   room organization consistent   safety round/check completed  Taken 10/11/2024 1950 by Giancarlo Chapman LPN  Safety Promotion/Fall Prevention:   assistive device/personal items within reach   clutter free environment maintained   room organization consistent   safety round/check completed  Taken 10/11/2024 1926 by Giancarlo Chapman LPN  Safety Promotion/Fall Prevention:   assistive device/personal items within reach   clutter free environment maintained   room organization consistent   safety round/check completed  Intervention: Prevent Skin Injury  Description: Perform a screening for skin injury risk, such as pressure or moisture associated skin damage on admission and at regular intervals throughout hospital stay.  Keep all areas of skin (especially folds) clean and dry.  Maintain adequate skin hydration.  Relieve and redistribute pressure and protect bony prominences; implement measures based on patient-specific risk factors.  Match turning and repositioning schedule to clinical  condition.  Encourage weight shift frequently; assist with reposition if unable to complete independently.  Float heels off bed; avoid pressure on the Achilles tendon.  Keep skin free from extended contact with medical devices.  Encourage functional activity and mobility, as early as tolerated.  Use aids (e.g., slide boards, mechanical lift) during transfer.  Recent Flowsheet Documentation  Taken 10/12/2024 0422 by Giancarlo Chapman LPN  Body Position:   turned   side-lying   right   weight shifting  Taken 10/12/2024 0200 by Giancarlo Chapman LPN  Body Position:   turned   side-lying   left  Taken 10/12/2024 0057 by Giancarlo Chapman LPN  Body Position:   turned   side-lying   right   weight shifting  Taken 10/12/2024 0017 by Giancarlo Chapman LPN  Body Position:   turned   side-lying   right   weight shifting  Taken 10/11/2024 2301 by Giancarlo Chapman LPN  Body Position:   turned   side-lying   left   weight shifting  Taken 10/11/2024 2259 by Giancarlo Chapman LPN  Body Position:   turned   side-lying   left   weight shifting  Taken 10/11/2024 2003 by Giancarlo Chapman LPN  Body Position: supine  Taken 10/11/2024 1950 by Giancarlo Chapman LPN  Body Position: supine  Taken 10/11/2024 1926 by Giancarlo Chapman LPN  Body Position: supine  Skin Protection: adhesive use limited  Intervention: Prevent and Manage VTE (Venous Thromboembolism) Risk  Description: Assess for VTE (venous thromboembolism) risk.  Encourage and assist with early ambulation.  Initiate and maintain compression or other therapy, as indicated, based on identified risk in accordance with organizational protocol and provider order.  Encourage both active and passive leg exercises while in bed, if unable to ambulate.  Recent Flowsheet Documentation  Taken 10/11/2024 1926 by Giancarlo Chapman LPN  VTE Prevention/Management:   bilateral   sequential compression devices off   patient refused intervention  Intervention: Prevent Infection  Description: Maintain skin  and mucous membrane integrity; promote hand, oral and pulmonary hygiene.  Optimize fluid balance, nutrition, sleep and glycemic control to maximize infection resistance.  Identify potential sources of infection early to prevent or mitigate progression of infection (e.g., wound, lines, devices).  Evaluate ongoing need for invasive devices; remove promptly when no longer indicated.  Recent Flowsheet Documentation  Taken 10/12/2024 0422 by Giancarlo Chapman LPN  Infection Prevention: single patient room provided  Taken 10/11/2024 2003 by Giancarlo Chapman LPN  Infection Prevention: single patient room provided  Taken 10/11/2024 1950 by Giancarlo Chapman LPN  Infection Prevention: single patient room provided  Goal: Optimal Comfort and Wellbeing  Outcome: Progressing  Intervention: Provide Person-Centered Care  Description: Use a family-focused approach to care.  Develop trust and rapport by proactively providing information, encouraging questions, addressing concerns and offering reassurance.  Acknowledge emotional response to hospitalization.  Recognize and utilize personal coping strategies.  Honor spiritual and cultural preferences.  Recent Flowsheet Documentation  Taken 10/11/2024 1926 by Giancarlo Chapman LPN  Trust Relationship/Rapport:   care explained   questions encouraged   thoughts/feelings acknowledged   emotional support provided  Goal: Readiness for Transition of Care  Outcome: Progressing     Problem: Impaired Wound Healing  Goal: Optimal Wound Healing  Outcome: Progressing     Problem: Skin Injury Risk Increased  Goal: Skin Health and Integrity  Outcome: Progressing  Intervention: Optimize Skin Protection  Description: Perform a full pressure injury risk assessment, as indicated by screening, upon admission to care unit.  Reassess skin (injury risk, full inspection) frequently (e.g., scheduled interval, with change in condition) to provide optimal early detection and prevention.  Maintain adequate tissue  perfusion (e.g., encourage fluid balance; avoid crossing legs, constrictive clothing or devices) to promote tissue oxygenation.  Maintain head of bed at lowest degree of elevation tolerated, considering medical condition and other restrictions.  Avoid positioning onto an area that remains reddened.  Minimize incontinence and moisture (e.g., toileting schedule; moisture-wicking pad, diaper or incontinence collection device; skin moisture barrier).  Cleanse skin promptly and gently when soiled utilizing a pH-balanced cleanser.  Relieve and redistribute pressure (e.g., scheduled position changes, weight shifts, use of support surface, medical device repositioning, protective dressing application, use of positioning device, microclimate control, use of pressure-injury-monitor  Encourage increased activity, such as sitting in a chair at the bedside or early mobilization, when able to tolerate.  Recent Flowsheet Documentation  Taken 10/12/2024 0607 by Giancarlo Chapman LPN  Activity Management: activity encouraged  Head of Bed (HOB) Positioning: HOB at 20-30 degrees  Taken 10/12/2024 0422 by Giancarlo Chapman LPN  Activity Management: activity encouraged  Head of Bed (HOB) Positioning: HOB at 20-30 degrees  Taken 10/12/2024 0200 by Giancarlo Chapman LPN  Activity Management: activity encouraged  Head of Bed (HOB) Positioning: HOB at 20-30 degrees  Taken 10/12/2024 0057 by Giancarlo Chapman LPN  Activity Management: activity encouraged  Head of Bed (HOB) Positioning: HOB at 20-30 degrees  Taken 10/12/2024 0017 by Giancarlo Chapman LPN  Activity Management: activity encouraged  Head of Bed (HOB) Positioning: HOB at 20-30 degrees  Taken 10/11/2024 2301 by Giancarlo Chapman LPN  Activity Management: activity encouraged  Head of Bed (HOB) Positioning: HOB at 20-30 degrees  Taken 10/11/2024 2259 by Giancarlo Chapman LPN  Activity Management: activity encouraged  Head of Bed (HOB) Positioning: HOB at 20-30 degrees  Taken 10/11/2024  2003 by Giancarlo Chapman LPN  Activity Management: activity encouraged  Head of Bed (HOB) Positioning: HOB at 20-30 degrees  Taken 10/11/2024 1950 by Giancarlo Chapman LPN  Activity Management: activity encouraged  Head of Bed (HOB) Positioning: HOB at 20-30 degrees  Taken 10/11/2024 1926 by Giancarlo Chapman LPN  Activity Management: activity encouraged  Pressure Reduction Techniques: frequent weight shift encouraged  Head of Bed (HOB) Positioning: HOB at 20-30 degrees  Pressure Reduction Devices: positioning supports utilized  Skin Protection: adhesive use limited     Problem: Fall Injury Risk  Goal: Absence of Fall and Fall-Related Injury  Outcome: Progressing  Intervention: Identify and Manage Contributors  Description: Develop a fall prevention plan with the patient and caregiver/family.  Provide reorientation, appropriate sensory stimulation and routines with changes in mental status to decrease risk of fall.  Promote use of personal vision and auditory aids.  Assess assistance level required for safe and effective self-care; provide support as needed, such as toileting, mobilization. For age 65 and older, implement timed toileting with assistance.  Encourage physical activity, such as performance of mobility and self-care at highest level of patient ability, multicomponent exercise program and provision of appropriate assistive devices.  If fall occurs, assess the severity of injury; implement fall injury protocol. Determine the cause and revise fall injury prevention plan.  Regularly review medication contribution to fall risk; adjust medication administration times to minimize risk of falling.  Consider risk related to polypharmacy and age.  Balance adequate pain management with potential for oversedation.  Recent Flowsheet Documentation  Taken 10/12/2024 0200 by Giancarlo Chapman LPN  Medication Review/Management: medications reviewed  Taken 10/11/2024 1950 by Giancarlo Chapman LPN  Medication  Review/Management: medications reviewed  Intervention: Promote Injury-Free Environment  Description: Provide a safe, barrier-free environment that encourages independent activity.  Keep care area uncluttered and well-lighted.  Determine need for increased observation or monitoring.  Avoid use of devices that minimize mobility, such as restraints or indwelling urinary catheter.  Recent Flowsheet Documentation  Taken 10/12/2024 0607 by Giancarlo Chapman LPN  Safety Promotion/Fall Prevention:   assistive device/personal items within reach   clutter free environment maintained   room organization consistent   safety round/check completed  Taken 10/12/2024 0422 by Giancarlo Chapman LPN  Safety Promotion/Fall Prevention:   assistive device/personal items within reach   clutter free environment maintained   room organization consistent   safety round/check completed  Taken 10/12/2024 0200 by Giancarlo Chapman LPN  Safety Promotion/Fall Prevention:   assistive device/personal items within reach   clutter free environment maintained   room organization consistent   safety round/check completed  Taken 10/12/2024 0057 by Giancarlo Chapman LPN  Safety Promotion/Fall Prevention:   assistive device/personal items within reach   clutter free environment maintained   room organization consistent   safety round/check completed  Taken 10/12/2024 0017 by Giancarlo Chapman LPN  Safety Promotion/Fall Prevention:   assistive device/personal items within reach   clutter free environment maintained   room organization consistent   safety round/check completed  Taken 10/11/2024 2301 by Giancarlo Chapman LPN  Safety Promotion/Fall Prevention:   assistive device/personal items within reach   clutter free environment maintained   room organization consistent   safety round/check completed  Taken 10/11/2024 2259 by Giancarlo Chapman LPN  Safety Promotion/Fall Prevention:   assistive device/personal items within reach   clutter free environment  maintained   room organization consistent   safety round/check completed  Taken 10/11/2024 2003 by Giancarlo Chapmna LPN  Safety Promotion/Fall Prevention:   assistive device/personal items within reach   clutter free environment maintained   room organization consistent   safety round/check completed  Taken 10/11/2024 1950 by Giancarlo hCapman LPN  Safety Promotion/Fall Prevention:   assistive device/personal items within reach   clutter free environment maintained   room organization consistent   safety round/check completed  Taken 10/11/2024 1926 by Giancarlo Chapman LPN  Safety Promotion/Fall Prevention:   assistive device/personal items within reach   clutter free environment maintained   room organization consistent   safety round/check completed   Goal Outcome Evaluation:           Progress: no change

## 2024-10-13 LAB
BACTERIA SPEC AEROBE CULT: NORMAL
BACTERIA SPEC AEROBE CULT: NORMAL
GLUCOSE BLDC GLUCOMTR-MCNC: 102 MG/DL (ref 70–105)
GLUCOSE BLDC GLUCOMTR-MCNC: 102 MG/DL (ref 70–105)
GLUCOSE BLDC GLUCOMTR-MCNC: 126 MG/DL (ref 70–105)
GLUCOSE BLDC GLUCOMTR-MCNC: 168 MG/DL (ref 70–105)

## 2024-10-13 PROCEDURE — 99232 SBSQ HOSP IP/OBS MODERATE 35: CPT | Performed by: INTERNAL MEDICINE

## 2024-10-13 PROCEDURE — 63710000001 INSULIN LISPRO (HUMAN) PER 5 UNITS: Performed by: INTERNAL MEDICINE

## 2024-10-13 PROCEDURE — 25010000002 ENOXAPARIN PER 10 MG: Performed by: INTERNAL MEDICINE

## 2024-10-13 PROCEDURE — 82948 REAGENT STRIP/BLOOD GLUCOSE: CPT | Performed by: INTERNAL MEDICINE

## 2024-10-13 PROCEDURE — 82948 REAGENT STRIP/BLOOD GLUCOSE: CPT

## 2024-10-13 RX ORDER — AMLODIPINE BESYLATE 5 MG/1
5 TABLET ORAL
Status: DISCONTINUED | OUTPATIENT
Start: 2024-10-13 | End: 2024-10-18 | Stop reason: HOSPADM

## 2024-10-13 RX ADMIN — ENOXAPARIN SODIUM 40 MG: 100 INJECTION SUBCUTANEOUS at 21:10

## 2024-10-13 RX ADMIN — AMLODIPINE BESYLATE 5 MG: 5 TABLET ORAL at 12:49

## 2024-10-13 RX ADMIN — BISACODYL 5 MG: 5 TABLET, COATED ORAL at 14:56

## 2024-10-13 RX ADMIN — RISPERIDONE 4 MG: 1 TABLET, FILM COATED ORAL at 21:23

## 2024-10-13 RX ADMIN — AMOXICILLIN AND CLAVULANATE POTASSIUM 1 TABLET: 875; 125 TABLET, FILM COATED ORAL at 08:11

## 2024-10-13 RX ADMIN — Medication 10 ML: at 21:23

## 2024-10-13 RX ADMIN — THERA TABS 1 TABLET: TAB at 08:11

## 2024-10-13 RX ADMIN — ZINC OXIDE 1 APPLICATION: 200 OINTMENT TOPICAL at 12:53

## 2024-10-13 RX ADMIN — ZINC OXIDE 1 APPLICATION: 200 OINTMENT TOPICAL at 21:25

## 2024-10-13 RX ADMIN — PAROXETINE 30 MG: 10 TABLET, FILM COATED ORAL at 08:11

## 2024-10-13 RX ADMIN — INSULIN LISPRO 2 UNITS: 100 INJECTION, SOLUTION INTRAVENOUS; SUBCUTANEOUS at 21:34

## 2024-10-13 RX ADMIN — MONTELUKAST 10 MG: 10 TABLET, FILM COATED ORAL at 21:11

## 2024-10-13 RX ADMIN — NYSTATIN: 100000 POWDER TOPICAL at 08:12

## 2024-10-13 RX ADMIN — LISINOPRIL 40 MG: 20 TABLET ORAL at 08:12

## 2024-10-13 RX ADMIN — CALCIUM ACETATE MONOHYDRATE AND ALUMINUM SULFATE TETRADECAHYDRATE 1000 ML: 952; 1347 POWDER, FOR SOLUTION TOPICAL at 15:08

## 2024-10-13 RX ADMIN — DOXYCYCLINE 100 MG: 100 CAPSULE ORAL at 08:11

## 2024-10-13 RX ADMIN — ZINC OXIDE 1 APPLICATION: 200 OINTMENT TOPICAL at 08:12

## 2024-10-13 RX ADMIN — FLUCONAZOLE 200 MG: 100 TABLET ORAL at 14:56

## 2024-10-13 RX ADMIN — Medication 10 ML: at 08:12

## 2024-10-13 RX ADMIN — ZINC OXIDE 1 APPLICATION: 200 OINTMENT TOPICAL at 18:22

## 2024-10-13 RX ADMIN — Medication 220 MG: at 08:11

## 2024-10-13 RX ADMIN — DOXYCYCLINE 100 MG: 100 CAPSULE ORAL at 21:10

## 2024-10-13 RX ADMIN — AMOXICILLIN AND CLAVULANATE POTASSIUM 1 TABLET: 875; 125 TABLET, FILM COATED ORAL at 21:10

## 2024-10-13 RX ADMIN — ENOXAPARIN SODIUM 40 MG: 100 INJECTION SUBCUTANEOUS at 08:11

## 2024-10-13 RX ADMIN — CALCIUM ACETATE MONOHYDRATE AND ALUMINUM SULFATE TETRADECAHYDRATE 1000 ML: 952; 1347 POWDER, FOR SOLUTION TOPICAL at 21:36

## 2024-10-13 RX ADMIN — HYDROCODONE BITARTRATE AND ACETAMINOPHEN 1 TABLET: 5; 325 TABLET ORAL at 08:11

## 2024-10-13 RX ADMIN — METOPROLOL SUCCINATE 50 MG: 50 TABLET, EXTENDED RELEASE ORAL at 08:12

## 2024-10-13 RX ADMIN — CALCIUM ACETATE MONOHYDRATE AND ALUMINUM SULFATE TETRADECAHYDRATE 1000 ML: 952; 1347 POWDER, FOR SOLUTION TOPICAL at 04:07

## 2024-10-13 RX ADMIN — NYSTATIN: 100000 POWDER TOPICAL at 21:25

## 2024-10-13 RX ADMIN — HYDROXYZINE HYDROCHLORIDE 50 MG: 25 TABLET, FILM COATED ORAL at 21:10

## 2024-10-13 NOTE — PROGRESS NOTES
[facility] MEDICINE SERVICE  DAILY PROGRESS NOTE    NAME: Felisha Mukherjee  : 1965  MRN: [id number]      LOS: 5 days     PROVIDER OF SERVICE: Timothy Duane Brammell, MD    Chief Complaint: Cellulitis of abdominal wall    Subjective:     Interval History:  History taken from: patient    Patient really without any acute issues.  She has improved irritation complaints with her skin.  She is eating without issue.  Having bowel movements.  Ambulatory at baseline.  Tolerating her West catheter.  Denies any other additional acute issues.        Review of Systems:   Review of Systems   All other systems reviewed and are negative.      Objective:     Vital Signs  Temp:  [98.2 °F (36.8 °C)-98.7 °F (37.1 °C)] 98.5 °F (36.9 °C)  Heart Rate:  [67-80] 78  Resp:  [15-29] 25  BP: (132-180)/(67-97) 153/92   Body mass index is 44.64 kg/m².    Physical Exam  Physical Exam  Vitals reviewed.   Constitutional:       Appearance: Normal appearance. She is obese.   HENT:      Head: Normocephalic.   Cardiovascular:      Rate and Rhythm: Normal rate and regular rhythm.   Pulmonary:      Effort: Pulmonary effort is normal.      Breath sounds: Normal breath sounds.   Abdominal:      General: Bowel sounds are normal.      Palpations: Abdomen is soft.   Skin:     Comments: Persistent improvement in patient's cellulitis/rash.   Neurological:      Mental Status: She is alert.            Diagnostic Data    Results from last 7 days   Lab Units 10/11/24  0107 10/09/24  0254 10/08/24  1213   WBC 10*3/mm3 9.29   < > 14.33*   HEMOGLOBIN g/dL 10.8*   < > 10.9*   HEMATOCRIT % 37.2   < > 36.1   PLATELETS 10*3/mm3 430   < > 393   GLUCOSE mg/dL 121*   < > 121*   CREATININE mg/dL 1.00   < > 1.40*   BUN mg/dL 9   < > 17   SODIUM mmol/L 139   < > 128*   POTASSIUM mmol/L 3.9   < > 4.3   AST (SGOT) U/L  --   --  29   ALT (SGPT) U/L  --   --  17   ALK PHOS U/L  --   --  68   BILIRUBIN mg/dL  --   --  0.4   ANION GAP mmol/L 11.7   < > 15.0    < > =  values in this interval not displayed.       No radiology results for the last day          Assessment:   Lower abdominal wall cellulitis bacterial versus fungal.  Uterine carcinoma.  Hydronephrosis now with West catheter  Type 2 diabetes  Obesity  Anemia  Acute renal insufficiency, improved      Plan: Complete course of oral antibiotics as well as ongoing topical care for rash.  Attempting placement for wound care that would allow patient to begin radiation therapy for her gynecologic issues.  With consistent improvement with the rash probably need there repeat evaluation by radiation oncologist as to the degree of improvement necessary prior to institution of her radiation.      Active and Resolved Problems  Active Hospital Problems    Diagnosis  POA    **Cellulitis of abdominal wall [L03.311]  Yes      Resolved Hospital Problems   No resolved problems to display.           VTE Prophylaxis:  Pharmacologic VTE prophylaxis orders are present.             Disposition Planning:     Barriers to Discharge: bed availability  Anticipated Date of Discharge: 10/14  Place of Discharge:  SNF for wound care if still needed      Time: 30 minutes     Code Status and Medical Interventions: CPR (Attempt to Resuscitate); Full Support   Ordered at: 10/08/24 1722     Level Of Support Discussed With:    Patient     Code Status (Patient has no pulse and is not breathing):    CPR (Attempt to Resuscitate)     Medical Interventions (Patient has pulse or is breathing):    Full Support       Signature: Electronically signed by Timothy Duane Brammell, MD, 10/13/24, 13:13 EDT.  [facility]ist Team

## 2024-10-13 NOTE — PLAN OF CARE
Problem: Adult Inpatient Plan of Care  Goal: Absence of Hospital-Acquired Illness or Injury  Intervention: Identify and Manage Fall Risk  Recent Flowsheet Documentation  Taken 10/13/2024 1456 by Crista Loyd RN  Safety Promotion/Fall Prevention: safety round/check completed  Taken 10/13/2024 1249 by Crista Loyd RN  Safety Promotion/Fall Prevention: safety round/check completed  Taken 10/13/2024 1006 by Crista Loyd RN  Safety Promotion/Fall Prevention: safety round/check completed  Taken 10/13/2024 0811 by Crista Loyd RN  Safety Promotion/Fall Prevention: safety round/check completed  Intervention: Prevent Skin Injury  Recent Flowsheet Documentation  Taken 10/13/2024 0811 by Crista Loyd RN  Body Position: position changed independently  Skin Protection: incontinence pads utilized  Goal: Optimal Comfort and Wellbeing  Intervention: Monitor Pain and Promote Comfort  Recent Flowsheet Documentation  Taken 10/13/2024 0811 by Crista Loyd RN  Pain Management Interventions: pain medication given  Intervention: Provide Person-Centered Care  Recent Flowsheet Documentation  Taken 10/13/2024 0811 by Crista Loyd RN  Trust Relationship/Rapport:   care explained   choices provided     Problem: Skin Injury Risk Increased  Goal: Skin Health and Integrity  Intervention: Optimize Skin Protection  Recent Flowsheet Documentation  Taken 10/13/2024 0811 by Crista Loyd RN  Activity Management: activity encouraged  Pressure Reduction Techniques: frequent weight shift encouraged  Head of Bed (HOB) Positioning: HOB elevated  Skin Protection: incontinence pads utilized     Problem: Fall Injury Risk  Goal: Absence of Fall and Fall-Related Injury  Intervention: Identify and Manage Contributors  Recent Flowsheet Documentation  Taken 10/13/2024 0811 by Crista Loyd RN  Medication Review/Management: medications reviewed  Intervention: Promote Injury-Free Environment  Recent Flowsheet Documentation  Taken  10/13/2024 1456 by Crista Loyd RN  Safety Promotion/Fall Prevention: safety round/check completed  Taken 10/13/2024 1249 by Crista Loyd RN  Safety Promotion/Fall Prevention: safety round/check completed  Taken 10/13/2024 1006 by Crista Loyd RN  Safety Promotion/Fall Prevention: safety round/check completed  Taken 10/13/2024 0811 by Crista Loyd RN  Safety Promotion/Fall Prevention: safety round/check completed   Goal Outcome Evaluation:     Pt resting in bed comfortably. Tolerating ambulation to BR well. SBP elevated, treated with amlodipine. No complaints of pain or discomfort at this time. Call light within reach and bed in lowest position.

## 2024-10-13 NOTE — PROGRESS NOTES
Hematology/Oncology Progress Note    Patient name: Felisha Mukherjee  : 1965  MRN: [id number]      Chief complaint: rash    Subjective/interim summary:    10/13/2024: Feels the same. Has had less pain at the site of cellulitis. Continues to be weak and not active. Says she has a biopsy on Mon but she doesn't know what time.    History:  Past Medical History:   Diagnosis Date    Diabetes mellitus     Hypertension     Uterine mass    ,   Past Surgical History:   Procedure Laterality Date    CHOLECYSTECTOMY     ,   Family History   Problem Relation Age of Onset    Diabetes Mother    ,   Social History     Tobacco Use    Smoking status: Never   Vaping Use    Vaping status: Never Used   Substance Use Topics    Alcohol use: Never    Drug use: Never   ,   Medications Prior to Admission   Medication Sig Dispense Refill Last Dose/Taking    docusate sodium 100 MG capsule Take 1 capsule by mouth Daily.   10/8/2024    Farxiga 10 MG tablet Take 10 mg by mouth Daily.   10/8/2024    ferrous sulfate 325 (65 FE) MG tablet Take 1 tablet by mouth 5 (Five) Times a Week. Monday, Tuesday, Wednesday, Thursday and Friday   10/8/2024    hydrOXYzine (ATARAX) 50 MG tablet Take 1 tablet by mouth Every Night.   10/7/2024    lisinopril (PRINIVIL,ZESTRIL) 40 MG tablet Take 1 tablet by mouth Daily.   10/8/2024    metFORMIN (GLUCOPHAGE) 500 MG tablet Take 1 tablet by mouth 2 (Two) Times a Day.   10/8/2024    metoprolol succinate XL (TOPROL-XL) 50 MG 24 hr tablet Take 1 tablet by mouth every night at bedtime.   10/7/2024    montelukast (SINGULAIR) 10 MG tablet Take 1 tablet by mouth Every Night.   10/7/2024    PARoxetine (PAXIL) 30 MG tablet Take 1 tablet by mouth.   10/7/2024    risperiDONE (risperDAL) 2 MG tablet Take 2 tablets by mouth Every Evening.   10/7/2024    vitamin D (ERGOCALCIFEROL) 1.25 MG (79227 UT) capsule capsule Take 1 capsule by mouth Every 7 (Seven) Days. Wednesdays   10/2/2024   , Scheduled Meds:  aluminum  sulfate-calcium acetate 1:40, 1,000 mL, Topical, Q8H  amoxicillin-clavulanate, 1 tablet, Oral, Q12H  doxycycline, 100 mg, Oral, Q12H  enoxaparin, 40 mg, Subcutaneous, Q12H  ferrous sulfate, 324 mg, Oral, Once per day on Monday Tuesday Wednesday Thursday Friday  fluconazole, 200 mg, Oral, Q24H  hydrocortisone-bacitracin-zinc oxide-nystatin, 1 Application, Topical, 4x Daily  hydrOXYzine, 50 mg, Oral, Nightly  insulin lispro, 2-9 Units, Subcutaneous, 4x Daily AC & at Bedtime  lisinopril, 40 mg, Oral, Daily  metoprolol succinate XL, 50 mg, Oral, Q24H  montelukast, 10 mg, Oral, Nightly  multivitamin, 1 tablet, Oral, Daily  nystatin, , Topical, Q12H  PARoxetine, 30 mg, Oral, Daily  risperiDONE, 4 mg, Oral, Q PM  sodium chloride, 10 mL, Intravenous, Q12H  zinc sulfate, 220 mg, Oral, Daily    , Continuous Infusions:  Pharmacy to Dose enoxaparin (LOVENOX),     , PRN Meds:    acetaminophen    senna-docusate sodium **AND** polyethylene glycol **AND** bisacodyl **AND** bisacodyl    Calcium Replacement - Follow Nurse / BPA Driven Protocol    dextrose    dextrose    glucagon (human recombinant)    HYDROcodone-acetaminophen    Magnesium Standard Dose Replacement - Follow Nurse / BPA Driven Protocol    melatonin    Pharmacy to Dose enoxaparin (LOVENOX)    Phosphorus Replacement - Follow Nurse / BPA Driven Protocol    Potassium Replacement - Follow Nurse / BPA Driven Protocol    [COMPLETED] Insert Peripheral IV **AND** sodium chloride    sodium chloride    sodium chloride    traMADol   Allergies:  Patient has no known allergies.    Review of Systems   Constitutional:  Positive for fatigue. Negative for chills and fever.   HENT: Negative.     Eyes: Negative.    Respiratory: Negative.     Cardiovascular: Negative.    Gastrointestinal: Negative.    Endocrine: Negative.    Genitourinary: Negative.    Musculoskeletal: Negative.    Skin:  Positive for color change and rash.        Pain and redness in groin and lower  "abdomen  Allergic/Immunologic: Negative.    Neurological: Negative.    Hematological: Negative.    Psychiatric/Behavioral: Negative.       Objective   Vital Signs:   /89 (BP Location: Right arm, Patient Position: Lying)   Pulse 74   Temp 98.2 °F (36.8 °C) (Oral)   Resp (!) 29   Ht 160 cm (63\")   Wt 114 kg (251 lb 15.8 oz)   SpO2 90%   BMI 44.64 kg/m²     Physical Exam: (performed by MD)  Vitals and nursing note reviewed.   Constitutional:       General: She is not in acute distress.     Appearance: Normal appearance. She is obese. She is ill-appearing. She is not toxic-appearing.   HENT:      Head: Normocephalic and atraumatic.      Right Ear: External ear normal.      Left Ear: External ear normal.      Nose: Nose normal.      Mouth/Throat:      Mouth: Mucous membranes are moist.      Pharynx: Oropharynx is clear.   Eyes:      Extraocular Movements: Extraocular movements intact.      Pupils: Pupils are equal, round, and reactive to light.   Cardiovascular:      Rate and Rhythm: Normal rate and regular rhythm.      Pulses: Normal pulses.      Heart sounds: Normal heart sounds.   Pulmonary:      Effort: Pulmonary effort is normal.      Breath sounds: Normal breath sounds.   Abdominal:      General: Bowel sounds are normal. There is no distension.      Palpations: Abdomen is soft.   Genitourinary:     Comments: darkened erythema areas across lower abdomen and in groin, b/l upper thighs, multiple open wounds -minimal oozing -no malodorous smell  Musculoskeletal:         General: Swelling present. Normal range of motion.      Cervical back: Normal range of motion.   Skin:     General: Skin is warm and dry.      Comments:  as above  Neurological:      General: No focal deficit present.      Mental Status: She is alert and oriented to person, place, and time. Mental status is at baseline.   Psychiatric:         Mood and Affect: Mood normal.         Behavior: Behavior normal.         Thought Content: Thought " content normal.         Judgment: Judgment normal.        Results Review:  Lab Results (last 48 hours)       Procedure Component Value Units Date/Time    POC Glucose 4x Daily Before Meals & at Bedtime [[id number]]  (Abnormal) Collected: 10/13/24 0727    Specimen: Blood Updated: 10/13/24 0728     Glucose 126 mg/dL      Comment: Serial Number: [id number]:  [id number]       POC Glucose Once [[id number]]  (Abnormal) Collected: 10/12/24 2024    Specimen: Blood Updated: 10/12/24 2026     Glucose 138 mg/dL      Comment: Serial Number: [id number]:  [id number]       POC Glucose 4x Daily Before Meals & at Bedtime [[id number]]  (Abnormal) Collected: 10/12/24 1727    Specimen: Blood Updated: 10/12/24 1729     Glucose 111 mg/dL      Comment: Serial Number: [id number]:  [id number]       Blood Culture - Blood, Arm, Left [[id number]]  (Normal) Collected: 10/08/24 1213    Specimen: Blood from Arm, Left Updated: 10/12/24 1230     Blood Culture No growth at 4 days    Blood Culture - Blood, Wrist, Left [[id number]]  (Normal) Collected: 10/08/24 1213    Specimen: Blood from Wrist, Left Updated: 10/12/24 1230     Blood Culture No growth at 4 days    POC Glucose 4x Daily Before Meals & at Bedtime [[id number]]  (Abnormal) Collected: 10/12/24 1140    Specimen: Blood Updated: 10/12/24 1142     Glucose 169 mg/dL      Comment: Serial Number: [id number]:  [id number]       POC Glucose Once [[id number]]  (Abnormal) Collected: 10/12/24 0749    Specimen: Blood Updated: 10/12/24 0751     Glucose 170 mg/dL      Comment: Serial Number: [id number]:  [id number]       POC Glucose Once [[id number]]  (Normal) Collected: 10/11/24 2045    Specimen: Blood Updated: 10/11/24 2047     Glucose 104 mg/dL      Comment: Serial Number: [id number]:  [id number]       POC Glucose Once [[id number]]  (Abnormal) Collected: 10/11/24 1643    Specimen: Blood Updated: 10/11/24 1646     Glucose 128 mg/dL      Comment: Serial Number:  [id number]:  [id number]       POC Glucose Once [[id number]]  (Abnormal) Collected: 10/11/24 1139    Specimen: Blood Updated: 10/11/24 1141     Glucose 166 mg/dL      Comment: Serial Number: [id number]:  [id number]                Pending Results: Biopsy    Imaging Reviewed:   US Renal Bilateral    Result Date: 10/9/2024  1. Moderate dilation of the right kidney and mild dilation of the left kidney 2. Evaluation of the bladder is limited by placement of West catheter Electronically Signed: Twan Garza MD  10/9/2024 2:25 PM EDT  Workstation ID: [id number]    CT Abdomen Pelvis With Contrast    Result Date: 10/8/2024  Impression: 1.Superficial soft tissue edema/induration within the lower abdominal wall and left flank, compatible with cellulitis in the appropriate clinical setting. No subcutaneous gas is seen. No associated discrete fluid collection is seen. 2.Large, heterogeneous mass of the uterus/cervix with probable invasion of the right posterior urinary bladder. Please refer to the PET/CT from 5 days prior. 3.Mild bilateral hydroureteronephrosis with moderately distended urinary bladder. Ureteral obstruction or bladder outlet obstruction cannot be excluded. 4.Numerous pulmonary nodules, retroperitoneal/iliac chain lymphadenopathy, and left-sided omental metastatic implants as noted on recent PET/CT. 5.Multiple small hypoattenuating liver lesions, particularly within the right hepatic lobe, concerning for metastatic disease. These may be further characterized with liver MRI if clinically indicated. 6.9 cm hypodense lesion within the left adnexa. Please refer to the MRI of the pelvis from 8/19/2024. 7.Additional findings as detailed above. Electronically Signed: Sushil Benítez MD  10/8/2024 2:51 PM EDT  Workstation ID: [id number]          Assessment & Plan   ASSESSMENT/PLAN  endometrial uterine cancer, per imaging appears metastatic: per GynOnc, unresectable. To obtain a biopsy for histologic  confirmation before commencement of treatment-per patient is going to be done sometime Monday.   -Discussed with her; unaware of what time.   Cellulitis of the abdominal skin-per patient feels it is improving; no longer as sore and no longer getting chills.  - Continue local treatment and antibiotic treatment.   Poor performance status; poor treatment candidate  Will continue to follow.       Electronically signed by Alina Feng MD PhD, 10/13/24, 8:27 AM EDT.        Thank you for this consult. We will be happy to follow along with you.

## 2024-10-13 NOTE — PLAN OF CARE
Goal Outcome Evaluation:              Problem: Adult Inpatient Plan of Care  Goal: Plan of Care Review  Outcome: Progressing  Goal: Patient-Specific Goal (Individualized)  Outcome: Progressing  Goal: Absence of Hospital-Acquired Illness or Injury  Outcome: Progressing  Intervention: Identify and Manage Fall Risk  Recent Flowsheet Documentation  Taken 10/13/2024 0200 by Leslee Goldberg RN  Safety Promotion/Fall Prevention:   nonskid shoes/slippers when out of bed   safety round/check completed   assistive device/personal items within reach   clutter free environment maintained   lighting adjusted   room organization consistent   toileting scheduled  Taken 10/13/2024 0000 by Leslee Goldberg RN  Safety Promotion/Fall Prevention:   nonskid shoes/slippers when out of bed   safety round/check completed   assistive device/personal items within reach   clutter free environment maintained   lighting adjusted   room organization consistent   toileting scheduled  Taken 10/12/2024 2024 by Leslee Goldberg RN  Safety Promotion/Fall Prevention:   nonskid shoes/slippers when out of bed   safety round/check completed   assistive device/personal items within reach   clutter free environment maintained   lighting adjusted   room organization consistent   toileting scheduled  Goal: Optimal Comfort and Wellbeing  Outcome: Progressing  Goal: Readiness for Transition of Care  Outcome: Progressing     Problem: Impaired Wound Healing  Goal: Optimal Wound Healing  Outcome: Progressing     Problem: Skin Injury Risk Increased  Goal: Skin Health and Integrity  Outcome: Progressing  Intervention: Optimize Skin Protection  Recent Flowsheet Documentation  Taken 10/13/2024 0200 by Leslee Goldberg RN  Head of Bed (HOB) Positioning: HOB elevated  Taken 10/13/2024 0000 by Leslee Goldberg RN  Head of Bed (HOB) Positioning: HOB elevated  Taken 10/12/2024 2024 by Leslee Goldberg RN  Head of Bed (HOB) Positioning: HOB elevated     Problem: Fall Injury Risk  Goal:  Absence of Fall and Fall-Related Injury  Outcome: Progressing  Intervention: Promote Injury-Free Environment  Recent Flowsheet Documentation  Taken 10/13/2024 0200 by Leslee Goldberg, RN  Safety Promotion/Fall Prevention:   nonskid shoes/slippers when out of bed   safety round/check completed   assistive device/personal items within reach   clutter free environment maintained   lighting adjusted   room organization consistent   toileting scheduled  Taken 10/13/2024 0000 by Leslee Goldberg, RN  Safety Promotion/Fall Prevention:   nonskid shoes/slippers when out of bed   safety round/check completed   assistive device/personal items within reach   clutter free environment maintained   lighting adjusted   room organization consistent   toileting scheduled  Taken 10/12/2024 2024 by Leslee Goldberg, RN  Safety Promotion/Fall Prevention:   nonskid shoes/slippers when out of bed   safety round/check completed   assistive device/personal items within reach   clutter free environment maintained   lighting adjusted   room organization consistent   toileting scheduled

## 2024-10-14 ENCOUNTER — APPOINTMENT (OUTPATIENT)
Dept: CT IMAGING | Facility: HOSPITAL | Age: 59
DRG: 580 | End: 2024-10-14
Payer: MEDICARE

## 2024-10-14 LAB
ANION GAP SERPL CALCULATED.3IONS-SCNC: 11.5 MMOL/L (ref 5–15)
APTT PPP: 25.1 SECONDS (ref 24–31)
BUN SERPL-MCNC: 10 MG/DL (ref 8–23)
BUN/CREAT SERPL: 9 (ref 7–25)
CALCIUM SPEC-SCNC: 10.2 MG/DL (ref 8.6–10.5)
CHLORIDE SERPL-SCNC: 103 MMOL/L (ref 98–107)
CO2 SERPL-SCNC: 23.5 MMOL/L (ref 22–29)
CREAT SERPL-MCNC: 1.11 MG/DL (ref 0.57–1)
DEPRECATED RDW RBC AUTO: 64.4 FL (ref 37–54)
EGFRCR SERPLBLD CKD-EPI 2021: 57 ML/MIN/1.73
ERYTHROCYTE [DISTWIDTH] IN BLOOD BY AUTOMATED COUNT: 22 % (ref 12.3–15.4)
GLUCOSE BLDC GLUCOMTR-MCNC: 125 MG/DL (ref 70–105)
GLUCOSE BLDC GLUCOMTR-MCNC: 157 MG/DL (ref 70–105)
GLUCOSE BLDC GLUCOMTR-MCNC: 177 MG/DL (ref 70–105)
GLUCOSE BLDC GLUCOMTR-MCNC: 98 MG/DL (ref 70–105)
GLUCOSE SERPL-MCNC: 104 MG/DL (ref 65–99)
HCT VFR BLD AUTO: 34.5 % (ref 34–46.6)
HGB BLD-MCNC: 10 G/DL (ref 12–15.9)
INR PPP: 1.14 (ref 0.93–1.1)
MCH RBC QN AUTO: 23.3 PG (ref 26.6–33)
MCHC RBC AUTO-ENTMCNC: 29 G/DL (ref 31.5–35.7)
MCV RBC AUTO: 80.2 FL (ref 79–97)
PLATELET # BLD AUTO: 375 10*3/MM3 (ref 140–450)
PMV BLD AUTO: 8.6 FL (ref 6–12)
POTASSIUM SERPL-SCNC: 3.8 MMOL/L (ref 3.5–5.2)
PROTHROMBIN TIME: 12.3 SECONDS (ref 9.6–11.7)
RBC # BLD AUTO: 4.3 10*6/MM3 (ref 3.77–5.28)
SODIUM SERPL-SCNC: 138 MMOL/L (ref 136–145)
WBC NRBC COR # BLD AUTO: 9.4 10*3/MM3 (ref 3.4–10.8)

## 2024-10-14 PROCEDURE — 85610 PROTHROMBIN TIME: CPT | Performed by: RADIOLOGY

## 2024-10-14 PROCEDURE — 82948 REAGENT STRIP/BLOOD GLUCOSE: CPT | Performed by: INTERNAL MEDICINE

## 2024-10-14 PROCEDURE — 88360 TUMOR IMMUNOHISTOCHEM/MANUAL: CPT

## 2024-10-14 PROCEDURE — 80048 BASIC METABOLIC PNL TOTAL CA: CPT | Performed by: STUDENT IN AN ORGANIZED HEALTH CARE EDUCATION/TRAINING PROGRAM

## 2024-10-14 PROCEDURE — 63710000001 INSULIN LISPRO (HUMAN) PER 5 UNITS: Performed by: INTERNAL MEDICINE

## 2024-10-14 PROCEDURE — 77012 CT SCAN FOR NEEDLE BIOPSY: CPT

## 2024-10-14 PROCEDURE — 25010000002 ONDANSETRON PER 1 MG: Performed by: RADIOLOGY

## 2024-10-14 PROCEDURE — 99152 MOD SED SAME PHYS/QHP 5/>YRS: CPT

## 2024-10-14 PROCEDURE — 88342 IMHCHEM/IMCYTCHM 1ST ANTB: CPT

## 2024-10-14 PROCEDURE — 97110 THERAPEUTIC EXERCISES: CPT

## 2024-10-14 PROCEDURE — 88342 IMHCHEM/IMCYTCHM 1ST ANTB: CPT | Performed by: INTERNAL MEDICINE

## 2024-10-14 PROCEDURE — 25010000002 LIDOCAINE 1 % SOLUTION: Performed by: RADIOLOGY

## 2024-10-14 PROCEDURE — 99232 SBSQ HOSP IP/OBS MODERATE 35: CPT | Performed by: INTERNAL MEDICINE

## 2024-10-14 PROCEDURE — 88307 TISSUE EXAM BY PATHOLOGIST: CPT | Performed by: INTERNAL MEDICINE

## 2024-10-14 PROCEDURE — 82948 REAGENT STRIP/BLOOD GLUCOSE: CPT

## 2024-10-14 PROCEDURE — 25010000002 ENOXAPARIN PER 10 MG: Performed by: INTERNAL MEDICINE

## 2024-10-14 PROCEDURE — 85027 COMPLETE CBC AUTOMATED: CPT | Performed by: STUDENT IN AN ORGANIZED HEALTH CARE EDUCATION/TRAINING PROGRAM

## 2024-10-14 PROCEDURE — 97535 SELF CARE MNGMENT TRAINING: CPT

## 2024-10-14 PROCEDURE — 25010000002 FENTANYL CITRATE (PF) 50 MCG/ML SOLUTION: Performed by: RADIOLOGY

## 2024-10-14 PROCEDURE — 88333 PATH CONSLTJ SURG CYTO XM 1: CPT | Performed by: INTERNAL MEDICINE

## 2024-10-14 PROCEDURE — 85730 THROMBOPLASTIN TIME PARTIAL: CPT | Performed by: RADIOLOGY

## 2024-10-14 PROCEDURE — 88360 TUMOR IMMUNOHISTOCHEM/MANUAL: CPT | Performed by: INTERNAL MEDICINE

## 2024-10-14 PROCEDURE — 88341 IMHCHEM/IMCYTCHM EA ADD ANTB: CPT | Performed by: INTERNAL MEDICINE

## 2024-10-14 PROCEDURE — 88341 IMHCHEM/IMCYTCHM EA ADD ANTB: CPT

## 2024-10-14 PROCEDURE — 25010000002 MIDAZOLAM PER 1 MG: Performed by: RADIOLOGY

## 2024-10-14 PROCEDURE — 0WBF3ZX EXCISION OF ABDOMINAL WALL, PERCUTANEOUS APPROACH, DIAGNOSTIC: ICD-10-PCS | Performed by: RADIOLOGY

## 2024-10-14 RX ORDER — MIDAZOLAM HYDROCHLORIDE 1 MG/ML
INJECTION, SOLUTION INTRAMUSCULAR; INTRAVENOUS AS NEEDED
Status: COMPLETED | OUTPATIENT
Start: 2024-10-14 | End: 2024-10-14

## 2024-10-14 RX ORDER — FENTANYL CITRATE 50 UG/ML
INJECTION, SOLUTION INTRAMUSCULAR; INTRAVENOUS AS NEEDED
Status: COMPLETED | OUTPATIENT
Start: 2024-10-14 | End: 2024-10-14

## 2024-10-14 RX ORDER — ONDANSETRON 2 MG/ML
INJECTION INTRAMUSCULAR; INTRAVENOUS AS NEEDED
Status: COMPLETED | OUTPATIENT
Start: 2024-10-14 | End: 2024-10-14

## 2024-10-14 RX ORDER — LIDOCAINE HYDROCHLORIDE 10 MG/ML
INJECTION, SOLUTION INFILTRATION; PERINEURAL AS NEEDED
Status: COMPLETED | OUTPATIENT
Start: 2024-10-14 | End: 2024-10-14

## 2024-10-14 RX ADMIN — LISINOPRIL 40 MG: 20 TABLET ORAL at 09:30

## 2024-10-14 RX ADMIN — FERROUS SULFATE TAB EC 324 MG (65 MG FE EQUIVALENT) 324 MG: 324 (65 FE) TABLET DELAYED RESPONSE at 09:30

## 2024-10-14 RX ADMIN — THERA TABS 1 TABLET: TAB at 09:30

## 2024-10-14 RX ADMIN — FENTANYL CITRATE 50 MCG: 50 INJECTION, SOLUTION INTRAMUSCULAR; INTRAVENOUS at 12:50

## 2024-10-14 RX ADMIN — AMLODIPINE BESYLATE 5 MG: 5 TABLET ORAL at 09:30

## 2024-10-14 RX ADMIN — MONTELUKAST 10 MG: 10 TABLET, FILM COATED ORAL at 20:28

## 2024-10-14 RX ADMIN — FLUCONAZOLE 200 MG: 100 TABLET ORAL at 16:25

## 2024-10-14 RX ADMIN — NYSTATIN: 100000 POWDER TOPICAL at 20:28

## 2024-10-14 RX ADMIN — ENOXAPARIN SODIUM 40 MG: 100 INJECTION SUBCUTANEOUS at 20:34

## 2024-10-14 RX ADMIN — PAROXETINE 30 MG: 10 TABLET, FILM COATED ORAL at 09:31

## 2024-10-14 RX ADMIN — RISPERIDONE 4 MG: 1 TABLET, FILM COATED ORAL at 16:24

## 2024-10-14 RX ADMIN — LIDOCAINE HYDROCHLORIDE 7 ML: 10 INJECTION, SOLUTION INFILTRATION; PERINEURAL at 13:01

## 2024-10-14 RX ADMIN — CALCIUM ACETATE MONOHYDRATE AND ALUMINUM SULFATE TETRADECAHYDRATE 1000 ML: 952; 1347 POWDER, FOR SOLUTION TOPICAL at 05:41

## 2024-10-14 RX ADMIN — HYDROXYZINE HYDROCHLORIDE 50 MG: 25 TABLET, FILM COATED ORAL at 20:28

## 2024-10-14 RX ADMIN — INSULIN LISPRO 2 UNITS: 100 INJECTION, SOLUTION INTRAVENOUS; SUBCUTANEOUS at 18:16

## 2024-10-14 RX ADMIN — ZINC OXIDE 1 APPLICATION: 200 OINTMENT TOPICAL at 18:17

## 2024-10-14 RX ADMIN — AMOXICILLIN AND CLAVULANATE POTASSIUM 1 TABLET: 875; 125 TABLET, FILM COATED ORAL at 20:28

## 2024-10-14 RX ADMIN — CALCIUM ACETATE MONOHYDRATE AND ALUMINUM SULFATE TETRADECAHYDRATE 1000 ML: 952; 1347 POWDER, FOR SOLUTION TOPICAL at 21:23

## 2024-10-14 RX ADMIN — AMOXICILLIN AND CLAVULANATE POTASSIUM 1 TABLET: 875; 125 TABLET, FILM COATED ORAL at 09:30

## 2024-10-14 RX ADMIN — NYSTATIN: 100000 POWDER TOPICAL at 09:31

## 2024-10-14 RX ADMIN — ONDANSETRON 4 MG: 2 INJECTION INTRAMUSCULAR; INTRAVENOUS at 12:49

## 2024-10-14 RX ADMIN — ZINC OXIDE 1 APPLICATION: 200 OINTMENT TOPICAL at 09:31

## 2024-10-14 RX ADMIN — METOPROLOL SUCCINATE 50 MG: 50 TABLET, EXTENDED RELEASE ORAL at 09:31

## 2024-10-14 RX ADMIN — DOXYCYCLINE 100 MG: 100 CAPSULE ORAL at 20:28

## 2024-10-14 RX ADMIN — Medication 220 MG: at 09:30

## 2024-10-14 RX ADMIN — Medication 10 ML: at 09:37

## 2024-10-14 RX ADMIN — DOXYCYCLINE 100 MG: 100 CAPSULE ORAL at 09:30

## 2024-10-14 RX ADMIN — ZINC OXIDE 1 APPLICATION: 200 OINTMENT TOPICAL at 20:28

## 2024-10-14 RX ADMIN — HYDROCODONE BITARTRATE AND ACETAMINOPHEN 1 TABLET: 5; 325 TABLET ORAL at 04:26

## 2024-10-14 RX ADMIN — MIDAZOLAM 1 MG: 1 INJECTION INTRAMUSCULAR; INTRAVENOUS at 12:50

## 2024-10-14 RX ADMIN — Medication 10 ML: at 20:28

## 2024-10-14 NOTE — PROGRESS NOTES
[facility] MEDICINE SERVICE  DAILY PROGRESS NOTE    NAME: Felisha Mukherjee  : 1965  MRN: [id number]      LOS: 6 days     PROVIDER OF SERVICE: Harry Pryor MD    Chief Complaint: Cellulitis of abdominal wall    Subjective:     Interval History:  History taken from: patient    She is eating without issue.  Having bowel movements.  Ambulatory at baseline.  Tolerating her West catheter.  Denies any other additional acute issues.        Review of Systems:   Review of Systems   All other systems reviewed and are negative.      Objective:     Vital Signs  Temp:  [98 °F (36.7 °C)-98.7 °F (37.1 °C)] 98 °F (36.7 °C)  Heart Rate:  [67-87] 76  Resp:  [19-26] 24  BP: (146-180)/(80-97) 146/89  Flow (L/min) (Oxygen Therapy):  [2] 2   Body mass index is 44.64 kg/m².    Physical Exam  Physical Exam  Vitals reviewed.   Constitutional:       Appearance: Normal appearance. She is obese.   HENT:      Head: Normocephalic.   Cardiovascular:      Rate and Rhythm: Normal rate and regular rhythm.   Pulmonary:      Effort: Pulmonary effort is normal.      Breath sounds: Normal breath sounds.   Abdominal:      General: Bowel sounds are normal.      Palpations: Abdomen is soft.   Skin:     Comments: Persistent improvement in patient's cellulitis/rash.   Neurological:      Mental Status: She is alert.            Diagnostic Data    Results from last 7 days   Lab Units 10/14/24  0848 10/09/24  0254 10/08/24  1213   WBC 10*3/mm3 9.40   < > 14.33*   HEMOGLOBIN g/dL 10.0*   < > 10.9*   HEMATOCRIT % 34.5   < > 36.1   PLATELETS 10*3/mm3 375   < > 393   GLUCOSE mg/dL 104*   < > 121*   CREATININE mg/dL 1.11*   < > 1.40*   BUN mg/dL 10   < > 17   SODIUM mmol/L 138   < > 128*   POTASSIUM mmol/L 3.8   < > 4.3   AST (SGOT) U/L  --   --  29   ALT (SGPT) U/L  --   --  17   ALK PHOS U/L  --   --  68   BILIRUBIN mg/dL  --   --  0.4   ANION GAP mmol/L 11.5   < > 15.0    < > = values in this interval not displayed.       No radiology results for  the last day          Assessment:   Lower abdominal wall cellulitis bacterial versus fungal.  Uterine carcinoma.  Hydronephrosis now with West catheter  Type 2 diabetes  Obesity  Anemia  Acute renal insufficiency, improved      Plan: Complete course of oral antibiotics as well as ongoing topical care for rash.  Attempting placement for wound care that would allow patient to begin radiation therapy for her gynecologic issues.      For biopsy today 10/14/2024.    Active and Resolved Problems  Active Hospital Problems    Diagnosis  POA    **Cellulitis of abdominal wall [L03.311]  Yes      Resolved Hospital Problems   No resolved problems to display.           VTE Prophylaxis:  Pharmacologic VTE prophylaxis orders are present.             Disposition Planning:     Barriers to Discharge: Biopsy, bed availability  Anticipated Date of Discharge: 10/15  Place of Discharge:  SNF for wound care if still needed      Time: 30 minutes     Code Status and Medical Interventions: CPR (Attempt to Resuscitate); Full Support   Ordered at: 10/08/24 1722     Level Of Support Discussed With:    Patient     Code Status (Patient has no pulse and is not breathing):    CPR (Attempt to Resuscitate)     Medical Interventions (Patient has pulse or is breathing):    Full Support       Signature: Electronically signed by Harry Pryor MD, 10/14/24, 10:45 EDT.  [facility] ([facility]ist [facility]    Size Of Lesion In Cm: 0.9

## 2024-10-14 NOTE — THERAPY TREATMENT NOTE
"Subjective: Pt agreeable to therapeutic plan of care.  Cognition: oriented to Person, Place, Time, and Situation    Objective:     Precautions - [place] Risk    Bed Mobility: Modified-Independent, demos good ability to bridge supine in bed to scoot HOB  Functional Ambulation: N/A or Not attempted.    Grooming: Independent  ADL Position: supine with HOB elevated  ADL Comments: Don lotion     Therapeutic Exercise - 20 Reps, x4 setsB UE AROM supine with HOB elevated.    Vitals: WNL    Pain: 5 VAS  Location: Low back  Interventions for pain: Repositioned and Therapeutic Presence  Education: Provided education on the importance of mobility in the acute care setting, Verbal/Tactile Cues, and ADL training    Assessment: Fleisha Mukherjee presents with ADL impairments affecting function including balance, endurance / activity tolerance, pain, and strength. Pt A&O x4, reports 5/10 pain in low back. Pt demos mod I to scoot HOB in supine to repositon, OT facilitated chair position in bed to comeplte BUE exercises. Pt IND donned lotion to BUEs. Pt demos good ROM. Demonstrated functioning below baseline abilities indicate the need for continued skilled intervention while inpatient. Tolerating session today without incident. Will continue to follow and progress as tolerated.     Plan/Recommendations:   Low Intensity Therapy recommended post-acute care - This is recommended as therapy feels this patient would require 2-3 visits per week. OP or HH would be the best option depending on patient's home bound status. Consider, if the patient has other  \"skilled\" needs such as wounds, IV antibiotics, etc. Combined with \"low intensity\" could also equate to a SNF. If patient is medically complex, consider LTAC.    Pt desires Home with Home Health at discharge. Pt cooperative; agreeable to therapeutic recommendations and plan of care.     Modified Sundar: N/A = No pre-op stroke/TIA    Post-Tx Position: Supine with HOB Elevated, Alarms " activated, and Call light and personal items within reach  PPE: gloves

## 2024-10-14 NOTE — H&P
[facility]   Interventional Radiology H&P    Patient Name: Felisha Mukherjee  : 1965  MRN: [id number]  Primary Care Physician:  Hanh Granados APRN  Referring Physician: No ref. provider found  Date of admission: 10/8/2024    Subjective   Subjective     HPI:  Felisha Mukherjee is a 59 y.o. female with uterine malignancy.    Review of Systems:   Constitutional no fever,  no weight loss       Otolaryngeal no difficulty swallowing   Cardiovascular no chest pain   Pulmonary no cough, no sputum production   Gastrointestinal no constipation, no diarrhea                         Personal History       Past Medical/Surgical History:   Past Medical History:   Diagnosis Date    Diabetes mellitus     Hypertension     Uterine mass      Past Surgical History:   Procedure Laterality Date    CHOLECYSTECTOMY         Social History:  reports that she has never smoked. She does not have any smokeless tobacco history on file. She reports that she does not drink alcohol and does not use drugs.    Medications:  Medications Prior to Admission   Medication Sig Dispense Refill Last Dose/Taking    docusate sodium 100 MG capsule Take 1 capsule by mouth Daily.   10/8/2024    Farxiga 10 MG tablet Take 10 mg by mouth Daily.   10/8/2024    ferrous sulfate 325 (65 FE) MG tablet Take 1 tablet by mouth 5 (Five) Times a Week. Monday, Tuesday, Wednesday, Thursday and Friday   10/8/2024    hydrOXYzine (ATARAX) 50 MG tablet Take 1 tablet by mouth Every Night.   10/7/2024    lisinopril (PRINIVIL,ZESTRIL) 40 MG tablet Take 1 tablet by mouth Daily.   10/8/2024    metFORMIN (GLUCOPHAGE) 500 MG tablet Take 1 tablet by mouth 2 (Two) Times a Day.   10/8/2024    metoprolol succinate XL (TOPROL-XL) 50 MG 24 hr tablet Take 1 tablet by mouth every night at bedtime.   10/7/2024    montelukast (SINGULAIR) 10 MG tablet Take 1 tablet by mouth Every Night.   10/7/2024    PARoxetine (PAXIL) 30 MG tablet Take 1 tablet by mouth Daily.   10/7/2024     risperiDONE (risperDAL) 2 MG tablet Take 2 tablets by mouth Every Evening.   10/7/2024    vitamin D (ERGOCALCIFEROL) 1.25 MG (49303 UT) capsule capsule Take 1 capsule by mouth Every 7 (Seven) Days. Wednesdays   10/2/2024     Current medications:  aluminum sulfate-calcium acetate 1:40, 1,000 mL, Topical, Q8H  amLODIPine, 5 mg, Oral, Q24H  amoxicillin-clavulanate, 1 tablet, Oral, Q12H  doxycycline, 100 mg, Oral, Q12H  enoxaparin, 40 mg, Subcutaneous, Q12H  ferrous sulfate, 324 mg, Oral, Once per day on Monday Tuesday Wednesday Thursday Friday  fluconazole, 200 mg, Oral, Q24H  hydrocortisone-bacitracin-zinc oxide-nystatin, 1 Application, Topical, 4x Daily  hydrOXYzine, 50 mg, Oral, Nightly  insulin lispro, 2-9 Units, Subcutaneous, 4x Daily AC & at Bedtime  lisinopril, 40 mg, Oral, Daily  metoprolol succinate XL, 50 mg, Oral, Q24H  montelukast, 10 mg, Oral, Nightly  multivitamin, 1 tablet, Oral, Daily  nystatin, , Topical, Q12H  PARoxetine, 30 mg, Oral, Daily  risperiDONE, 4 mg, Oral, Q PM  sodium chloride, 10 mL, Intravenous, Q12H  zinc sulfate, 220 mg, Oral, Daily      Current IV drips:  Pharmacy to Dose enoxaparin (LOVENOX),         Allergies:  No Known Allergies    Objective    Objective     Vitals:   Temp:  [98 °F (36.7 °C)-98.7 °F (37.1 °C)] 98 °F (36.7 °C)  Heart Rate:  [74-87] 74  Resp:  [19-26] 26  BP: (146-160)/(80-92) 160/87  Flow (L/min) (Oxygen Therapy):  [2] 2      Physical Exam:   Constitutional: Awake, alert, No acute distress    Respiratory: Clear to auscultation bilaterally, nonlabored respirations    Cardiovascular: RRR, no murmurs, rubs, or gallops, palpable pedal pulses bilaterally   Gastrointestinal: Positive bowel sounds, soft, nontender, nondistended        ASA SCALE ASSESSMENT:  2-Mild to moderate systemic disease, medically well controlled, with no functional limitation    MALLAMPATI CLASSIFICATION:  2-Able to visualize the soft palate, fauces, uvula. The anterior & posterior tonsilar pillars  "are hidden by the tongue.       Result Review        Result Review:     Sodium   Date Value Ref Range Status   10/14/2024 138 136 - 145 mmol/L Final       Potassium   Date Value Ref Range Status   10/14/2024 3.8 3.5 - 5.2 mmol/L Final       Chloride   Date Value Ref Range Status   10/14/2024 103 98 - 107 mmol/L Final       No results found for: \"PLASMABICARB\"    BUN   Date Value Ref Range Status   10/14/2024 10 8 - 23 mg/dL Final       Creatinine   Date Value Ref Range Status   10/14/2024 1.11 (H) 0.57 - 1.00 mg/dL Final       Calcium   Date Value Ref Range Status   10/14/2024 10.2 8.6 - 10.5 mg/dL Final           No components found for: \"GLUCOSE.*\"  Results from last 7 days   Lab Units 10/14/24  0848   WBC 10*3/mm3 9.40   HEMOGLOBIN g/dL 10.0*   HEMATOCRIT % 34.5   PLATELETS 10*3/mm3 375      Results from last 7 days   Lab Units 10/14/24  0848   INR  1.14*           Assessment / Plan     Assesment:   Uterine malignancy      Plan:   Left lower quadrant biopsy.    The risks and benefits of the procedure were discussed with the patient.    Electronically signed by Patrick Lucas MD, 10/14/24, 12:47 PM EDT.   "

## 2024-10-14 NOTE — CASE MANAGEMENT/SOCIAL WORK
Continued Stay Note  SERA Martell     Patient Name: Felisha Mukherjee  MRN: [id number]  Today's Date: 10/14/2024    Admit Date: 10/8/2024    Plan: [facility] (accepting.) PASRR approved. No precert required.   Discharge Plan       Row Name 10/14/24 1549       Plan    Plan [facility] (accepting.) PASRR approved. No precert required.    Patient/Family in Agreement with Plan yes    Plan Comments DC barriers: wound care, FC anchored, biopsy today in IR, PO abx. Urology, oncology and ID following.                 Sarah Cormier RN     Office phone: [phone]  Office fax: [phone]

## 2024-10-14 NOTE — PROGRESS NOTES
Hematology/Oncology Inpatient Progress Note    PATIENT NAME: Felisha Mukherjee  : 1965  MRN: [id number]    CHIEF COMPLAINT: Abdominal pain.     HISTORY OF PRESENT ILLNESS:      2024: Ms. Hwang was referred for the investigation and treatment of anemia.  Heme in 2024 for blood count revealed a hemoglobin of 7.1 g/dL with microcytic red cells.  There was clear evidence of iron deficiency with a total iron binding capacity saturation of 2.3%.  She was started on oral iron and was in early 2024 identified as having endometrial adenocarcinoma of the endometrioid type with a FIGO grade 1.  The iron deficiency was attributed to menorrhagia that was felt to be the result of the malignancy.  With persistent anemia a decision was made to treat her with intravenous iron.     -Underwent exam under anesthesia, cystoscopy, D & C on 2024.     Surgical Findings:  Large tumor extending through the cervix filling the vagina.  The bladder was noted to be free of lesions and both ureteral orifices were identified.      Final Pathology  Diagnosis:   A.  Uterus, endometrial curettings:   - Endometrial adenocarcinoma, endometrioid type, FIGO grade 2, see note     Note:   - On immunostaining, the tumor cells are positive for PAX8, ER (70%, 2-3+), and vimentin supportive of above diagnosis.   - Immunohistochemical staining for mismatch repair proteins demonstrates INTACT expression of MLH1, PMS2, MSH2, and MSH6.   - Immunohistochemical staining for 53 demonstrates a wild-type expression.      9/10/2024: For the first time at the [place] office with the above.  She has yet to receive intravenous iron but is scheduled to do so in the near future.  She is also being followed by Dr. Matthews at the [facility] gynecology/oncology program.  Apparently surgery is not in the near future plans.  Generally she feels well at this time although she continues to be fatigued and weak at times.  She  describes a good appetite for the most part and eats frequently during the day.  Her weight is stable.  She has not experienced any chest pains, cough or dyspnea.  She has intermittent abdominal pain and describes abnormal defecations that happen every 2 or 3 days but several times during that day at times with soft stools but never liquid stools.  She has some dysuria.  She has continued to have vaginal bleeding but it has not been as intense as before.  She has no peripheral edema.  On exam she seems chronically ill.  She is not in distress.  She is conversant and oriented.  No jaundice.  The lungs are diminished bilaterally and the heart regular.  The abdomen is soft nontender and there is no edema.  Laboratory exams reviewed.  She has yet to receive intravenous iron.  I have asked her to see me again after completing the intravenous iron with new laboratory exams.     FAMILY HISTORY:  MGM: ovarian cancer  GGM:: ovarian cancer      10/3/2024 patient had PET/CT imaging that showed large hypermetabolic mass involving uterus extending into the cervix and vagina consistent with her known endometrial malignancy.  New hypermetabolic soft tissue involving posterior bladder wall concerning for tumor invasion, new moderate bilateral hydroureteronephrosis is likely secondary to the mass involving left and right UVJ.  There are hypermetabolic iliac, retroperitoneal and small subcarinal lymph nodes concerning for metastatic adenopathy.  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  Cystic 8.6 x 8.4 cm left adnexal lesion with hypermetabolic peripheral nodule could relate to ovarian metastases or primary ovarian neoplasm.    Subjective   10/14/2024:Perhaps better. Less pain. Able to eat. No difficulties sleeping.     ROS:  Review of Systems   Constitutional:  Positive for activity change and fatigue. Negative for appetite change, chills, diaphoresis, fever and unexpected weight change.   HENT:   Negative for congestion, dental problem, drooling, ear discharge, ear pain, facial swelling, hearing loss, mouth sores, nosebleeds, postnasal drip, rhinorrhea, sinus pressure, sinus pain, sneezing, sore throat, tinnitus, trouble swallowing and voice change.    Eyes:  Negative for photophobia, pain, discharge, redness, itching and visual disturbance.   Respiratory:  Negative for apnea, cough, choking, chest tightness, shortness of breath, wheezing and stridor.    Cardiovascular:  Negative for chest pain, palpitations and leg swelling.   Gastrointestinal:  Negative for abdominal distention, abdominal pain, anal bleeding, blood in stool, constipation, diarrhea, nausea, rectal pain and vomiting.   Endocrine: Negative for cold intolerance, heat intolerance, polydipsia and polyuria.   Genitourinary:  Negative for decreased urine volume, difficulty urinating, dysuria, flank pain, frequency, genital sores, hematuria and urgency.   Musculoskeletal:  Negative for arthralgias, back pain, gait problem, joint swelling, myalgias, neck pain and neck stiffness.   Skin:  Negative for color change, pallor and rash.   Neurological:  Positive for weakness. Negative for dizziness, tremors, seizures, syncope, facial asymmetry, speech difficulty, light-headedness, numbness and headaches.   Hematological:  Negative for adenopathy. Does not bruise/bleed easily.   Psychiatric/Behavioral:  Negative for agitation, behavioral problems, confusion, decreased concentration, hallucinations, self-injury, sleep disturbance and suicidal ideas. The patient is not nervous/anxious.       MEDICATIONS:    Scheduled Meds:  aluminum sulfate-calcium acetate 1:40, 1,000 mL, Topical, Q8H  amLODIPine, 5 mg, Oral, Q24H  amoxicillin-clavulanate, 1 tablet, Oral, Q12H  doxycycline, 100 mg, Oral, Q12H  enoxaparin, 40 mg, Subcutaneous, Q12H  ferrous sulfate, 324 mg, Oral, Once per day on Monday Tuesday Wednesday Thursday Friday  fluconazole, 200 mg, Oral,  "Q24H  hydrocortisone-bacitracin-zinc oxide-nystatin, 1 Application, Topical, 4x Daily  hydrOXYzine, 50 mg, Oral, Nightly  insulin lispro, 2-9 Units, Subcutaneous, 4x Daily AC & at Bedtime  lisinopril, 40 mg, Oral, Daily  metoprolol succinate XL, 50 mg, Oral, Q24H  montelukast, 10 mg, Oral, Nightly  multivitamin, 1 tablet, Oral, Daily  nystatin, , Topical, Q12H  PARoxetine, 30 mg, Oral, Daily  risperiDONE, 4 mg, Oral, Q PM  sodium chloride, 10 mL, Intravenous, Q12H  zinc sulfate, 220 mg, Oral, Daily       Continuous Infusions:  Pharmacy to Dose enoxaparin (LOVENOX),        PRN Meds:    acetaminophen    senna-docusate sodium **AND** polyethylene glycol **AND** bisacodyl **AND** bisacodyl    Calcium Replacement - Follow Nurse / BPA Driven Protocol    dextrose    dextrose    glucagon (human recombinant)    Magnesium Standard Dose Replacement - Follow Nurse / BPA Driven Protocol    melatonin    Pharmacy to Dose enoxaparin (LOVENOX)    Phosphorus Replacement - Follow Nurse / BPA Driven Protocol    Potassium Replacement - Follow Nurse / BPA Driven Protocol    [COMPLETED] Insert Peripheral IV **AND** sodium chloride    sodium chloride    sodium chloride    traMADol     ALLERGIES:  No Known Allergies    Objective    VITALS:   /70   Pulse 82   Temp 98 °F (36.7 °C) (Oral)   Resp 21   Ht 160 cm (63\")   Wt 114 kg (251 lb 15.8 oz)   SpO2 93% Comment: room air  BMI 44.64 kg/m²     PHYSICAL EXAM: (performed by MD)  Physical Exam  Constitutional:       General: She is not in acute distress.     Appearance: She is ill-appearing. She is not toxic-appearing or diaphoretic.   HENT:      Head: Normocephalic and atraumatic.      Right Ear: External ear normal.      Left Ear: External ear normal.      Nose: Nose normal.      Mouth/Throat:      Mouth: Mucous membranes are moist.      Pharynx: Oropharynx is clear. No oropharyngeal exudate or posterior oropharyngeal erythema.   Eyes:      General: No scleral icterus.        Right " eye: No discharge.         Left eye: No discharge.      Conjunctiva/sclera: Conjunctivae normal.      Pupils: Pupils are equal, round, and reactive to light.   Cardiovascular:      Rate and Rhythm: Normal rate and regular rhythm.      Pulses: Normal pulses.      Heart sounds: No murmur heard.     No friction rub. No gallop.   Pulmonary:      Effort: No respiratory distress.      Breath sounds: No stridor. No wheezing, rhonchi or rales.   Abdominal:      General: Bowel sounds are normal. There is no distension.      Palpations: Abdomen is soft. There is no mass.      Tenderness: There is no abdominal tenderness. There is no right CVA tenderness, left CVA tenderness, guarding or rebound.      Hernia: No hernia is present.      Comments: Protuberant. Soft and not tender.    Musculoskeletal:         General: No tenderness, deformity or signs of injury.      Cervical back: No rigidity.      Right lower leg: No edema.      Left lower leg: No edema.   Lymphadenopathy:      Cervical: No cervical adenopathy.   Skin:     Coloration: Skin is not jaundiced or pale.      Findings: No bruising, lesion or rash.   Neurological:      General: No focal deficit present.      Mental Status: She is alert and oriented to person, place, and time.      Cranial Nerves: No cranial nerve deficit.   Psychiatric:         Mood and Affect: Mood normal.         Behavior: Behavior normal.         Thought Content: Thought content normal.         Judgment: Judgment normal.      SHAN Del Real MD performed the physical exam on 10/12/2024 as documented above.     RECENT LABS:  Lab Results (last 24 hours)       Procedure Component Value Units Date/Time    POC Glucose Once [[id number]]  (Abnormal) Collected: 10/14/24 1559    Specimen: Blood Updated: 10/14/24 1601     Glucose 177 mg/dL      Comment: Serial Number: [id number]:  [id number]       Tissue Pathology Exam [[id number]] Collected: 10/14/24 1302    Specimen: Tissue from Abdomen Updated:  10/14/24 1357    POC Glucose 4x Daily Before Meals & at Bedtime [[id number]]  (Normal) Collected: 10/14/24 1133    Specimen: Blood Updated: 10/14/24 1135     Glucose 98 mg/dL      Comment: Serial Number: [id number]:  [id number]       Basic Metabolic Panel [[id number]]  (Abnormal) Collected: 10/14/24 0848    Specimen: Blood from Arm, Right Updated: 10/14/24 1000     Glucose 104 mg/dL      BUN 10 mg/dL      Creatinine 1.11 mg/dL      Sodium 138 mmol/L      Potassium 3.8 mmol/L      Chloride 103 mmol/L      CO2 23.5 mmol/L      Calcium 10.2 mg/dL      BUN/Creatinine Ratio 9.0     Anion Gap 11.5 mmol/L      eGFR 57.0 mL/min/1.73     Narrative:      GFR Normal >60  Chronic Kidney Disease <60  Kidney Failure <15      aPTT [[id number]]  (Normal) Collected: 10/14/24 0848    Specimen: Blood from Arm, Right Updated: 10/14/24 0950     PTT 25.1 seconds     Protime-INR [[id number]]  (Abnormal) Collected: 10/14/24 0848    Specimen: Blood from Arm, Right Updated: 10/14/24 0950     Protime 12.3 Seconds      INR 1.14    CBC (No Diff) [[id number]]  (Abnormal) Collected: 10/14/24 0848    Specimen: Blood from Arm, Right Updated: 10/14/24 0939     WBC 9.40 10*3/mm3      RBC 4.30 10*6/mm3      Hemoglobin 10.0 g/dL      Hematocrit 34.5 %      MCV 80.2 fL      MCH 23.3 pg      MCHC 29.0 g/dL      RDW 22.0 %      RDW-SD 64.4 fl      MPV 8.6 fL      Platelets 375 10*3/mm3     POC Glucose 4x Daily Before Meals & at Bedtime [[id number]]  (Abnormal) Collected: 10/14/24 0701    Specimen: Blood Updated: 10/14/24 0702     Glucose 125 mg/dL      Comment: Serial Number: [id number]:  [id number]       POC Glucose Once [[id number]]  (Abnormal) Collected: 10/13/24 2124    Specimen: Blood Updated: 10/13/24 2125     Glucose 168 mg/dL      Comment: Serial Number: [id number]:  [id number]       POC Glucose 4x Daily Before Meals & at Bedtime [[id number]]  (Normal) Collected: 10/13/24 1749    Specimen: Blood Updated: 10/13/24 1752     Glucose 102  mg/dL      Comment: Serial Number: [id number]:  [id number]             IMAGING REVIEWED:  CT Biopsy Abdomen Retroperitoneal    Result Date: 10/14/2024  1. Successful CT-guided biopsy of omental mass Electronically Signed: Partick Lucas MD  10/14/2024 2:48 PM EDT  Workstation ID: [id number]     Assessment & Plan   ASSESSMENT:  Apparent metastatic endometrial cancer: Obtained a biopsy today. Awaiting results. Discussed with Dr. Nash.   Cellulitis of the abdominal skin. Improved. Continue treatment.   Poor performance status.   Awaiting the result of the biopsy.     PLAN:  As above.     Jacinto Del Real MD on 10/14/2024 at 17:45

## 2024-10-14 NOTE — PLAN OF CARE
"Assessment: Felisha Mukherjee presents with ADL impairments affecting function including balance, endurance / activity tolerance, pain, and strength. Pt A&O x4, reports 5/10 pain in low back. Pt demos mod I to scoot HOB in supine to repositon, OT facilitated chair position in bed to comeplte BUE exercises. Pt IND donned lotion to BUEs. Pt demos good ROM. Demonstrated functioning below baseline abilities indicate the need for continued skilled intervention while inpatient. Tolerating session today without incident. Will continue to follow and progress as tolerated.      Plan/Recommendations:   Low Intensity Therapy recommended post-acute care - This is recommended as therapy feels this patient would require 2-3 visits per week. OP or HH would be the best option depending on patient's home bound status. Consider, if the patient has other  \"skilled\" needs such as wounds, IV antibiotics, etc. Combined with \"low intensity\" could also equate to a SNF. If patient is medically complex, consider LTAC.  "

## 2024-10-14 NOTE — PLAN OF CARE
Goal Outcome Evaluation:   Patient received nightly oral and topical antibiotics. See MAR. Rested well throughout the night with call light within reach. Plan of care ongoing.                                           Deconditioning

## 2024-10-15 LAB
ANION GAP SERPL CALCULATED.3IONS-SCNC: 11.2 MMOL/L (ref 5–15)
BUN SERPL-MCNC: 12 MG/DL (ref 8–23)
BUN/CREAT SERPL: 10.1 (ref 7–25)
CALCIUM SPEC-SCNC: 10.1 MG/DL (ref 8.6–10.5)
CHLORIDE SERPL-SCNC: 104 MMOL/L (ref 98–107)
CO2 SERPL-SCNC: 21.8 MMOL/L (ref 22–29)
CREAT SERPL-MCNC: 1.19 MG/DL (ref 0.57–1)
DEPRECATED RDW RBC AUTO: 64.9 FL (ref 37–54)
EGFRCR SERPLBLD CKD-EPI 2021: 52.5 ML/MIN/1.73
ERYTHROCYTE [DISTWIDTH] IN BLOOD BY AUTOMATED COUNT: 21.9 % (ref 12.3–15.4)
GLUCOSE BLDC GLUCOMTR-MCNC: 107 MG/DL (ref 70–105)
GLUCOSE BLDC GLUCOMTR-MCNC: 125 MG/DL (ref 70–105)
GLUCOSE BLDC GLUCOMTR-MCNC: 143 MG/DL (ref 70–105)
GLUCOSE BLDC GLUCOMTR-MCNC: 176 MG/DL (ref 70–105)
GLUCOSE SERPL-MCNC: 133 MG/DL (ref 65–99)
HCT VFR BLD AUTO: 33.5 % (ref 34–46.6)
HGB BLD-MCNC: 9.6 G/DL (ref 12–15.9)
MCH RBC QN AUTO: 23.4 PG (ref 26.6–33)
MCHC RBC AUTO-ENTMCNC: 28.7 G/DL (ref 31.5–35.7)
MCV RBC AUTO: 81.7 FL (ref 79–97)
PLATELET # BLD AUTO: 336 10*3/MM3 (ref 140–450)
PMV BLD AUTO: 8.5 FL (ref 6–12)
POTASSIUM SERPL-SCNC: 4 MMOL/L (ref 3.5–5.2)
RBC # BLD AUTO: 4.1 10*6/MM3 (ref 3.77–5.28)
SODIUM SERPL-SCNC: 137 MMOL/L (ref 136–145)
WBC NRBC COR # BLD AUTO: 9.88 10*3/MM3 (ref 3.4–10.8)

## 2024-10-15 PROCEDURE — 97116 GAIT TRAINING THERAPY: CPT

## 2024-10-15 PROCEDURE — 63710000001 INSULIN LISPRO (HUMAN) PER 5 UNITS: Performed by: INTERNAL MEDICINE

## 2024-10-15 PROCEDURE — 82948 REAGENT STRIP/BLOOD GLUCOSE: CPT

## 2024-10-15 PROCEDURE — 85027 COMPLETE CBC AUTOMATED: CPT | Performed by: STUDENT IN AN ORGANIZED HEALTH CARE EDUCATION/TRAINING PROGRAM

## 2024-10-15 PROCEDURE — 82948 REAGENT STRIP/BLOOD GLUCOSE: CPT | Performed by: INTERNAL MEDICINE

## 2024-10-15 PROCEDURE — 97110 THERAPEUTIC EXERCISES: CPT

## 2024-10-15 PROCEDURE — 80048 BASIC METABOLIC PNL TOTAL CA: CPT | Performed by: STUDENT IN AN ORGANIZED HEALTH CARE EDUCATION/TRAINING PROGRAM

## 2024-10-15 PROCEDURE — 99232 SBSQ HOSP IP/OBS MODERATE 35: CPT | Performed by: INTERNAL MEDICINE

## 2024-10-15 PROCEDURE — 25010000002 ENOXAPARIN PER 10 MG: Performed by: INTERNAL MEDICINE

## 2024-10-15 RX ORDER — FLUCONAZOLE 200 MG/1
200 TABLET ORAL EVERY 24 HOURS
Qty: 1 TABLET | Refills: 0 | Status: CANCELLED | OUTPATIENT
Start: 2024-10-15 | End: 2024-10-16

## 2024-10-15 RX ADMIN — DOXYCYCLINE 100 MG: 100 CAPSULE ORAL at 08:08

## 2024-10-15 RX ADMIN — TRAMADOL HYDROCHLORIDE 50 MG: 50 TABLET ORAL at 01:21

## 2024-10-15 RX ADMIN — HYDROXYZINE HYDROCHLORIDE 50 MG: 25 TABLET, FILM COATED ORAL at 20:59

## 2024-10-15 RX ADMIN — AMOXICILLIN AND CLAVULANATE POTASSIUM 1 TABLET: 875; 125 TABLET, FILM COATED ORAL at 20:59

## 2024-10-15 RX ADMIN — NYSTATIN: 100000 POWDER TOPICAL at 20:59

## 2024-10-15 RX ADMIN — FERROUS SULFATE TAB EC 324 MG (65 MG FE EQUIVALENT) 324 MG: 324 (65 FE) TABLET DELAYED RESPONSE at 08:08

## 2024-10-15 RX ADMIN — ACETAMINOPHEN 1000 MG: 500 TABLET, FILM COATED ORAL at 22:58

## 2024-10-15 RX ADMIN — DOXYCYCLINE 100 MG: 100 CAPSULE ORAL at 20:59

## 2024-10-15 RX ADMIN — Medication 10 ML: at 21:00

## 2024-10-15 RX ADMIN — ENOXAPARIN SODIUM 40 MG: 100 INJECTION SUBCUTANEOUS at 21:00

## 2024-10-15 RX ADMIN — MONTELUKAST 10 MG: 10 TABLET, FILM COATED ORAL at 21:00

## 2024-10-15 RX ADMIN — CALCIUM ACETATE MONOHYDRATE AND ALUMINUM SULFATE TETRADECAHYDRATE 1000 ML: 952; 1347 POWDER, FOR SOLUTION TOPICAL at 13:00

## 2024-10-15 RX ADMIN — TRAMADOL HYDROCHLORIDE 50 MG: 50 TABLET ORAL at 20:59

## 2024-10-15 RX ADMIN — THERA TABS 1 TABLET: TAB at 08:08

## 2024-10-15 RX ADMIN — CALCIUM ACETATE MONOHYDRATE AND ALUMINUM SULFATE TETRADECAHYDRATE 1000 ML: 952; 1347 POWDER, FOR SOLUTION TOPICAL at 21:00

## 2024-10-15 RX ADMIN — AMLODIPINE BESYLATE 5 MG: 5 TABLET ORAL at 08:09

## 2024-10-15 RX ADMIN — CALCIUM ACETATE MONOHYDRATE AND ALUMINUM SULFATE TETRADECAHYDRATE 1000 ML: 952; 1347 POWDER, FOR SOLUTION TOPICAL at 05:09

## 2024-10-15 RX ADMIN — NYSTATIN: 100000 POWDER TOPICAL at 13:31

## 2024-10-15 RX ADMIN — PAROXETINE 30 MG: 10 TABLET, FILM COATED ORAL at 08:08

## 2024-10-15 RX ADMIN — ZINC OXIDE 1 APPLICATION: 200 OINTMENT TOPICAL at 17:15

## 2024-10-15 RX ADMIN — LISINOPRIL 40 MG: 20 TABLET ORAL at 08:08

## 2024-10-15 RX ADMIN — FLUCONAZOLE 200 MG: 100 TABLET ORAL at 14:43

## 2024-10-15 RX ADMIN — ACETAMINOPHEN 1000 MG: 500 TABLET, FILM COATED ORAL at 13:38

## 2024-10-15 RX ADMIN — ENOXAPARIN SODIUM 40 MG: 100 INJECTION SUBCUTANEOUS at 08:08

## 2024-10-15 RX ADMIN — INSULIN LISPRO 2 UNITS: 100 INJECTION, SOLUTION INTRAVENOUS; SUBCUTANEOUS at 11:56

## 2024-10-15 RX ADMIN — ZINC OXIDE 1 APPLICATION: 200 OINTMENT TOPICAL at 13:31

## 2024-10-15 RX ADMIN — ZINC OXIDE 1 APPLICATION: 200 OINTMENT TOPICAL at 20:59

## 2024-10-15 RX ADMIN — Medication 220 MG: at 08:08

## 2024-10-15 RX ADMIN — METOPROLOL SUCCINATE 50 MG: 50 TABLET, EXTENDED RELEASE ORAL at 08:08

## 2024-10-15 RX ADMIN — AMOXICILLIN AND CLAVULANATE POTASSIUM 1 TABLET: 875; 125 TABLET, FILM COATED ORAL at 08:07

## 2024-10-15 NOTE — PROGRESS NOTES
"Nutrition Services    Patient Name: Felisha Mukherjee  YOB: 1965  MRN: [id number]  Admission date: 10/8/2024    PROGRESS NOTE      Encounter Information: Checking in to monitor PO intake. Per nursing documentation, pt is coming 100% of meals.        PO Diet: Diet: Diabetic; Consistent Carbohydrate; Fluid Consistency: Thin (IDDSI 0)   PO Supplements: Boost glucose BID    PO Intake:  100%        Current nutrition support: -   Nutrition support review: -       Labs (reviewed below): Reviewed. Management per attending.        GI Function:  Last BM 10/14        Nutrition Intervention Updates: Continue Boost Glucose Control BID (Provides 380 kcals, 32 g protein if consumed)      Encourage PO intake        Results from last 7 days   Lab Units 10/15/24  0510 10/14/24  0848 10/11/24  0107 10/09/24  0254 10/08/24  1213   SODIUM mmol/L 137 138 139   < > 128*   POTASSIUM mmol/L 4.0 3.8 3.9   < > 4.3   CHLORIDE mmol/L 104 103 104   < > 94*   CO2 mmol/L 21.8* 23.5 23.3   < > 19.0*   BUN mg/dL 12 10 9   < > 17   CREATININE mg/dL 1.19* 1.11* 1.00   < > 1.40*   CALCIUM mg/dL 10.1 10.2 9.6   < > 10.1   BILIRUBIN mg/dL  --   --   --   --  0.4   ALK PHOS U/L  --   --   --   --  68   ALT (SGPT) U/L  --   --   --   --  17   AST (SGOT) U/L  --   --   --   --  29   GLUCOSE mg/dL 133* 104* 121*   < > 121*    < > = values in this interval not displayed.     Results from last 7 days   Lab Units 10/15/24  0510 10/14/24  0848 10/11/24  0107 10/10/24  0307 10/09/24  0254   MAGNESIUM mg/dL  --   --  1.8 1.7 1.9   HEMOGLOBIN g/dL 9.6*   < > 10.8* 9.7* 9.4*   HEMATOCRIT % 33.5*   < > 37.2 33.9* 31.2*    < > = values in this interval not displayed.     No results found for: \"COVID19\"  No results found for: \"HGBA1C\"        RD to follow up per protocol.    Electronically signed by:  Codi Mckeon RD  10/15/24 08:22 EDT   "

## 2024-10-15 NOTE — PLAN OF CARE
Assessment: Felisha Mukherjee presents with functional mobility impairments which indicate the need for skilled intervention. Pt on 2L O2 upon room entry. Able to transfer to toilet SBA, completing pericare IND. Pt O2 sat remained >90% throughout activity, no SOA noted. Tolerating session today without incident. Will continue to follow and progress as tolerated.

## 2024-10-15 NOTE — PROGRESS NOTES
[facility] MEDICINE SERVICE  DAILY PROGRESS NOTE    NAME: Felisha Mukherjee  : 1965  MRN: [id number]      LOS: 7 days     PROVIDER OF SERVICE: Harry Pryor MD    Chief Complaint: Cellulitis of abdominal wall    Subjective:     Interval History:  History taken from: patient    She is eating without issue.  Having bowel movements.  Ambulatory at baseline.  Tolerating her West catheter.  Denies any other additional acute issues.        Review of Systems:   Review of Systems   All other systems reviewed and are negative.      Objective:     Vital Signs  Temp:  [97.8 °F (36.6 °C)-98.2 °F (36.8 °C)] 98.1 °F (36.7 °C)  Heart Rate:  [74-89] 80  Resp:  [17-22] 21  BP: (140-158)/(70-97) 145/83  Flow (L/min) (Oxygen Therapy):  [2-3] 2   Body mass index is 44.64 kg/m².    Physical Exam  Physical Exam  Vitals reviewed.   Constitutional:       Appearance: Normal appearance. She is obese.   HENT:      Head: Normocephalic.   Cardiovascular:      Rate and Rhythm: Normal rate and regular rhythm.   Pulmonary:      Effort: Pulmonary effort is normal.      Breath sounds: Normal breath sounds.   Abdominal:      General: Bowel sounds are normal.      Palpations: Abdomen is soft.   Skin:     Comments: Persistent improvement in patient's cellulitis/rash.   Neurological:      Mental Status: She is alert.            Diagnostic Data    Results from last 7 days   Lab Units 10/15/24  0510   WBC 10*3/mm3 9.88   HEMOGLOBIN g/dL 9.6*   HEMATOCRIT % 33.5*   PLATELETS 10*3/mm3 336   GLUCOSE mg/dL 133*   CREATININE mg/dL 1.19*   BUN mg/dL 12   SODIUM mmol/L 137   POTASSIUM mmol/L 4.0   ANION GAP mmol/L 11.2       CT Biopsy Abdomen Retroperitoneal    Result Date: 10/14/2024  1. Successful CT-guided biopsy of omental mass Electronically Signed: Patrick Lucas MD  10/14/2024 2:48 PM EDT  Workstation ID: [id number]           Assessment:   Lower abdominal wall cellulitis bacterial versus fungal.  Uterine carcinoma.  Hydronephrosis now with  West catheter  Type 2 diabetes  Obesity  Anemia  Acute renal insufficiency      Plan:     Renal function worsened today.  This is likely related to poor hydration over the last few days.  I encouraged her to drink more water today.  We will see her renal function in the morning likely discharge in the morning    Abdominal wound is improving steadily.  She will be able to have radiation once this is finished.      Active and Resolved Problems  Active Hospital Problems    Diagnosis  POA    **Cellulitis of abdominal wall [L03.311]  Yes      Resolved Hospital Problems   No resolved problems to display.           VTE Prophylaxis:  Pharmacologic VTE prophylaxis orders are present.             Disposition Planning:     Barriers to Discharge: Biopsy, bed availability  Anticipated Date of Discharge: 10/16  Place of Discharge:  SNF for wound care if still needed      Time: 30 minutes     Code Status and Medical Interventions: CPR (Attempt to Resuscitate); Full Support   Ordered at: 10/08/24 1722     Level Of Support Discussed With:    Patient     Code Status (Patient has no pulse and is not breathing):    CPR (Attempt to Resuscitate)     Medical Interventions (Patient has pulse or is breathing):    Full Support       Signature: Electronically signed by Harry Pryor MD, 10/15/24, 14:59 EDT.  [facility] Hospitalist Team

## 2024-10-15 NOTE — PLAN OF CARE
Goal Outcome Evaluation:         Patient has complained of pain in lower abdomen and buttock, patient has been repositioned mulitple times and pain medication was given, treatment continues as ordered.

## 2024-10-15 NOTE — PROGRESS NOTES
Hematology/Oncology Inpatient Progress Note    PATIENT NAME: Felisha Mukherjee  : 1965  MRN: [id number]    CHIEF COMPLAINT: Abdominal pain.     HISTORY OF PRESENT ILLNESS:      2024: Ms. Hwang was referred for the investigation and treatment of anemia.  Heme in 2024 for blood count revealed a hemoglobin of 7.1 g/dL with microcytic red cells.  There was clear evidence of iron deficiency with a total iron binding capacity saturation of 2.3%.  She was started on oral iron and was in early 2024 identified as having endometrial adenocarcinoma of the endometrioid type with a FIGO grade 1.  The iron deficiency was attributed to menorrhagia that was felt to be the result of the malignancy.  With persistent anemia a decision was made to treat her with intravenous iron.     -Underwent exam under anesthesia, cystoscopy, D & C on 2024.     Surgical Findings:  Large tumor extending through the cervix filling the vagina.  The bladder was noted to be free of lesions and both ureteral orifices were identified.      Final Pathology  Diagnosis:   A.  Uterus, endometrial curettings:   - Endometrial adenocarcinoma, endometrioid type, FIGO grade 2, see note     Note:   - On immunostaining, the tumor cells are positive for PAX8, ER (70%, 2-3+), and vimentin supportive of above diagnosis.   - Immunohistochemical staining for mismatch repair proteins demonstrates INTACT expression of MLH1, PMS2, MSH2, and MSH6.   - Immunohistochemical staining for 53 demonstrates a wild-type expression.      9/10/2024: For the first time at the [place] office with the above.  She has yet to receive intravenous iron but is scheduled to do so in the near future.  She is also being followed by Dr. Matthews at the [facility] gynecology/oncology program.  Apparently surgery is not in the near future plans.  Generally she feels well at this time although she continues to be fatigued and weak at times.  She  describes a good appetite for the most part and eats frequently during the day.  Her weight is stable.  She has not experienced any chest pains, cough or dyspnea.  She has intermittent abdominal pain and describes abnormal defecations that happen every 2 or 3 days but several times during that day at times with soft stools but never liquid stools.  She has some dysuria.  She has continued to have vaginal bleeding but it has not been as intense as before.  She has no peripheral edema.  On exam she seems chronically ill.  She is not in distress.  She is conversant and oriented.  No jaundice.  The lungs are diminished bilaterally and the heart regular.  The abdomen is soft nontender and there is no edema.  Laboratory exams reviewed.  She has yet to receive intravenous iron.  I have asked her to see me again after completing the intravenous iron with new laboratory exams.     FAMILY HISTORY:  MGM: ovarian cancer  GGM:: ovarian cancer      10/3/2024 patient had PET/CT imaging that showed large hypermetabolic mass involving uterus extending into the cervix and vagina consistent with her known endometrial malignancy.  New hypermetabolic soft tissue involving posterior bladder wall concerning for tumor invasion, new moderate bilateral hydroureteronephrosis is likely secondary to the mass involving left and right UVJ.  There are hypermetabolic iliac, retroperitoneal and small subcarinal lymph nodes concerning for metastatic adenopathy.  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  Cystic 8.6 x 8.4 cm left adnexal lesion with hypermetabolic peripheral nodule could relate to ovarian metastases or primary ovarian neoplasm.    Subjective   10/15/2024 Feeling better. For the most part without pain. Able to eat. Not very mobile.     ROS:  Review of Systems   Constitutional:  Positive for activity change and fatigue. Negative for appetite change, chills, diaphoresis, fever and unexpected weight change.    HENT:  Negative for congestion, dental problem, drooling, ear discharge, ear pain, facial swelling, hearing loss, mouth sores, nosebleeds, postnasal drip, rhinorrhea, sinus pressure, sinus pain, sneezing, sore throat, tinnitus, trouble swallowing and voice change.    Eyes:  Negative for photophobia, pain, discharge, redness, itching and visual disturbance.   Respiratory:  Negative for apnea, cough, choking, chest tightness, shortness of breath, wheezing and stridor.    Cardiovascular:  Negative for chest pain, palpitations and leg swelling.   Gastrointestinal:  Negative for abdominal distention, abdominal pain, anal bleeding, blood in stool, constipation, diarrhea, nausea, rectal pain and vomiting.   Endocrine: Negative for cold intolerance, heat intolerance, polydipsia and polyuria.   Genitourinary:  Negative for decreased urine volume, difficulty urinating, dysuria, flank pain, frequency, genital sores, hematuria and urgency.   Musculoskeletal:  Negative for arthralgias, back pain, gait problem, joint swelling, myalgias, neck pain and neck stiffness.   Skin:  Negative for color change, pallor and rash.   Neurological:  Positive for weakness. Negative for dizziness, tremors, seizures, syncope, facial asymmetry, speech difficulty, light-headedness, numbness and headaches.   Hematological:  Negative for adenopathy. Does not bruise/bleed easily.   Psychiatric/Behavioral:  Negative for agitation, behavioral problems, confusion, decreased concentration, hallucinations, self-injury, sleep disturbance and suicidal ideas. The patient is not nervous/anxious.       MEDICATIONS:    Scheduled Meds:  aluminum sulfate-calcium acetate 1:40, 1,000 mL, Topical, Q8H  amLODIPine, 5 mg, Oral, Q24H  amoxicillin-clavulanate, 1 tablet, Oral, Q12H  doxycycline, 100 mg, Oral, Q12H  enoxaparin, 40 mg, Subcutaneous, Q12H  ferrous sulfate, 324 mg, Oral, Once per day on Monday Tuesday Wednesday Thursday Friday  fluconazole, 200 mg, Oral,  "Q24H  hydrocortisone-bacitracin-zinc oxide-nystatin, 1 Application, Topical, 4x Daily  hydrOXYzine, 50 mg, Oral, Nightly  insulin lispro, 2-9 Units, Subcutaneous, 4x Daily AC & at Bedtime  lisinopril, 40 mg, Oral, Daily  metoprolol succinate XL, 50 mg, Oral, Q24H  montelukast, 10 mg, Oral, Nightly  multivitamin, 1 tablet, Oral, Daily  nystatin, , Topical, Q12H  PARoxetine, 30 mg, Oral, Daily  risperiDONE, 4 mg, Oral, Q PM  sodium chloride, 10 mL, Intravenous, Q12H  zinc sulfate, 220 mg, Oral, Daily       Continuous Infusions:  Pharmacy to Dose enoxaparin (LOVENOX),        PRN Meds:    acetaminophen    senna-docusate sodium **AND** polyethylene glycol **AND** bisacodyl **AND** bisacodyl    Calcium Replacement - Follow Nurse / BPA Driven Protocol    dextrose    dextrose    glucagon (human recombinant)    Magnesium Standard Dose Replacement - Follow Nurse / BPA Driven Protocol    melatonin    Pharmacy to Dose enoxaparin (LOVENOX)    Phosphorus Replacement - Follow Nurse / BPA Driven Protocol    Potassium Replacement - Follow Nurse / BPA Driven Protocol    [COMPLETED] Insert Peripheral IV **AND** sodium chloride    sodium chloride    sodium chloride    traMADol     ALLERGIES:  No Known Allergies    Objective    VITALS:   /83 (BP Location: Left arm, Patient Position: Lying)   Pulse 74   Temp 98.1 °F (36.7 °C) (Oral)   Resp 21   Ht 160 cm (63\")   Wt 114 kg (251 lb 15.8 oz)   SpO2 96%   BMI 44.64 kg/m²     PHYSICAL EXAM: (performed by MD)  Physical Exam  Constitutional:       General: She is not in acute distress.     Appearance: She is ill-appearing. She is not toxic-appearing or diaphoretic.   HENT:      Head: Normocephalic and atraumatic.      Right Ear: External ear normal.      Left Ear: External ear normal.      Nose: Nose normal.      Mouth/Throat:      Mouth: Mucous membranes are moist.      Pharynx: Oropharynx is clear. No oropharyngeal exudate or posterior oropharyngeal erythema.   Eyes:      " General: No scleral icterus.        Right eye: No discharge.         Left eye: No discharge.      Conjunctiva/sclera: Conjunctivae normal.      Pupils: Pupils are equal, round, and reactive to light.   Cardiovascular:      Rate and Rhythm: Normal rate and regular rhythm.      Pulses: Normal pulses.      Heart sounds: No murmur heard.     No friction rub. No gallop.   Pulmonary:      Effort: No respiratory distress.      Breath sounds: No stridor. No wheezing, rhonchi or rales.   Abdominal:      General: Bowel sounds are normal. There is no distension.      Palpations: Abdomen is soft. There is no mass.      Tenderness: There is no abdominal tenderness. There is no right CVA tenderness, left CVA tenderness, guarding or rebound.      Hernia: No hernia is present.      Comments: Protuberant. Soft and not tender.    Musculoskeletal:         General: No tenderness, deformity or signs of injury.      Cervical back: No rigidity.      Right lower leg: No edema.      Left lower leg: No edema.   Lymphadenopathy:      Cervical: No cervical adenopathy.   Skin:     Coloration: Skin is not jaundiced or pale.      Findings: No bruising, lesion or rash.   Neurological:      General: No focal deficit present.      Mental Status: She is alert and oriented to person, place, and time.      Cranial Nerves: No cranial nerve deficit.   Psychiatric:         Mood and Affect: Mood normal.         Behavior: Behavior normal.         Thought Content: Thought content normal.         Judgment: Judgment normal.      SHAN Del Real MD performed the physical exam on 10/15/2024 as documented above.     RECENT LABS:  Lab Results (last 24 hours)       Procedure Component Value Units Date/Time    POC Glucose Once [[id number]]  (Abnormal) Collected: 10/15/24 1115    Specimen: Blood Updated: 10/15/24 1117     Glucose 176 mg/dL      Comment: Serial Number: [id number]:  [id number]       POC Glucose 4x Daily Before Meals & at Bedtime [[id number]]   (Abnormal) Collected: 10/15/24 0719    Specimen: Blood Updated: 10/15/24 0721     Glucose 125 mg/dL      Comment: Serial Number: [id number]:  [id number]       Basic Metabolic Panel [[id number]]  (Abnormal) Collected: 10/15/24 0510    Specimen: Blood Updated: 10/15/24 0636     Glucose 133 mg/dL      BUN 12 mg/dL      Creatinine 1.19 mg/dL      Sodium 137 mmol/L      Potassium 4.0 mmol/L      Chloride 104 mmol/L      CO2 21.8 mmol/L      Calcium 10.1 mg/dL      BUN/Creatinine Ratio 10.1     Anion Gap 11.2 mmol/L      eGFR 52.5 mL/min/1.73     Narrative:      GFR Normal >60  Chronic Kidney Disease <60  Kidney Failure <15      CBC (No Diff) [[id number]]  (Abnormal) Collected: 10/15/24 0510    Specimen: Blood Updated: 10/15/24 0607     WBC 9.88 10*3/mm3      RBC 4.10 10*6/mm3      Hemoglobin 9.6 g/dL      Hematocrit 33.5 %      MCV 81.7 fL      MCH 23.4 pg      MCHC 28.7 g/dL      RDW 21.9 %      RDW-SD 64.9 fl      MPV 8.5 fL      Platelets 336 10*3/mm3     POC Glucose Once [[id number]]  (Abnormal) Collected: 10/14/24 2033    Specimen: Blood Updated: 10/14/24 2035     Glucose 157 mg/dL      Comment: Serial Number: [id number]:  [id number]       POC Glucose Once [[id number]]  (Abnormal) Collected: 10/14/24 1559    Specimen: Blood Updated: 10/14/24 1601     Glucose 177 mg/dL      Comment: Serial Number: [id number]:  [id number]       Tissue Pathology Exam [[id number]] Collected: 10/14/24 1302    Specimen: Tissue from Abdomen Updated: 10/14/24 1357          IMAGING REVIEWED:  CT Biopsy Abdomen Retroperitoneal    Result Date: 10/14/2024  1. Successful CT-guided biopsy of omental mass Electronically Signed: Patrick Lucas MD  10/14/2024 2:48 PM EDT  Workstation ID: [id number]     Assessment & Plan   ASSESSMENT:  Apparent metastatic endometrial cancer: Awaiting the report of the biopsy.   Cellulitis of the abdominal skin. Continues to improve  Poor performance status.   Reviewed the results of the laboratory exams.  Discussed with her. Encouraged mobility.     PLAN:  As above.     Jacinto Del Real MD on 10/15/2024 at 12:48 PM.

## 2024-10-15 NOTE — THERAPY TREATMENT NOTE
"Subjective: Pt agreeable to therapeutic plan of care.    Objective:     Precautions - falls    Bed mobility - SBA  Transfers - SBA  Ambulation - 50 feet SBA, no AD    Therapeutic Exercise - 20 Reps B LE AROM supported sitting / chair    Vitals: WNL    Pain: 0 VAS   Location: N/A  Intervention for pain: N/A    Education: Provided education on the importance of mobility in the acute care setting, Verbal/Tactile Cues, Transfer Training, and Gait Training    Assessment: Felisha Mukherjee presents with functional mobility impairments which indicate the need for skilled intervention. Pt on 2L O2 upon room entry. Able to transfer to toilet SBA, completing pericare IND. Pt O2 sat remained >90% throughout activity, no SOA noted. Tolerating session today without incident. Will continue to follow and progress as tolerated.     Plan/Recommendations:   If medically appropriate, Low Intensity Therapy recommended post-acute care - This is recommended as therapy feels this patient would require 2-3 visits per week. OP or HH would be the best option depending on patient's home bound status. Consider, if the patient has other  \"skilled\" needs such as wounds, IV antibiotics, etc. Combined with \"low intensity\" could also equate to a SNF. If patient is medically complex, consider LTAC. Pt requires no DME at discharge.     Pt desires Home with Home Health at discharge. Pt cooperative; agreeable to therapeutic recommendations and plan of care.     Modified [place]: N/A = No pre-op stroke/TIA    Post-Tx Position: Up in Chair, Alarms activated, and Call light and personal items within reach  PPE: gloves   "

## 2024-10-16 ENCOUNTER — DOCUMENTATION (OUTPATIENT)
Dept: RADIATION ONCOLOGY | Facility: HOSPITAL | Age: 59
End: 2024-10-16
Payer: MEDICARE

## 2024-10-16 ENCOUNTER — APPOINTMENT (OUTPATIENT)
Dept: ULTRASOUND IMAGING | Facility: HOSPITAL | Age: 59
DRG: 580 | End: 2024-10-16
Payer: MEDICARE

## 2024-10-16 LAB
ANION GAP SERPL CALCULATED.3IONS-SCNC: 13.5 MMOL/L (ref 5–15)
BUN SERPL-MCNC: 16 MG/DL (ref 8–23)
BUN/CREAT SERPL: 10.8 (ref 7–25)
CALCIUM SPEC-SCNC: 9.9 MG/DL (ref 8.6–10.5)
CHLORIDE SERPL-SCNC: 101 MMOL/L (ref 98–107)
CO2 SERPL-SCNC: 20.5 MMOL/L (ref 22–29)
CREAT SERPL-MCNC: 1.48 MG/DL (ref 0.57–1)
DEPRECATED RDW RBC AUTO: 65.2 FL (ref 37–54)
EGFRCR SERPLBLD CKD-EPI 2021: 40.4 ML/MIN/1.73
ERYTHROCYTE [DISTWIDTH] IN BLOOD BY AUTOMATED COUNT: 21.9 % (ref 12.3–15.4)
GLUCOSE BLDC GLUCOMTR-MCNC: 113 MG/DL (ref 70–105)
GLUCOSE BLDC GLUCOMTR-MCNC: 117 MG/DL (ref 70–105)
GLUCOSE BLDC GLUCOMTR-MCNC: 127 MG/DL (ref 70–105)
GLUCOSE BLDC GLUCOMTR-MCNC: 136 MG/DL (ref 70–105)
GLUCOSE SERPL-MCNC: 125 MG/DL (ref 65–99)
HCT VFR BLD AUTO: 34.9 % (ref 34–46.6)
HGB BLD-MCNC: 10.3 G/DL (ref 12–15.9)
MCH RBC QN AUTO: 24.2 PG (ref 26.6–33)
MCHC RBC AUTO-ENTMCNC: 29.5 G/DL (ref 31.5–35.7)
MCV RBC AUTO: 82.1 FL (ref 79–97)
PLATELET # BLD AUTO: 350 10*3/MM3 (ref 140–450)
PMV BLD AUTO: 8.7 FL (ref 6–12)
POTASSIUM SERPL-SCNC: 3.9 MMOL/L (ref 3.5–5.2)
RAD ONC ARIA COURSE ID: NORMAL
RAD ONC ARIA COURSE LAST TREATMENT DATE: NORMAL
RAD ONC ARIA COURSE START DATE: NORMAL
RAD ONC ARIA COURSE TREATMENT ELAPSED DAYS: 0
RAD ONC ARIA FIRST TREATMENT DATE: NORMAL
RAD ONC ARIA PLAN FRACTIONS TREATED TO DATE: 1
RAD ONC ARIA PLAN ID: NORMAL
RAD ONC ARIA PLAN PRESCRIBED DOSE PER FRACTION: 4 GY
RAD ONC ARIA PLAN PRIMARY REFERENCE POINT: NORMAL
RAD ONC ARIA PLAN TOTAL FRACTIONS PRESCRIBED: 5
RAD ONC ARIA PLAN TOTAL PRESCRIBED DOSE: 2000 CGY
RAD ONC ARIA REFERENCE POINT DOSAGE GIVEN TO DATE: 4 GY
RAD ONC ARIA REFERENCE POINT ID: NORMAL
RAD ONC ARIA REFERENCE POINT SESSION DOSAGE GIVEN: 4 GY
RBC # BLD AUTO: 4.25 10*6/MM3 (ref 3.77–5.28)
SODIUM SERPL-SCNC: 135 MMOL/L (ref 136–145)
WBC NRBC COR # BLD AUTO: 9.93 10*3/MM3 (ref 3.4–10.8)

## 2024-10-16 PROCEDURE — 82948 REAGENT STRIP/BLOOD GLUCOSE: CPT | Performed by: INTERNAL MEDICINE

## 2024-10-16 PROCEDURE — 25010000002 ENOXAPARIN PER 10 MG: Performed by: INTERNAL MEDICINE

## 2024-10-16 PROCEDURE — 99232 SBSQ HOSP IP/OBS MODERATE 35: CPT | Performed by: INTERNAL MEDICINE

## 2024-10-16 PROCEDURE — 76775 US EXAM ABDO BACK WALL LIM: CPT

## 2024-10-16 PROCEDURE — 85027 COMPLETE CBC AUTOMATED: CPT | Performed by: STUDENT IN AN ORGANIZED HEALTH CARE EDUCATION/TRAINING PROGRAM

## 2024-10-16 PROCEDURE — 82948 REAGENT STRIP/BLOOD GLUCOSE: CPT

## 2024-10-16 PROCEDURE — 80048 BASIC METABOLIC PNL TOTAL CA: CPT | Performed by: STUDENT IN AN ORGANIZED HEALTH CARE EDUCATION/TRAINING PROGRAM

## 2024-10-16 RX ORDER — AMLODIPINE BESYLATE 5 MG/1
5 TABLET ORAL
Qty: 30 TABLET | Refills: 0 | Status: SHIPPED | OUTPATIENT
Start: 2024-10-16 | End: 2024-11-02

## 2024-10-16 RX ORDER — DIPHENOXYLATE HYDROCHLORIDE AND ATROPINE SULFATE 2.5; .025 MG/1; MG/1
1 TABLET ORAL DAILY
Qty: 30 TABLET | Refills: 0 | Status: SHIPPED | OUTPATIENT
Start: 2024-10-16 | End: 2024-11-02

## 2024-10-16 RX ORDER — DOXYCYCLINE 100 MG/1
100 CAPSULE ORAL EVERY 12 HOURS SCHEDULED
Qty: 2 CAPSULE | Refills: 0 | Status: SHIPPED | OUTPATIENT
Start: 2024-10-16 | End: 2024-10-17

## 2024-10-16 RX ORDER — NYSTATIN 100000 [USP'U]/G
POWDER TOPICAL EVERY 12 HOURS SCHEDULED
Qty: 60 G | Refills: 6 | Status: SHIPPED | OUTPATIENT
Start: 2024-10-16 | End: 2024-11-02

## 2024-10-16 RX ORDER — ZINC SULFATE 50(220)MG
220 CAPSULE ORAL DAILY
Qty: 30 CAPSULE | Refills: 0 | Status: SHIPPED | OUTPATIENT
Start: 2024-10-16 | End: 2024-11-02

## 2024-10-16 RX ORDER — TRAMADOL HYDROCHLORIDE 50 MG/1
50 TABLET ORAL EVERY 6 HOURS PRN
Qty: 30 TABLET | Refills: 0 | Status: SHIPPED | OUTPATIENT
Start: 2024-10-16 | End: 2024-10-24

## 2024-10-16 RX ADMIN — NYSTATIN: 100000 POWDER TOPICAL at 20:32

## 2024-10-16 RX ADMIN — TRAMADOL HYDROCHLORIDE 50 MG: 50 TABLET ORAL at 04:49

## 2024-10-16 RX ADMIN — ZINC OXIDE 1 APPLICATION: 200 OINTMENT TOPICAL at 17:07

## 2024-10-16 RX ADMIN — NYSTATIN: 100000 POWDER TOPICAL at 11:51

## 2024-10-16 RX ADMIN — Medication 10 ML: at 20:34

## 2024-10-16 RX ADMIN — CALCIUM ACETATE MONOHYDRATE AND ALUMINUM SULFATE TETRADECAHYDRATE 1000 ML: 952; 1347 POWDER, FOR SOLUTION TOPICAL at 05:00

## 2024-10-16 RX ADMIN — CALCIUM ACETATE MONOHYDRATE AND ALUMINUM SULFATE TETRADECAHYDRATE 1000 ML: 952; 1347 POWDER, FOR SOLUTION TOPICAL at 22:43

## 2024-10-16 RX ADMIN — THERA TABS 1 TABLET: TAB at 08:27

## 2024-10-16 RX ADMIN — RISPERIDONE 4 MG: 1 TABLET, FILM COATED ORAL at 16:42

## 2024-10-16 RX ADMIN — AMOXICILLIN AND CLAVULANATE POTASSIUM 1 TABLET: 875; 125 TABLET, FILM COATED ORAL at 20:32

## 2024-10-16 RX ADMIN — ACETAMINOPHEN 1000 MG: 500 TABLET, FILM COATED ORAL at 16:42

## 2024-10-16 RX ADMIN — Medication 220 MG: at 08:27

## 2024-10-16 RX ADMIN — FERROUS SULFATE TAB EC 324 MG (65 MG FE EQUIVALENT) 324 MG: 324 (65 FE) TABLET DELAYED RESPONSE at 08:27

## 2024-10-16 RX ADMIN — MONTELUKAST 10 MG: 10 TABLET, FILM COATED ORAL at 20:32

## 2024-10-16 RX ADMIN — HYDROXYZINE HYDROCHLORIDE 50 MG: 25 TABLET, FILM COATED ORAL at 20:32

## 2024-10-16 RX ADMIN — PAROXETINE 30 MG: 10 TABLET, FILM COATED ORAL at 08:27

## 2024-10-16 RX ADMIN — METOPROLOL SUCCINATE 50 MG: 50 TABLET, EXTENDED RELEASE ORAL at 08:26

## 2024-10-16 RX ADMIN — DOXYCYCLINE 100 MG: 100 CAPSULE ORAL at 08:26

## 2024-10-16 RX ADMIN — AMLODIPINE BESYLATE 5 MG: 5 TABLET ORAL at 08:27

## 2024-10-16 RX ADMIN — LISINOPRIL 40 MG: 20 TABLET ORAL at 08:26

## 2024-10-16 RX ADMIN — ENOXAPARIN SODIUM 40 MG: 100 INJECTION SUBCUTANEOUS at 08:26

## 2024-10-16 RX ADMIN — ZINC OXIDE 1 APPLICATION: 200 OINTMENT TOPICAL at 11:51

## 2024-10-16 RX ADMIN — ENOXAPARIN SODIUM 40 MG: 100 INJECTION SUBCUTANEOUS at 20:32

## 2024-10-16 RX ADMIN — CALCIUM ACETATE MONOHYDRATE AND ALUMINUM SULFATE TETRADECAHYDRATE 1000 ML: 952; 1347 POWDER, FOR SOLUTION TOPICAL at 14:48

## 2024-10-16 RX ADMIN — ZINC OXIDE 1 APPLICATION: 200 OINTMENT TOPICAL at 20:32

## 2024-10-16 RX ADMIN — AMOXICILLIN AND CLAVULANATE POTASSIUM 1 TABLET: 875; 125 TABLET, FILM COATED ORAL at 08:26

## 2024-10-16 NOTE — PLAN OF CARE
Problem: Adult Inpatient Plan of Care  Goal: Plan of Care Review  Outcome: Progressing  Goal: Patient-Specific Goal (Individualized)  Outcome: Progressing  Goal: Absence of Hospital-Acquired Illness or Injury  Outcome: Progressing  Intervention: Identify and Manage Fall Risk  Recent Flowsheet Documentation  Taken 10/16/2024 1703 by Monique Radford RN  Safety Promotion/Fall Prevention:   safety round/check completed   room organization consistent   activity supervised  Taken 10/16/2024 1628 by Monique Radford RN  Safety Promotion/Fall Prevention:   safety round/check completed   room organization consistent   nonskid shoes/slippers when out of bed   fall prevention program maintained   activity supervised   clutter free environment maintained  Taken 10/16/2024 1437 by Monique Radford RN  Safety Promotion/Fall Prevention:   safety round/check completed   room organization consistent   nonskid shoes/slippers when out of bed   lighting adjusted   fall prevention program maintained   clutter free environment maintained   activity supervised  Taken 10/16/2024 1400 by Monique Radford RN  Safety Promotion/Fall Prevention: patient off unit  Taken 10/16/2024 1252 by Monique Radford RN  Safety Promotion/Fall Prevention: (pt just left for radiation) patient off unit  Taken 10/16/2024 0824 by Monique Radford RN  Safety Promotion/Fall Prevention:   activity supervised   clutter free environment maintained   safety round/check completed   room organization consistent   lighting adjusted   fall prevention program maintained  Intervention: Prevent Skin Injury  Recent Flowsheet Documentation  Taken 10/16/2024 0824 by Monique Radford RN  Skin Protection:   incontinence pads utilized   skin sealant/moisture barrier applied  Intervention: Prevent and Manage VTE (Venous Thromboembolism) Risk  Recent Flowsheet Documentation  Taken 10/16/2024 0824 by Monique Radford RN  VTE Prevention/Management: (Lovenox) other (see  comments)  Intervention: Prevent Infection  Recent Flowsheet Documentation  Taken 10/16/2024 0824 by Monique Radford, RN  Infection Prevention:   rest/sleep promoted   hand hygiene promoted   single patient room provided  Goal: Optimal Comfort and Wellbeing  Outcome: Progressing  Goal: Readiness for Transition of Care  Outcome: Progressing     Problem: Impaired Wound Healing  Goal: Optimal Wound Healing  Outcome: Progressing     Problem: Skin Injury Risk Increased  Goal: Skin Health and Integrity  Outcome: Progressing  Intervention: Optimize Skin Protection  Recent Flowsheet Documentation  Taken 10/16/2024 1628 by Monique Radford, RN  Activity Management: ambulated to bathroom  Taken 10/16/2024 1437 by Monique Radford RN  Activity Management: ambulated to bathroom  Taken 10/16/2024 0824 by Monique Radford RN  Activity Management:   ambulated to bathroom   back to bed  Pressure Reduction Techniques:   frequent weight shift encouraged   sit time limited to 2 hours   weight shift assistance provided  Skin Protection:   incontinence pads utilized   skin sealant/moisture barrier applied     Problem: Fall Injury Risk  Goal: Absence of Fall and Fall-Related Injury  Outcome: Progressing  Intervention: Identify and Manage Contributors  Recent Flowsheet Documentation  Taken 10/16/2024 1628 by Monique Radford RN  Medication Review/Management: medications reviewed  Taken 10/16/2024 1437 by Monique Radford RN  Medication Review/Management: medications reviewed  Taken 10/16/2024 0824 by Monique Radford RN  Medication Review/Management: medications reviewed  Intervention: Promote Injury-Free Environment  Recent Flowsheet Documentation  Taken 10/16/2024 1703 by Monique Radford, RN  Safety Promotion/Fall Prevention:   safety round/check completed   room organization consistent   activity supervised  Taken 10/16/2024 1628 by Monique Radford RN  Safety Promotion/Fall Prevention:   safety round/check completed   room organization  consistent   nonskid shoes/slippers when out of bed   fall prevention program maintained   activity supervised   clutter free environment maintained  Taken 10/16/2024 1437 by Monique Radford RN  Safety Promotion/Fall Prevention:   safety round/check completed   room organization consistent   nonskid shoes/slippers when out of bed   lighting adjusted   fall prevention program maintained   clutter free environment maintained   activity supervised  Taken 10/16/2024 1400 by Monique Radford RN  Safety Promotion/Fall Prevention: patient off unit  Taken 10/16/2024 1252 by Monique Radford RN  Safety Promotion/Fall Prevention: (pt just left for radiation) patient off unit  Taken 10/16/2024 0824 by Monique Radford, RN  Safety Promotion/Fall Prevention:   activity supervised   clutter free environment maintained   safety round/check completed   room organization consistent   lighting adjusted   fall prevention program maintained   Goal Outcome Evaluation:

## 2024-10-16 NOTE — PROGRESS NOTES
[facility] MEDICINE SERVICE  DAILY PROGRESS NOTE    NAME: Felisha Mukherjee  : 1965  MRN: [id number]      LOS: 8 days     PROVIDER OF SERVICE: Harry Pryor MD    Chief Complaint: Cellulitis of abdominal wall    Subjective:     Interval History:  History taken from: patient    She is eating without issue.  Bowel movements slightly up.  West catheter has been in and out over the last day.  She does not like the catheter and would like to keep it out if at all possible.      Review of Systems:   Review of Systems   All other systems reviewed and are negative.      Objective:     Vital Signs  Temp:  [97.3 °F (36.3 °C)-99 °F (37.2 °C)] 98.7 °F (37.1 °C)  Heart Rate:  [72-82] 79  Resp:  [14-18] 14  BP: (130-153)/(88-99) 130/90  Flow (L/min) (Oxygen Therapy):  [2] 2   Body mass index is 44.64 kg/m².    Physical Exam  Physical Exam  Vitals reviewed.   Constitutional:       Appearance: Normal appearance. She is obese.   HENT:      Head: Normocephalic.   Cardiovascular:      Rate and Rhythm: Normal rate and regular rhythm.   Pulmonary:      Effort: Pulmonary effort is normal.      Breath sounds: Normal breath sounds.   Abdominal:      General: Bowel sounds are normal.      Palpations: Abdomen is soft.   Skin:     Comments: Persistent improvement in patient's cellulitis/rash.   Neurological:      Mental Status: She is alert.            Diagnostic Data    Results from last 7 days   Lab Units 10/16/24  0501   WBC 10*3/mm3 9.93   HEMOGLOBIN g/dL 10.3*   HEMATOCRIT % 34.9   PLATELETS 10*3/mm3 350   GLUCOSE mg/dL 125*   CREATININE mg/dL 1.48*   BUN mg/dL 16   SODIUM mmol/L 135*   POTASSIUM mmol/L 3.9   ANION GAP mmol/L 13.5       CT Biopsy Abdomen Retroperitoneal    Result Date: 10/14/2024  1. Successful CT-guided biopsy of omental mass Electronically Signed: Patrick Lucas MD  10/14/2024 2:48 PM EDT  Workstation ID: [id number]           Assessment:   Lower abdominal wall cellulitis bacterial versus fungal.  Uterine  carcinoma.  Hydronephrosis now with West catheter  Type 2 diabetes  Obesity  Anemia  Acute renal insufficiency      Plan:     Renal function continues to slide.  Her p.o. intake seems to be appropriate.  She is having some increased bowel movements but I do not believe she is grossly hypovolemic.  Seems to be keeping up with her output.  Does not seem to be grossly overloaded either.  Will check kidneys with ultrasound to exclude obstructive process.  Will have nephrology chat with her today regarding this.    Abdominal wound is improving steadily.  She will be able to have radiation once this is finished.      Active and Resolved Problems  Active Hospital Problems    Diagnosis  POA    **Cellulitis of abdominal wall [L03.311]  Yes      Resolved Hospital Problems   No resolved problems to display.           VTE Prophylaxis:  Pharmacologic VTE prophylaxis orders are present.             Disposition Planning:     Barriers to Discharge: [facility] management, bed availability  Anticipated Date of Discharge: 10/16  Place of Discharge:  SNF for wound care if still needed      Time: 30 minutes     Code Status and Medical Interventions: CPR (Attempt to Resuscitate); Full Support   Ordered at: 10/08/24 1722     Level Of Support Discussed With:    Patient     Code Status (Patient has no pulse and is not breathing):    CPR (Attempt to Resuscitate)     Medical Interventions (Patient has pulse or is breathing):    Full Support       Signature: Electronically signed by Harry Pryor MD, 10/16/24, 13:13 EDT.  [facility]ist Team

## 2024-10-16 NOTE — SIGNIFICANT NOTE
10/16/24 1446   OTHER   Discipline physical therapist   Rehab Time/Intention   Session Not Performed patient unavailable for treatment  (Pt off the floor at time of attempt. Will follow up tomorrow.)   Recommendation   PT - Next Appointment 10/17/24

## 2024-10-16 NOTE — CASE MANAGEMENT/SOCIAL WORK
Continued Stay Note  [facility]     Patient Name: Felisha Mukherjee  MRN: [id number]  Today's Date: 10/16/2024    Admit Date: 10/8/2024    Plan: [facility] (accepting.) PASRR approved. No precert required.   Discharge Plan       Row Name 10/16/24 1301       Plan    Plan Comments DC barriers: creat trending up, wound care, biopsy results pending. Urology, oncology, ID following                     Sarah Cormier RN     Office phone: [phone]  Office fax: [phone]

## 2024-10-16 NOTE — PLAN OF CARE
"Goal Outcome Evaluation:            Patient has increased fluid intake this shift, patient has voiced that she feels anxious and goes to the bathroom a lot when she is anxious, patient has stated that she is \"uncomfortable\", pain medication has been given throughout the shift, patient has urinated less than 100mls thus far into shift, patient has refused for this nurse to try and cath her, education provided but patient states that she wants to try on her own to urinate. Going to attempt to anchor cath in one more time before shift ends.                                 "

## 2024-10-16 NOTE — PROGRESS NOTES
[facility] DAILY PROGRESS NOTE    Patient Identification  Name: Felisha Mukherjee  Age: 59 y.o.  Sex: female  :  1965  MRN: [id number]    Date: 10/16/2024             Subjective:  Interval History: Creatinine back up, CHELITA p    Objective:    Scheduled Meds:aluminum sulfate-calcium acetate 1:40, 1,000 mL, Topical, Q8H  amLODIPine, 5 mg, Oral, Q24H  amoxicillin-clavulanate, 1 tablet, Oral, Q12H  enoxaparin, 40 mg, Subcutaneous, Q12H  ferrous sulfate, 324 mg, Oral, Once per day on   hydrocortisone-bacitracin-zinc oxide-nystatin, 1 Application, Topical, 4x Daily  hydrOXYzine, 50 mg, Oral, Nightly  insulin lispro, 2-9 Units, Subcutaneous, 4x Daily AC & at Bedtime  lisinopril, 40 mg, Oral, Daily  metoprolol succinate XL, 50 mg, Oral, Q24H  montelukast, 10 mg, Oral, Nightly  multivitamin, 1 tablet, Oral, Daily  nystatin, , Topical, Q12H  PARoxetine, 30 mg, Oral, Daily  risperiDONE, 4 mg, Oral, Q PM  sodium chloride, 10 mL, Intravenous, Q12H  zinc sulfate, 220 mg, Oral, Daily      Continuous Infusions:Pharmacy to Dose enoxaparin (LOVENOX),       PRN Meds:  acetaminophen    senna-docusate sodium **AND** polyethylene glycol **AND** bisacodyl **AND** bisacodyl    Calcium Replacement - Follow Nurse / BPA Driven Protocol    dextrose    dextrose    glucagon (human recombinant)    Magnesium Standard Dose Replacement - Follow Nurse / BPA Driven Protocol    melatonin    Pharmacy to Dose enoxaparin (LOVENOX)    Phosphorus Replacement - Follow Nurse / BPA Driven Protocol    Potassium Replacement - Follow Nurse / BPA Driven Protocol    [COMPLETED] Insert Peripheral IV **AND** sodium chloride    sodium chloride    sodium chloride    traMADol    Vital signs in last 24 hours:  Temp:  [97.3 °F (36.3 °C)-99 °F (37.2 °C)] 98.7 °F (37.1 °C)  Heart Rate:  [72-82] 79  Resp:  [14-18] 14  BP: (130-153)/(88-99) 130/90    Intake/Output:    Intake/Output Summary (Last 24 hours) at 10/16/2024  "1549  Last data filed at 10/16/2024 1000  Gross per 24 hour   Intake 1424 ml   Output 650 ml   Net 774 ml       Exam:  /90 (BP Location: Left arm, Patient Position: Lying)   Pulse 79   Temp 98.7 °F (37.1 °C) (Oral)   Resp 14   Ht 160 cm (63\")   Wt 114 kg (251 lb 15.8 oz)   SpO2 91%   BMI 44.64 kg/m²     General Appearance:    Alert, cooperative, NAD   Lungs:     Respirations unlabored, no audible wheezing    Heart:    No cyanosis   Abdomen:     Soft, ND    :    No suprapubic distention, voiding now            Data Review:  All labs (24hrs):   Recent Results (from the past 24 hours)   POC Glucose Once    Collection Time: 10/15/24  4:56 PM    Specimen: Blood   Result Value Ref Range    Glucose 107 (H) 70 - 105 mg/dL   POC Glucose Once    Collection Time: 10/15/24 10:47 PM    Specimen: Blood   Result Value Ref Range    Glucose 143 (H) 70 - 105 mg/dL   CBC (No Diff)    Collection Time: 10/16/24  5:01 AM    Specimen: Arm, Right; Blood   Result Value Ref Range    WBC 9.93 3.40 - 10.80 10*3/mm3    RBC 4.25 3.77 - 5.28 10*6/mm3    Hemoglobin 10.3 (L) 12.0 - 15.9 g/dL    Hematocrit 34.9 34.0 - 46.6 %    MCV 82.1 79.0 - 97.0 fL    MCH 24.2 (L) 26.6 - 33.0 pg    MCHC 29.5 (L) 31.5 - 35.7 g/dL    RDW 21.9 (H) 12.3 - 15.4 %    RDW-SD 65.2 (H) 37.0 - 54.0 fl    MPV 8.7 6.0 - 12.0 fL    Platelets 350 140 - 450 10*3/mm3   Basic Metabolic Panel    Collection Time: 10/16/24  5:01 AM    Specimen: Arm, Right; Blood   Result Value Ref Range    Glucose 125 (H) 65 - 99 mg/dL    BUN 16 8 - 23 mg/dL    Creatinine 1.48 (H) 0.57 - 1.00 mg/dL    Sodium 135 (L) 136 - 145 mmol/L    Potassium 3.9 3.5 - 5.2 mmol/L    Chloride 101 98 - 107 mmol/L    CO2 20.5 (L) 22.0 - 29.0 mmol/L    Calcium 9.9 8.6 - 10.5 mg/dL    BUN/Creatinine Ratio 10.8 7.0 - 25.0    Anion Gap 13.5 5.0 - 15.0 mmol/L    eGFR 40.4 (L) >60.0 mL/min/1.73   POC Glucose 4x Daily Before Meals & at Bedtime    Collection Time: 10/16/24  7:34 AM    Specimen: Blood   Result " Value Ref Range    Glucose 127 (H) 70 - 105 mg/dL   POC Glucose Once    Collection Time: 10/16/24 11:50 AM    Specimen: Blood   Result Value Ref Range    Glucose 117 (H) 70 - 105 mg/dL   Rad Onc Aria Session Summary    Collection Time: 10/16/24  2:15 PM   Result Value Ref Range    Course ID C1     Course Start Date 10/9/2024  4:43 PM     Course First Treatment Date 10/16/2024  2:09 PM     Course Last Treatment Date 10/16/2024  2:13 PM     Course Elapsed Days 0     Reference Point ID PTV Pelvis     Reference Point Dosage Given to Date 4 Gy    Reference Point Session Dosage Given 4 Gy    Plan ID Pelvis     Plan Fractions Treated to Date 1     Plan Total Fractions Prescribed 5     Plan Prescribed Dose Per Fraction 4 Gy    Plan Total Prescribed Dose 2,000 cGy    Plan Primary Reference Point PTV Pelvis       Imaging Results (Last 24 Hours)       Procedure Component Value Units Date/Time    US Renal Bilateral [[id number]] Resulted: 10/16/24 1338     Updated: 10/16/24 1410             Assessment:    Cellulitis of abdominal wall      Leland hydronephrosis due to endometrial cancer possibly invading bladder  Retention  HASMUKH     Plan:      Voiding now after being straight cathed once last night  Creatinine up, renal ultrasound pending  May need West replaced if ultrasound shows hydronephrosis or renal function gets worse    Philipp Jackson MD  [facility]  Formerly [facility], later [facility], [facility] [address]  [place] [address]  Office: [phone]  Available via Epic Secure Chat  10/16/24  15:49 EDT

## 2024-10-16 NOTE — NURSING NOTE
Neph consult called to Dr. Tolbert with [facility]. Left a voicemail at [phone] for them to call back.

## 2024-10-16 NOTE — CONSULTS
INITIAL CONSULT NOTE      Patient Name: Felisha Mukherjee  : 1965  MRN: [id number]  Primary Care Physician: Hanh Granados APRN  Date of admission: 10/8/2024    Patient Care Team:  Hanh Granados APRN as PCP - General (Family Medicine)  Jesus Robbins MD as Consulting Physician (Nephrology)  Jacinto Del Real MD as Consulting Physician (Hematology and Oncology)        Reason for Consult:       HASMUKH  Subjective   History of Present Illness:   Chief Complaint:   Chief Complaint   Patient presents with    Rash     Pt has head rash to left leg from radiation.   Left upper leg, unable to visualize at triage        HISTORY:  Felisha Mukherjee is a 59 y.o. female with past medical history of Endometrium cancer,.  Morbid obesity, hypertension, diabetes mellitus, uterine cancer, who presents with rash on on the lower extremities she came to the hospital on 10/8/2024.  She also found to have abdominal wall cellulitis antibiotics were started.  But renal consult was called because of the high creatinine patient creatinine that was 1.4 on 10 8 it was 1 on 10/11/2024 no increased to 1.5.  Renal ultrasound done showed bilateral mild to moderate hydronephrosis.  Distended urinary bladder.  There is a lobular mass within the posterior margin of the bladder   So patient to have obstructive uropathy in the presence of endometrial cancer.  Urology has been consulted again          Review of systems:  All other review of system unremarkable  Constitutional: Complaining of weakness and lethargy  HEENT:  No headache, otalgia, itchy eyes, nasal discharge or sore throat.  Cardiac:  No chest pain, dyspnea, orthopnea or PND.  Chest:              No cough, phlegm or wheezing.  Abdomen:  No abdominal pain, nausea or vomiting.  Neuro:  No focal weakness, abnormal movements or seizure-like activity.  :   No hematuria, no pyuria, no dysuria, no flank pain.  ROS was otherwise negative except as mentioned in the  EDNA.       Personal History:     Past Medical History:   Past Medical History:   Diagnosis Date    Diabetes mellitus     Hypertension     Uterine mass        Surgical History:      Past Surgical History:   Procedure Laterality Date    CHOLECYSTECTOMY         Family History: family history includes Diabetes in her mother. Otherwise pertinent FHx was reviewed and unremarkable.     Social History:  reports that she has never smoked. She does not have any smokeless tobacco history on file. She reports that she does not drink alcohol and does not use drugs.    Medications:  Prior to Admission medications    Medication Sig Start Date End Date Taking? Authorizing Provider   aluminum sulfate-calcium acetate 1:40 (DOMEBORO) topical astringent solution Apply 1,000 mL topically to the appropriate area as directed Every 8 (Eight) Hours for 7 doses. 10/16/24 10/19/24 Yes Harry Pryor MD   amLODIPine (NORVASC) 5 MG tablet Take 1 tablet by mouth Daily. 10/16/24  Yes Harry Pryor MD   amoxicillin-clavulanate (AUGMENTIN) 875-125 MG per tablet Take 1 tablet by mouth Every 12 (Twelve) Hours for 3 doses. Indications: Infection of the Skin and/or Soft Tissue 10/16/24 10/18/24 Yes Harry Pryor MD   docusate sodium 100 MG capsule Take 1 capsule by mouth Daily. 6/26/24  Yes William Irwin MD   doxycycline (MONODOX) 100 MG capsule Take 1 capsule by mouth Every 12 (Twelve) Hours for 2 doses. Indications: Infection of the Skin and/or Soft Tissue 10/16/24 10/17/24 Yes Harry Pryor MD   Farxiga 10 MG tablet Take 10 mg by mouth Daily. 6/26/24  Yes William Irwin MD   ferrous sulfate 325 (65 FE) MG tablet Take 1 tablet by mouth 5 (Five) Times a Week. Monday, Tuesday, Wednesday, Thursday and Friday   Yes William Irwin MD   hydrocortisone-bacitracin-zinc oxide-nystatin (MAGIC BARRIER) Apply 1 Application topically to the appropriate area as directed 4 (Four) Times a Day. 10/16/24  Yes Harry Pryor MD    hydrOXYzine (ATARAX) 50 MG tablet Take 1 tablet by mouth Every Night. 6/27/24  Yes William Irwin MD   lisinopril (PRINIVIL,ZESTRIL) 40 MG tablet Take 1 tablet by mouth Daily.   Yes William Irwin MD   metFORMIN (GLUCOPHAGE) 500 MG tablet Take 1 tablet by mouth 2 (Two) Times a Day. 5/28/24  Yes William Irwin MD   metoprolol succinate XL (TOPROL-XL) 50 MG 24 hr tablet Take 1 tablet by mouth every night at bedtime.   Yes William Irwin MD   montelukast (SINGULAIR) 10 MG tablet Take 1 tablet by mouth Every Night.   Yes William Irwin MD   multivitamin (Thera) tablet tablet Take 1 tablet by mouth Daily. 10/16/24  Yes Harry Pryor MD   nystatin (MYCOSTATIN) 477035 UNIT/GM powder Apply  topically to the appropriate area as directed Every 12 (Twelve) Hours. 10/16/24  Yes Harry Pryor MD   PARoxetine (PAXIL) 30 MG tablet Take 1 tablet by mouth Daily. 6/12/24  Yes William Irwin MD   risperiDONE (risperDAL) 2 MG tablet Take 2 tablets by mouth Every Evening. 5/30/24  Yes William Irwin MD   traMADol (ULTRAM) 50 MG tablet Take 1 tablet by mouth Every 6 (Six) Hours As Needed for Moderate Pain for up to 8 days. 10/16/24 10/24/24 Yes Harry Pryor MD   zinc sulfate (ZINCATE) 220 (50 Zn) MG capsule Take 1 capsule by mouth Daily. 10/16/24  Yes Harry Pryor MD   vitamin D (ERGOCALCIFEROL) 1.25 MG (25280 UT) capsule capsule Take 1 capsule by mouth Every 7 (Seven) Days. Wednesdays    William Irwin MD     Scheduled Meds:aluminum sulfate-calcium acetate 1:40, 1,000 mL, Topical, Q8H  amLODIPine, 5 mg, Oral, Q24H  amoxicillin-clavulanate, 1 tablet, Oral, Q12H  enoxaparin, 40 mg, Subcutaneous, Q12H  ferrous sulfate, 324 mg, Oral, Once per day on Monday Tuesday Wednesday Thursday Friday  hydrocortisone-bacitracin-zinc oxide-nystatin, 1 Application, Topical, 4x Daily  hydrOXYzine, 50 mg, Oral, Nightly  insulin lispro, 2-9 Units, Subcutaneous, 4x Daily AC & at  Bedtime  lisinopril, 40 mg, Oral, Daily  metoprolol succinate XL, 50 mg, Oral, Q24H  montelukast, 10 mg, Oral, Nightly  multivitamin, 1 tablet, Oral, Daily  nystatin, , Topical, Q12H  PARoxetine, 30 mg, Oral, Daily  risperiDONE, 4 mg, Oral, Q PM  sodium chloride, 10 mL, Intravenous, Q12H  zinc sulfate, 220 mg, Oral, Daily      Continuous Infusions:Pharmacy to Dose enoxaparin (LOVENOX),       PRN Meds:  acetaminophen    senna-docusate sodium **AND** polyethylene glycol **AND** bisacodyl **AND** bisacodyl    Calcium Replacement - Follow Nurse / BPA Driven Protocol    dextrose    dextrose    glucagon (human recombinant)    Magnesium Standard Dose Replacement - Follow Nurse / BPA Driven Protocol    melatonin    Pharmacy to Dose enoxaparin (LOVENOX)    Phosphorus Replacement - Follow Nurse / BPA Driven Protocol    Potassium Replacement - Follow Nurse / BPA Driven Protocol    [COMPLETED] Insert Peripheral IV **AND** sodium chloride    sodium chloride    sodium chloride    traMADol  Allergies:  No Known Allergies    Objective   Exam:     Vital Signs  Temp:  [97.3 °F (36.3 °C)-99 °F (37.2 °C)] 98.7 °F (37.1 °C)  Heart Rate:  [72-82] 79  Resp:  [14-18] 14  BP: (130-153)/(88-99) 130/90  SpO2:  [91 %-96 %] 91 %  on  Flow (L/min) (Oxygen Therapy):  [2] 2;   Device (Oxygen Therapy): room air  Body mass index is 44.64 kg/m².  EXAM  General: Middle-age obese white female in no acute distress.    Head:      Normocephalic and atraumatic.    Eyes:      PERRL/EOM intact, conjunctivae and sclerae clear without nystagmus.    Neck:      No masses, thyromegaly,  trachea central   Lungs:    Clear bilaterally to auscultation.    Heart:      Regular rate and rhythm, no murmur no gallop  Abd:        Soft, nontender, not distended, bowel sounds positive, no shifting dullness.  Msk:        No deformity or scoliosis noted of thoracic or lumbar spine.    Pulses:   Pulses normal in all 4 extremities.    Extremities:        No cyanosis or  clubbing--+1 edema.    Neuro:    No focal deficits.   alert oriented x3  Skin:       Intact without lesions or rashes.    Psych:    Alert and cooperative; normal mood and affect; normal attention span       Results Review:  I have personally reviewed most recent Data :  [facility]  CBC    Results from last 7 days   Lab Units 10/16/24  0501 10/15/24  0510 10/14/24  0848 10/11/24  0107 10/10/24  0307   WBC 10*3/mm3 9.93 9.88 9.40 9.29 8.85   HEMOGLOBIN g/dL 10.3* 9.6* 10.0* 10.8* 9.7*   PLATELETS 10*3/mm3 350 336 375 430 381     CMP   Results from last 7 days   Lab Units 10/16/24  0501 10/15/24  0510 10/14/24  0848 10/11/24  0107 10/10/24  0307   SODIUM mmol/L 135* 137 138 139 134*   POTASSIUM mmol/L 3.9 4.0 3.8 3.9 3.7   CHLORIDE mmol/L 101 104 103 104 103   CO2 mmol/L 20.5* 21.8* 23.5 23.3 21.3*   BUN mg/dL 16 12 10 9 10   CREATININE mg/dL 1.48* 1.19* 1.11* 1.00 0.85   GLUCOSE mg/dL 125* 133* 104* 121* 180*     ABG      CT Biopsy Abdomen Retroperitoneal    Result Date: 10/14/2024  1. Successful CT-guided biopsy of omental mass Electronically Signed: Patrick Lucas MD  10/14/2024 2:48 PM EDT  Workstation ID: [id number]    US Renal Bilateral    Result Date: 10/9/2024  1. Moderate dilation of the right kidney and mild dilation of the left kidney 2. Evaluation of the bladder is limited by placement of West catheter Electronically Signed: Twan Garza MD  10/9/2024 2:25 PM EDT  Workstation ID: [id number]           Assessment & Plan   Assessment and Plan:         Cellulitis of abdominal wall    ASSESSMENT:  Acute kidney injury likely obstructive uropathy  History of uterine cancer  Hypertension  History of diabetes mellitus          PLAN :     Urology already following the patient  I think patient may need a ureteral stent placement because of the obstructive uropathy  Will discuss with them tomorrow  Meanwhile there is a possibility renal function may get worse  Follow-up with oncology regarding  uterine cancer  Thank you for letting me participate      Julio Cesar Tolbert MD  [facility]  10/16/2024  15:13 EDT

## 2024-10-16 NOTE — NURSING NOTE
Bladder scanned patient and only found 250ml in bladder earlier in shift it was 360. Patient stated that she still would like to continue to urinate by herself and that she is anxious about not being able to be discharged if she has to get catheter replaced. Consoled patient and answered all her questions. Patient expressed that she felt better after one on one care was provided.

## 2024-10-16 NOTE — PROGRESS NOTES
Hematology/Oncology Inpatient Progress Note    PATIENT NAME: Felisha Mukherjee  : 1965  MRN: [id number]    CHIEF COMPLAINT: Abdominal pain.     HISTORY OF PRESENT ILLNESS:      2024: Ms. Hwang was referred for the investigation and treatment of anemia.  Heme in 2024 for blood count revealed a hemoglobin of 7.1 g/dL with microcytic red cells.  There was clear evidence of iron deficiency with a total iron binding capacity saturation of 2.3%.  She was started on oral iron and was in early 2024 identified as having endometrial adenocarcinoma of the endometrioid type with a FIGO grade 1.  The iron deficiency was attributed to menorrhagia that was felt to be the result of the malignancy.  With persistent anemia a decision was made to treat her with intravenous iron.     -Underwent exam under anesthesia, cystoscopy, D & C on 2024.     Surgical Findings:  Large tumor extending through the cervix filling the vagina.  The bladder was noted to be free of lesions and both ureteral orifices were identified.      Final Pathology  Diagnosis:   A.  Uterus, endometrial curettings:   - Endometrial adenocarcinoma, endometrioid type, FIGO grade 2, see note     Note:   - On immunostaining, the tumor cells are positive for PAX8, ER (70%, 2-3+), and vimentin supportive of above diagnosis.   - Immunohistochemical staining for mismatch repair proteins demonstrates INTACT expression of MLH1, PMS2, MSH2, and MSH6.   - Immunohistochemical staining for 53 demonstrates a wild-type expression.      9/10/2024: For the first time at the [place] office with the above.  She has yet to receive intravenous iron but is scheduled to do so in the near future.  She is also being followed by Dr. Matthews at the [facility] gynecology/oncology program.  Apparently surgery is not in the near future plans.  Generally she feels well at this time although she continues to be fatigued and weak at times.  She  describes a good appetite for the most part and eats frequently during the day.  Her weight is stable.  She has not experienced any chest pains, cough or dyspnea.  She has intermittent abdominal pain and describes abnormal defecations that happen every 2 or 3 days but several times during that day at times with soft stools but never liquid stools.  She has some dysuria.  She has continued to have vaginal bleeding but it has not been as intense as before.  She has no peripheral edema.  On exam she seems chronically ill.  She is not in distress.  She is conversant and oriented.  No jaundice.  The lungs are diminished bilaterally and the heart regular.  The abdomen is soft nontender and there is no edema.  Laboratory exams reviewed.  She has yet to receive intravenous iron.  I have asked her to see me again after completing the intravenous iron with new laboratory exams.     FAMILY HISTORY:  MGM: ovarian cancer  GGM:: ovarian cancer      10/3/2024 patient had PET/CT imaging that showed large hypermetabolic mass involving uterus extending into the cervix and vagina consistent with her known endometrial malignancy.  New hypermetabolic soft tissue involving posterior bladder wall concerning for tumor invasion, new moderate bilateral hydroureteronephrosis is likely secondary to the mass involving left and right UVJ.  There are hypermetabolic iliac, retroperitoneal and small subcarinal lymph nodes concerning for metastatic adenopathy.  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  Cystic 8.6 x 8.4 cm left adnexal lesion with hypermetabolic peripheral nodule could relate to ovarian metastases or primary ovarian neoplasm.    Subjective   10/16/2024: Feels much better.  The pain is almost completely resolved.  Has urinary tenesmus and urgency.    ROS:  Review of Systems   Constitutional:  Positive for activity change and fatigue. Negative for appetite change, chills, diaphoresis, fever and unexpected  weight change.   HENT:  Negative for congestion, dental problem, drooling, ear discharge, ear pain, facial swelling, hearing loss, mouth sores, nosebleeds, postnasal drip, rhinorrhea, sinus pressure, sinus pain, sneezing, sore throat, tinnitus, trouble swallowing and voice change.    Eyes:  Negative for photophobia, pain, discharge, redness, itching and visual disturbance.   Respiratory:  Negative for apnea, cough, choking, chest tightness, shortness of breath, wheezing and stridor.    Cardiovascular:  Negative for chest pain, palpitations and leg swelling.   Gastrointestinal:  Negative for abdominal distention, abdominal pain, anal bleeding, blood in stool, constipation, diarrhea, nausea, rectal pain and vomiting.   Endocrine: Negative for cold intolerance, heat intolerance, polydipsia and polyuria.   Genitourinary:  Positive for frequency and urgency. Negative for decreased urine volume, difficulty urinating, dysuria, flank pain, genital sores and hematuria.   Musculoskeletal:  Negative for arthralgias, back pain, gait problem, joint swelling, myalgias, neck pain and neck stiffness.   Skin:  Negative for color change, pallor and rash.   Neurological:  Positive for weakness. Negative for dizziness, tremors, seizures, syncope, facial asymmetry, speech difficulty, light-headedness, numbness and headaches.   Hematological:  Negative for adenopathy. Does not bruise/bleed easily.   Psychiatric/Behavioral:  Negative for agitation, behavioral problems, confusion, decreased concentration, hallucinations, self-injury, sleep disturbance and suicidal ideas. The patient is not nervous/anxious.       MEDICATIONS:    Scheduled Meds:  aluminum sulfate-calcium acetate 1:40, 1,000 mL, Topical, Q8H  amLODIPine, 5 mg, Oral, Q24H  amoxicillin-clavulanate, 1 tablet, Oral, Q12H  enoxaparin, 40 mg, Subcutaneous, Q12H  ferrous sulfate, 324 mg, Oral, Once per day on Monday Tuesday Wednesday Thursday Friday  hydrocortisone-bacitracin-zinc  "oxide-nystatin, 1 Application, Topical, 4x Daily  hydrOXYzine, 50 mg, Oral, Nightly  insulin lispro, 2-9 Units, Subcutaneous, 4x Daily AC & at Bedtime  lisinopril, 40 mg, Oral, Daily  metoprolol succinate XL, 50 mg, Oral, Q24H  montelukast, 10 mg, Oral, Nightly  multivitamin, 1 tablet, Oral, Daily  nystatin, , Topical, Q12H  PARoxetine, 30 mg, Oral, Daily  risperiDONE, 4 mg, Oral, Q PM  sodium chloride, 10 mL, Intravenous, Q12H  zinc sulfate, 220 mg, Oral, Daily       Continuous Infusions:  Pharmacy to Dose enoxaparin (LOVENOX),        PRN Meds:    acetaminophen    senna-docusate sodium **AND** polyethylene glycol **AND** bisacodyl **AND** bisacodyl    Calcium Replacement - Follow Nurse / BPA Driven Protocol    dextrose    dextrose    glucagon (human recombinant)    Magnesium Standard Dose Replacement - Follow Nurse / BPA Driven Protocol    melatonin    Pharmacy to Dose enoxaparin (LOVENOX)    Phosphorus Replacement - Follow Nurse / BPA Driven Protocol    Potassium Replacement - Follow Nurse / BPA Driven Protocol    [COMPLETED] Insert Peripheral IV **AND** sodium chloride    sodium chloride    sodium chloride    traMADol     ALLERGIES:  No Known Allergies    Objective    VITALS:   /88 (BP Location: Left arm, Patient Position: Lying)   Pulse 79   Temp 98 °F (36.7 °C) (Oral)   Resp 16   Ht 160 cm (63\")   Wt 114 kg (251 lb 15.8 oz)   SpO2 95%   BMI 44.64 kg/m²     PHYSICAL EXAM: (performed by MD)  Physical Exam  Constitutional:       General: She is not in acute distress.     Appearance: She is ill-appearing. She is not toxic-appearing or diaphoretic.   HENT:      Head: Normocephalic and atraumatic.      Right Ear: External ear normal.      Left Ear: External ear normal.      Nose: Nose normal.      Mouth/Throat:      Mouth: Mucous membranes are moist.      Pharynx: Oropharynx is clear. No oropharyngeal exudate or posterior oropharyngeal erythema.   Eyes:      General: No scleral icterus.        Right eye: " No discharge.         Left eye: No discharge.      Conjunctiva/sclera: Conjunctivae normal.      Pupils: Pupils are equal, round, and reactive to light.   Cardiovascular:      Rate and Rhythm: Normal rate and regular rhythm.      Pulses: Normal pulses.      Heart sounds: No murmur heard.     No friction rub. No gallop.   Pulmonary:      Effort: No respiratory distress.      Breath sounds: No stridor. No wheezing, rhonchi or rales.   Abdominal:      General: Bowel sounds are normal. There is no distension.      Palpations: Abdomen is soft. There is no mass.      Tenderness: There is no abdominal tenderness. There is no right CVA tenderness, left CVA tenderness, guarding or rebound.      Hernia: No hernia is present.      Comments: Protuberant, soft and nontender.  The areas of erythema has seemed almost completely healed today.  There is no more pain.   Musculoskeletal:         General: No tenderness, deformity or signs of injury.      Cervical back: No rigidity.      Right lower leg: No edema.      Left lower leg: No edema.   Lymphadenopathy:      Cervical: No cervical adenopathy.   Skin:     Coloration: Skin is not jaundiced or pale.      Findings: No bruising, lesion or rash.   Neurological:      General: No focal deficit present.      Mental Status: She is alert and oriented to person, place, and time.      Cranial Nerves: No cranial nerve deficit.   Psychiatric:         Mood and Affect: Mood normal.         Behavior: Behavior normal.         Thought Content: Thought content normal.         Judgment: Judgment normal.      SHAN Del Real MD performed the physical exam on 10/16/2024.    RECENT LABS:  Lab Results (last 24 hours)       Procedure Component Value Units Date/Time    POC Glucose Once [[id number]]  (Abnormal) Collected: 10/16/24 1633    Specimen: Blood Updated: 10/16/24 1636     Glucose 113 mg/dL      Comment: Serial Number: [id number]:  [id number]       POC Glucose Once [[id number]]  (Abnormal)  Collected: 10/16/24 1150    Specimen: Blood Updated: 10/16/24 1153     Glucose 117 mg/dL      Comment: Serial Number: [id number]:  [id number]       POC Glucose 4x Daily Before Meals & at Bedtime [[id number]]  (Abnormal) Collected: 10/16/24 0734    Specimen: Blood Updated: 10/16/24 0735     Glucose 127 mg/dL      Comment: Serial Number: [id number]:  [id number]       Basic Metabolic Panel [[id number]]  (Abnormal) Collected: 10/16/24 0501    Specimen: Blood from Arm, Right Updated: 10/16/24 0637     Glucose 125 mg/dL      BUN 16 mg/dL      Creatinine 1.48 mg/dL      Sodium 135 mmol/L      Potassium 3.9 mmol/L      Chloride 101 mmol/L      CO2 20.5 mmol/L      Calcium 9.9 mg/dL      BUN/Creatinine Ratio 10.8     Anion Gap 13.5 mmol/L      eGFR 40.4 mL/min/1.73     Narrative:      GFR Normal >60  Chronic Kidney Disease <60  Kidney Failure <15      CBC (No Diff) [[id number]]  (Abnormal) Collected: 10/16/24 0501    Specimen: Blood from Arm, Right Updated: 10/16/24 0615     WBC 9.93 10*3/mm3      RBC 4.25 10*6/mm3      Hemoglobin 10.3 g/dL      Hematocrit 34.9 %      MCV 82.1 fL      MCH 24.2 pg      MCHC 29.5 g/dL      RDW 21.9 %      RDW-SD 65.2 fl      MPV 8.7 fL      Platelets 350 10*3/mm3     POC Glucose Once [[id number]]  (Abnormal) Collected: 10/15/24 2247    Specimen: Blood Updated: 10/15/24 2250     Glucose 143 mg/dL      Comment: Serial Number: [id number]:  [id number]             IMAGING REVIEWED:  US Renal Bilateral    Result Date: 10/16/2024  Impression: 1.Bilateral mild to moderate hydronephrosis. 2.Distended urinary bladder. There is a lobular mass within the posterior margin of the bladder presumably neoplastic. Electronically Signed: Kishore Stapleton MD  10/16/2024 5:19 PM EDT  Workstation ID: [id number]     Assessment & Plan   ASSESSMENT:  Apparent metastatic endometrial cancer: The report of the biopsy is not back yet.  Discussed with her.  I expect to see some information perhaps  tomorrow.  Cellulitis of the abdominal skin.  Continues to improve.  No additional intervention at this time.  Poor performance status.   Again encouraged mobility.    PLAN:  As above.    Jacinto Del Real MD on 10/16/2024 at 1747.

## 2024-10-16 NOTE — NURSING NOTE
WOCN note:    59 yr old female admitted 10/8/24 with abdominal wall cellulitis. WOCN follow up on extensive moisture associated skin damage and cellulitis.   Abdominal folds and groin area much improved. There is a dull redness to the skin but the rash is resolving. Patient reports less pain and discomfort. The [place] soaks are scheduled for one more day. There is Nystatin powder and Magic barrier cream at the bedside. We will continue to follow.

## 2024-10-17 ENCOUNTER — DOCUMENTATION (OUTPATIENT)
Dept: RADIATION ONCOLOGY | Facility: HOSPITAL | Age: 59
End: 2024-10-17
Payer: MEDICARE

## 2024-10-17 LAB
ALBUMIN SERPL-MCNC: 3.1 G/DL (ref 3.5–5.2)
ALBUMIN/GLOB SERPL: 1.1 G/DL
ALP SERPL-CCNC: 62 U/L (ref 39–117)
ALT SERPL W P-5'-P-CCNC: 14 U/L (ref 1–33)
ANION GAP SERPL CALCULATED.3IONS-SCNC: 15.1 MMOL/L (ref 5–15)
AST SERPL-CCNC: 31 U/L (ref 1–32)
BACTERIA UR QL AUTO: ABNORMAL /HPF
BILIRUB SERPL-MCNC: 0.2 MG/DL (ref 0–1.2)
BILIRUB UR QL STRIP: NEGATIVE
BUN SERPL-MCNC: 21 MG/DL (ref 8–23)
BUN/CREAT SERPL: 12.1 (ref 7–25)
CALCIUM SPEC-SCNC: 9.7 MG/DL (ref 8.6–10.5)
CHLORIDE SERPL-SCNC: 98 MMOL/L (ref 98–107)
CLARITY UR: ABNORMAL
CO2 SERPL-SCNC: 17.9 MMOL/L (ref 22–29)
COLOR UR: YELLOW
CREAT SERPL-MCNC: 1.73 MG/DL (ref 0.57–1)
CREAT UR-MCNC: 87 MG/DL
DEPRECATED RDW RBC AUTO: 65.8 FL (ref 37–54)
EGFRCR SERPLBLD CKD-EPI 2021: 33.5 ML/MIN/1.73
ERYTHROCYTE [DISTWIDTH] IN BLOOD BY AUTOMATED COUNT: 22 % (ref 12.3–15.4)
GLOBULIN UR ELPH-MCNC: 2.9 GM/DL
GLUCOSE BLDC GLUCOMTR-MCNC: 101 MG/DL (ref 70–105)
GLUCOSE BLDC GLUCOMTR-MCNC: 125 MG/DL (ref 70–105)
GLUCOSE BLDC GLUCOMTR-MCNC: 181 MG/DL (ref 70–105)
GLUCOSE BLDC GLUCOMTR-MCNC: 90 MG/DL (ref 70–105)
GLUCOSE SERPL-MCNC: 134 MG/DL (ref 65–99)
GLUCOSE UR STRIP-MCNC: NEGATIVE MG/DL
HCT VFR BLD AUTO: 34.5 % (ref 34–46.6)
HGB BLD-MCNC: 10.1 G/DL (ref 12–15.9)
HGB UR QL STRIP.AUTO: NEGATIVE
HYALINE CASTS UR QL AUTO: ABNORMAL /LPF
KETONES UR QL STRIP: NEGATIVE
LEUKOCYTE ESTERASE UR QL STRIP.AUTO: NEGATIVE
MAGNESIUM SERPL-MCNC: 1.7 MG/DL (ref 1.6–2.4)
MCH RBC QN AUTO: 23.9 PG (ref 26.6–33)
MCHC RBC AUTO-ENTMCNC: 29.3 G/DL (ref 31.5–35.7)
MCV RBC AUTO: 81.8 FL (ref 79–97)
NITRITE UR QL STRIP: NEGATIVE
NT-PROBNP SERPL-MCNC: 234 PG/ML (ref 0–900)
PH UR STRIP.AUTO: 5.5 [PH] (ref 5–8)
PHOSPHATE SERPL-MCNC: 5.9 MG/DL (ref 2.5–4.5)
PLATELET # BLD AUTO: 333 10*3/MM3 (ref 140–450)
PMV BLD AUTO: 8.6 FL (ref 6–12)
POTASSIUM SERPL-SCNC: 4.1 MMOL/L (ref 3.5–5.2)
PROT ?TM UR-MCNC: 79.6 MG/DL
PROT SERPL-MCNC: 6 G/DL (ref 6–8.5)
PROT UR QL STRIP: ABNORMAL
RAD ONC ARIA COURSE ID: NORMAL
RAD ONC ARIA COURSE LAST TREATMENT DATE: NORMAL
RAD ONC ARIA COURSE START DATE: NORMAL
RAD ONC ARIA COURSE TREATMENT ELAPSED DAYS: 1
RAD ONC ARIA FIRST TREATMENT DATE: NORMAL
RAD ONC ARIA PLAN FRACTIONS TREATED TO DATE: 2
RAD ONC ARIA PLAN ID: NORMAL
RAD ONC ARIA PLAN PRESCRIBED DOSE PER FRACTION: 4 GY
RAD ONC ARIA PLAN PRIMARY REFERENCE POINT: NORMAL
RAD ONC ARIA PLAN TOTAL FRACTIONS PRESCRIBED: 5
RAD ONC ARIA PLAN TOTAL PRESCRIBED DOSE: 2000 CGY
RAD ONC ARIA REFERENCE POINT DOSAGE GIVEN TO DATE: 8 GY
RAD ONC ARIA REFERENCE POINT ID: NORMAL
RAD ONC ARIA REFERENCE POINT SESSION DOSAGE GIVEN: 4 GY
RBC # BLD AUTO: 4.22 10*6/MM3 (ref 3.77–5.28)
RBC # UR STRIP: ABNORMAL /HPF
REF LAB TEST METHOD: ABNORMAL
SODIUM SERPL-SCNC: 131 MMOL/L (ref 136–145)
SODIUM UR-SCNC: <20 MMOL/L
SP GR UR STRIP: 1.01 (ref 1–1.03)
SQUAMOUS #/AREA URNS HPF: ABNORMAL /HPF
UROBILINOGEN UR QL STRIP: ABNORMAL
WBC # UR STRIP: ABNORMAL /HPF
WBC NRBC COR # BLD AUTO: 12.46 10*3/MM3 (ref 3.4–10.8)

## 2024-10-17 PROCEDURE — 81001 URINALYSIS AUTO W/SCOPE: CPT | Performed by: INTERNAL MEDICINE

## 2024-10-17 PROCEDURE — 99232 SBSQ HOSP IP/OBS MODERATE 35: CPT | Performed by: INTERNAL MEDICINE

## 2024-10-17 PROCEDURE — 82570 ASSAY OF URINE CREATININE: CPT | Performed by: INTERNAL MEDICINE

## 2024-10-17 PROCEDURE — 82948 REAGENT STRIP/BLOOD GLUCOSE: CPT

## 2024-10-17 PROCEDURE — 84100 ASSAY OF PHOSPHORUS: CPT | Performed by: INTERNAL MEDICINE

## 2024-10-17 PROCEDURE — 83880 ASSAY OF NATRIURETIC PEPTIDE: CPT | Performed by: INTERNAL MEDICINE

## 2024-10-17 PROCEDURE — 25010000002 ENOXAPARIN PER 10 MG: Performed by: INTERNAL MEDICINE

## 2024-10-17 PROCEDURE — 84156 ASSAY OF PROTEIN URINE: CPT | Performed by: INTERNAL MEDICINE

## 2024-10-17 PROCEDURE — 84300 ASSAY OF URINE SODIUM: CPT | Performed by: INTERNAL MEDICINE

## 2024-10-17 PROCEDURE — 82948 REAGENT STRIP/BLOOD GLUCOSE: CPT | Performed by: INTERNAL MEDICINE

## 2024-10-17 PROCEDURE — 63710000001 INSULIN LISPRO (HUMAN) PER 5 UNITS: Performed by: INTERNAL MEDICINE

## 2024-10-17 PROCEDURE — 80053 COMPREHEN METABOLIC PANEL: CPT | Performed by: INTERNAL MEDICINE

## 2024-10-17 PROCEDURE — 83735 ASSAY OF MAGNESIUM: CPT | Performed by: INTERNAL MEDICINE

## 2024-10-17 PROCEDURE — 85027 COMPLETE CBC AUTOMATED: CPT | Performed by: STUDENT IN AN ORGANIZED HEALTH CARE EDUCATION/TRAINING PROGRAM

## 2024-10-17 RX ADMIN — ENOXAPARIN SODIUM 40 MG: 100 INJECTION SUBCUTANEOUS at 21:21

## 2024-10-17 RX ADMIN — Medication 10 ML: at 08:45

## 2024-10-17 RX ADMIN — LISINOPRIL 40 MG: 20 TABLET ORAL at 08:44

## 2024-10-17 RX ADMIN — PAROXETINE 30 MG: 10 TABLET, FILM COATED ORAL at 08:43

## 2024-10-17 RX ADMIN — INSULIN LISPRO 2 UNITS: 100 INJECTION, SOLUTION INTRAVENOUS; SUBCUTANEOUS at 21:21

## 2024-10-17 RX ADMIN — MONTELUKAST 10 MG: 10 TABLET, FILM COATED ORAL at 21:22

## 2024-10-17 RX ADMIN — FERROUS SULFATE TAB EC 324 MG (65 MG FE EQUIVALENT) 324 MG: 324 (65 FE) TABLET DELAYED RESPONSE at 08:43

## 2024-10-17 RX ADMIN — Medication 10 ML: at 21:22

## 2024-10-17 RX ADMIN — THERA TABS 1 TABLET: TAB at 08:44

## 2024-10-17 RX ADMIN — AMLODIPINE BESYLATE 5 MG: 5 TABLET ORAL at 08:43

## 2024-10-17 RX ADMIN — ZINC OXIDE 1 APPLICATION: 200 OINTMENT TOPICAL at 17:54

## 2024-10-17 RX ADMIN — Medication 220 MG: at 08:43

## 2024-10-17 RX ADMIN — CALCIUM ACETATE MONOHYDRATE AND ALUMINUM SULFATE TETRADECAHYDRATE 1000 ML: 952; 1347 POWDER, FOR SOLUTION TOPICAL at 08:45

## 2024-10-17 RX ADMIN — ENOXAPARIN SODIUM 40 MG: 100 INJECTION SUBCUTANEOUS at 08:43

## 2024-10-17 RX ADMIN — ZINC OXIDE 1 APPLICATION: 200 OINTMENT TOPICAL at 08:45

## 2024-10-17 RX ADMIN — ZINC OXIDE 1 APPLICATION: 200 OINTMENT TOPICAL at 21:22

## 2024-10-17 RX ADMIN — ZINC OXIDE 1 APPLICATION: 200 OINTMENT TOPICAL at 15:28

## 2024-10-17 RX ADMIN — NYSTATIN: 100000 POWDER TOPICAL at 21:22

## 2024-10-17 RX ADMIN — RISPERIDONE 4 MG: 1 TABLET, FILM COATED ORAL at 21:22

## 2024-10-17 RX ADMIN — NYSTATIN: 100000 POWDER TOPICAL at 08:45

## 2024-10-17 RX ADMIN — METOPROLOL SUCCINATE 50 MG: 50 TABLET, EXTENDED RELEASE ORAL at 08:44

## 2024-10-17 RX ADMIN — HYDROXYZINE HYDROCHLORIDE 50 MG: 25 TABLET, FILM COATED ORAL at 21:22

## 2024-10-17 NOTE — SIGNIFICANT NOTE
10/17/24 1610   OTHER   Discipline physical therapist   Rehab Time/Intention   Session Not Performed patient/family declined treatment  (Pt declined tx today, had just returned from radiation treatment at time of attempt and was very fatigued.)   Recommendation   PT - Next Appointment 10/18/24

## 2024-10-17 NOTE — PROGRESS NOTES
[facility] DAILY PROGRESS NOTE    Patient Identification  Name: Felisha Mukherjee  Age: 59 y.o.  Sex: female  :  1965  MRN: [id number]    Date: 10/17/2024             Subjective:  Interval History: Creatinine back up, CHELITA with distended bladder and mild to mod hydro    Objective:    Scheduled Meds:amLODIPine, 5 mg, Oral, Q24H  enoxaparin, 40 mg, Subcutaneous, Q12H  ferrous sulfate, 324 mg, Oral, Once per day on   hydrocortisone-bacitracin-zinc oxide-nystatin, 1 Application, Topical, 4x Daily  hydrOXYzine, 50 mg, Oral, Nightly  insulin lispro, 2-9 Units, Subcutaneous, 4x Daily AC & at Bedtime  lisinopril, 40 mg, Oral, Daily  metoprolol succinate XL, 50 mg, Oral, Q24H  montelukast, 10 mg, Oral, Nightly  multivitamin, 1 tablet, Oral, Daily  nystatin, , Topical, Q12H  PARoxetine, 30 mg, Oral, Daily  risperiDONE, 4 mg, Oral, Q PM  sodium chloride, 10 mL, Intravenous, Q12H  zinc sulfate, 220 mg, Oral, Daily      Continuous Infusions:Pharmacy to Dose enoxaparin (LOVENOX),       PRN Meds:  acetaminophen    senna-docusate sodium **AND** polyethylene glycol **AND** bisacodyl **AND** bisacodyl    Calcium Replacement - Follow Nurse / BPA Driven Protocol    dextrose    dextrose    glucagon (human recombinant)    Magnesium Standard Dose Replacement - Follow Nurse / BPA Driven Protocol    [facility]    Pharmacy to Dose enoxaparin (LOVENOX)    Phosphorus Replacement - Follow Nurse / BPA Driven Protocol    Potassium Replacement - Follow Nurse / BPA Driven Protocol    [COMPLETED] Insert Peripheral IV **AND** sodium chloride    sodium chloride    sodium chloride    Vital signs in last 24 hours:  Temp:  [97.7 °F (36.5 °C)-98.4 °F (36.9 °C)] 97.7 °F (36.5 °C)  Heart Rate:  [76-84] 76  Resp:  [16-18] 18  BP: (111-157)/(68-88) 145/79    Intake/Output:    Intake/Output Summary (Last 24 hours) at 10/17/2024 1553  Last data filed at 10/17/2024 1527  Gross per 24 hour   Intake --   Output 4656  "ml   Net -1550 ml       Exam:  /79 (BP Location: Left arm, Patient Position: Lying) Comment (BP Location): forearm  Pulse 76   Temp 97.7 °F (36.5 °C) (Oral)   Resp 18   Ht 160 cm (63\")   Wt 114 kg (251 lb 15.8 oz)   SpO2 95%   BMI 44.64 kg/m²     General Appearance:    Alert, cooperative, NAD   Lungs:     Respirations unlabored, no audible wheezing    Heart:    No cyanosis   Abdomen:     Soft, ND    :    No suprapubic distention            Data Review:  All labs (24hrs):   Recent Results (from the past 24 hours)   POC Glucose Once    Collection Time: 10/16/24  4:33 PM    Specimen: Blood   Result Value Ref Range    Glucose 113 (H) 70 - 105 mg/dL   POC Glucose Once    Collection Time: 10/16/24  9:34 PM    Specimen: Blood   Result Value Ref Range    Glucose 136 (H) 70 - 105 mg/dL   CBC (No Diff)    Collection Time: 10/17/24  2:52 AM    Specimen: Blood   Result Value Ref Range    WBC 12.46 (H) 3.40 - 10.80 10*3/mm3    RBC 4.22 3.77 - 5.28 10*6/mm3    Hemoglobin 10.1 (L) 12.0 - 15.9 g/dL    Hematocrit 34.5 34.0 - 46.6 %    MCV 81.8 79.0 - 97.0 fL    MCH 23.9 (L) 26.6 - 33.0 pg    MCHC 29.3 (L) 31.5 - 35.7 g/dL    RDW 22.0 (H) 12.3 - 15.4 %    RDW-SD 65.8 (H) 37.0 - 54.0 fl    MPV 8.6 6.0 - 12.0 fL    Platelets 333 140 - 450 10*3/mm3   Comprehensive Metabolic Panel    Collection Time: 10/17/24  2:52 AM    Specimen: Blood   Result Value Ref Range    Glucose 134 (H) 65 - 99 mg/dL    BUN 21 8 - 23 mg/dL    Creatinine 1.73 (H) 0.57 - 1.00 mg/dL    Sodium 131 (L) 136 - 145 mmol/L    Potassium 4.1 3.5 - 5.2 mmol/L    Chloride 98 98 - 107 mmol/L    CO2 17.9 (L) 22.0 - 29.0 mmol/L    Calcium 9.7 8.6 - 10.5 mg/dL    Total Protein 6.0 6.0 - 8.5 g/dL    Albumin 3.1 (L) 3.5 - 5.2 g/dL    ALT (SGPT) 14 1 - 33 U/L    AST (SGOT) 31 1 - 32 U/L    Alkaline Phosphatase 62 39 - 117 U/L    Total Bilirubin 0.2 0.0 - 1.2 mg/dL    Globulin 2.9 gm/dL    A/G Ratio 1.1 g/dL    BUN/Creatinine Ratio 12.1 7.0 - 25.0    Anion Gap 15.1 " (H) 5.0 - 15.0 mmol/L    eGFR 33.5 (L) >60.0 mL/min/1.73   Magnesium    Collection Time: 10/17/24  2:52 AM    Specimen: Blood   Result Value Ref Range    Magnesium 1.7 1.6 - 2.4 mg/dL   Phosphorus    Collection Time: 10/17/24  2:52 AM    Specimen: Blood   Result Value Ref Range    Phosphorus 5.9 (H) 2.5 - 4.5 mg/dL   BNP    Collection Time: 10/17/24  2:52 AM    Specimen: Blood   Result Value Ref Range    proBNP 234.0 0.0 - 900.0 pg/mL   POC Glucose 4x Daily Before Meals & at Bedtime    Collection Time: 10/17/24  7:43 AM    Specimen: Blood   Result Value Ref Range    Glucose 125 (H) 70 - 105 mg/dL   Protein, Urine, Random - Indwelling Urethral Catheter    Collection Time: 10/17/24 10:30 AM    Specimen: Indwelling Urethral Catheter; Urine   Result Value Ref Range    Total Protein, Urine 79.6 mg/dL   Urinalysis With Microscopic If Indicated (No Culture) - Indwelling Urethral Catheter    Collection Time: 10/17/24 10:30 AM    Specimen: Indwelling Urethral Catheter; Urine   Result Value Ref Range    Color, UA Yellow Yellow, Straw    Appearance, UA Cloudy (A) Clear    pH, UA 5.5 5.0 - 8.0    Specific Gravity, UA 1.009 1.005 - 1.030    Glucose, UA Negative Negative    Ketones, UA Negative Negative    Bilirubin, UA Negative Negative    Blood, UA Negative Negative    Protein,  mg/dL (2+) (A) Negative    Leuk Esterase, UA Negative Negative    Nitrite, UA Negative Negative    Urobilinogen, UA 0.2 E.U./dL 0.2 - 1.0 E.U./dL   Sodium, Urine, Random - Indwelling Urethral Catheter    Collection Time: 10/17/24 10:30 AM    Specimen: Indwelling Urethral Catheter; Urine   Result Value Ref Range    Sodium, Urine <20 mmol/L   Urinalysis, Microscopic Only - Indwelling Urethral Catheter    Collection Time: 10/17/24 10:30 AM    Specimen: Indwelling Urethral Catheter; Urine   Result Value Ref Range    RBC, UA None Seen None Seen, 0-2 /HPF    WBC, UA 6-10 (A) None Seen, 0-2 /HPF    Bacteria, UA None Seen None Seen /HPF    Squamous  Epithelial Cells, UA 0-2 None Seen, 0-2 /HPF    Hyaline Casts, UA None Seen None Seen /LPF    Methodology Manual Light Microscopy    Rad Onc [place] Session Summary    Collection Time: 10/17/24 12:20 PM   Result Value Ref Range    Course ID C1     Course Start Date 10/9/2024  4:43 PM     Course First Treatment Date 10/16/2024  2:09 PM     Course Last Treatment Date 10/17/2024 12:19 PM     Course Elapsed Days 1     Reference Point ID PTV Pelvis     Reference Point Dosage Given to Date 8 Gy    Reference Point Session Dosage Given 4 Gy    Plan ID Pelvis     Plan Fractions Treated to Date 2     Plan Total Fractions Prescribed 5     Plan Prescribed Dose Per Fraction 4 Gy    Plan Total Prescribed Dose 2,000 cGy    Plan Primary Reference Point PTV Pelvis    POC Glucose Once    Collection Time: 10/17/24  3:18 PM    Specimen: Blood   Result Value Ref Range    Glucose 90 70 - 105 mg/dL      Imaging Results (Last 24 Hours)       Procedure Component Value Units Date/Time    US Renal Bilateral [[id number]] Collected: 10/16/24 1716     Updated: 10/16/24 1721    Narrative:      US RENAL BILATERAL    Date of Exam: 10/16/2024 4:35 PM EDT    Indication: Evaluation of HASMUKH.    Comparison: CT abdomen pelvis 10/14/2024, renal ultrasound 10/9/2024    Technique: Grayscale and color Doppler ultrasound evaluation of the kidneys and urinary bladder was performed.      Findings:  RIGHT kidney measures 11.1 x 6.7 x 6.7 cm.  Kidney echogenicity, size, and vascularity appear within normal limits. There is no solid kidney mass.  No echogenic shadowing stone. There is mild to moderate hydronephrosis.    LEFT kidney measures 10.8 x 6.8 x 6.4 cm. Kidney echogenicity, size, and vascularity appear within normal limits. There is no solid kidney mass.  No echogenic shadowing stone. There is mild to moderate hydronephrosis.    Within the posterior margin of the urinary bladder is a lobular intraluminal mass with internal vascularity suggestive of neoplasm  measuring 5.4 x 1.5 x 5.7 cm. Urinary bladder is distended at time of examination with volume of 528 cc        Impression:      Impression:  1.Bilateral mild to moderate hydronephrosis.  2.Distended urinary bladder. There is a lobular mass within the posterior margin of the bladder presumably neoplastic.        Electronically Signed: Kishore Stapleton MD    10/16/2024 5:19 PM EDT    Workstation ID: [id number]             Assessment:    Cellulitis of abdominal wall      [place] hydronephrosis due to endometrial cancer possibly invading bladder  Retention  HASMUKH     Plan:      Needs kinney, retaining in bladder with hydro and Cr up  If cr not improved with kinney like previously will need stents    Philipp Jackson MD  [facility]  [facility], [address]  [place] [address]  Office: [phone]  Available via [employer]  10/17/24  15:48 EDT

## 2024-10-17 NOTE — PROGRESS NOTES
PROGRESS NOTE      Patient Name: Felisha Mukherjee  : 1965  MRN: [id number]  Primary Care Physician: Hanh Granados APRN  Date of admission: 10/8/2024    Patient Care Team:  Hanh Granados APRN as PCP - General (Family Medicine)  Jesus Robbins MD as Consulting Physician (Nephrology)  Jacinto Del Real MD as Consulting Physician (Hematology and Oncology)        Subjective   Subjective:     Patient is feeling same as yesterday still weak and tired all other review of system unremarkable  Review of systems:  All other review of system unremarkable      Allergies:  No Known Allergies    Objective   Exam:     Vital Signs  Temp:  [98 °F (36.7 °C)-98.7 °F (37.1 °C)] 98.3 °F (36.8 °C)  Heart Rate:  [79-84] 84  Resp:  [14-18] 16  BP: (111-157)/(68-90) 126/71  SpO2:  [90 %-95 %] 90 %  on   ;   Device (Oxygen Therapy): room air  Body mass index is 44.64 kg/m².    General: Middle-age somewhat obese female in no acute distress.    Head:      Normocephalic and atraumatic.    Eyes:      PERRL/EOM intact, conjunctivae and sclerae clear without nystagmus.    Neck:      No masses, thyromegaly,  trachea central with normal respiratory effort   Lungs:    Clear bilaterally to auscultation.    Heart:      Regular rate and rhythm, no murmur no gallop  Abd:        Soft, nontender, not distended, bowel sounds positive, no shifting dullness   Pulses:   Pulses palpable  Extr:        No cyanosis or clubbing--mild edema.    Neuro:    No focal deficits.   alert oriented x3  Skin:       Intact without lesions or rashes.    Psych:    Alert and cooperative; normal mood and affect; .      Results Review:  I have personally reviewed most recent Data :  CBC    Results from last 7 days   Lab Units 10/17/24  0252 10/16/24  0501 10/15/24  0510 10/14/24  0848 10/11/24  0107   WBC 10*3/mm3 12.46* 9.93 9.88 9.40 9.29   HEMOGLOBIN g/dL 10.1* 10.3* 9.6* 10.0* 10.8*   PLATELETS 10*3/mm3 333 350 336 375 430     CMP   Results  from last 7 days   Lab Units 10/17/24  0252 10/16/24  0501 10/15/24  0510 10/14/24  0848 10/11/24  0107   SODIUM mmol/L 131* 135* 137 138 139   POTASSIUM mmol/L 4.1 3.9 4.0 3.8 3.9   CHLORIDE mmol/L 98 101 104 103 104   CO2 mmol/L 17.9* 20.5* 21.8* 23.5 23.3   BUN mg/dL 21 16 12 10 9   CREATININE mg/dL 1.73* 1.48* 1.19* 1.11* 1.00   GLUCOSE mg/dL 134* 125* 133* 104* 121*   ALBUMIN g/dL 3.1*  --   --   --   --    BILIRUBIN mg/dL 0.2  --   --   --   --    ALK PHOS U/L 62  --   --   --   --    AST (SGOT) U/L 31  --   --   --   --    ALT (SGPT) U/L 14  --   --   --   --      ABG      US Renal Bilateral    Result Date: 10/16/2024  Impression: 1.Bilateral mild to moderate hydronephrosis. 2.Distended urinary bladder. There is a lobular mass within the posterior margin of the bladder presumably neoplastic. Electronically Signed: Kishore Stapleton MD  10/16/2024 5:19 PM EDT  Workstation ID: [id number]       Scheduled Meds:amLODIPine, 5 mg, Oral, Q24H  enoxaparin, 40 mg, Subcutaneous, Q12H  ferrous sulfate, 324 mg, Oral, Once per day on Monday Tuesday Wednesday Thursday Friday  hydrocortisone-bacitracin-zinc oxide-nystatin, 1 Application, Topical, 4x Daily  hydrOXYzine, 50 mg, Oral, Nightly  insulin lispro, 2-9 Units, Subcutaneous, 4x Daily AC & at Bedtime  lisinopril, 40 mg, Oral, Daily  metoprolol succinate XL, 50 mg, Oral, Q24H  montelukast, 10 mg, Oral, Nightly  multivitamin, 1 tablet, Oral, Daily  nystatin, , Topical, Q12H  PARoxetine, 30 mg, Oral, Daily  risperiDONE, 4 mg, Oral, Q PM  sodium chloride, 10 mL, Intravenous, Q12H  zinc sulfate, 220 mg, Oral, Daily      Continuous Infusions:Pharmacy to Dose enoxaparin (LOVENOX),       PRN Meds:  acetaminophen    senna-docusate sodium **AND** polyethylene glycol **AND** bisacodyl **AND** bisacodyl    Calcium Replacement - Follow Nurse / BPA Driven Protocol    dextrose    dextrose    glucagon (human recombinant)    Magnesium Standard Dose Replacement - Follow Nurse / BPA Driven  Protocol    [facility]    Pharmacy to Dose enoxaparin (LOVENOX)    Phosphorus Replacement - Follow Nurse / BPA Driven Protocol    Potassium Replacement - Follow Nurse / BPA Driven Protocol    [COMPLETED] Insert Peripheral IV **AND** sodium chloride    sodium chloride    sodium chloride    Assessment & Plan   Assessment and Plan:         Cellulitis of abdominal wall    ASSESSMENT:  Acute kidney injury likely obstructive uropathy  Metabolic acidosis secondary to HASMUKH  Hyponatremia secondary to malignancy and HASMUKH  History of uterine cancer  Hypertension  History of diabetes mellitus              PLAN :      urology note appreciated discussed with them they want to get a West catheter inserted they think once West catheter inserted some urine output will help to improve the renal function  Worsening acidosis hyponatremia secondary to HASMUKH  If that is not helping to increase the creatinine by tomorrow patient may need a ureteral stent placement  Follow-up with the volume status closely  Start p.o. sodium bicarbonate at this time  Follow-up with oncology regarding uterine cancer  Thank you for letting me participate          Electronically signed by Julio Cesar Tolbert MD,   [facility] kidney consultant  [phone]  10/17/2024  09:47 EDT

## 2024-10-17 NOTE — PROGRESS NOTES
Hematology/Oncology Inpatient Progress Note    PATIENT NAME: Felisha Mukherjee  : 1965  MRN: [id number]    CHIEF COMPLAINT: Abdominal pain.     HISTORY OF PRESENT ILLNESS:      2024: Ms. Hwang was referred for the investigation and treatment of anemia.  Heme in 2024 for blood count revealed a hemoglobin of 7.1 g/dL with microcytic red cells.  There was clear evidence of iron deficiency with a total iron binding capacity saturation of 2.3%.  She was started on oral iron and was in early 2024 identified as having endometrial adenocarcinoma of the endometrioid type with a FIGO grade 1.  The iron deficiency was attributed to menorrhagia that was felt to be the result of the malignancy.  With persistent anemia a decision was made to treat her with intravenous iron.     -Underwent exam under anesthesia, cystoscopy, D & C on 2024.     Surgical Findings:  Large tumor extending through the cervix filling the vagina.  The bladder was noted to be free of lesions and both ureteral orifices were identified.      Final Pathology  Diagnosis:   A.  Uterus, endometrial curettings:   - Endometrial adenocarcinoma, endometrioid type, FIGO grade 2, see note     Note:   - On immunostaining, the tumor cells are positive for PAX8, ER (70%, 2-3+), and vimentin supportive of above diagnosis.   - Immunohistochemical staining for mismatch repair proteins demonstrates INTACT expression of MLH1, PMS2, MSH2, and MSH6.   - Immunohistochemical staining for 53 demonstrates a wild-type expression.      9/10/2024: For the first time at the [employer] office with the above.  She has yet to receive intravenous iron but is scheduled to do so in the near future.  She is also being followed by Dr. Matthews at the [facility] gynecology/oncology program.  Apparently surgery is not in the near future plans.  Generally she feels well at this time although she continues to be fatigued and weak at times.  She  describes a good appetite for the most part and eats frequently during the day.  Her weight is stable.  She has not experienced any chest pains, cough or dyspnea.  She has intermittent abdominal pain and describes abnormal defecations that happen every 2 or 3 days but several times during that day at times with soft stools but never liquid stools.  She has some dysuria.  She has continued to have vaginal bleeding but it has not been as intense as before.  She has no peripheral edema.  On exam she seems chronically ill.  She is not in distress.  She is conversant and oriented.  No jaundice.  The lungs are diminished bilaterally and the heart regular.  The abdomen is soft nontender and there is no edema.  Laboratory exams reviewed.  She has yet to receive intravenous iron.  I have asked her to see me again after completing the intravenous iron with new laboratory exams.     FAMILY HISTORY:  MGM: ovarian cancer  GGM:: ovarian cancer      10/3/2024 patient had PET/CT imaging that showed large hypermetabolic mass involving uterus extending into the cervix and vagina consistent with her known endometrial malignancy.  New hypermetabolic soft tissue involving posterior bladder wall concerning for tumor invasion, new moderate bilateral hydroureteronephrosis is likely secondary to the mass involving left and right UVJ.  There are hypermetabolic iliac, retroperitoneal and small subcarinal lymph nodes concerning for metastatic adenopathy.  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  Cystic 8.6 x 8.4 cm left adnexal lesion with hypermetabolic peripheral nodule could relate to ovarian metastases or primary ovarian neoplasm.    Subjective   10/17/2024: Somewhat better.  Able to eat.  Gets out of the bed to use the bathroom.    ROS:  Review of Systems   Constitutional:  Positive for activity change and fatigue. Negative for appetite change, chills, diaphoresis, fever and unexpected weight change.   HENT:   Negative for congestion, dental problem, drooling, ear discharge, ear pain, facial swelling, hearing loss, mouth sores, nosebleeds, postnasal drip, rhinorrhea, sinus pressure, sinus pain, sneezing, sore throat, tinnitus, trouble swallowing and voice change.    Eyes:  Negative for photophobia, pain, discharge, redness, itching and visual disturbance.   Respiratory:  Negative for apnea, cough, choking, chest tightness, shortness of breath, wheezing and stridor.    Cardiovascular:  Negative for chest pain, palpitations and leg swelling.   Gastrointestinal:  Negative for abdominal distention, abdominal pain, anal bleeding, blood in stool, constipation, diarrhea, nausea, rectal pain and vomiting.   Endocrine: Negative for cold intolerance, heat intolerance, polydipsia and polyuria.   Genitourinary:  Positive for frequency and urgency. Negative for decreased urine volume, difficulty urinating, dysuria, flank pain, genital sores and hematuria.   Musculoskeletal:  Negative for arthralgias, back pain, gait problem, joint swelling, myalgias, neck pain and neck stiffness.   Skin:  Negative for color change, pallor and rash.   Neurological:  Positive for weakness. Negative for dizziness, tremors, seizures, syncope, facial asymmetry, speech difficulty, light-headedness, numbness and headaches.   Hematological:  Negative for adenopathy. Does not bruise/bleed easily.   Psychiatric/Behavioral:  Negative for agitation, behavioral problems, confusion, decreased concentration, hallucinations, self-injury, sleep disturbance and suicidal ideas. The patient is not nervous/anxious.       MEDICATIONS:    Scheduled Meds:  amLODIPine, 5 mg, Oral, Q24H  enoxaparin, 40 mg, Subcutaneous, Q12H  ferrous sulfate, 324 mg, Oral, Once per day on Monday Tuesday Wednesday Thursday Friday  hydrocortisone-bacitracin-zinc oxide-nystatin, 1 Application, Topical, 4x Daily  hydrOXYzine, 50 mg, Oral, Nightly  insulin lispro, 2-9 Units, Subcutaneous, 4x Daily  "AC & at Bedtime  lisinopril, 40 mg, Oral, Daily  metoprolol succinate XL, 50 mg, Oral, Q24H  montelukast, 10 mg, Oral, Nightly  multivitamin, 1 tablet, Oral, Daily  nystatin, , Topical, Q12H  PARoxetine, 30 mg, Oral, Daily  risperiDONE, 4 mg, Oral, Q PM  sodium chloride, 10 mL, Intravenous, Q12H  zinc sulfate, 220 mg, Oral, Daily       Continuous Infusions:  Pharmacy to Dose enoxaparin (LOVENOX),        PRN Meds:    acetaminophen    senna-docusate sodium **AND** polyethylene glycol **AND** bisacodyl **AND** bisacodyl    Calcium Replacement - Follow Nurse / BPA Driven Protocol    dextrose    dextrose    glucagon (human recombinant)    Magnesium Standard Dose Replacement - Follow Nurse / BPA Driven Protocol    melatonin    Pharmacy to Dose enoxaparin (LOVENOX)    Phosphorus Replacement - Follow Nurse / BPA Driven Protocol    Potassium Replacement - Follow Nurse / BPA Driven Protocol    [COMPLETED] Insert Peripheral IV **AND** sodium chloride    sodium chloride    sodium chloride     ALLERGIES:  No Known Allergies    Objective    VITALS:   /79 (BP Location: Left arm, Patient Position: Lying) Comment (BP Location): forearm  Pulse 76   Temp 97.7 °F (36.5 °C) (Oral)   Resp 18   Ht 160 cm (63\")   Wt 114 kg (251 lb 15.8 oz)   SpO2 95%   BMI 44.64 kg/m²     PHYSICAL EXAM: (performed by MD)  Physical Exam  Constitutional:       General: She is not in acute distress.     Appearance: She is ill-appearing. She is not toxic-appearing or diaphoretic.   HENT:      Head: Normocephalic and atraumatic.      Right Ear: External ear normal.      Left Ear: External ear normal.      Nose: Nose normal.      Mouth/Throat:      Mouth: Mucous membranes are moist.      Pharynx: Oropharynx is clear. No oropharyngeal exudate or posterior oropharyngeal erythema.   Eyes:      General: No scleral icterus.        Right eye: No discharge.         Left eye: No discharge.      Conjunctiva/sclera: Conjunctivae normal.      Pupils: Pupils " are equal, round, and reactive to light.   Cardiovascular:      Rate and Rhythm: Normal rate and regular rhythm.      Pulses: Normal pulses.      Heart sounds: No murmur heard.     No friction rub. No gallop.   Pulmonary:      Effort: No respiratory distress.      Breath sounds: No stridor. No wheezing, rhonchi or rales.   Abdominal:      General: Bowel sounds are normal. There is no distension.      Palpations: Abdomen is soft. There is no mass.      Tenderness: There is no abdominal tenderness. There is no right CVA tenderness, left CVA tenderness, guarding or rebound.      Hernia: No hernia is present.      Comments: Protuberant, soft and nontender.  The areas of erythema has seemed almost completely healed today.  There is no more pain.   Musculoskeletal:         General: No tenderness, deformity or signs of injury.      Cervical back: No rigidity.      Right lower leg: No edema.      Left lower leg: No edema.   Lymphadenopathy:      Cervical: No cervical adenopathy.   Skin:     Coloration: Skin is not jaundiced or pale.      Findings: No bruising, lesion or rash.   Neurological:      General: No focal deficit present.      Mental Status: She is alert and oriented to person, place, and time.      Cranial Nerves: No cranial nerve deficit.   Psychiatric:         Mood and Affect: Mood normal.         Behavior: Behavior normal.         Thought Content: Thought content normal.         Judgment: Judgment normal.      SHAN Del Real MD performed the physical exam on 10/17/2024 as documented above.    RECENT LABS:  Lab Results (last 24 hours)       Procedure Component Value Units Date/Time    POC Glucose Once [[id number]]  (Normal) Collected: 10/17/24 1518    Specimen: Blood Updated: 10/17/24 1520     Glucose 90 mg/dL      Comment: Serial Number: [id number]:  [id number]       Protein, Urine, Random - Indwelling Urethral Catheter [[id number]] Collected: 10/17/24 1030    Specimen: Urine from Indwelling Urethral  Catheter Updated: 10/17/24 1138     Total Protein, Urine 79.6 mg/dL     Narrative:      Reference intervals for random urine have not been established.  Clinical usage is dependent upon physician's interpretation in combination with other laboratory tests.       Sodium, Urine, Random - Indwelling Urethral Catheter [[id number]] Collected: 10/17/24 1030    Specimen: Urine from Indwelling Urethral Catheter Updated: 10/17/24 1138     Sodium, Urine <20 mmol/L     Narrative:      Reference intervals for random urine have not been established.  Clinical usage is dependent upon physician's interpretation in combination with other laboratory tests.       Urinalysis, Microscopic Only - Indwelling Urethral Catheter [[id number]]  (Abnormal) Collected: 10/17/24 1030    Specimen: Urine from Indwelling Urethral Catheter Updated: 10/17/24 1116     RBC, UA None Seen /HPF      WBC, UA 6-10 /HPF      Bacteria, UA None Seen /HPF      Squamous Epithelial Cells, UA 0-2 /HPF      Hyaline Casts, UA None Seen /LPF      Methodology Manual Light Microscopy    Urinalysis With Microscopic If Indicated (No Culture) - Indwelling Urethral Catheter [[id number]]  (Abnormal) Collected: 10/17/24 1030    Specimen: Urine from Indwelling Urethral Catheter Updated: 10/17/24 1116     Color, UA Yellow     Appearance, UA Cloudy     Comment: Result checked          pH, UA 5.5     Specific Gravity, UA 1.009     Glucose, UA Negative     Ketones, UA Negative     Bilirubin, UA Negative     Blood, UA Negative     Protein,  mg/dL (2+)     Leuk Esterase, UA Negative     Nitrite, UA Negative     Urobilinogen, UA 0.2 E.U./dL    Creatinine Urine Random (kidney function) GFR component - Indwelling Urethral Catheter [[id number]] Collected: 10/17/24 1030    Specimen: Urine from Indwelling Urethral Catheter Updated: 10/17/24 1103    POC Glucose 4x Daily Before Meals & at Bedtime [[id number]]  (Abnormal) Collected: 10/17/24 0743    Specimen: Blood Updated: 10/17/24 0778      Glucose 125 mg/dL      Comment: Serial Number: [id number]:  [id number]       Comprehensive Metabolic Panel [[id number]]  (Abnormal) Collected: 10/17/24 0252    Specimen: Blood Updated: 10/17/24 0347     Glucose 134 mg/dL      BUN 21 mg/dL      Creatinine 1.73 mg/dL      Sodium 131 mmol/L      Potassium 4.1 mmol/L      Chloride 98 mmol/L      CO2 17.9 mmol/L      Calcium 9.7 mg/dL      Total Protein 6.0 g/dL      Albumin 3.1 g/dL      ALT (SGPT) 14 U/L      AST (SGOT) 31 U/L      Alkaline Phosphatase 62 U/L      Total Bilirubin 0.2 mg/dL      Globulin 2.9 gm/dL      A/G Ratio 1.1 g/dL      BUN/Creatinine Ratio 12.1     Anion Gap 15.1 mmol/L      eGFR 33.5 mL/min/1.73     Narrative:      GFR Normal >60  Chronic Kidney Disease <60  Kidney Failure <15      Magnesium [[id number]]  (Normal) Collected: 10/17/24 0252    Specimen: Blood Updated: 10/17/24 0347     Magnesium 1.7 mg/dL     Phosphorus [[id number]]  (Abnormal) Collected: 10/17/24 0252    Specimen: Blood Updated: 10/17/24 0347     Phosphorus 5.9 mg/dL     BNP [[id number]]  (Normal) Collected: 10/17/24 0252    Specimen: Blood Updated: 10/17/24 0347     proBNP 234.0 pg/mL     Narrative:      This assay is used as an aid in the diagnosis of individuals suspected of having heart failure. It can be used as an aid in the diagnosis of acute decompensated heart failure (ADHF) in patients presenting with signs and symptoms of ADHF to the emergency department (ED). In addition, NT-proBNP of <300 pg/mL indicates ADHF is not likely.    Age Range Result Interpretation  NT-proBNP Concentration (pg/mL:      <50             Positive            >450                   Gray                 300-450                    Negative             <300    50-75           Positive            >900                  Gray                300-900                  Negative            <300      >75             Positive            >1800                  Gray                300-1800                   Negative            <300    CBC (No Diff) [[id number]]  (Abnormal) Collected: 10/17/24 0252    Specimen: Blood Updated: 10/17/24 0323     WBC 12.46 10*3/mm3      RBC 4.22 10*6/mm3      Hemoglobin 10.1 g/dL      Hematocrit 34.5 %      MCV 81.8 fL      MCH 23.9 pg      MCHC 29.3 g/dL      RDW 22.0 %      RDW-SD 65.8 fl      MPV 8.6 fL      Platelets 333 10*3/mm3     POC Glucose Once [[id number]]  (Abnormal) Collected: 10/16/24 2134    Specimen: Blood Updated: 10/16/24 2136     Glucose 136 mg/dL      Comment: Serial Number: [id number]:  [id number]             IMAGING REVIEWED:  US Renal Bilateral    Result Date: 10/16/2024  Impression: 1.Bilateral mild to moderate hydronephrosis. 2.Distended urinary bladder. There is a lobular mass within the posterior margin of the bladder presumably neoplastic. Electronically Signed: Kishore Stapleton MD  10/16/2024 5:19 PM EDT  Workstation ID: [id number]     Assessment & Plan   ASSESSMENT:  Apparent metastatic endometrial cancer: The report of the biopsy is not back yet.  Discussed with her.  No report of the biopsy yet.  Cellulitis of the abdominal skin.  Continues to improve.  Poor performance status.  She is to have she is to have physical therapy.  Reviewed all laboratory exams and discussed with her.    PLAN:  As above.    Jacinto Del Real MD on 10/17/2024 at 1650.

## 2024-10-17 NOTE — PROGRESS NOTES
Radiation Oncology Nurse Note    Assessed patient while waiting for radiation treatment this morning after request from the treatment team. Patient stated she had her kinney catheter placed or replaced this morning. Patient stated it was painful when inserted, but is only causing discomfort at this time. Her urine was noted to be moderately blood-tinged without visible clots. Patient stated this started when she arrived in our department for treatment. Dr. Garzon assessed her as well. No intervention needed at this time. Will alert her bedside nurse so it can be monitored. Patient received her radiation treatment prior to transportation back to her inpatient room.    Amy Hughes RN, BSN  Radiation Oncology Department  [facility]  [phone]

## 2024-10-17 NOTE — PLAN OF CARE
Problem: Adult Inpatient Plan of Care  Goal: Plan of Care Review  Outcome: Progressing  Goal: Patient-Specific Goal (Individualized)  Outcome: Progressing  Goal: Absence of Hospital-Acquired Illness or Injury  Outcome: Progressing  Intervention: Identify and Manage Fall Risk  Recent Flowsheet Documentation  Taken 10/17/2024 1032 by Vikki Maharaj RN  Safety Promotion/Fall Prevention: safety round/check completed  Taken 10/17/2024 0859 by Vikki Maharaj RN  Safety Promotion/Fall Prevention: safety round/check completed  Intervention: Prevent Skin Injury  Recent Flowsheet Documentation  Taken 10/17/2024 0859 by Vikki Maharaj RN  Body Position: position changed independently  Intervention: Prevent and Manage VTE (Venous Thromboembolism) Risk  Recent Flowsheet Documentation  Taken 10/17/2024 0859 by Vikki Maharaj RN  VTE Prevention/Management:   bilateral   SCDs (sequential compression devices) off  Intervention: Prevent Infection  Recent Flowsheet Documentation  Taken 10/17/2024 0859 by Vikki Maharaj RN  Infection Prevention: single patient room provided  Goal: Optimal Comfort and Wellbeing  Outcome: Progressing  Intervention: Provide Person-Centered Care  Recent Flowsheet Documentation  Taken 10/17/2024 0859 by Vikki Maharaj RN  Trust Relationship/Rapport:   care explained   choices provided   emotional support provided  Goal: Readiness for Transition of Care  Outcome: Progressing     Problem: Impaired Wound Healing  Goal: Optimal Wound Healing  Outcome: Progressing     Problem: Skin Injury Risk Increased  Goal: Skin Health and Integrity  Outcome: Progressing     Problem: Fall Injury Risk  Goal: Absence of Fall and Fall-Related Injury  Outcome: Progressing  Intervention: Identify and Manage Contributors  Recent Flowsheet Documentation  Taken 10/17/2024 0859 by Vikki Maharaj RN  Medication Review/Management: medications reviewed  Intervention: Promote Injury-Free  Environment  Recent Flowsheet Documentation  Taken 10/17/2024 1032 by Vikki Maharaj, RN  Safety Promotion/Fall Prevention: safety round/check completed  Taken 10/17/2024 0859 by Vikki Maharaj, RN  Safety Promotion/Fall Prevention: safety round/check completed   Goal Outcome Evaluation:   Pt completed radiation today. She also had a kinney placed. No concerns and call light within reach.

## 2024-10-17 NOTE — PLAN OF CARE
Goal Outcome Evaluation:                 Wound care completed. Patient able to void multiple times throughout shift. No complaints of pain or discomfort.

## 2024-10-17 NOTE — PROGRESS NOTES
[facility] MEDICINE SERVICE  DAILY PROGRESS NOTE    NAME: Felisha Mukherjee  : 1965  MRN: [id number]      LOS: 9 days     PROVIDER OF SERVICE: Harry Pryor MD    Chief Complaint: Cellulitis of abdominal wall    Subjective:     Interval History:  History taken from: patient    She is eating without issue.  West catheter is in place.      Review of Systems:   Review of Systems   All other systems reviewed and are negative.      Objective:     Vital Signs  Temp:  [98 °F (36.7 °C)-98.4 °F (36.9 °C)] 98.3 °F (36.8 °C)  Heart Rate:  [79-84] 84  Resp:  [16-18] 16  BP: (111-157)/(68-88) 126/71   Body mass index is 44.64 kg/m².    Physical Exam  Physical Exam  Vitals reviewed.   Constitutional:       Appearance: Normal appearance. She is obese.   HENT:      Head: Normocephalic.   Cardiovascular:      Rate and Rhythm: Normal rate and regular rhythm.   Pulmonary:      Effort: Pulmonary effort is normal.      Breath sounds: Normal breath sounds.   Abdominal:      General: Bowel sounds are normal.      Palpations: Abdomen is soft.   Skin:     Comments: Persistent improvement in patient's cellulitis/rash.   Neurological:      Mental Status: She is alert.            Diagnostic Data    Results from last 7 days   Lab Units 10/17/24  0252   WBC 10*3/mm3 12.46*   HEMOGLOBIN g/dL 10.1*   HEMATOCRIT % 34.5   PLATELETS 10*3/mm3 333   GLUCOSE mg/dL 134*   CREATININE mg/dL 1.73*   BUN mg/dL 21   SODIUM mmol/L 131*   POTASSIUM mmol/L 4.1   AST (SGOT) U/L 31   ALT (SGPT) U/L 14   ALK PHOS U/L 62   BILIRUBIN mg/dL 0.2   ANION GAP mmol/L 15.1*       US Renal Bilateral    Result Date: 10/16/2024  Impression: 1.Bilateral mild to moderate hydronephrosis. 2.Distended urinary bladder. There is a lobular mass within the posterior margin of the bladder presumably neoplastic. Electronically Signed: Kishore Stapleton MD  10/16/2024 5:19 PM EDT  Workstation ID: [id number]           Assessment:   Lower abdominal wall cellulitis bacterial  versus fungal.  Uterine carcinoma.  Hydronephrosis now with West catheter  Type 2 diabetes  Obesity  Anemia  Acute renal insufficiency      Plan:     Bilateral hydronephrosis noted on the ultrasound of her kidneys yesterday.  To meet with urology regarding options for decompression.  This should resolve the HASMUKH.    Going for radiation therapy later today.      Active and Resolved Problems  Active Hospital Problems    Diagnosis  POA    **Cellulitis of abdominal wall [L03.311]  Yes      Resolved Hospital Problems   No resolved problems to display.           VTE Prophylaxis:  Pharmacologic VTE prophylaxis orders are present.             Disposition Planning:     Barriers to Discharge: HASMUKH management, bed availability  Anticipated Date of Discharge: 10/16  Place of Discharge:  SNF for wound care if still needed      Time: 30 minutes     Code Status and Medical Interventions: CPR (Attempt to Resuscitate); Full Support   Ordered at: 10/08/24 1722     Level Of Support Discussed With:    Patient     Code Status (Patient has no pulse and is not breathing):    CPR (Attempt to Resuscitate)     Medical Interventions (Patient has pulse or is breathing):    Full Support       Signature: Electronically signed by Harry Pryor MD, 10/17/24, 12:46 EDT.  [facility] Hospitalist Team

## 2024-10-18 ENCOUNTER — READMISSION MANAGEMENT (OUTPATIENT)
Dept: CALL CENTER | Facility: HOSPITAL | Age: 59
End: 2024-10-18
Payer: MEDICARE

## 2024-10-18 ENCOUNTER — HOSPITAL ENCOUNTER (OUTPATIENT)
Dept: RADIATION ONCOLOGY | Facility: HOSPITAL | Age: 59
Discharge: HOME | End: 2024-10-18
Payer: MEDICARE

## 2024-10-18 VITALS
WEIGHT: 251.99 LBS | SYSTOLIC BLOOD PRESSURE: 139 MMHG | TEMPERATURE: 98.9 F | RESPIRATION RATE: 16 BRPM | DIASTOLIC BLOOD PRESSURE: 87 MMHG | HEART RATE: 81 BPM | HEIGHT: 63 IN | OXYGEN SATURATION: 91 % | BODY MASS INDEX: 44.65 KG/M2

## 2024-10-18 LAB
ANION GAP SERPL CALCULATED.3IONS-SCNC: 9.6 MMOL/L (ref 5–15)
BUN SERPL-MCNC: 25 MG/DL (ref 8–23)
BUN/CREAT SERPL: 16.8 (ref 7–25)
CALCIUM SPEC-SCNC: 9.5 MG/DL (ref 8.6–10.5)
CHLORIDE SERPL-SCNC: 106 MMOL/L (ref 98–107)
CO2 SERPL-SCNC: 20.4 MMOL/L (ref 22–29)
CREAT SERPL-MCNC: 1.49 MG/DL (ref 0.57–1)
DEPRECATED RDW RBC AUTO: 62.2 FL (ref 37–54)
EGFRCR SERPLBLD CKD-EPI 2021: 40 ML/MIN/1.73
ERYTHROCYTE [DISTWIDTH] IN BLOOD BY AUTOMATED COUNT: 21.7 % (ref 12.3–15.4)
GLUCOSE BLDC GLUCOMTR-MCNC: 131 MG/DL (ref 70–105)
GLUCOSE BLDC GLUCOMTR-MCNC: 165 MG/DL (ref 70–105)
GLUCOSE BLDC GLUCOMTR-MCNC: 89 MG/DL (ref 70–105)
GLUCOSE SERPL-MCNC: 116 MG/DL (ref 65–99)
HCT VFR BLD AUTO: 31.7 % (ref 34–46.6)
HGB BLD-MCNC: 9.5 G/DL (ref 12–15.9)
MCH RBC QN AUTO: 23.8 PG (ref 26.6–33)
MCHC RBC AUTO-ENTMCNC: 30 G/DL (ref 31.5–35.7)
MCV RBC AUTO: 79.3 FL (ref 79–97)
PLATELET # BLD AUTO: 312 10*3/MM3 (ref 140–450)
PMV BLD AUTO: 9 FL (ref 6–12)
POTASSIUM SERPL-SCNC: 4 MMOL/L (ref 3.5–5.2)
RAD ONC ARIA COURSE ID: NORMAL
RAD ONC ARIA COURSE LAST TREATMENT DATE: NORMAL
RAD ONC ARIA COURSE START DATE: NORMAL
RAD ONC ARIA COURSE TREATMENT ELAPSED DAYS: 2
RAD ONC ARIA FIRST TREATMENT DATE: NORMAL
RAD ONC ARIA PLAN FRACTIONS TREATED TO DATE: 3
RAD ONC ARIA PLAN ID: NORMAL
RAD ONC ARIA PLAN PRESCRIBED DOSE PER FRACTION: 4 GY
RAD ONC ARIA PLAN PRIMARY REFERENCE POINT: NORMAL
RAD ONC ARIA PLAN TOTAL FRACTIONS PRESCRIBED: 5
RAD ONC ARIA PLAN TOTAL PRESCRIBED DOSE: 2000 CGY
RAD ONC ARIA REFERENCE POINT DOSAGE GIVEN TO DATE: 12 GY
RAD ONC ARIA REFERENCE POINT ID: NORMAL
RAD ONC ARIA REFERENCE POINT SESSION DOSAGE GIVEN: 4 GY
RBC # BLD AUTO: 4 10*6/MM3 (ref 3.77–5.28)
SODIUM SERPL-SCNC: 136 MMOL/L (ref 136–145)
WBC NRBC COR # BLD AUTO: 8.43 10*3/MM3 (ref 3.4–10.8)

## 2024-10-18 PROCEDURE — 80048 BASIC METABOLIC PNL TOTAL CA: CPT | Performed by: STUDENT IN AN ORGANIZED HEALTH CARE EDUCATION/TRAINING PROGRAM

## 2024-10-18 PROCEDURE — 63710000001 INSULIN LISPRO (HUMAN) PER 5 UNITS: Performed by: INTERNAL MEDICINE

## 2024-10-18 PROCEDURE — 77387 GUIDANCE FOR RADJ TX DLVR: CPT | Performed by: INTERNAL MEDICINE

## 2024-10-18 PROCEDURE — 82948 REAGENT STRIP/BLOOD GLUCOSE: CPT

## 2024-10-18 PROCEDURE — 77014 CHG CT GUIDANCE RADIATION THERAPY FLDS PLACEMENT: CPT | Performed by: INTERNAL MEDICINE

## 2024-10-18 PROCEDURE — 25010000002 ENOXAPARIN PER 10 MG: Performed by: INTERNAL MEDICINE

## 2024-10-18 PROCEDURE — 85027 COMPLETE CBC AUTOMATED: CPT | Performed by: STUDENT IN AN ORGANIZED HEALTH CARE EDUCATION/TRAINING PROGRAM

## 2024-10-18 PROCEDURE — 82948 REAGENT STRIP/BLOOD GLUCOSE: CPT | Performed by: INTERNAL MEDICINE

## 2024-10-18 PROCEDURE — 77412 RADIATION TX DELIVERY LVL 3: CPT | Performed by: INTERNAL MEDICINE

## 2024-10-18 RX ADMIN — ZINC OXIDE 1 APPLICATION: 200 OINTMENT TOPICAL at 17:12

## 2024-10-18 RX ADMIN — PAROXETINE 30 MG: 10 TABLET, FILM COATED ORAL at 08:13

## 2024-10-18 RX ADMIN — RISPERIDONE 4 MG: 1 TABLET, FILM COATED ORAL at 17:12

## 2024-10-18 RX ADMIN — METOPROLOL SUCCINATE 50 MG: 50 TABLET, EXTENDED RELEASE ORAL at 08:13

## 2024-10-18 RX ADMIN — ZINC OXIDE 1 APPLICATION: 200 OINTMENT TOPICAL at 12:15

## 2024-10-18 RX ADMIN — LISINOPRIL 40 MG: 20 TABLET ORAL at 08:13

## 2024-10-18 RX ADMIN — ENOXAPARIN SODIUM 40 MG: 100 INJECTION SUBCUTANEOUS at 08:13

## 2024-10-18 RX ADMIN — AMLODIPINE BESYLATE 5 MG: 5 TABLET ORAL at 08:13

## 2024-10-18 RX ADMIN — Medication 220 MG: at 08:13

## 2024-10-18 RX ADMIN — ZINC OXIDE 1 APPLICATION: 200 OINTMENT TOPICAL at 08:14

## 2024-10-18 RX ADMIN — FERROUS SULFATE TAB EC 324 MG (65 MG FE EQUIVALENT) 324 MG: 324 (65 FE) TABLET DELAYED RESPONSE at 08:13

## 2024-10-18 RX ADMIN — NYSTATIN: 100000 POWDER TOPICAL at 08:14

## 2024-10-18 RX ADMIN — THERA TABS 1 TABLET: TAB at 08:13

## 2024-10-18 RX ADMIN — INSULIN LISPRO 2 UNITS: 100 INJECTION, SOLUTION INTRAVENOUS; SUBCUTANEOUS at 08:13

## 2024-10-18 RX ADMIN — Medication 10 ML: at 08:14

## 2024-10-18 NOTE — PLAN OF CARE
Problem: Adult Inpatient Plan of Care  Goal: Plan of Care Review  Outcome: Met  Goal: Patient-Specific Goal (Individualized)  Outcome: Met  Goal: Absence of Hospital-Acquired Illness or Injury  Outcome: Met  Intervention: Identify and Manage Fall Risk  Recent Flowsheet Documentation  Taken 10/18/2024 1459 by Vikki Maharaj RN  Safety Promotion/Fall Prevention: patient off unit  Taken 10/18/2024 1259 by Vikki Maharaj RN  Safety Promotion/Fall Prevention: safety round/check completed  Taken 10/18/2024 1043 by Vikki Maharaj RN  Safety Promotion/Fall Prevention: safety round/check completed  Taken 10/18/2024 0800 by Vikki Maharaj RN  Safety Promotion/Fall Prevention: safety round/check completed  Intervention: Prevent Skin Injury  Recent Flowsheet Documentation  Taken 10/18/2024 0800 by Vikki Maharaj RN  Body Position: position changed independently  Intervention: Prevent and Manage VTE (Venous Thromboembolism) Risk  Recent Flowsheet Documentation  Taken 10/18/2024 0800 by Vikki Maharaj RN  VTE Prevention/Management:   bilateral   SCDs (sequential compression devices) off  Intervention: Prevent Infection  Recent Flowsheet Documentation  Taken 10/18/2024 0800 by Vikki Maharaj RN  Infection Prevention: single patient room provided  Goal: Optimal Comfort and Wellbeing  Outcome: Met  Intervention: Provide Person-Centered Care  Recent Flowsheet Documentation  Taken 10/18/2024 0800 by Vikki Maharaj RN  Trust Relationship/Rapport:   care explained   choices provided   emotional support provided   empathic listening provided   questions answered  Goal: Readiness for Transition of Care  Outcome: Met     Problem: Impaired Wound Healing  Goal: Optimal Wound Healing  Outcome: Met     Problem: Skin Injury Risk Increased  Goal: Skin Health and Integrity  Outcome: Met  Intervention: Optimize Skin Protection  Recent Flowsheet Documentation  Taken 10/18/2024 0800 by Nicko  ROME Florez  Activity Management:   ambulated to bathroom   ambulated in room     Problem: Fall Injury Risk  Goal: Absence of Fall and Fall-Related Injury  Outcome: Met  Intervention: Identify and Manage Contributors  Recent Flowsheet Documentation  Taken 10/18/2024 0800 by Vikki Maharaj RN  Medication Review/Management: medications reviewed  Self-Care Promotion: independence encouraged  Intervention: Promote Injury-Free Environment  Recent Flowsheet Documentation  Taken 10/18/2024 1459 by Vikki Maharaj RN  Safety Promotion/Fall Prevention: patient off unit  Taken 10/18/2024 1259 by Vikki Maharaj RN  Safety Promotion/Fall Prevention: safety round/check completed  Taken 10/18/2024 1043 by Vikki Maharaj RN  Safety Promotion/Fall Prevention: safety round/check completed  Taken 10/18/2024 0800 by Vikki Maharaj RN  Safety Promotion/Fall Prevention: safety round/check completed   Goal Outcome Evaluation:   Pt completed radiation today and her wound care. She also had her IV removed at discharge; intact and clean. West to remain at discharge. Pt to transport to [facility] via  wheelchair van. Report called to [facility]. No concerns.

## 2024-10-18 NOTE — DISCHARGE SUMMARY
"             [facility] Medicine Services  Discharge Summary    Date of Service: 10/18/2024  Patient Name: Felisha Mukherjee  : 1965  MRN: [id number]    Date of Admission: 10/8/2024  Discharge Diagnosis: Cellulitis of abdominal wall  Date of Discharge: 10/18/2024  Primary Care Physician: Hanh Granados APRN      Presenting Problem:   Cellulitis of abdominal wall [L03.311]  Urinary retention [R33.9]  Uterine mass [N85.8]  Cellulitis of groin, left [L03.314]  Abdominal wall cellulitis [L03.311]    Active and Resolved Hospital Problems:  Active Hospital Problems    Diagnosis POA    **Cellulitis of abdominal wall [L03.311] Yes      Resolved Hospital Problems   No resolved problems to display.         Hospital Course     HPI:    \"Felisha Mukherjee is a 59 y.o. female with a CMH of diabetes, hypertension, uterine cancer who presented to [facility] on 10/8/2024 with rash on abdomen and lower legs.     Patient with history of uterine cancer, was seen in office by her radiation oncologist today when he noticed a developing rash on abdomen legs and thighs.  Patient states that the rash started last Thursday and continues to worsen.  Of note, she did not start any radiation treatment in these areas at this time.  She was sent to ED for evaluation.     ED evaluation, workup-CT abdomen with evidence of abdominal wall cellulitis and leukocytosis.  Patient was given IV fluids empiric antibiotics, IV fluids, infectious workup.\"    Hospital Course:  Patient was hospitalized on  due to a worsening rash on her thighs groin and abdomen.  Abdominal rash was felt to represent cellulitis and patient was treated with appropriate antibiotic coverage for this.  She is known to have metastatic endometrial cancer and a biopsy was pursued by oncology while she is inpatient.  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  Radiation oncology was consulted and patient began treatment with " radiation therapy while inpatient.    Her hospitalization was complicated by acute kidney injury which was secondary to obstructive uropathy.  She was found to have bilateral hydronephrosis likely due to her endometrial cancer after placement of West her urinary system was able to be decompressed and her HASMUKH improved.  She will need a West in place on discharge to continue decompressing her bladder.        DISCHARGE Follow Up Recommendations for labs and diagnostics:     Oncology  Primary care medicine  Urology        Day of Discharge     Vital Signs:  Temp:  [97.7 °F (36.5 °C)-98.9 °F (37.2 °C)] 98.9 °F (37.2 °C)  Heart Rate:  [76-84] 81  Resp:  [16-18] 16  BP: (120-145)/(65-87) 139/87    Physical Exam:  Physical Exam  Constitutional:       Appearance: Normal appearance. She is obese.   Cardiovascular:      Rate and Rhythm: Normal rate and regular rhythm.      Pulses: Normal pulses.      Heart sounds: Normal heart sounds.   Pulmonary:      Effort: Pulmonary effort is normal.      Breath sounds: Normal breath sounds.   Abdominal:      General: Abdomen is flat.   Skin:     General: Skin is warm.   Neurological:      General: No focal deficit present.      Mental Status: She is alert. Mental status is at baseline.            Pertinent  and/or Most Recent Results     LAB RESULTS:      Lab 10/18/24  0421 10/17/24  0252 10/16/24  0501 10/15/24  0510 10/14/24  0848   WBC 8.43 12.46* 9.93 9.88 9.40   HEMOGLOBIN 9.5* 10.1* 10.3* 9.6* 10.0*   HEMATOCRIT 31.7* 34.5 34.9 33.5* 34.5   PLATELETS 312 333 350 336 375   MCV 79.3 81.8 82.1 81.7 80.2   PROTIME  --   --   --   --  12.3*   APTT  --   --   --   --  25.1         Lab 10/18/24  0421 10/17/24  0252 10/16/24  0501 10/15/24  0510 10/14/24  0848   SODIUM 136 131* 135* 137 138   POTASSIUM 4.0 4.1 3.9 4.0 3.8   CHLORIDE 106 98 101 104 103   CO2 20.4* 17.9* 20.5* 21.8* 23.5   ANION GAP 9.6 15.1* 13.5 11.2 11.5   BUN 25* 21 16 12 10   CREATININE 1.49* 1.73* 1.48* 1.19* 1.11*    EGFR 40.0* 33.5* 40.4* 52.5* 57.0*   GLUCOSE 116* 134* 125* 133* 104*   CALCIUM 9.5 9.7 9.9 10.1 10.2   MAGNESIUM  --  1.7  --   --   --    PHOSPHORUS  --  5.9*  --   --   --          Lab 10/17/24  0252   TOTAL PROTEIN 6.0   ALBUMIN 3.1*   GLOBULIN 2.9   ALT (SGPT) 14   AST (SGOT) 31   BILIRUBIN 0.2   ALK PHOS 62         Lab 10/17/24  0252 10/14/24  0848   PROBNP 234.0  --    PROTIME  --  12.3*   INR  --  1.14*                 Brief Urine Lab Results  (Last result in the past 365 days)        Color   Clarity   Blood   Leuk Est   Nitrite   Protein   CREAT   Urine HCG        10/17/24 1030             87.0         10/17/24 1030 Yellow   Cloudy  Comment: Result checked     Negative   Negative   Negative   100 mg/dL (2+)                 Microbiology Results (last 10 days)       ** No results found for the last 240 hours. **            US Renal Bilateral    Result Date: 10/16/2024  Impression: Impression: 1.Bilateral mild to moderate hydronephrosis. 2.Distended urinary bladder. There is a lobular mass within the posterior margin of the bladder presumably neoplastic. Electronically Signed: Kishore Stapleton MD  10/16/2024 5:19 PM EDT  Workstation ID: [id number]    CT Biopsy Abdomen Retroperitoneal    Result Date: 10/14/2024  Impression: 1. Successful CT-guided biopsy of omental mass Electronically Signed: Patrick Lucas MD  10/14/2024 2:48 PM EDT  Workstation ID: [id number]    US Renal Bilateral    Result Date: 10/9/2024  Impression: 1. Moderate dilation of the right kidney and mild dilation of the left kidney 2. Evaluation of the bladder is limited by placement of West catheter Electronically Signed: Twan Garza MD  10/9/2024 2:25 PM EDT  Workstation ID: [id number]    CT Abdomen Pelvis With Contrast    Result Date: 10/8/2024  Impression: Impression: 1.Superficial soft tissue edema/induration within the lower abdominal wall and left flank, compatible with cellulitis in the appropriate clinical setting. No subcutaneous gas is  seen. No associated discrete fluid collection is seen. 2.Large, heterogeneous mass of the uterus/cervix with probable invasion of the right posterior urinary bladder. Please refer to the PET/CT from 5 days prior. 3.Mild bilateral hydroureteronephrosis with moderately distended urinary bladder. Ureteral obstruction or bladder outlet obstruction cannot be excluded. 4.Numerous pulmonary nodules, retroperitoneal/iliac chain lymphadenopathy, and left-sided omental metastatic implants as noted on recent PET/CT. 5.Multiple small hypoattenuating liver lesions, particularly within the right hepatic lobe, concerning for metastatic disease. These may be further characterized with liver MRI if clinically indicated. 6.9 cm hypodense lesion within the left adnexa. Please refer to the MRI of the pelvis from 8/19/2024. 7.Additional findings as detailed above. Electronically Signed: Sushil Benítez MD  10/8/2024 2:51 PM EDT  Workstation ID: [id number]    NM PET/CT Skull Base to Mid Thigh    Result Date: 10/7/2024  Impression: Impression: 1. Large hypermetabolic mass involving uterus extending into cervix and vagina consistent with known endometrial malignancy. 2. New hypermetabolic soft tissue involving posterior bladder wall concerning for direct tumor invasion of the bladder. 3. New moderate bilateral hydroureteronephrosis likely secondary to mass involving left and right UVJ. 4. Hypermetabolic iliac and retroperitoneal adenopathy consistent with metastatic adenopathy. Hypermetabolic small subcarinal node also concerning for metastatic hypermetabolic adenopathy. 5. Multiple pulmonary metastatic nodules. 6. Omental carcinomatosis. 7. Cystic 8.6 x 8.4 cm left adnexal lesion with hypermetabolic peripheral nodule which could relate to ovarian metastasis or primary ovarian neoplasm as described on prior pelvic MRI. Electronically Signed: Tom Bennett MD  10/7/2024 12:42 PM EDT  Workstation ID: [id number]                 Labs Pending at  Discharge:  Pending Labs       Order Current Status    Tissue Pathology Exam In process            Procedures Performed           Consults:   Consults       Date and Time Order Name Status Description    10/16/2024  1:13 PM Inpatient Nephrology Consult      10/9/2024 10:02 AM Inpatient Urology Consult Completed     10/9/2024  9:53 AM Hematology & Oncology Inpatient Consult Completed     10/9/2024  9:44 AM Inpatient Infectious Diseases Consult Completed     10/8/2024  3:12 PM Hospitalist (on-call MD unless specified)                Discharge Details        Discharge Medications        New Medications        Instructions Start Date   aluminum sulfate-calcium acetate 1:40 topical astringent solution  Commonly known as: DOMEBORO   1,000 mL, Topical, Every 8 Hours Scheduled      amLODIPine 5 MG tablet  Commonly known as: NORVASC   5 mg, Oral, Every 24 Hours Scheduled      amoxicillin-clavulanate 875-125 MG per tablet  Commonly known as: AUGMENTIN   1 tablet, Oral, Every 12 Hours Scheduled      hydrocortisone-bacitracin-zinc oxide-nystatin  Commonly known as: MAGIC BARRIER   1 Application, Topical, 4 Times Daily      multivitamin tablet tablet   1 tablet, Oral, Daily      nystatin 934305 UNIT/GM powder  Commonly known as: MYCOSTATIN   Topical, Every 12 Hours Scheduled      traMADol 50 MG tablet  Commonly known as: ULTRAM   50 mg, Oral, Every 6 Hours PRN      zinc sulfate 220 (50 Zn) MG capsule  Commonly known as: ZINCATE   220 mg, Oral, Daily             Continue These Medications        Instructions Start Date   docusate sodium 100 MG capsule   100 mg, Oral, Daily      Farxiga 10 MG tablet  Generic drug: dapagliflozin Propanediol   10 mg, Oral, Daily      ferrous sulfate 325 (65 FE) MG tablet   325 mg, Oral, 5 Times Weekly, Monday, Tuesday, Wednesday, Thursday and Friday       hydrOXYzine 50 MG tablet  Commonly known as: ATARAX   1 tablet, Oral, Nightly      lisinopril 40 MG tablet  Commonly known as: PRINIVIL,ZESTRIL    40 mg, Oral, Daily      metFORMIN 500 MG tablet  Commonly known as: GLUCOPHAGE   500 mg, Oral, 2 Times Daily      metoprolol succinate XL 50 MG 24 hr tablet  Commonly known as: TOPROL-XL   50 mg, Oral, Every Night at Bedtime      montelukast 10 MG tablet  Commonly known as: SINGULAIR   10 mg, Oral, Nightly      PARoxetine 30 MG tablet  Commonly known as: PAXIL   30 mg, Oral, Daily      risperiDONE 2 MG tablet  Commonly known as: risperDAL   4 mg, Oral, Every Evening      vitamin D 1.25 MG (88924 UT) capsule capsule  Commonly known as: ERGOCALCIFEROL   Take 1 capsule by mouth Every 7 (Seven) Days. Wednesdays             ASK your doctor about these medications        Instructions Start Date   doxycycline 100 MG capsule  Commonly known as: MONODOX  Ask about: Should I take this medication?   100 mg, Oral, Every 12 Hours Scheduled               No Known Allergies      Discharge Disposition:   Home or Self Care    Diet:  Hospital:  Diet Order   Procedures    Diet: Diabetic; Consistent Carbohydrate; Fluid Consistency: Thin (IDDSI 0)         Discharge Activity:         CODE STATUS:  Code Status and Medical Interventions: CPR (Attempt to Resuscitate); Full Support   Ordered at: 10/08/24 1722     Level Of Support Discussed With:    Patient     Code Status (Patient has no pulse and is not breathing):    CPR (Attempt to Resuscitate)     Medical Interventions (Patient has pulse or is breathing):    Full Support         Future Appointments   Date Time Provider Department Center   10/22/2024  2:15 PM Jacinto Del Real MD [facility]           Time spent on Discharge including face to face service: 50 minutes    Signature: Electronically signed by Harry Pryor MD, 10/18/24, 13:29 EDT.  [facility] Hospitalist Team

## 2024-10-18 NOTE — CONSULTS
"Nutrition Services    Patient Name: Felisha Mukherjee  YOB: 1965  MRN: [id number]  Admission date: 10/8/2024    Continue Boost Glucose Control BID (Provides 380 kcals, 32 g protein if consumed)       NUTRITION SCREENING      Trending Narrative: 10/18: Followed up with pt. She reports a good appetite/intake. Per nursing documentation, pt is consuming 100% of meals. Pt reports that she is drinking the boost. No nausea. Pt reports that she has IBS.     10/11:  Pt presented to ED on 10/8 with complaints of rash to lower abdomen and her upper legs x 6 days. Pt has a history of HTN, DM, and endometrial cancer. Pt was seen at her oncologist office who advised her to go to the ER. Pt admitted due to cellulitis of abdominal wall. WOCN following. CT revealed possible signs of hydronephrosis.        PO Diet: Diet: Diabetic; Consistent Carbohydrate; Fluid Consistency: Thin (IDDSI 0)   PO Supplements: 10/18: boost glucose BID    Trending PO Intake:  10/18: 100%     10/11:  100% intake x last 4 documented meals       Nutritionally-Pertinent Medications RDN Reviewed, C/W clinical course         Labs (reviewed below): Reviewed. Management per attending.      Results from last 7 days   Lab Units 10/18/24  0421 10/17/24  0252 10/16/24  0501   SODIUM mmol/L 136 131* 135*   POTASSIUM mmol/L 4.0 4.1 3.9   CHLORIDE mmol/L 106 98 101   CO2 mmol/L 20.4* 17.9* 20.5*   BUN mg/dL 25* 21 16   CREATININE mg/dL 1.49* 1.73* 1.48*   CALCIUM mg/dL 9.5 9.7 9.9   BILIRUBIN mg/dL  --  0.2  --    ALK PHOS U/L  --  62  --    ALT (SGPT) U/L  --  14  --    AST (SGOT) U/L  --  31  --    GLUCOSE mg/dL 116* 134* 125*     Results from last 7 days   Lab Units 10/18/24  0421 10/17/24  0252   MAGNESIUM mg/dL  --  1.7   PHOSPHORUS mg/dL  --  5.9*   HEMOGLOBIN g/dL 9.5* 10.1*   HEMATOCRIT % 31.7* 34.5     No results found for: \"HGBA1C\"       GI Function:   10/18        Skin: Extensive MASD to pannus, thighs, and perineum  12 edema to L/R legs     " "  Weight Review: Estimated body mass index is 44.64 kg/m² as calculated from the following:    Height as of this encounter: 160 cm (63\").    Weight as of this encounter: 114 kg (251 lb 15.8 oz).    Comment:   10/11: (last weight 10/8) 251#  Current weight c/w 2 months ago  10/18: No updated weight     Wt Readings from Last 30 Encounters:   10/08/24 1106 114 kg (251 lb 15.8 oz)   10/08/24 0929 114 kg (251 lb 12.8 oz)   09/30/24 1244 112 kg (247 lb 12.8 oz)   09/30/24 1114 112 kg (248 lb)   09/23/24 1313 112 kg (248 lb)   09/10/24 1258 114 kg (252 lb)   08/20/24 1059 115 kg (252 lb 9.6 oz)          Estimated/Assessed Needs       Energy Requirements    EST Needs, Method, Wt used 1834 kcals (35 kcals/kg IBW 52.4 kg)       Protein Requirements    EST Needs, Method, Wt used 73-83 g PRO (1.4-1.6 g/kg IBW 52.4 kg)       Fluid Requirements     Estimated Needs (mL/day) 1 mL/kcal or per hydration status            Nutrition Problem Statement: Increased nutrient needs (kcal/protein) related to suspected hypermetabolism in the setting of cancer and skin breakdown as evidenced by extensive MASD present and dx of endometrial cancer.         Nutrition Intervention: Continue boost glucose BID           Monitoring/Evaluation PO intake, Supplement intake, Skin status, GI status, Symptoms            RD to follow up per protocol.    Electronically signed by:  Codi Mckeon RD  10/18/24 08:17 EDT      "

## 2024-10-18 NOTE — PROGRESS NOTES
[facility] DAILY PROGRESS NOTE    Patient Identification  Name: Felisha Mukherjee  Age: 59 y.o.  Sex: female  :  1965  MRN: [id number]    Date: 10/18/2024             Subjective:  Interval History: Creatinine improved again with West    Objective:    Scheduled Meds:amLODIPine, 5 mg, Oral, Q24H  enoxaparin, 40 mg, Subcutaneous, Q12H  ferrous sulfate, 324 mg, Oral, Once per day on   hydrocortisone-bacitracin-zinc oxide-nystatin, 1 Application, Topical, 4x Daily  hydrOXYzine, 50 mg, Oral, Nightly  insulin lispro, 2-9 Units, Subcutaneous, 4x Daily AC & at Bedtime  lisinopril, 40 mg, Oral, Daily  metoprolol succinate XL, 50 mg, Oral, Q24H  montelukast, 10 mg, Oral, Nightly  multivitamin, 1 tablet, Oral, Daily  nystatin, , Topical, Q12H  PARoxetine, 30 mg, Oral, Daily  risperiDONE, 4 mg, Oral, Q PM  sodium chloride, 10 mL, Intravenous, Q12H  zinc sulfate, 220 mg, Oral, Daily      Continuous Infusions:Pharmacy to Dose enoxaparin (LOVENOX),       PRN Meds:  acetaminophen    senna-docusate sodium **AND** polyethylene glycol **AND** bisacodyl **AND** bisacodyl    Calcium Replacement - Follow Nurse / BPA Driven Protocol    dextrose    dextrose    glucagon (human recombinant)    Magnesium Standard Dose Replacement - Follow Nurse / BPA Driven Protocol    melatonin    Pharmacy to Dose enoxaparin (LOVENOX)    Phosphorus Replacement - Follow Nurse / BPA Driven Protocol    Potassium Replacement - Follow Nurse / BPA Driven Protocol    [COMPLETED] Insert Peripheral IV **AND** sodium chloride    sodium chloride    sodium chloride    Vital signs in last 24 hours:  Temp:  [97.7 °F (36.5 °C)-98.9 °F (37.2 °C)] 98.9 °F (37.2 °C)  Heart Rate:  [76-84] 81  Resp:  [16-18] 16  BP: (120-145)/(65-87) 139/87    Intake/Output:    Intake/Output Summary (Last 24 hours) at 10/18/2024 1237  Last data filed at 10/18/2024 1121  Gross per 24 hour   Intake --   Output 3475 ml   Net -3475 ml  "      Exam:  /87 (BP Location: Left arm, Patient Position: Lying)   Pulse 81   Temp 98.9 °F (37.2 °C) (Oral)   Resp 16   Ht 160 cm (63\")   Wt 114 kg (251 lb 15.8 oz)   SpO2 91%   BMI 44.64 kg/m²     General Appearance:    Alert, cooperative, NAD   Lungs:     Respirations unlabored, no audible wheezing    Heart:    No cyanosis   Abdomen:     Soft, ND    :    No suprapubic distention, West clear            Data Review:  All labs (24hrs):   Recent Results (from the past 24 hours)   POC Glucose Once    Collection Time: 10/17/24  3:18 PM    Specimen: Blood   Result Value Ref Range    Glucose 90 70 - 105 mg/dL   POC Glucose 4x Daily Before Meals & at Bedtime    Collection Time: 10/17/24  5:52 PM    Specimen: Blood   Result Value Ref Range    Glucose 101 70 - 105 mg/dL   POC Glucose Once    Collection Time: 10/17/24  8:42 PM    Specimen: Blood   Result Value Ref Range    Glucose 181 (H) 70 - 105 mg/dL   CBC (No Diff)    Collection Time: 10/18/24  4:21 AM    Specimen: Blood   Result Value Ref Range    WBC 8.43 3.40 - 10.80 10*3/mm3    RBC 4.00 3.77 - 5.28 10*6/mm3    Hemoglobin 9.5 (L) 12.0 - 15.9 g/dL    Hematocrit 31.7 (L) 34.0 - 46.6 %    MCV 79.3 79.0 - 97.0 fL    MCH 23.8 (L) 26.6 - 33.0 pg    MCHC 30.0 (L) 31.5 - 35.7 g/dL    RDW 21.7 (H) 12.3 - 15.4 %    RDW-SD 62.2 (H) 37.0 - 54.0 fl    MPV 9.0 6.0 - 12.0 fL    Platelets 312 140 - 450 10*3/mm3   Basic Metabolic Panel    Collection Time: 10/18/24  4:21 AM    Specimen: Blood   Result Value Ref Range    Glucose 116 (H) 65 - 99 mg/dL    BUN 25 (H) 8 - 23 mg/dL    Creatinine 1.49 (H) 0.57 - 1.00 mg/dL    Sodium 136 136 - 145 mmol/L    Potassium 4.0 3.5 - 5.2 mmol/L    Chloride 106 98 - 107 mmol/L    CO2 20.4 (L) 22.0 - 29.0 mmol/L    Calcium 9.5 8.6 - 10.5 mg/dL    BUN/Creatinine Ratio 16.8 7.0 - 25.0    Anion Gap 9.6 5.0 - 15.0 mmol/L    eGFR 40.0 (L) >60.0 mL/min/1.73   POC Glucose Once    Collection Time: 10/18/24  7:16 AM    Specimen: Blood   Result " Value Ref Range    Glucose 165 (H) 70 - 105 mg/dL   POC Glucose 4x Daily Before Meals & at Bedtime    Collection Time: 10/18/24 12:08 PM    Specimen: Blood   Result Value Ref Range    Glucose 89 70 - 105 mg/dL      Imaging Results (Last 24 Hours)       ** No results found for the last 24 hours. **             Assessment:    Cellulitis of abdominal wall      Leland hydronephrosis due to endometrial cancer possibly invading bladder  Retention  HASMUKH     Plan:      Needs kinney for now and possibly chronically while going thru treatment, failed voiding trials with worsening HAMSUKH  No need for stents at this time  Will follow peripherally    Philipp Jackson MD  [facility]  [facility], [address]  [place] [address]  Office: [phone]  Available via [employer] My[employer]  10/18/24  12:37 EDT

## 2024-10-18 NOTE — SIGNIFICANT NOTE
10/18/24 0956   OTHER   Discipline physical therapist   Rehab Time/Intention   Session Not Performed other (see comments)  (Pt d/c pending)   Recommendation   PT - Next Appointment 10/21/24

## 2024-10-18 NOTE — PROGRESS NOTES
PROGRESS NOTE      Patient Name: Felisha Mukhrejee  : 1965  MRN: [id number]  Primary Care Physician: Hanh Granados APRN  Date of admission: 10/8/2024    Patient Care Team:  Hanh Granados APRN as PCP - General (Family Medicine)  Jesus Robbins MD as Consulting Physician (Nephrology)  Jacinto Del Real MD as Consulting Physician (Hematology and Oncology)        Subjective   Subjective:     Patient is feeling same as yesterday still weak and tired all other review of system unremarkable  Review of systems:  All other review of system unremarkable      Allergies:  No Known Allergies    Objective   Exam:     Vital Signs  Temp:  [97.7 °F (36.5 °C)-98.9 °F (37.2 °C)] 98.9 °F (37.2 °C)  Heart Rate:  [76-84] 81  Resp:  [16-18] 16  BP: (120-145)/(65-87) 139/87  SpO2:  [90 %-95 %] 91 %  on   ;   Device (Oxygen Therapy): room air  Body mass index is 44.64 kg/m².    General: Middle-age somewhat obese female in no acute distress.    Head:      Normocephalic and atraumatic.    Eyes:      PERRL/EOM intact, conjunctivae and sclerae clear without nystagmus.    Neck:      No masses, thyromegaly,  trachea central with normal respiratory effort   Lungs:    Clear bilaterally to auscultation.    Heart:      Regular rate and rhythm, no murmur no gallop  Abd:        Soft, nontender, not distended, bowel sounds positive, no shifting dullness   Pulses:   Pulses palpable  Extr:        No cyanosis or clubbing--mild edema.    Neuro:    No focal deficits.   alert oriented x3  Skin:       Intact without lesions or rashes.    Psych:    Alert and cooperative; normal mood and affect; .      Results Review:  I have personally reviewed most recent Data :  CBC    Results from last 7 days   Lab Units 10/18/24  0421 10/17/24  0252 10/16/24  0501 10/15/24  0510 10/14/24  0848   WBC 10*3/mm3 8.43 12.46* 9.93 9.88 9.40   HEMOGLOBIN g/dL 9.5* 10.1* 10.3* 9.6* 10.0*   PLATELETS 10*3/mm3 312 333 350 336 375     CMP   Results  from last 7 days   Lab Units 10/18/24  0421 10/17/24  0252 10/16/24  0501 10/15/24  0510 10/14/24  0848   SODIUM mmol/L 136 131* 135* 137 138   POTASSIUM mmol/L 4.0 4.1 3.9 4.0 3.8   CHLORIDE mmol/L 106 98 101 104 103   CO2 mmol/L 20.4* 17.9* 20.5* 21.8* 23.5   BUN mg/dL 25* 21 16 12 10   CREATININE mg/dL 1.49* 1.73* 1.48* 1.19* 1.11*   GLUCOSE mg/dL 116* 134* 125* 133* 104*   ALBUMIN g/dL  --  3.1*  --   --   --    BILIRUBIN mg/dL  --  0.2  --   --   --    ALK PHOS U/L  --  62  --   --   --    AST (SGOT) U/L  --  31  --   --   --    ALT (SGPT) U/L  --  14  --   --   --      ABG      US Renal Bilateral    Result Date: 10/16/2024  Impression: 1.Bilateral mild to moderate hydronephrosis. 2.Distended urinary bladder. There is a lobular mass within the posterior margin of the bladder presumably neoplastic. Electronically Signed: Kishore Stapleton MD  10/16/2024 5:19 PM EDT  Workstation ID: [id number]       Scheduled Meds:amLODIPine, 5 mg, Oral, Q24H  enoxaparin, 40 mg, Subcutaneous, Q12H  ferrous sulfate, 324 mg, Oral, Once per day on Monday Tuesday Wednesday Thursday Friday  hydrocortisone-bacitracin-zinc oxide-nystatin, 1 Application, Topical, 4x Daily  hydrOXYzine, 50 mg, Oral, Nightly  insulin lispro, 2-9 Units, Subcutaneous, 4x Daily AC & at Bedtime  lisinopril, 40 mg, Oral, Daily  metoprolol succinate XL, 50 mg, Oral, Q24H  montelukast, 10 mg, Oral, Nightly  multivitamin, 1 tablet, Oral, Daily  nystatin, , Topical, Q12H  PARoxetine, 30 mg, Oral, Daily  risperiDONE, 4 mg, Oral, Q PM  sodium chloride, 10 mL, Intravenous, Q12H  zinc sulfate, 220 mg, Oral, Daily      Continuous Infusions:Pharmacy to Dose enoxaparin (LOVENOX),       PRN Meds:  acetaminophen    senna-docusate sodium **AND** polyethylene glycol **AND** bisacodyl **AND** bisacodyl    Calcium Replacement - Follow Nurse / BPA Driven Protocol    dextrose    dextrose    glucagon (human recombinant)    Magnesium Standard Dose Replacement - Follow Nurse / BPA Driven  Protocol    melatonin    Pharmacy to Dose enoxaparin (LOVENOX)    Phosphorus Replacement - Follow Nurse / BPA Driven Protocol    Potassium Replacement - Follow Nurse / BPA Driven Protocol    [COMPLETED] Insert Peripheral IV **AND** sodium chloride    sodium chloride    sodium chloride    Assessment & Plan   Assessment and Plan:         Cellulitis of abdominal wall    ASSESSMENT:  Acute kidney injury likely obstructive uropathy  Metabolic acidosis secondary to HASMUKH  Hyponatremia secondary to malignancy and HASMUKH  History of uterine cancer  Hypertension  History of diabetes mellitus              PLAN :      urology note appreciated discussed with them they want to get a West catheter inserted they think once West catheter inserted some urine output will help to improve the renal function  Hyponatremia better, acidosis better with improvement in the urine output   Patient will need a West catheter chronically   Likely bladder outlet obstruction in addition to ureteral blockage   follow-up with the volume status closely  Continue p.o. sodium bicarbonate as patient may end up with some CKD  Other possibility there is chronic obstructive obstructive disease and resulting in  mild RTA  Follow-up with oncology regarding uterine cancer  Thank you for letting me participate          Electronically signed by Julio Cesar Tolbert MD,   [facility] kidney consultant  [phone]  10/18/2024  15:14 EDT

## 2024-10-19 NOTE — OUTREACH NOTE
Prep Survey      Flowsheet Row Responses   [facility] discharged from? [facility]   Is LACE score < 7 ? No   Eligibility Readm Mgmt   Discharge diagnosis *Cellulitis of abdominal wall (   Does the patient have one of the following disease processes/diagnoses(primary or secondary)? Other   Does the patient have Home health ordered? Yes   What is the Home health agency?  [facility]   Is there a DME ordered? No   Prep survey completed? Yes            PUMA CHAVEZ - Registered Nurse

## 2024-10-21 NOTE — CASE MANAGEMENT/SOCIAL WORK
Case Management Discharge Note      Final Note: [facility]         Selected Continued Care - Discharged on 10/18/2024 Admission date: 10/8/2024 - Discharge disposition: Home or Self Care      Destination Coordination complete. Patient indicates having no preference.      Service Provider Services Address Phone Fax Patient Preferred    [address] [facility] [address] [place] [address] [phone] --                 Transportation Services  W/C Rishabh: Sam Keating    Final Discharge Disposition Code: 03 - skilled nursing facility (SNF)

## 2024-10-21 NOTE — TELEPHONE ENCOUNTER
Caller: DELISA    Relationship:  [facility]    Best call back number: [phone]    PATIENT CALLED REQUESTING TO CANCEL SAME DAY APPT.    Did the patient call AFTER the start time of their scheduled appointment?  NO    Any additional information: PT IS IN REHAB AT [facility] AND NEEDS TO RS HER 10/22 FU WITH DR HARPER.  PLEASE RS FOR [place] LOCATION UNTIL FURTHER NOTICE, AS PT CANNOT BE TRANSPORTED TO [place].

## 2024-10-22 ENCOUNTER — RADIATION ONCOLOGY WEEKLY ASSESSMENT (OUTPATIENT)
Dept: RADIATION ONCOLOGY | Facility: HOSPITAL | Age: 59
End: 2024-10-22
Payer: MEDICARE

## 2024-10-22 VITALS — HEIGHT: 63 IN | WEIGHT: 251.32 LBS | BODY MASS INDEX: 44.53 KG/M2

## 2024-10-22 DIAGNOSIS — C54.1 MALIGNANT NEOPLASM OF ENDOMETRIUM: Primary | ICD-10-CM

## 2024-10-22 LAB
RAD ONC ARIA COURSE ID: NORMAL
RAD ONC ARIA COURSE LAST TREATMENT DATE: NORMAL
RAD ONC ARIA COURSE START DATE: NORMAL
RAD ONC ARIA COURSE TREATMENT ELAPSED DAYS: 6
RAD ONC ARIA FIRST TREATMENT DATE: NORMAL
RAD ONC ARIA PLAN FRACTIONS TREATED TO DATE: 4
RAD ONC ARIA PLAN ID: NORMAL
RAD ONC ARIA PLAN PRESCRIBED DOSE PER FRACTION: 4 GY
RAD ONC ARIA PLAN PRIMARY REFERENCE POINT: NORMAL
RAD ONC ARIA PLAN TOTAL FRACTIONS PRESCRIBED: 5
RAD ONC ARIA PLAN TOTAL PRESCRIBED DOSE: 2000 CGY
RAD ONC ARIA REFERENCE POINT DOSAGE GIVEN TO DATE: 16 GY
RAD ONC ARIA REFERENCE POINT ID: NORMAL
RAD ONC ARIA REFERENCE POINT SESSION DOSAGE GIVEN: 4 GY

## 2024-10-22 NOTE — PROGRESS NOTES
[facility] RADIATION ONCOLOGY  ON-TREATMENT VISIT NOTE    NAME: Feilsha Mukherjee  YOB: 1965  MRN #: [id number]  DATE OF SERVICE: 10/22/2024  PRESCRIBING PHYSICIAN: Pete Garzon MD    DIAGNOSIS:      ICD-10-CM ICD-9-CM   1. Malignant neoplasm of endometrium  C54.1 182.0      RADIATION THERAPY VISIT:  Continue radiation therapy, Dosimetry plan remains acceptable, Films reviewed and remains acceptable, Pain assessed, Pain management planned, Radiation dose schedule reviewed and remains acceptable, Radiation technique remains acceptable, and Symptoms within expected range    Radiation Treatments       Active   Plans   Pelvis   Most recent treatment: Dose planned: 400 cGy (fraction 4 on 10/22/2024)   Total: Dose planned: 2,000 cGy (5 fractions)   Elapsed Days: 6      Reference Points   PTV Pelvis   Most recent treatment: Dose given: 400 cGy (on 10/22/2024)   Total: Dose given: 1,600 cGy   Elapsed Days: 6                    [] Concurrent Chemo   Regimen:       PHYSICAL ASSESSMENT         There were no vitals filed for this visit.   Wt Readings from Last 3 Encounters:   10/22/24 114 kg (251 lb 5.2 oz)   10/08/24 114 kg (251 lb 15.8 oz)   10/08/24 114 kg (251 lb 12.8 oz)     Ambulatory and capable of all selfcare but unable to carry out any work activities; up and about more than 50% of waking hours = 2    We examined the relevant areas: yes  Findings are within the expected range for this stage of treatment: yes    ACTION ITEMS     Patient tolerating treatment well and as expected for this stage in their treatment and Continue radiation therapy as planned    Estimated Completion Date: 10/23/2024    Anticipatory guidance provided.    Prior to starting treatment, we discussed with the patient and her sister the original plans for quad shot and the change in recommendations given her significant skin cellulitis and her very small bladder due to tumor size. We felt it would be safer to proceed  with a more traditional palliative regimen of 20 Gy in 5 fx. The patient and sister expressed understanding and are agreeable. We discussed plans to see Dr. Del Real after completing radiation to discuss chemotherapy options. The patient is agreeable to this plan.    Plan for 1 month post treatment evaluation and 3 month post treatment imaging.       Pete Garzon MD  Radiation Oncology  [facility]

## 2024-10-23 ENCOUNTER — HOSPITAL ENCOUNTER (OUTPATIENT)
Dept: RADIATION ONCOLOGY | Facility: HOSPITAL | Age: 59
Discharge: HOME | End: 2024-10-23

## 2024-10-23 ENCOUNTER — READMISSION MANAGEMENT (OUTPATIENT)
Dept: CALL CENTER | Facility: HOSPITAL | Age: 59
End: 2024-10-23
Payer: MEDICARE

## 2024-10-23 ENCOUNTER — RADIATION ONCOLOGY WEEKLY ASSESSMENT (OUTPATIENT)
Dept: RADIATION ONCOLOGY | Facility: HOSPITAL | Age: 59
End: 2024-10-23
Payer: MEDICARE

## 2024-10-23 DIAGNOSIS — C54.1 MALIGNANT NEOPLASM OF ENDOMETRIUM: Primary | ICD-10-CM

## 2024-10-23 LAB
RAD ONC ARIA COURSE ID: NORMAL
RAD ONC ARIA COURSE LAST TREATMENT DATE: NORMAL
RAD ONC ARIA COURSE START DATE: NORMAL
RAD ONC ARIA COURSE TREATMENT ELAPSED DAYS: 7
RAD ONC ARIA FIRST TREATMENT DATE: NORMAL
RAD ONC ARIA PLAN FRACTIONS TREATED TO DATE: 5
RAD ONC ARIA PLAN ID: NORMAL
RAD ONC ARIA PLAN PRESCRIBED DOSE PER FRACTION: 4 GY
RAD ONC ARIA PLAN PRIMARY REFERENCE POINT: NORMAL
RAD ONC ARIA PLAN TOTAL FRACTIONS PRESCRIBED: 5
RAD ONC ARIA PLAN TOTAL PRESCRIBED DOSE: 2000 CGY
RAD ONC ARIA REFERENCE POINT DOSAGE GIVEN TO DATE: 20 GY
RAD ONC ARIA REFERENCE POINT ID: NORMAL
RAD ONC ARIA REFERENCE POINT SESSION DOSAGE GIVEN: 4 GY

## 2024-10-23 NOTE — OUTREACH NOTE
Medical Week 1 Survey      Flowsheet Row Responses   [facility]? [facility]   Does the patient have one of the following disease processes/diagnoses(primary or secondary)? Other   Week 1 attempt successful? No   Unsuccessful attempts Attempt 1            Luisa CHAVEZ - Registered Nurse

## 2024-10-23 NOTE — PROGRESS NOTES
RADIATION THERAPY COMPLETION and DISCHARGE     Felisha Mukherjee completed radiation therapy on 10/23/2024 for Malignant neoplasm of endometrium [C54.1]. The summary of her treatment is as follows:    TREATMENT SITE:   Pelvis START DATE:   10/16/2024   TOTAL DOSE (cGy):   2000 END DATE:   10/23/2024    DOSE/FRACTION:   400 ELAPSED DAYS:   7   TOTAL FACTIONS:   5 PHYSICIAN:    Dr. Pete Garzon     Felisha is scheduled for a one-month follow-up appointment with Dr. Pete Garzon on November 20, 2024 at 3:00 pm.  _______________________________________________________________________    The following instructions were provided to the patient and/or family in their printed after visit summary (AVS) as well as discussed in-person by the radiation oncology nurse or medical assistant. The patient and/or family had the opportunity to ask questions and verbalized their questions were adequately answered. Encouraged patient to contact our department with any questions or concerns.    RADIATION THERAPY DISCHARGE INSTRUCTIONS  Pelvis    CONGRATULATIONS! You completed 5 radiation treatments for treatment of your uterine cancer. These discharge instructions are important for you to follow until your one-month follow up appointment with your radiation oncologist. Please make sure to review these instructions and call the Radiation Oncology Department if you have any questions or concerns with symptoms you may experience. Thank you for trusting us with your cancer treatment!    DIET  Eat a regular, well balanced diet that is high in protein such as meat, eggs, cheese, and nut butters  Drink 48 to 64 ounces of fluid daily  Use nutritional supplements if you are not able to eat full meals  Monitor your weight and report continued weight loss to your doctor    MEDICATIONS  Use Tylenol as needed to decrease discomfort and irritation to treatment area  Take pain medications only as prescribed  Take a laxative or stool softener as  needed to prevent constipation due to pain medications  Use Imodium (up to 8 pills per day) as needed for loose stools    SKIN CARE  Wash treated skin gently with your hands using a mild, non-drying soap such as Dove® or Aveeno® until skin returns to normal  Pat skin dry - do not rub  Keep treated skin moist with frequent applications of Aquaphor® or unscented lotion (such as Eucerin)®  Always protect your treated skin when outdoors by wearing protective clothing and applying sunscreen SPF 15 or higher at least 30 minutes before going outdoors and reapply frequently    ACTIVITY  Fatigue is a normal side effect of radiation therapy and should improve over time  Alternate rest and activity  Exercise such as walking may help to improve your fatigue    FOLLOW-UP  Continue follow-up appointments with all other doctors as necessary  Call your radiation oncologist or nurse if you are concerned with any side effects you are experiencing.    SPECIAL INSTRUCTIONS  Reintroduce fiber into your diet slowly so you will know which foods cause loose stools or cramps  Use sitz baths as directed    WHEN TO CALL YOUR RADIATION ONCOLOGIST OR NURSE: [phone]  You have increased burning, frequency, or blood in your urine or stool.  You have a fever of 100.4 OF or higher.  You have chills.  Your pain or discomfort is getting worse.  Your skin in the treatment area is getting more red or swollen, feels hard or hot, has a rash or blisters, and/or is itchy.  You see drainage (liquid) coming from your skin in the treatment area.  You see any new open areas (wounds) or changes to your skin.  You have any questions or concerns.  _______________________________________________________________________    Completed by: Olivia Ortiz MA, Radiation Oncology Medical Assistant on 10/23/24 at 09:08 EDT

## 2024-10-23 NOTE — PATIENT INSTRUCTIONS
RADIATION THERAPY DISCHARGE INSTRUCTIONS  Pelvis    CONGRATULATIONS! You completed 5 radiation treatments for treatment of your uterine cancer. These discharge instructions are important for you to follow until your one-month follow up appointment with your radiation oncologist. Please make sure to review these instructions and call the Radiation Oncology Department if you have any questions or concerns with symptoms you may experience. Thank you for trusting us with your cancer treatment!    DIET  Eat a regular, well balanced diet that is high in protein such as meat, eggs, cheese, and nut butters  Drink 48 to 64 ounces of fluid daily  Use nutritional supplements if you are not able to eat full meals  Monitor your weight and report continued weight loss to your doctor    MEDICATIONS  Use Tylenol as needed to decrease discomfort and irritation to treatment area  Take pain medications only as prescribed  Take a laxative or stool softener as needed to prevent constipation due to pain medications  Use Imodium (up to 8 pills per day) as needed for loose stools    SKIN CARE  Wash treated skin gently with your hands using a mild, non-drying soap such as Dove® or Aveeno® until skin returns to normal  Pat skin dry - do not rub  Keep treated skin moist with frequent applications of Aquaphor® or unscented lotion (such as Eucerin)®  Always protect your treated skin when outdoors by wearing protective clothing and applying sunscreen SPF 15 or higher at least 30 minutes before going outdoors and reapply frequently    ACTIVITY  Fatigue is a normal side effect of radiation therapy and should improve over time  Alternate rest and activity  Exercise such as walking may help to improve your fatigue    FOLLOW-UP  Continue follow-up appointments with all other doctors as necessary  Call your radiation oncologist or nurse if you are concerned with any side effects you are experiencing.    SPECIAL INSTRUCTIONS  Reintroduce fiber into  your diet slowly so you will know which foods cause loose stools or cramps  Use sitz baths as directed    WHEN TO CALL YOUR RADIATION ONCOLOGIST OR NURSE: [phone]  You have increased burning, frequency, or blood in your urine or stool.  You have a fever of 100.4 OF or higher.  You have chills.  Your pain or discomfort is getting worse.  Your skin in the treatment area is getting more red or swollen, feels hard or hot, has a rash or blisters, and/or is itchy.  You see drainage (liquid) coming from your skin in the treatment area.  You see any new open areas (wounds) or changes to your skin.  You have any questions or concerns.    _______________________________________________________________________    Completed by: Olivia Ortiz MA on 10/23/2024 at 09:08 EDT

## 2024-10-24 ENCOUNTER — HOSPITAL ENCOUNTER (OUTPATIENT)
Dept: RADIATION ONCOLOGY | Facility: HOSPITAL | Age: 59
Discharge: HOME | End: 2024-10-24
Payer: MEDICARE

## 2024-10-24 DIAGNOSIS — C54.1 MALIGNANT NEOPLASM OF ENDOMETRIUM: Primary | ICD-10-CM

## 2024-10-24 LAB
RAD ONC ARIA COURSE END DATE: NORMAL
RAD ONC ARIA COURSE ID: NORMAL
RAD ONC ARIA COURSE LAST TREATMENT DATE: NORMAL
RAD ONC ARIA COURSE START DATE: NORMAL
RAD ONC ARIA COURSE TREATMENT ELAPSED DAYS: 7
RAD ONC ARIA FIRST TREATMENT DATE: NORMAL
RAD ONC ARIA PLAN FRACTIONS TREATED TO DATE: 5
RAD ONC ARIA PLAN ID: NORMAL
RAD ONC ARIA PLAN NAME: NORMAL
RAD ONC ARIA PLAN PRESCRIBED DOSE PER FRACTION: 4 GY
RAD ONC ARIA PLAN PRIMARY REFERENCE POINT: NORMAL
RAD ONC ARIA PLAN TOTAL FRACTIONS PRESCRIBED: 5
RAD ONC ARIA PLAN TOTAL PRESCRIBED DOSE: 2000 CGY
RAD ONC ARIA REFERENCE POINT DOSAGE GIVEN TO DATE: 20 GY
RAD ONC ARIA REFERENCE POINT ID: NORMAL

## 2024-10-24 NOTE — PROGRESS NOTES
HEMATOLOGY ONCOLOGY OUTPATIENT FOLLOW-UP      Patient name: Felisha Mukherjee  : 1965  MRN: [id number]  Primary Care Physician: Hanh Granados APRN  Referring Physician: Hanh Granados*  Reason For Consult:       History of Present Illness:    2024: Ms. Hwang was referred for the investigation and treatment of anemia.  Heme in 2024 for blood count revealed a hemoglobin of 7.1 g/dL with microcytic red cells.  There was clear evidence of iron deficiency with a total iron binding capacity saturation of 2.3%.  She was started on oral iron and was in early 2024 identified as having endometrial adenocarcinoma of the endometrioid type with a FIGO grade 1.  The iron deficiency was attributed to menorrhagia that was felt to be the result of the malignancy.  With persistent anemia a decision was made to treat her with intravenous iron.    9/10/2024: For the first time at the [place] office with the above.  She has yet to receive intravenous iron but is scheduled to do so in the near future.  She is also being followed by Dr. Matthews at the [facility] gynecology/oncology program.  Apparently surgery is not in the near future plans.  Generally she feels well at this time although she continues to be fatigued and weak at times.  She describes a good appetite for the most part and eats frequently during the day.  Her weight is stable.  She has not experienced any chest pains, cough or dyspnea.  She has intermittent abdominal pain and describes abnormal defecations that happen every 2 or 3 days but several times during that day at times with soft stools but never liquid stools.  She has some dysuria.  She has continued to have vaginal bleeding but it has not been as intense as before.  She has no peripheral edema.  On exam she seems chronically ill.  She is not in distress.  She is conversant and oriented.  No jaundice.  The lungs are diminished  bilaterally and the heart regular.  The abdomen is soft nontender and there is no edema.  Laboratory exams reviewed.  She has yet to receive intravenous iron.  I have asked her to see me again after completing the intravenous iron with new laboratory exams.    10/10/2024: Ms. Mukherjee is a patient of mine. She was referred for iron deficiency that seemed to be the result of postmenopausal vaginal bleeding. This second was the result of endometrial carcinoma, which was being staged by Dr. Matthews at the [facility]. At the time of this admission she had been determined to have evidence of metastatic disease. She was seen by Dr. Garzon who sent her to the hospital with cellulitis of the skin of the lower abdomen. Today, she reports she feels better and has had less pain at the place of the cellulitis. She has not been very active and she is weak. She has been afebrile. She has not had any dyspnea and has been eating well. On exam she is oriented. No jaundice and no oral lesions. The lungs are clear and the heart regular. The abdomen is protuberant and soft. There is indeed a large band of erythematous skin on the lover abdomen with areas of breakdown that is very tender. I have reviewed the imaging studies and discussed with Dr. Garzon. I believe that histologic confirmation of metastatic disease with an image guided biopsy is necessary. Will allow for the cellulitis to improve prior to the biopsy. I will continue to follow her.     10/17/2024: Received treatment with antibiotics with slow but sure improvement. She had a biopsy of the omentum. The final report of pathology reported metastatic moderately differentiated adenocarcinoma. The tumor was positive for cytokeratin 7 and patchy positivity for PAX8. The tumor was negative for cytokeratin 20, GATA3, TTF-1, Napsin A, estrogen receptor, progesterone receptor, CDX2 and CA 19.9. This was not felt to be entirely consistent with endometrioid  endometrial carcinoma, given the negativity for estrogen and progesterone receptors. She was discharged to continue recovery and start palliative treatment with radiation therapy.     10/28/2024: In the office for follow up. She has completed palliative radiation to the pelvis.  She has been feeling somewhat better, but she's still extremely weak and still can walk a few steps at a time. She has been intermittently incontinent of stool. She has been eating some. Spends the majority of the time in bed; her sister is clear in that at this point she would not be able to return home. On exam pale, ill appearing but in no distress. Transported in a wheel chair. No jaundice. Lungs diminished. Heart regular. Abdomen protuberant.There is edema. Reviewed the laboratory exams. Discussed the report of the biopsy with her and her sister. Her poor performance status is concerning. She might not benefit from antineoplastic treatment. She is unsure but would like to at least try. On this basis she is to receive a port. Next generation sequencing was requested. She is to see me in 2 weeks. For now continue physical therapy.     FAMILY HISTORY:  MGM: ovarian cancer  GGM:: ovarian cancer     PMH: DM, HTN, CKD     She is being worked up at [facility] as described and is planned for pelvic MRI with tentative plans for Surgery.    Past Medical History:   Diagnosis Date    Diabetes mellitus     Hypertension     Uterine mass      Past Surgical History:   Procedure Laterality Date    CHOLECYSTECTOMY         Current Outpatient Medications:     amLODIPine (NORVASC) 5 MG tablet, Take 1 tablet by mouth Daily., Disp: 30 tablet, Rfl: 0    docusate sodium 100 MG capsule, Take 1 capsule by mouth Daily., Disp: , Rfl:     Farxiga 10 MG tablet, Take 10 mg by mouth Daily., Disp: , Rfl:     ferrous sulfate 325 (65 FE) MG tablet, Take 1 tablet by mouth 5 (Five) Times a Week. Monday, Tuesday, Wednesday, Thursday and Friday, Disp: , Rfl:      hydrocortisone-bacitracin-zinc oxide-nystatin (MAGIC BARRIER), Apply 1 Application topically to the appropriate area as directed 4 (Four) Times a Day., Disp: 100 g, Rfl: 0    hydrOXYzine (ATARAX) 50 MG tablet, Take 1 tablet by mouth Every Night., Disp: , Rfl:     lisinopril (PRINIVIL,ZESTRIL) 40 MG tablet, Take 1 tablet by mouth Daily., Disp: , Rfl:     metFORMIN (GLUCOPHAGE) 500 MG tablet, Take 1 tablet by mouth 2 (Two) Times a Day., Disp: , Rfl:     metoprolol succinate XL (TOPROL-XL) 50 MG 24 hr tablet, Take 1 tablet by mouth every night at bedtime., Disp: , Rfl:     montelukast (SINGULAIR) 10 MG tablet, Take 1 tablet by mouth Every Night., Disp: , Rfl:     multivitamin (Thera) tablet tablet, Take 1 tablet by mouth Daily., Disp: 30 tablet, Rfl: 0    nystatin (MYCOSTATIN) 637881 UNIT/GM powder, Apply  topically to the appropriate area as directed Every 12 (Twelve) Hours., Disp: 60 g, Rfl: 6    PARoxetine (PAXIL) 30 MG tablet, Take 1 tablet by mouth Daily., Disp: , Rfl:     risperiDONE (risperDAL) 2 MG tablet, Take 2 tablets by mouth Every Evening., Disp: , Rfl:     traMADol (ULTRAM) 50 MG tablet, Take 1 tablet by mouth Every 6 (Six) Hours As Needed for Moderate Pain for up to 8 days., Disp: 30 tablet, Rfl: 0    vitamin D (ERGOCALCIFEROL) 1.25 MG (13330 UT) capsule capsule, Take 1 capsule by mouth Every 7 (Seven) Days. Wednesdays, Disp: , Rfl:     zinc sulfate (ZINCATE) 220 (50 Zn) MG capsule, Take 1 capsule by mouth Daily., Disp: 30 capsule, Rfl: 0    No Known Allergies    Family History   Problem Relation Age of Onset    Diabetes Mother      Cancer-related family history is not on file.    Social History     Tobacco Use    Smoking status: Never   Vaping Use    Vaping status: Never Used   Substance Use Topics    Alcohol use: Never    Drug use: Never     Social History     Social History Narrative    Not on file     ROS:   Review of Systems   Constitutional:  Positive for fatigue and unexpected weight change.  Negative for activity change, appetite change, chills, diaphoresis and fever.   HENT: Negative.  Negative for congestion, dental problem, drooling, ear discharge, ear pain, facial swelling, hearing loss, mouth sores, nosebleeds, postnasal drip, rhinorrhea, sinus pressure, sinus pain, sneezing, sore throat, tinnitus, trouble swallowing and voice change.    Eyes: Negative.  Negative for photophobia, pain, discharge, redness, itching and visual disturbance.   Respiratory:  Positive for shortness of breath. Negative for apnea, cough, choking, chest tightness, wheezing and stridor.    Cardiovascular: Negative.  Negative for chest pain, palpitations and leg swelling.   Gastrointestinal:  Positive for diarrhea. Negative for abdominal distention, abdominal pain, anal bleeding, blood in stool, constipation, nausea, rectal pain and vomiting.   Endocrine: Negative.  Negative for cold intolerance, heat intolerance, polydipsia and polyuria.   Genitourinary:  Positive for pelvic pain and vaginal bleeding. Negative for decreased urine volume, difficulty urinating, dysuria, flank pain, frequency, genital sores, hematuria and urgency.   Musculoskeletal: Negative.  Negative for arthralgias, back pain, gait problem, joint swelling, myalgias, neck pain and neck stiffness.   Skin: Negative.  Negative for color change, pallor and rash.   Allergic/Immunologic: Negative.    Neurological:  Positive for dizziness and weakness. Negative for tremors, seizures, syncope, facial asymmetry, speech difficulty, light-headedness, numbness and headaches.   Hematological: Negative.  Negative for adenopathy. Does not bruise/bleed easily.   Psychiatric/Behavioral: Negative.  Negative for agitation, behavioral problems, confusion, decreased concentration, hallucinations, self-injury, sleep disturbance and suicidal ideas. The patient is not nervous/anxious.      Objective:    Vital Signs:  There were no vitals filed for this visit.  There is no height or  weight on file to calculate BMI.    ECOG  (1) Restricted in physically strenuous activity, ambulatory and able to do work of light nature    Physical Exam:   Physical Exam  Constitutional:       General: She is not in acute distress.     Appearance: She is normal weight. She is ill-appearing. She is not toxic-appearing or diaphoretic.      Comments: Conversant, pale and ill appearing. No distress.    HENT:      Head: Normocephalic and atraumatic.      Right Ear: External ear normal.      Left Ear: External ear normal.      Nose: Nose normal.      Mouth/Throat:      Mouth: Mucous membranes are moist.      Pharynx: Oropharynx is clear. No oropharyngeal exudate or posterior oropharyngeal erythema.   Eyes:      General: No scleral icterus.        Right eye: No discharge.         Left eye: No discharge.      Extraocular Movements: Extraocular movements intact.      Conjunctiva/sclera: Conjunctivae normal.      Pupils: Pupils are equal, round, and reactive to light.   Cardiovascular:      Rate and Rhythm: Normal rate and regular rhythm.      Pulses: Normal pulses.      Heart sounds: No murmur heard.     No friction rub. No gallop.   Pulmonary:      Effort: Pulmonary effort is normal. No respiratory distress.      Breath sounds: No stridor. No wheezing, rhonchi or rales.   Abdominal:      General: Bowel sounds are normal. There is no distension.      Palpations: Abdomen is soft. There is no mass.      Tenderness: There is no abdominal tenderness. There is no right CVA tenderness, left CVA tenderness, guarding or rebound.      Hernia: No hernia is present.      Comments: Protuberant and soft.    Musculoskeletal:         General: No tenderness, deformity or signs of injury. Normal range of motion.      Cervical back: Normal range of motion and neck supple. No rigidity.      Right lower leg: No edema.      Left lower leg: No edema.   Lymphadenopathy:      Cervical: No cervical adenopathy.   Skin:     General: Skin is warm.       Coloration: Skin is pale. Skin is not jaundiced.      Findings: No bruising, lesion or rash.   Neurological:      General: No focal deficit present.      Mental Status: She is alert and oriented to person, place, and time.      Cranial Nerves: No cranial nerve deficit.   Psychiatric:         Mood and Affect: Mood normal.         Behavior: Behavior normal.         Thought Content: Thought content normal.         Judgment: Judgment normal.     Jacinto Del Real MD performed the physical exam on 10/28/2024 as documented above.     Lab Results - Last 18 Months   Lab Units 10/18/24  0421 10/17/24  0252 10/16/24  0501   WBC 10*3/mm3 8.43 12.46* 9.93   HEMOGLOBIN g/dL 9.5* 10.1* 10.3*   HEMATOCRIT % 31.7* 34.5 34.9   PLATELETS 10*3/mm3 312 333 350   MCV fL 79.3 81.8 82.1     Lab Results - Last 18 Months   Lab Units 10/18/24  0421 10/17/24  0252 10/16/24  0501 10/09/24  0254 10/08/24  1213 08/20/24  1150   SODIUM mmol/L 136 131* 135*   < > 128* 139   POTASSIUM mmol/L 4.0 4.1 3.9   < > 4.3 4.2   CHLORIDE mmol/L 106 98 101   < > 94* 104   CO2 mmol/L 20.4* 17.9* 20.5*   < > 19.0* 23.7   BUN mg/dL 25* 21 16   < > 17 9   CREATININE mg/dL 1.49* 1.73* 1.48*   < > 1.40* 1.06*   CALCIUM mg/dL 9.5 9.7 9.9   < > 10.1 9.5   BILIRUBIN mg/dL  --  0.2  --   --  0.4 0.4   ALK PHOS U/L  --  62  --   --  68 82   ALT (SGPT) U/L  --  14  --   --  17 8   AST (SGOT) U/L  --  31  --   --  29 12   GLUCOSE mg/dL 116* 134* 125*   < > 121* 130*    < > = values in this interval not displayed.     Lab Results   Component Value Date    GLUCOSE 116 (H) 10/18/2024    BUN 25 (H) 10/18/2024    CREATININE 1.49 (H) 10/18/2024    BCR 16.8 10/18/2024    K 4.0 10/18/2024    CO2 20.4 (L) 10/18/2024    CALCIUM 9.5 10/18/2024    ALBUMIN 3.1 (L) 10/17/2024    AST 31 10/17/2024    ALT 14 10/17/2024     Lab Results   Component Value Date    IRON 25 (L) 08/20/2024    TIBC 477 08/20/2024    FERRITIN 18.20 08/20/2024     Lab Results   Component Value Date    FOLATE 8.06  08/20/2024     Lab Results   Component Value Date    RETICCTPCT 3.61 (H) 08/20/2024     Lab Results   Component Value Date    SBQXPMID85 331 08/20/2024     LDH   Date Value Ref Range Status   08/20/2024 192 135 - 214 U/L Final     Lab Results   Component Value Date    SEDRATE 32 (H) 08/20/2024     Lab Results   Component Value Date    HAPTOGLOBIN 212 (H) 08/20/2024     Lab Results   Component Value Date    SEDRATE 32 (H) 08/20/2024      Assessment & Plan     1.  Iron deficiency anemia: The oratory exams are unequivocal of iron deficiency.  Oral iron does not seem to have solved the problem.  She has been scheduled to receive intravenous iron and will do so approximately 48 hours from now.  Seems improved. Continue same.   2.  Metastatic endometrioid endometrial carcinoma. To obtain next generation sequencing. To have a port placed.   3. Reviewed the records from the hospital. Discussed with her and her sister at length. Discussed the alternative between antineoplastic treatment versus supportive and symptomatic treatment.   4.  Referred for port insertion. See me in 2 weeks.     Jacinto Del Real MD on 10/28/2024 at 12:53 PM.

## 2024-10-25 LAB
LAB AP CASE REPORT: NORMAL
LAB AP DIAGNOSIS COMMENT: NORMAL
Lab: NORMAL
PATH REPORT.FINAL DX SPEC: NORMAL
PATH REPORT.GROSS SPEC: NORMAL

## 2024-10-29 LAB
LAB AP CASE REPORT: NORMAL
LAB AP DIAGNOSIS COMMENT: NORMAL
Lab: NORMAL
PATH REPORT.ADDENDUM SPEC: NORMAL
PATH REPORT.FINAL DX SPEC: NORMAL
PATH REPORT.GROSS SPEC: NORMAL

## 2024-10-29 NOTE — OUTREACH NOTE
Medical Week 2 Survey      Flowsheet Row Responses   [facility] facility patient discharged from? [facility]   Does the patient have one of the following disease processes/diagnoses(primary or secondary)? Other   Week 2 attempt successful? No   Unsuccessful attempts Attempt 1   Revoke Change in health status-moved to LTC/SNF/Hospice  [[facility]]            Sarah BOOKER - Registered Nurse

## 2024-10-30 PROBLEM — R65.21 SEPTIC SHOCK: Status: ACTIVE | Noted: 2024-01-01

## 2024-10-30 PROBLEM — A41.9 SEPTIC SHOCK: Status: ACTIVE | Noted: 2024-01-01

## 2024-10-30 NOTE — CONSULTS
Picc team consult:    Procedure explained to patient and informed consent obtained.  Time out performed.  Picc line placed LUE basilic vessel utilizing US guidance and modified seldinger technique with easily compressible, non pulsatile vessel without difficulty.

## 2024-10-30 NOTE — ED PROVIDER NOTES
Subjective   History of Present Illness  59-year-old female with fall this morning when she got up to go to the bathroom.  Patient struck the right side of her head denies any LOC.  Patient complains of a moderate headache, no neck pain, mild upper back pain.  Patient also complains of shortness of air for the past 2 days with a dry cough, moderate epigastric pain.  Patient was just discharged in this facility for abdominal wall cellulitis on 10/18/2024 and states she feels better in that regard.      Review of Systems   Constitutional:  Positive for diaphoresis.   Respiratory:  Positive for cough and shortness of breath.    Cardiovascular:  Negative for chest pain.   Gastrointestinal:  Positive for abdominal pain. Negative for diarrhea and vomiting.   Musculoskeletal:  Positive for back pain.   Neurological:  Positive for headaches.       Past Medical History:   Diagnosis Date    Diabetes mellitus     Hypertension     Uterine mass        No Known Allergies    Past Surgical History:   Procedure Laterality Date    CHOLECYSTECTOMY         Family History   Problem Relation Age of Onset    Diabetes Mother        Social History     Socioeconomic History    Marital status:    Tobacco Use    Smoking status: Never   Vaping Use    Vaping status: Never Used   Substance and Sexual Activity    Alcohol use: Never    Drug use: Never           Objective   Physical Exam  Constitutional:       Appearance: She is diaphoretic.      Comments: Obese 59-year-old female in mild respiratory distress   HENT:      Head: Normocephalic and atraumatic.      Mouth/Throat:      Mouth: Mucous membranes are moist.      Pharynx: Oropharynx is clear.   Eyes:      Extraocular Movements: Extraocular movements intact.      Conjunctiva/sclera: Conjunctivae normal.      Pupils: Pupils are equal, round, and reactive to light.   Cardiovascular:      Rate and Rhythm: Tachycardia present. Rhythm irregular.   Pulmonary:      Comments: Mild distress,  grossly clear bilaterally  Abdominal:      General: Bowel sounds are normal. There is no distension.      Palpations: Abdomen is soft.      Tenderness: There is no abdominal tenderness.   Musculoskeletal:      Cervical back: Normal range of motion and neck supple. No tenderness.      Comments: Obese extremities but no pitting edema or tenderness   Skin:     Capillary Refill: Capillary refill takes less than 2 seconds.      Coloration: Skin is pale.   Neurological:      General: No focal deficit present.      Mental Status: She is oriented to person, place, and time.         Procedures           ED Course                                               Medical Decision Making  On reevaluation, patient's hypotension is not improved significantly with sepsis bolus.  Patient will be placed on Levophed.  Patient's RVR has been difficult to manage and she has a prolonged QT interval and hypotension.  Case discussed with Dr. Pagan with cardiology who requests further resuscitation with improvement in blood pressure and will consider either medical treatment versus cardioversion at that time.  Patient will have imaging of the head chest and abdomen, case discussed with general with ICU service and will follow these results as patient will have the images done in CT and then transferred to the ICU.    Critical care time 1 hour    Problems Addressed:  HASMUKH (acute kidney injury): complicated acute illness or injury  Atrial fibrillation with rapid ventricular response: complicated acute illness or injury  Hyponatremia: complicated acute illness or injury  Injury of head, initial encounter: complicated acute illness or injury  Metabolic acidosis: complicated acute illness or injury  Septic shock: complicated acute illness or injury    Amount and/or Complexity of Data Reviewed  External Data Reviewed: notes.     Details: Discharge summary from 10/18/2024 reviewed  Labs: ordered.     Details: Lactic acid 4.7  Radiology: ordered and  independent interpretation performed.     Details: Agree with pneumonia  ECG/medicine tests: ordered and independent interpretation performed.     Details: EKG interpretation: Atrial fibrillation with rapid ventricular response, rate 143, no STEMI seen    Risk  Prescription drug management.  Decision regarding hospitalization.        Final diagnoses:   Septic shock   Atrial fibrillation with rapid ventricular response   Injury of head, initial encounter   HASMUKH (acute kidney injury)   Metabolic acidosis   Hyponatremia       ED Disposition  ED Disposition       ED Disposition   Decision to Admit    Condition   --    Comment   Level of Care: Critical Care [6]   Admitting Physician: IRVING ALMAGUER [[id number]]                 No follow-up provider specified.       Medication List      No changes were made to your prescriptions during this visit.            Steven Solitario MD  10/30/24 0752

## 2024-10-30 NOTE — Clinical Note
Level of Care: Critical Care [6]   Diagnosis: Septic shock [[id number]]   Certification: I Certify That Inpatient Hospital Services Are Medically Necessary For Greater Than 2 Midnights

## 2024-10-30 NOTE — CONSULTS
Palliative Care Consultation    Patient Name: Felisha Mukherjee  : 1965  MRN: [id number]  Allergies: Patient has no known allergies.    Requesting clinician:  Jim  Reason for consult: Consultation for clarification of goals of care and code status.      Patient Code Status:   Code Status and Medical Interventions: CPR (Attempt to Resuscitate); Full Support   Ordered at: 10/30/24 0803     Code Status (Patient has no pulse and is not breathing):    CPR (Attempt to Resuscitate)     Medical Interventions (Patient has pulse or is breathing):    Full Support           Chief Complaint:    fall    History of Present Illness    Felisha Mukherjee is a 59 y.o. female who presented to [facility] ED on 10/30 with reports of a fall while using the bathroom. Patient struvck the right side of her head. No reporeted LOC. Patient also complaints of a moderate headache, and some shortness of breath with a dry cough. No noted nausea, vomiting, or diarrhea.     Of note, patient has metastatic endometrial cancer.     In ED: Creatinine 8.3    Patient noted with continued low BP and afib in ED. Started on pressor support. Cardiology consulted. Admitted to ICU. Cultures obtained. Started on IV antibiotics.         VITAL SIGNS:   Temp:  [98.1 °F (36.7 °C)] 98.1 °F (36.7 °C)  Heart Rate:  [115-144] 119  Resp:  [18] 18  BP: ()/(30-98) 62/37       PMH: DM, HTN    Past Surgical History:   Procedure Laterality Date    CHOLECYSTECTOMY         Family History   Problem Relation Age of Onset    Diabetes Mother        Social History     Tobacco Use    Smoking status: Never   Vaping Use    Vaping status: Never Used   Substance Use Topics    Alcohol use: Never    Drug use: Never           LABS:    Results from last 7 days   Lab Units 10/30/24  0630 10/30/24  0626   WBC 10*3/mm3  --  22.31*   HEMOGLOBIN g/dL  --  12.5   HEMOGLOBIN, POC g/dL 13.6  --    HEMATOCRIT %  --  40.1   HEMATOCRIT POC % 40  --    PLATELETS 10*3/mm3  --  192     Results from  last 7 days   Lab Units 10/30/24  0630 10/30/24  0626   SODIUM mmol/L  --  126*   POTASSIUM mmol/L  --  4.1   CHLORIDE mmol/L  --  92*   CO2 mmol/L  --  9.9*   BUN mg/dL  --  59*   CREATININE mg/dL 8.30* 7.47*   GLUCOSE mg/dL  --  132*   CALCIUM mg/dL  --  9.0     Results from last 7 days   Lab Units 10/30/24  0630 10/30/24  0626   SODIUM mmol/L  --  126*   POTASSIUM mmol/L  --  4.1   CHLORIDE mmol/L  --  92*   CO2 mmol/L  --  9.9*   BUN mg/dL  --  59*   CREATININE mg/dL 8.30* 7.47*   CALCIUM mg/dL  --  9.0   BILIRUBIN mg/dL  --  0.2   ALK PHOS U/L  --  82   ALT (SGPT) U/L  --  18   AST (SGOT) U/L  --  24   GLUCOSE mg/dL  --  132*         IMAGING STUDIES:  CT Abdomen Pelvis Without Contrast    Result Date: 10/30/2024  Impression: 1.No acute trauma identified within chest/abdomen/pelvis. 2.Overall progression of disease. Numerous pulmonary metastasis have progressed, mediastinal and retroperitoneal lymphadenopathy has progressed, and hepatic metastasis have progressed. Omental carcinomatosis appears unchanged, and the uterus appears smaller than on prior exam. Soft tissue lesions identified along urinary bladder not well seen, which could be secondary to interval West catheterization. 3.Trace right pleural effusion. 4.Cardiomegaly. 5.Mild scattered ascites. 6.Wall thickening of colon, which could be reactive versus acute colitis. Electronically Signed: Robinson Heredia MD  10/30/2024 10:25 AM EDT  Workstation ID: [id number]    CT Chest Without Contrast Diagnostic    Result Date: 10/30/2024  Impression: 1.No acute trauma identified within chest/abdomen/pelvis. 2.Overall progression of disease. Numerous pulmonary metastasis have progressed, mediastinal and retroperitoneal lymphadenopathy has progressed, and hepatic metastasis have progressed. Omental carcinomatosis appears unchanged, and the uterus appears smaller than on prior exam. Soft tissue lesions identified along urinary bladder not well seen, which could be  secondary to interval West catheterization. 3.Trace right pleural effusion. 4.Cardiomegaly. 5.Mild scattered ascites. 6.Wall thickening of colon, which could be reactive versus acute colitis. Electronically Signed: Robinson Heredia MD  10/30/2024 10:25 AM EDT  Workstation ID: [id number]    CT Head Without Contrast    Result Date: 10/30/2024  Impression: No acute intracranial abnormality Electronically Signed: Danny Julio MD  10/30/2024 9:31 AM EDT  Workstation ID: [id number]    XR Chest 1 View    Result Date: 10/30/2024  Impression: Interval development of patchy airspace disease throughout the lungs bilaterally, likely related to pneumonia. Follow-up to resolution recommended.. Electronically Signed: Kia Patino MD  10/30/2024 6:21 AM EDT  Workstation ID: [id number]       I reviewed the patient's new clinical results including labs, imaging, and vitals.        Scheduled Meds:  digoxin, 250 mcg, Intravenous, Once  heparin (porcine), 5,000 Units, Subcutaneous, Q12H  mupirocin, 1 Application, Each Nare, BID  senna-docusate sodium, 2 tablet, Oral, BID  sodium chloride, 10 mL, Intravenous, Q12H  sodium chloride, 10 mL, Intravenous, Q12H  sodium chloride, 10 mL, Intravenous, Q12H      Continuous Infusions:  norepinephrine, 0.02-0.3 mcg/kg/min, Last Rate: 0.2 mcg/kg/min (10/30/24 0911)  sodium bicarbonate 8.4 % 150 mEq in dextrose (D5W) 5 % 1,000 mL infusion (greater than 100 mEq), 150 mEq, Last Rate: 150 mEq (10/30/24 1108)        I have reviewed patient's current medication list.     Review of Systems   Constitutional:  Positive for fatigue.   Respiratory:  Positive for shortness of breath.    Neurological:  Positive for weakness.   All other systems reviewed and are negative.        Physical Exam  Vitals and nursing note reviewed.   Constitutional:       Appearance: She is obese. She is ill-appearing.   HENT:      Head: Normocephalic and atraumatic.      Nose: Nose normal.      Comments: Nasal canula     Mouth/Throat:       Mouth: Mucous membranes are dry.      Pharynx: Oropharynx is clear.   Eyes:      Conjunctiva/sclera: Conjunctivae normal.   Cardiovascular:      Rate and Rhythm: Normal rate.      Pulses: Normal pulses.   Pulmonary:      Effort: Pulmonary effort is normal.   Abdominal:      Palpations: Abdomen is soft.   Musculoskeletal:         General: Normal range of motion.      Cervical back: Normal range of motion.   Skin:     General: Skin is dry.      Coloration: Skin is pale.   Neurological:      General: No focal deficit present.             PROBLEM LIST:    Septic shock    Malignant neoplasm of body of uterus          ASSESSMENT/PLAN:    Sepsis?: With low BP and elevated WbC. Started on Iv antibiotics. Cultures obtained. Requiring pressor support.     Endometrial cancer: Recently completed palliative radiation. Pending the start of chemotherapy.     HASMUKH: With creatinine 8.3 in ED. Nephrology consulted for management.     Afib: with RVR. Cardiology consulted. Echo obtained.     DM/HTN: chronic conditions per primary.     These illnesses and their management contribute to the need for a palliative consult and advanced care planning.      ADVANCED CARE PLANNING:     10/30 Met with patient and sister at bedside. Patient is awake and alert. We discussed her current clinical status and overall goals of care. Patient tells me that at this time, she wants to continue with full aggressive treatment. We discussed the possibility of her not being able to treat her metastatic cancer based on her functional status. Sister acknowledges that this may ultimately change their plan of care, but are hoping to hear from MD Del Real regarding recommendations. Patient does share that if dialysis was necessary, she would be agreeable. Wanting to remain a full code with full intervention at this time. Patient has no advanced directives but tells me she would not want to live on machines. She states that she has one daughter that has been in  and out of psychiatric facilities. If she were  unable to make decisions on her own, she would want us to talk to her sisters for medical decisions. This information was shared with nursing and NP. I will have  follow up with health care representative documentation.       Advanced Directives:    Health Care Directive on file:    Health Care Surrogate:      Palliative Performance Scale Score:    Comments:           Decisional Capacity: yes  Patient's understanding of illness: adequate  Patient goals of care:  Full code, full intervention      Thank you for this consult and allowing us to participate in patient's plan of care. Palliative Care Team will continue to follow patient.       I spent 47 minutes reviewing providers documentation, medication records, assessing and examining patient, discussing with family, answering questions, providing guidance about a plan of care, and coordinating care with other healthcare members. More than 50% of time spent face to face discussing disease education, current clinical status, and medication management.     I spent an additional 20 minutes on advanced care planning, goals of care, and code status discussion.  I obtained consent for ACP discussion.     Haley Henao, APRN  10/30/2024

## 2024-10-30 NOTE — ED NOTES
Multiple attempts made by 3 RNs to obtain second set of blood cultures but were unsuccessful. IV team called and voicemail left.

## 2024-10-30 NOTE — CONSULTS
Cardiology Consult Note      REQUESTING PHYSICIAN    Vince Smith DO    PATIENT IDENTIFICATION  Name: Felisha Mukherjee  Age: 59 y.o.  Sex: female  :  1965  MRN: [id number]             REASON FOR CONSULTATION:  59-year-old female with no known history of ischemic heart disease.  Past medical history includes diabetes mellitus 2, hypertension, kidney disease-unspecified, endometrial cancer diagnosed 2024 with palliative radiation to the pelvis completed.    CC:  Elevated heart rate/irregular heart rhythm    HISTORY OF PRESENT ILLNESS:   Patient presented to [facility] 10/30/2024 after sustaining a fall at home.  She reports that she got up to go to the restroom and fell striking the right side of her head.  She denies any loss of consciousness but did report a headache with some mild upper back discomfort.  She reported shortness of breath over the past 2 days, dry cough, epigastric discomfort.  The patient was discharged from this facility on 10/18/2024 after treatment for abdominal wall cellulitis.  In the ED, workup included arterial blood gas with pCO2 17.7, pO2 76.3, HCO3 9.1, base excess -14.7.  Additional lab derangement was noted and she was admitted for further treatment and cardiology consultation.  EKG confirmed atrial fibrillation at 143 bpm and blood pressure quite low at 55/30.  She received IV fluid resuscitation per sepsis protocol without considerable improvement in her blood pressure.  She was then started on pressor support and cardiology was consulted.     Upon my evaluation, the patient is lying in bed awake and alert in the intensive care unit.  She reports no chest pain but does describe some lower back pain.  She denies any nausea or vomiting.  She does reports some shortness of breath and is currently on 6L NC.  Telemetry reveals atrial fibrillation at 128 bpm, blood pressure 102/52.  She is on 0.2 levo.  She denies any lower extremity edema or  "orthopnea.  She denies any prior cardiac evaluation.    Reportedly the patient has declined significantly over the past few weeks with decreased p.o. intake, being very weak and fatigued.  She rarely gets out of bed.      REVIEW OF SYSTEMS:  Pertinent items are noted in HPI, all other systems reviewed and negative    OBJECTIVE   High-sensitivity troponin 169  proBNP 1560  Sodium 125  Creatinine 8.3 (7.47)  Lactate 4.7/Pro-Josr 4.82  WBC 22.31  Blood cultures and respiratory panel pending  Chest x-ray: Interval development of patchy airspace disease throughout the lungs bilaterally, likely related to pneumonia    ASSESSMENT  Septic shock  Atrial fibrillation with rapid ventricular response-new problem  Elevated chads vascular score  Bilateral pneumonia on imaging  Acute kidney injury  Hypotension requiring vasopressor support  Elevated high-sensitivity troponin  Metastatic endometrial carcinoma      PLAN  Palliative care has been consulted to discuss treatment goals given her advanced metastatic cancer.  Continue vasopressor support for low blood pressures.  We will check 2D echocardiogram  Will try 1 dose digoxin 250 mcg for rate control/rhythm conversion  Blood pressure has been very soft since admission, therefore unable to use IV diltiazem or BB.  Monitor rhythm closely  Further recommendations following results of echo and evaluation by cardiologist      CHF Guideline Directed Medical Therapy  Beta Blocker:   ARNI/ACE/ARB:   SGLT 2 inhibitors:   Diuretics:   Aldosterone Antagonist:   Vasodilators & Nitrates:     Vital Signs  Visit Vitals  /50   Pulse 111   Temp 97.5 °F (36.4 °C) (Axillary)   Resp 18   Ht 160 cm (63\")   Wt 114 kg (251 lb 5.2 oz)   SpO2 97%   BMI 44.52 kg/m²     Oxygen Therapy  SpO2: 97 %  Device (Oxygen Therapy): nasal cannula  Device (Oxygen Therapy) (Infant): nasal cannula  Flow (L/min) (Oxygen Therapy): 6  Flowsheet Rows      Flowsheet Row First Filed Value   Admission Height 160 cm (63\") " "Documented at 10/30/2024 0514   Admission Weight 114 kg (251 lb 5.2 oz) Documented at 10/30/2024 0514          Intake & Output (last 3 days)       None          Lines, Drains & Airways       Active LDAs       Name Placement date Placement time Site Days    Peripheral IV 10/30/24 0445 Anterior;Right;Upper Arm 10/30/24  0445  Arm  less than 1    Urethral Catheter 10/30/24  0647  -- less than 1                    MEDICAL HISTORY    Past Medical History:   Diagnosis Date    Diabetes mellitus     Hypertension     Uterine mass         SURGICAL HISTORY    Past Surgical History:   Procedure Laterality Date    CHOLECYSTECTOMY          FAMILY HISTORY    Family History   Problem Relation Age of Onset    Diabetes Mother        SOCIAL HISTORY    Social History     Tobacco Use    Smoking status: Never    Smokeless tobacco: Not on file   Substance Use Topics    Alcohol use: Never        ALLERGIES    No Known Allergies           /50   Pulse 111   Temp 97.5 °F (36.4 °C) (Axillary)   Resp 18   Ht 160 cm (63\")   Wt 114 kg (251 lb 5.2 oz)   SpO2 97%   BMI 44.52 kg/m²   Intake/Output last 3 shifts:  No intake/output data recorded.  Intake/Output this shift:  No intake/output data recorded.    PHYSICAL EXAM:    General: Well-developed, weak and ill-appearing 59-year-old female with alert, cooperative, no distress, appears stated age  Head:  Normocephalic, atraumatic, mucous membranes moist  Eyes:  Conjunctivae/corneas clear, EOM's intact     Neck:  Supple,  no adenopathy; no JVD or bruit  Lungs: Clear to auscultation bilaterally, no wheezes, rhonchi or rales are noted  Chest wall: No tenderness  Heart::  Irregularly irregular rate and rhythm, S1 and S2 normal, no murmur, rub or gallop  Abdomen: Soft, nontender, nondistended, bowel sounds active  Extremities: No cyanosis, clubbing, or edema   Pulses: 2+ and symmetric all extremities  Skin:  No rashes or lesions  Neuro/psych: A&O x3. CN II through XII are grossly intact with " "appropriate affect      Scheduled Meds:      digoxin, 250 mcg, Intravenous, Once  heparin (porcine), 5,000 Units, Subcutaneous, Q12H  mupirocin, 1 Application, Each Nare, BID  senna-docusate sodium, 2 tablet, Oral, BID  sodium chloride, 10 mL, Intravenous, Q12H  sodium chloride, 10 mL, Intravenous, Q12H  sodium chloride, 10 mL, Intravenous, Q12H        Continuous Infusions:    norepinephrine, 0.02-0.3 mcg/kg/min, Last Rate: 0.2 mcg/kg/min (10/30/24 0911)  sodium bicarbonate 8.4 % 150 mEq in dextrose (D5W) 5 % 1,000 mL infusion (greater than 100 mEq), 150 mEq, Last Rate: 150 mEq (10/30/24 1108)        PRN Meds:      acetaminophen **OR** acetaminophen    aluminum-magnesium hydroxide-simethicone    senna-docusate sodium **AND** polyethylene glycol **AND** bisacodyl **AND** bisacodyl    nitroglycerin    ondansetron ODT **OR** ondansetron    sodium chloride    sodium chloride    sodium chloride    sodium chloride    sodium chloride        Results Review:     I reviewed the patient's new clinical results.    CBC    Results from last 7 days   Lab Units 10/30/24  0630 10/30/24  0626 10/28/24  1132   WBC 10*3/mm3  --  22.31* 19.79*   HEMOGLOBIN g/dL  --  12.5 12.4   HEMOGLOBIN, POC g/dL 13.6  --   --    PLATELETS 10*3/mm3  --  192 248     Cr Clearance Estimated Creatinine Clearance: 8.8 mL/min (A) (by C-G formula based on SCr of 8.3 mg/dL (H)).  Coag     HbA1C No results found for: \"HGBA1C\"  Blood Glucose   Glucose   Date/Time Value Ref Range Status   10/30/2024 1012 128 (H) 70 - 105 mg/dL Final     Comment:     Serial Number: [id number]:  [id number]   10/30/2024 0630 127 (H) 70 - 105 mg/dL Final     Infection   Results from last 7 days   Lab Units 10/30/24  0626   PROCALCITONIN ng/mL 4.82*     CMP   Results from last 7 days   Lab Units 10/30/24  0630 10/30/24  0626 10/28/24  1132   SODIUM mmol/L  --  126* 130*   POTASSIUM mmol/L  --  4.1 3.8   CHLORIDE mmol/L  --  92* 96*   CO2 mmol/L  --  9.9* 19.8*   BUN mg/dL  --  " "59* 35*   CREATININE mg/dL 8.30* 7.47* 3.82*   GLUCOSE mg/dL  --  132* 123*   ALBUMIN g/dL  --  2.3* 2.6*   BILIRUBIN mg/dL  --  0.2 0.3   ALK PHOS U/L  --  82 84   AST (SGOT) U/L  --  24 23   ALT (SGPT) U/L  --  18 18     ABG    Results from last 7 days   Lab Units 10/30/24  0544   PH, ARTERIAL pH units 7.318*   PCO2, ARTERIAL mm Hg 17.7*   PO2 ART mm Hg 76.3*   O2 SATURATION ART % 94.5   BASE EXCESS ART mmol/L -14.7*     UA      CONNER  No results found for: \"POCMETH\", \"POCAMPHET\", \"POCBARBITUR\", \"POCBENZO\", \"POCCOCAINE\", \"POCOPIATES\", \"POCOXYCODO\", \"POCPHENCYC\", \"POCPROPOXY\", \"POCTHC\", \"POCTRICYC\"  Lysis Labs   Results from last 7 days   Lab Units 10/30/24  0630 10/30/24  0626 10/28/24  1132   HEMOGLOBIN g/dL  --  12.5 12.4   HEMOGLOBIN, POC g/dL 13.6  --   --    PLATELETS 10*3/mm3  --  192 248   CREATININE mg/dL 8.30* 7.47* 3.82*     Radiology(recent) CT Abdomen Pelvis Without Contrast    Result Date: 10/30/2024  Impression: 1.No acute trauma identified within chest/abdomen/pelvis. 2.Overall progression of disease. Numerous pulmonary metastasis have progressed, mediastinal and retroperitoneal lymphadenopathy has progressed, and hepatic metastasis have progressed. Omental carcinomatosis appears unchanged, and the uterus appears smaller than on prior exam. Soft tissue lesions identified along urinary bladder not well seen, which could be secondary to interval West catheterization. 3.Trace right pleural effusion. 4.Cardiomegaly. 5.Mild scattered ascites. 6.Wall thickening of colon, which could be reactive versus acute colitis. Electronically Signed: Robinson Heredia MD  10/30/2024 10:25 AM EDT  Workstation ID: [id number]    CT Chest Without Contrast Diagnostic    Result Date: 10/30/2024  Impression: 1.No acute trauma identified within chest/abdomen/pelvis. 2.Overall progression of disease. Numerous pulmonary metastasis have progressed, mediastinal and retroperitoneal lymphadenopathy has progressed, and hepatic " metastasis have progressed. Omental carcinomatosis appears unchanged, and the uterus appears smaller than on prior exam. Soft tissue lesions identified along urinary bladder not well seen, which could be secondary to interval West catheterization. 3.Trace right pleural effusion. 4.Cardiomegaly. 5.Mild scattered ascites. 6.Wall thickening of colon, which could be reactive versus acute colitis. Electronically Signed: Robinson Heredia MD  10/30/2024 10:25 AM EDT  Workstation ID: [id number]    CT Head Without Contrast    Result Date: 10/30/2024  Impression: No acute intracranial abnormality Electronically Signed: Danny Julio MD  10/30/2024 9:31 AM EDT  Workstation ID: [id number]    XR Chest 1 View    Result Date: 10/30/2024  Impression: Interval development of patchy airspace disease throughout the lungs bilaterally, likely related to pneumonia. Follow-up to resolution recommended.. Electronically Signed: Kia Patino MD  10/30/2024 6:21 AM EDT  Workstation ID: [id number]       Results from last 7 days   Lab Units 10/30/24  1011 10/30/24  0626   CK TOTAL U/L  --  35   HSTROP T ng/L 129* 169*       X-rays, labs reviewed personally by physician.    ECG/EMG Results (most recent)       Procedure Component Value Units Date/Time    ECG 12 Lead Tachycardia [[id number]] Collected: 10/30/24 0518     Updated: 10/30/24 0519     QT Interval 325 ms      QTC Interval 503 ms     Narrative:      HEART KPOY=500  bpm  RR Wmmxjmxp=804  ms  NV Interval=  ms  P Horizontal Axis=  deg  P Front Axis=  deg  QRSD Interval=93  ms  QT Imanrqok=694  ms  VKtC=764  ms  QRS Axis=17  deg  T Wave Axis=37  deg  - ABNORMAL ECG -  Atrial fibrillation  Date and Time of Study:2024-10-30 05:18:21    Telemetry Scan [[id number]] Resulted: 10/30/24     Updated: 10/30/24 0952              Medication Review:   I have reviewed the patient's current medication list  Scheduled Meds:digoxin, 250 mcg, Intravenous, Once  heparin (porcine), 5,000 Units, Subcutaneous,  Q12H  mupirocin, 1 Application, Each Nare, BID  senna-docusate sodium, 2 tablet, Oral, BID  sodium chloride, 10 mL, Intravenous, Q12H  sodium chloride, 10 mL, Intravenous, Q12H  sodium chloride, 10 mL, Intravenous, Q12H      Continuous Infusions:norepinephrine, 0.02-0.3 mcg/kg/min, Last Rate: 0.2 mcg/kg/min (10/30/24 0911)  sodium bicarbonate 8.4 % 150 mEq in dextrose (D5W) 5 % 1,000 mL infusion (greater than 100 mEq), 150 mEq, Last Rate: 150 mEq (10/30/24 1108)      PRN Meds:.  acetaminophen **OR** acetaminophen    aluminum-magnesium hydroxide-simethicone    senna-docusate sodium **AND** polyethylene glycol **AND** bisacodyl **AND** bisacodyl    nitroglycerin    ondansetron ODT **OR** ondansetron    sodium chloride    sodium chloride    sodium chloride    sodium chloride    sodium chloride    Imaging:  Imaging Results (Last 72 Hours)       Procedure Component Value Units Date/Time    CT Chest Without Contrast Diagnostic [[id number]] Collected: 10/30/24 0956     Updated: 10/30/24 1027    Narrative:      CT CHEST WO CONTRAST DIAGNOSTIC, CT ABDOMEN PELVIS WO CONTRAST    Date of Exam: 10/30/2024 9:23 AM EDT    Indication: acute respiratory failure.    Comparison: Chest radiograph from earlier today and CT abdomen pelvis from October 8, 2024 and PET/CT from October 3, 2024    Technique: Axial CT images were obtained of the chest abdomen and pelvis without contrast administration.  Sagittal and coronal reconstructions were performed.  Automated exposure control and iterative reconstruction methods were used.      Findings:  Chest:    The central tracheobronchial tree is clear. Innumerable bilateral pulmonary metastasis have progressed from prior PET/CT. An index right lower lobe nodule on image 44 measures 2.3 cm, previously 0.8 cm, and multiple new pulmonary metastases are present.   There is a trace right pleural effusion.    The heart is enlarged, with evidence of calcified coronary artery disease. A left-sided PICC  has its tip at the cavoatrial junction. The great vessels are normal in caliber. Mediastinal lymphadenopathy has progressed. An index precarinal lymph node on   image 30 measures 2.2 x 2.0 cm, previously 0.9 x 0.8 cm.    No aggressive osseous lesions are identified.    Abdomen/pelvis:    No global hepatic metastasis have progressed from prior exam. An index metastasis within the right inferior hepatic lobe on image 64 measures 2.2 cm, previously 0.7 cm. Retroperitoneal lymphadenopathy has progressed, with an index left para-aortic lymph   node on image 84 measuring 2.6 cm, previously 1.9 cm. Omental carcinomatosis appears unchanged. An enlarged uterus appears smaller measuring 12 cm in craniocaudal dimension, previously 17 cm. An 8.6 cm left adnexal cyst appears unchanged. A urinary West   catheter is in place, the previously identified soft tissue lesions along the right posterior urinary bladder are not well seen. There is mild scattered ascites.    The gallbladder is surgically absent. The unenhanced adrenal glands, kidneys, spleen, and pancreas are unremarkable.    The stomach appears normal. The small bowel appears normal in caliber and configuration. There is wall thickening of the colon. The appendix is not well seen. There is no loculated collection. The rectum is unremarkable.    No aggressive osseous lesions are identified.      Impression:      Impression:  1.No acute trauma identified within chest/abdomen/pelvis.  2.Overall progression of disease. Numerous pulmonary metastasis have progressed, mediastinal and retroperitoneal lymphadenopathy has progressed, and hepatic metastasis have progressed. Omental carcinomatosis appears unchanged, and the uterus appears   smaller than on prior exam. Soft tissue lesions identified along urinary bladder not well seen, which could be secondary to interval West catheterization.  3.Trace right pleural effusion.  4.Cardiomegaly.  5.Mild scattered ascites.  6.Wall  thickening of colon, which could be reactive versus acute colitis.      Electronically Signed: Robinson Heredia MD    10/30/2024 10:25 AM EDT    Workstation ID: [id number]    CT Abdomen Pelvis Without Contrast [[id number]] Collected: 10/30/24 0956     Updated: 10/30/24 1027    Narrative:      CT CHEST WO CONTRAST DIAGNOSTIC, CT ABDOMEN PELVIS WO CONTRAST    Date of Exam: 10/30/2024 9:23 AM EDT    Indication: acute respiratory failure.    Comparison: Chest radiograph from earlier today and CT abdomen pelvis from October 8, 2024 and PET/CT from October 3, 2024    Technique: Axial CT images were obtained of the chest abdomen and pelvis without contrast administration.  Sagittal and coronal reconstructions were performed.  Automated exposure control and iterative reconstruction methods were used.      Findings:  Chest:    The central tracheobronchial tree is clear. Innumerable bilateral pulmonary metastasis have progressed from prior PET/CT. An index right lower lobe nodule on image 44 measures 2.3 cm, previously 0.8 cm, and multiple new pulmonary metastases are present.   There is a trace right pleural effusion.    The heart is enlarged, with evidence of calcified coronary artery disease. A left-sided PICC has its tip at the cavoatrial junction. The great vessels are normal in caliber. Mediastinal lymphadenopathy has progressed. An index precarinal lymph node on   image 30 measures 2.2 x 2.0 cm, previously 0.9 x 0.8 cm.    No aggressive osseous lesions are identified.    Abdomen/pelvis:    No global hepatic metastasis have progressed from prior exam. An index metastasis within the right inferior hepatic lobe on image 64 measures 2.2 cm, previously 0.7 cm. Retroperitoneal lymphadenopathy has progressed, with an index left para-aortic lymph   node on image 84 measuring 2.6 cm, previously 1.9 cm. Omental carcinomatosis appears unchanged. An enlarged uterus appears smaller measuring 12 cm in craniocaudal dimension, previously  17 cm. An 8.6 cm left adnexal cyst appears unchanged. A urinary West   catheter is in place, the previously identified soft tissue lesions along the right posterior urinary bladder are not well seen. There is mild scattered ascites.    The gallbladder is surgically absent. The unenhanced adrenal glands, kidneys, spleen, and pancreas are unremarkable.    The stomach appears normal. The small bowel appears normal in caliber and configuration. There is wall thickening of the colon. The appendix is not well seen. There is no loculated collection. The rectum is unremarkable.    No aggressive osseous lesions are identified.      Impression:      Impression:  1.No acute trauma identified within chest/abdomen/pelvis.  2.Overall progression of disease. Numerous pulmonary metastasis have progressed, mediastinal and retroperitoneal lymphadenopathy has progressed, and hepatic metastasis have progressed. Omental carcinomatosis appears unchanged, and the uterus appears   smaller than on prior exam. Soft tissue lesions identified along urinary bladder not well seen, which could be secondary to interval Ewst catheterization.  3.Trace right pleural effusion.  4.Cardiomegaly.  5.Mild scattered ascites.  6.Wall thickening of colon, which could be reactive versus acute colitis.      Electronically Signed: Robinson Heredia MD    10/30/2024 10:25 AM EDT    Workstation ID: [id number]    CT Head Without Contrast [[id number]] Collected: 10/30/24 0929     Updated: 10/30/24 0933    Narrative:      CT HEAD WO CONTRAST    Date of Exam: 10/30/2024 9:23 AM EDT    Indication: trauma.    Comparison: None available.    Technique: Axial CT images were obtained of the head without contrast administration.  Coronal reconstructions were performed.  Automated exposure control and iterative reconstruction methods were used.      Findings: Exam limited due to head tilting  There is no evidence of hemorrhage. There is no mass effect or midline  "shift.    There is no extracerebral collection.    Ventricles are normal in size and configuration for patient's stated age.     Posterior fossa is within normal limits.    Calvarium and skull base appear intact.  Visualized sinuses show no air fluid levels. Visualized orbits are unremarkable.         Impression:      Impression:    No acute intracranial abnormality      Electronically Signed: Danny Julio MD    10/30/2024 9:31 AM EDT    Workstation ID: [id number]    XR Chest 1 View [[id number]] Collected: 10/30/24 0621     Updated: 10/30/24 0624    Narrative:      XR CHEST 1 VW    Date of Exam: 10/30/2024 5:59 AM EDT    Indication: soa    Comparison: 10/3/2024.    Findings:  Patchy airspace disease is present throughout the lungs bilaterally, new as compared to the previous study.. No pleural fluid. No pneumothorax. The pulmonary vasculature appears within normal limits. The cardiac and mediastinal silhouette appear   unremarkable. No acute osseous abnormality identified.      Impression:      Impression:  Interval development of patchy airspace disease throughout the lungs bilaterally, likely related to pneumonia. Follow-up to resolution recommended..        Electronically Signed: Kia Patino MD    10/30/2024 6:21 AM EDT    Workstation ID: [id number]              SANTI Carrillo  10/30/24  12:19 EDT       EMR Dragon/Transcription:   \"Dictated utilizing Dragon dictation\".                 Electronically signed by SANTI Carrillo, 10/30/24, 7:33 AM EDT.  Copied text in this note has been reviewed by me and is accurate as of 10/30/24.    Cardiology attending:  Seen and examined.  Chart and labs reviewed. Independent interpretations of cardiac testing was performed. History and exam findings are verified with above changes noted.  Assessment and plan notated by APC after being formulated by attending consultant.  Note that greater than 50% of the time spent in care of the patient was provided by " attending consultant.    Patient's blood pressure is problematic in controlling her atrial fibrillation.  She is on low-dose vasopressors.  We can try low-dose digoxin however we need to be cognizant of potential toxicities.  A-fib will be difficult to manage in this critically ill patient with sepsis and hypotension.  This is a new diagnosis of A-fib.  Family at bedside.  Palliative is been consulted.    Physical Exam:    General: Alert, cooperative, chronically ill-appearing  Head:  Normocephalic, atraumatic, mucous membranes moist  Eyes:  Conjunctivae/corneas clear, EOM's intact     Neck:  Supple,  no bruit  Lungs:            Coarse and diminished and shallow.  Chest wall: No tenderness  Heart::  Irregular ate and rhythm, tachycardic, S1 and S2 normal, 1/6 holosystolic murmur.  No rub or gallop  Abdomen: Soft, nontender, nondistended bowel sounds active  Extremities: No cyanosis, clubbing, or edema  Pulses: Diminished pedal pulses  Skin:  No rashes or lesions  Neuro/psych: A&O x3. CN II through XII are grossly intact with appropriate affect

## 2024-10-30 NOTE — NURSING NOTE
Patient arrived on the unit at 9:30 am from CT. Family at bedside and MD notified that patient is on the unit.

## 2024-10-30 NOTE — ED NOTES
[facility] called for report, nurse states pt gets up by herself an walks, nurse reports she heard the patient fall and went to check on her and took her blood pressure and it was 64/42, which has been dropping since October 28th. Pt was clammy, gray, and had some slurred speech.  Facility Nurse noted that the patient had a bump on the right side of her head. Pt was sent to rehab on October 18th for cellulitis of the abdominal wall and C-Diff, nurse stated the patient is one 1liter of oxygen at all times an dis alert and oriented times 4

## 2024-10-30 NOTE — H&P
Critical Care History and Physical     Felisha Mukherjee : 1965 MRN:[id number] LOS:0 ROOM: 2308/1     Reason for admission: Septic shock     Assessment / Plan     Septic shock   2/2 community-acquired pneumonia with risk factors  CXR: bilateral scattered airspace opacities, no effusions  WBC: 22.31, procal: 4.82, lactate: 4.7  Blood cultures pending  Urine and sputum cultures ordered  RVP negative  Started on vancomycin and cefepime  Persistent hypotension in spite of adequate fluid resuscitation   C/w additional fluids as needed. Avoid fluid overload.   On levophed, titrate vasopressors for a target MAP of 65.    IVC was >50% collapsible on POCUS following completion of sepsis bolus (2,310mL)  1 additional liter NaCl and 1L albumin 5% ordered    Acute Kidney Injury  Creatinine: 1.27 in 2024  Anuric, will do bladder scans, kinney in place  Likely prerenal but cannot r/o post-renal cause d/t obstruction from uterine mass  Possible intrinsic component due to ATN from hypotension.   C/w IV fluids as ordered.  Monitor Input/Output very closely.   Avoids NSAIDs, nephrotoxic medications, and hypotension.  Nephrology following  Urology consulted  Net IO Since Admission: No IO data has been entered for this period [10/30/24 1836]      AF RVR  No documented history of atrial fibrillation  On cardizem for a short time in ED, now off  HS troponin: 169  No chest pain  Suspect d/t demand ischemia  Trend troponin  Cardiology following    Hyponatremia  Na: 126 upon admit  On sodium bicarb gtt  Monitor and trend labs    Mixed respiratory and metabolic acidosis  2/2 sepsis and HASMUKH  Upon admit: 7.31/17.7/76.3/9.1  Received one amp of sodium bicarb and started on a gtt (150meq in 1L D5)  Lactic acidosis, 4.7  Monitor and trend labs    Ground-level fall  CT head: no acute abnormalities  Bedrest  PT when appropriate    Endometrial adenocarcinoma  Diagnosed 2024  CT chest/ab/pelv: Numerous pulmonary metastasis have  progressed, mediastinal and retroperitoneal lymphadenopathy has progressed, and hepatic metastasis have progressed. Omental carcinomatosis appears unchanged, and the uterus appears   smaller than on prior exam.   Heme/onc consulted  Palliative care consulted    Diabetes mellitus Type 2, not insulin-dependent  Hold oral agents while in ICU (Farxiga, metformin)  Not on insulin at home  Accu checks ACHS with sliding scale insulin coverage as needed.   Hgb A1C ordered, no previous on file    HTN  Home meds on hold while on pressors: Amlodipine, lisinopril, metoprolol XL    Anxiety/Depression: continue Paxil, Risperdal, and hydroxyzine        Code Status (Patient has no pulse and is not breathing): CPR (Attempt to Resuscitate)  Medical Interventions (Patient has pulse or is breathing): Full Support       Nutrition:   Diet: Regular/House, Diabetic; Consistent Carbohydrate; Texture: Regular (IDDSI 7); Fluid Consistency: Thin (IDDSI 0)     VTE Prophylaxis:  Pharmacologic & mechanical VTE prophylaxis orders are present.         History of Present illness     Felisha Mukherjee is a 59 y.o. female with PMH of diabetes type 2, hypertension, endometrial adenocarcinoma with metastasis presented to the hospital from an acute rehab for ground-level fall, shortness of breath and dry cough x 2 days.  She has diffuse abdominal pain and was recently discharged from this facility on 10/18/2024 after being treated for abdominal wall cellulitis. In the ED she developed new onset AF RVR and was started on a cardizem gtt. CT of the head without acute abnormalities.  CT of the abdomen pelvis was obtained and did show evidence of overall disease progression involving patient's known endometrial adenocarcinoma.  CXR shows bilateral airspace opacities, labs significant for CO2 9.9, sodium 126, BUN 8.3, HS troponin 169, lactate 4.7, WBC 22.3, and 12% bands. Persistent hypotension following the sepsis bolus indicated levophed and she was admitted  "to the ICU with a principal diagnosis of septic shock.      ACP: CODE STATUS was discussed with patient and she would like CPR and intubation if necessary.  Her decision-makers would be her sisters Andressa and Sandi if she becomes incapacitated.  The patient does have an adult daughter who resides in a behavioral institution.    Patient was seen and examined on 10/30/24 at 18:36 EDT .  She states she is feeling a little bit better overall but her head still feels \"not right\".  She is oriented x 3 but expresses that something is not right with her head and thought processes but is unable to articulate what specifically is wrong.  She repeats some of the words that I say. I suspect there is a uremic component to her mentation as her hypoxia is improving and her head CT was negative for abnormalities. She endorses diffuse abdominal pain.       Past Medical/Surgical/Social/Family History & Allergies     Past Medical History:   Diagnosis Date    Diabetes mellitus     Hypertension     Uterine mass       Past Surgical History:   Procedure Laterality Date    CHOLECYSTECTOMY        Social History     Socioeconomic History    Marital status:    Tobacco Use    Smoking status: Never   Vaping Use    Vaping status: Never Used   Substance and Sexual Activity    Alcohol use: Never    Drug use: Never      Family History   Problem Relation Age of Onset    Diabetes Mother       No Known Allergies   Social Drivers of Health     Tobacco Use: Unknown (10/30/2024)    Patient History     Smoking Tobacco Use: Never     Smokeless Tobacco Use: Unknown     Passive Exposure: Not on file   Alcohol Use: Not At Risk (10/30/2024)    AUDIT-C     Frequency of Alcohol Consumption: Never     Average Number of Drinks: Patient does not drink     Frequency of Binge Drinking: Never   Financial Resource Strain: Not on file   Food Insecurity: No Food Insecurity (10/8/2024)    Hunger Vital Sign     Worried About Running Out of Food in the Last Year: " Never true     Ran Out of Food in the Last Year: Never true   Transportation Needs: No Transportation Needs (10/8/2024)    PRAPARE - Transportation     Lack of Transportation (Medical): No     Lack of Transportation (Non-Medical): No   Physical Activity: Not on file   Stress: Not on file   Social Connections: Unknown (10/12/2023)    Family and Community Support     Help with Day-to-Day Activities: Not on file     Lonely or Isolated: Not on file   Interpersonal Safety: Not At Risk (10/30/2024)    Abuse Screen     Unsafe at Home or Work/School: no     Feels Threatened by Someone?: no     Does Anyone Keep You from Contacting Others or Doint Things Outside the Home?: no     Physical Sign of Abuse Present: no   Depression: Not at risk (8/20/2024)    PHQ-2     PHQ-2 Score: 0   Housing Stability: Not At Risk (10/30/2024)    Housing Stability     Current Living Arrangements: other (see comments)     Potentially Unsafe Housing Conditions: none   Utilities: Not At Risk (10/8/2024)    [facility] Utilities     Threatened with loss of utilities: No   Health Literacy: Unknown (10/8/2024)    Education     Help with school or training?: Not on file     Preferred Language: English   Employment: Unknown (10/12/2023)    Employment     Do you want help finding or keeping work or a job?: Not on file   Disabilities: At Risk (10/30/2024)    Disabilities     Concentrating, Remembering, or Making Decisions Difficulty: yes     Doing Errands Independently Difficulty: yes        Home Medications     Prior to Admission medications    Medication Sig Start Date End Date Taking? Authorizing Provider   amLODIPine (NORVASC) 5 MG tablet Take 1 tablet by mouth Daily. 10/16/24   Harry Pryor MD   docusate sodium 100 MG capsule Take 1 capsule by mouth Daily. 6/26/24   William Irwin MD   Farxiga 10 MG tablet Take 10 mg by mouth Daily. 6/26/24   William Irwin MD   ferrous sulfate 325 (65 FE) MG tablet Take 1 tablet by mouth 5 (Five) Times a  Week. Monday, Tuesday, Wednesday, Thursday and Friday    William Irwin MD   hydrocortisone-bacitracin-zinc oxide-nystatin (MAGIC BARRIER) Apply 1 Application topically to the appropriate area as directed 4 (Four) Times a Day. 10/16/24   Harry Pryor MD   hydrOXYzine (ATARAX) 50 MG tablet Take 1 tablet by mouth Every Night. 6/27/24   William Irwin MD   lisinopril (PRINIVIL,ZESTRIL) 40 MG tablet Take 1 tablet by mouth Daily.    William Irwin MD   metFORMIN (GLUCOPHAGE) 500 MG tablet Take 1 tablet by mouth 2 (Two) Times a Day. 5/28/24   William Irwin MD   metoprolol succinate XL (TOPROL-XL) 50 MG 24 hr tablet Take 1 tablet by mouth every night at bedtime.    William Irwin MD   montelukast (SINGULAIR) 10 MG tablet Take 1 tablet by mouth Every Night.    William Irwin MD   multivitamin (Thera) tablet tablet Take 1 tablet by mouth Daily. 10/16/24   Harry Pryor MD   nystatin (MYCOSTATIN) 486396 UNIT/GM powder Apply  topically to the appropriate area as directed Every 12 (Twelve) Hours. 10/16/24   Harry Pryor MD   PARoxetine (PAXIL) 30 MG tablet Take 1 tablet by mouth Daily. 6/12/24   William Irwin MD   risperiDONE (risperDAL) 2 MG tablet Take 2 tablets by mouth Every Evening. 5/30/24   William Irwin MD   vitamin D (ERGOCALCIFEROL) 1.25 MG (35996 UT) capsule capsule Take 1 capsule by mouth Every 7 (Seven) Days. Wednesdays    William Irwin MD   zinc sulfate (ZINCATE) 220 (50 Zn) MG capsule Take 1 capsule by mouth Daily. 10/16/24   Harry Pryor MD        Objective / Physical Exam     Vital signs:  Temp: 97.7 °F (36.5 °C)  BP: 123/59  Heart Rate: (!) 126  Resp: (!) 31  SpO2: 96 %  Weight: 114 kg (251 lb 5.2 oz)    Admission Weight: Weight: 114 kg (251 lb 5.2 oz)    Physical Exam  Vitals and nursing note reviewed.   Constitutional:       Appearance: She is obese. She is ill-appearing.   MAXIM:      Head: Normocephalic and atraumatic.       Mouth/Throat:      Mouth: Mucous membranes are moist.      Pharynx: Oropharynx is clear.   Eyes:      General: No scleral icterus.     Extraocular Movements: Extraocular movements intact.   Cardiovascular:      Rate and Rhythm: Tachycardia present. Rhythm irregular.      Pulses: Normal pulses.      Heart sounds: Normal heart sounds. No murmur heard.  Pulmonary:      Breath sounds: Normal breath sounds.   Abdominal:      General: There is distension.      Tenderness: There is abdominal tenderness.   Musculoskeletal:         General: No swelling.   Skin:     General: Skin is warm and dry.      Capillary Refill: Capillary refill takes less than 2 seconds.      Coloration: Skin is pale.   Neurological:      General: No focal deficit present.      Mental Status: She is oriented to person, place, and time. She is lethargic.   Psychiatric:         Mood and Affect: Affect is flat.          Labs     Results from last 7 days   Lab Units 10/30/24  0630 10/30/24  0626 10/28/24  1132   WBC 10*3/mm3  --  22.31* 19.79*   HEMATOCRIT %  --  40.1 39.1   HEMATOCRIT POC % 40  --   --    PLATELETS 10*3/mm3  --  192 248      Results from last 7 days   Lab Units 10/30/24  1720 10/30/24  0630 10/30/24  0626 10/28/24  1132   SODIUM mmol/L 132*  --  126* 130*   POTASSIUM mmol/L 3.6  --  4.1 3.8   CHLORIDE mmol/L 100  --  92* 96*   CO2 mmol/L 12.0*  --  9.9* 19.8*   BUN mg/dL 59*  --  59* 35*   CREATININE mg/dL 6.43* 8.30* 7.47* 3.82*        Imaging     Chest X ray: My independent assessment showed bilateral scattered airspace opacities, no effusions    EKG: My independent evaluation showed atrial fibrillation, no ST -T changes, , Qtc 503    US Renal Bilateral    Result Date: 10/30/2024  Impression: Lobulated nodularity in the posterior urinary bladder wall has a similar appearance to 10/16/2024.. Malignant involvement of the urinary bladder cannot be excluded. Normal sonographic appearance of the right kidney. Left kidney could not be  visualized. Electronically Signed: Jeanette Huang MD  10/30/2024 3:06 PM EDT  Workstation ID: [id number]    CT Abdomen Pelvis Without Contrast    Result Date: 10/30/2024  Impression: 1.No acute trauma identified within chest/abdomen/pelvis. 2.Overall progression of disease. Numerous pulmonary metastasis have progressed, mediastinal and retroperitoneal lymphadenopathy has progressed, and hepatic metastasis have progressed. Omental carcinomatosis appears unchanged, and the uterus appears smaller than on prior exam. Soft tissue lesions identified along urinary bladder not well seen, which could be secondary to interval West catheterization. 3.Trace right pleural effusion. 4.Cardiomegaly. 5.Mild scattered ascites. 6.Wall thickening of colon, which could be reactive versus acute colitis. Electronically Signed: Robinson Heredia MD  10/30/2024 10:25 AM EDT  Workstation ID: [id number]    CT Chest Without Contrast Diagnostic    Result Date: 10/30/2024  Impression: 1.No acute trauma identified within chest/abdomen/pelvis. 2.Overall progression of disease. Numerous pulmonary metastasis have progressed, mediastinal and retroperitoneal lymphadenopathy has progressed, and hepatic metastasis have progressed. Omental carcinomatosis appears unchanged, and the uterus appears smaller than on prior exam. Soft tissue lesions identified along urinary bladder not well seen, which could be secondary to interval West catheterization. 3.Trace right pleural effusion. 4.Cardiomegaly. 5.Mild scattered ascites. 6.Wall thickening of colon, which could be reactive versus acute colitis. Electronically Signed: Robinson Heredia MD  10/30/2024 10:25 AM EDT  Workstation ID: [id number]    CT Head Without Contrast    Result Date: 10/30/2024  Impression: No acute intracranial abnormality Electronically Signed: Danny Julio MD  10/30/2024 9:31 AM EDT  Workstation ID: [id number]    Current Medications     Scheduled Meds:  [Held by provider] amLODIPine, 5 mg,  Oral, Q24H  cefepime, 2,000 mg, Intravenous, Q24H  heparin (porcine), 5,000 Units, Subcutaneous, Q12H  [Held by provider] hydrOXYzine, 50 mg, Oral, Nightly  insulin lispro, 2-7 Units, Subcutaneous, 4x Daily AC & at Bedtime  [Held by provider] lisinopril, 40 mg, Oral, Daily  [Held by provider] metoprolol succinate XL, 50 mg, Oral, Q24H  montelukast, 10 mg, Oral, Nightly  multivitamin, 1 tablet, Oral, Daily  mupirocin, 1 Application, Each Nare, BID  PARoxetine, 30 mg, Oral, Daily  risperiDONE, 2 mg, Oral, Q PM  senna-docusate sodium, 2 tablet, Oral, BID  sodium bicarbonate, 100 mEq, Intravenous, Once  sodium chloride, 10 mL, Intravenous, Q12H  sodium chloride, 10 mL, Intravenous, Q12H  sodium chloride, 10 mL, Intravenous, Q12H  vitamin D, 50,000 Units, Oral, Q7 Days  zinc sulfate, 220 mg, Oral, Daily         Continuous Infusions:  norepinephrine, 0.02-0.3 mcg/kg/min, Last Rate: 0.25 mcg/kg/min (10/30/24 1417)  sodium bicarbonate 8.4 % 150 mEq in dextrose (D5W) 5 % 1,000 mL infusion (greater than 100 mEq), 150 mEq, Last Rate: 150 mEq (10/30/24 1108)      Patient continues to be critically ill, remains at risk of clinical deterioration or death and needed high complexity decision making. I have spent a total of 50 minutes providing critical care services to this patient including but not limited to: review of labs/ microbiology/imaging/medications, serial monitoring of vital signs, adjusting oxygen support settings, review of other consultant's notes, review of events in the last 24 hrs, monitoring input/output, review of treatment plan with bedside nurse, RT and other treatment team, management of life support and nutrition needs. I also spoke with the patient and her sister, Andressa, about the plan of care and answered all questions.    Time spent in performing separately billable procedures and updating family is not included in the critical care time.         SANTI Silverio   Critical Care  10/30/24   18:36 EDT

## 2024-10-30 NOTE — CONSULTS
[facility] CONSULT      Patient Identification:  NAME:  Felisha Mukherjee  Age:  59 y.o.   Sex:  female   :  1965   MRN:  [id number]       Chief complaint/Reason for consult: Hydronephrosis, obstructive uropathy    History of present illness:  59 y.o. female with known endometrial cancer admitted 10/30/2024 after a fall.  Patient with recent hospitalization and discharged with indwelling West. Indwelling West catheter has remained in place since previous hospitalization      Past medical history:  Past Medical History:   Diagnosis Date    Diabetes mellitus     Hypertension     Uterine mass        Past surgical history:  Past Surgical History:   Procedure Laterality Date    CHOLECYSTECTOMY         Allergies:  Patient has no known allergies.    Home medications:  Medications Prior to Admission   Medication Sig Dispense Refill Last Dose/Taking    amLODIPine (NORVASC) 5 MG tablet Take 1 tablet by mouth Daily. 30 tablet 0     Josr Carb-Mag Hydrox-Simeth 1200-270-80 MG/10ML suspension Take 10 mL by mouth Every 6 (Six) Hours As Needed.       docusate sodium 100 MG capsule Take 1 capsule by mouth Daily.       Farxiga 10 MG tablet Take 10 mg by mouth Daily.       ferrous sulfate 325 (65 FE) MG tablet Take 1 tablet by mouth 5 (Five) Times a Week. Monday, Tuesday, Wednesday, Thursday and Friday       hydrocortisone-bacitracin-zinc oxide-nystatin (MAGIC BARRIER) Apply 1 Application topically to the appropriate area as directed 4 (Four) Times a Day. 100 g 0     hydrOXYzine (ATARAX) 50 MG tablet Take 1 tablet by mouth Every Night.       lisinopril (PRINIVIL,ZESTRIL) 40 MG tablet Take 1 tablet by mouth Daily.       metFORMIN (GLUCOPHAGE) 500 MG tablet Take 1 tablet by mouth 2 (Two) Times a Day.       metoprolol succinate XL (TOPROL-XL) 50 MG 24 hr tablet Take 1 tablet by mouth every night at bedtime.       montelukast (SINGULAIR) 10 MG tablet Take 1 tablet by mouth Every Night.       multivitamin (Thera) tablet  tablet Take 1 tablet by mouth Daily. 30 tablet 0     nystatin (MYCOSTATIN) 171655 UNIT/GM powder Apply  topically to the appropriate area as directed Every 12 (Twelve) Hours. 60 g 6     PARoxetine (PAXIL) 30 MG tablet Take 1 tablet by mouth Daily.       risperiDONE (risperDAL) 2 MG tablet Take 2 tablets by mouth Every Evening.       traMADol (ULTRAM) 50 MG tablet Take 1 tablet by mouth Every 6 (Six) Hours As Needed for Moderate Pain.       vitamin D (ERGOCALCIFEROL) 1.25 MG (22133 UT) capsule capsule Take 1 capsule by mouth Every 7 (Seven) Days.        zinc sulfate (ZINCATE) 220 (50 Zn) MG capsule Take 1 capsule by mouth Daily. 30 capsule 0         Hospital medications:  cefepime, 2,000 mg, Intravenous, Q24H  heparin (porcine), 5,000 Units, Subcutaneous, Q12H  mupirocin, 1 Application, Each Nare, BID  senna-docusate sodium, 2 tablet, Oral, BID  sodium chloride, 10 mL, Intravenous, Q12H  sodium chloride, 10 mL, Intravenous, Q12H  sodium chloride, 10 mL, Intravenous, Q12H      norepinephrine, 0.02-0.3 mcg/kg/min, Last Rate: 0.25 mcg/kg/min (10/30/24 1417)  sodium bicarbonate 8.4 % 150 mEq in dextrose (D5W) 5 % 1,000 mL infusion (greater than 100 mEq), 150 mEq, Last Rate: 150 mEq (10/30/24 1108)        acetaminophen **OR** acetaminophen    aluminum-magnesium hydroxide-simethicone    senna-docusate sodium **AND** polyethylene glycol **AND** bisacodyl **AND** bisacodyl    nitroglycerin    ondansetron ODT **OR** ondansetron    sodium chloride    sodium chloride    sodium chloride    sodium chloride    sodium chloride    Family history:  Family History   Problem Relation Age of Onset    Diabetes Mother        Social history:  Social History     Tobacco Use    Smoking status: Never   Vaping Use    Vaping status: Never Used   Substance Use Topics    Alcohol use: Never    Drug use: Never       Objective:  TMax 24 hours:   Temp (24hrs), Av.8 °F (36.6 °C), Min:97.5 °F (36.4 °C), Max:98.1 °F (36.7 °C)      Vitals  Ranges:   Temp:  [97.5 °F (36.4 °C)-98.1 °F (36.7 °C)] 97.5 °F (36.4 °C)  Heart Rate:  [111-144] 127  Resp:  [18] 18  BP: ()/(30-98) 110/40    Intake/Output Last 3 shifts:  No intake/output data recorded.     Physical Exam:    General Appearance:    Alert, cooperative, NAD   Lungs:     Respirations unlabored, no audible wheezing    Heart:    No cyanosis   Abdomen:     Soft, ND    :    No suprapubic distention.  Indwelling West catheter in place with clear yellow output              Results review:   I reviewed the patient's new clinical results.    Data review:  Lab Results (last 24 hours)       Procedure Component Value Units Date/Time    Uric Acid [[id number]]  (Abnormal) Collected: 10/30/24 1011    Specimen: Blood Updated: 10/30/24 1158     Uric Acid 9.4 mg/dL     High Sensitivity Troponin T 2Hr [[id number]]  (Abnormal) Collected: 10/30/24 1011    Specimen: Blood Updated: 10/30/24 1107     HS Troponin T 129 ng/L      Troponin T Delta -40 ng/L     Narrative:      High Sensitive Troponin T Reference Range:  <14.0 ng/L- Negative Female for AMI  <22.0 ng/L- Negative Male for AMI  >=14 - Abnormal Female indicating possible myocardial injury.  >=22 - Abnormal Male indicating possible myocardial injury.   Clinicians would have to utilize clinical acumen, EKG, Troponin, and serial changes to determine if it is an Acute Myocardial Infarction or myocardial injury due to an underlying chronic condition.         STAT Lactic Acid, Reflex [[id number]]  (Abnormal) Collected: 10/30/24 1011    Specimen: Blood Updated: 10/30/24 1107     Lactate 4.2 mmol/L     CK [[id number]]  (Normal) Collected: 10/30/24 0626    Specimen: Blood Updated: 10/30/24 1014     Creatine Kinase 35 U/L     POC Glucose Once [[id number]]  (Abnormal) Collected: 10/30/24 1012    Specimen: Blood Updated: 10/30/24 1014     Glucose 128 mg/dL      Comment: Serial Number: [id number]:  [id number]       Blood Culture - Blood, Hand, Left [[id number]]  Collected: 10/30/24 0733    Specimen: Blood from Hand, Left Updated: 10/30/24 0752    Respiratory Panel PCR w/COVID-19(SARS-CoV-2) DARIA/MAX/VANESSA/PAD/COR/DELONTE In-House, NP Swab in UTM/VTM, 2 HR TAT - Swab, Nasopharynx [[id number]]  (Normal) Collected: 10/30/24 0555    Specimen: Swab from Nasopharynx Updated: 10/30/24 0737     ADENOVIRUS, PCR Not Detected     Coronavirus 229E Not Detected     Coronavirus HKU1 Not Detected     Coronavirus NL63 Not Detected     Coronavirus OC43 Not Detected     COVID19 Not Detected     Human Metapneumovirus Not Detected     Human Rhinovirus/Enterovirus Not Detected     Influenza A PCR Not Detected     Influenza B PCR Not Detected     Parainfluenza Virus 1 Not Detected     Parainfluenza Virus 2 Not Detected     Parainfluenza Virus 3 Not Detected     Parainfluenza Virus 4 Not Detected     RSV, PCR Not Detected     Bordetella pertussis pcr Not Detected     Bordetella parapertussis PCR Not Detected     Chlamydophila pneumoniae PCR Not Detected     Mycoplasma pneumo by PCR Not Detected    Narrative:      In the setting of a positive respiratory panel with a viral infection PLUS a negative procalcitonin without other underlying concern for bacterial infection, consider observing off antibiotics or discontinuation of antibiotics and continue supportive care. If the respiratory panel is positive for atypical bacterial infection (Bordetella pertussis, Chlamydophila pneumoniae, or Mycoplasma pneumoniae), consider antibiotic de-escalation to target atypical bacterial infection.    Comprehensive Metabolic Panel [[id number]]  (Abnormal) Collected: 10/30/24 0626    Specimen: Blood Updated: 10/30/24 0729     Glucose 132 mg/dL      BUN 59 mg/dL      Creatinine 7.47 mg/dL      Sodium 126 mmol/L      Potassium 4.1 mmol/L      Chloride 92 mmol/L      CO2 9.9 mmol/L      Calcium 9.0 mg/dL      Total Protein 5.0 g/dL      Albumin 2.3 g/dL      ALT (SGPT) 18 U/L      AST (SGOT) 24 U/L      Alkaline Phosphatase 82  U/L      Total Bilirubin 0.2 mg/dL      Globulin 2.7 gm/dL      A/G Ratio 0.9 g/dL      BUN/Creatinine Ratio 7.9     Anion Gap 24.1 mmol/L      eGFR 5.8 mL/min/1.73      Comment: <15 Indicative of kidney failure       Narrative:      GFR Normal >60  Chronic Kidney Disease <60  Kidney Failure <15      Magnesium [[id number]]  (Normal) Collected: 10/30/24 0626    Specimen: Blood Updated: 10/30/24 0729     Magnesium 2.1 mg/dL     Single High Sensitivity Troponin T [[id number]]  (Abnormal) Collected: 10/30/24 0626    Specimen: Blood Updated: 10/30/24 0729     HS Troponin T 169 ng/L     Narrative:      High Sensitive Troponin T Reference Range:  <14.0 ng/L- Negative Female for AMI  <22.0 ng/L- Negative Male for AMI  >=14 - Abnormal Female indicating possible myocardial injury.  >=22 - Abnormal Male indicating possible myocardial injury.   Clinicians would have to utilize clinical acumen, EKG, Troponin, and serial changes to determine if it is an Acute Myocardial Infarction or myocardial injury due to an underlying chronic condition.         CBC & Differential [[id number]]  (Abnormal) Collected: 10/30/24 0626    Specimen: Blood Updated: 10/30/24 0720    Narrative:      The following orders were created for panel order CBC & Differential.  Procedure                               Abnormality         Status                     ---------                               -----------         ------                     CBC Auto Differential[[id number]]        Abnormal            Final result               Scan Slide[[id number]]                                       Final result                 Please view results for these tests on the individual orders.    Scan Slide [[id number]] Collected: 10/30/24 0626    Specimen: Blood Updated: 10/30/24 0720     Scan Slide --     Comment: See Manual Differential Results       Manual Differential [[id number]]  (Abnormal) Collected: 10/30/24 0626    Specimen: Blood Updated: 10/30/24 0720     Neutrophil  % 71.0 %      Lymphocyte % 4.0 %      Monocyte % 8.0 %      Bands %  12.0 %      Metamyelocyte % 5.0 %      Neutrophils Absolute 18.52 10*3/mm3      Lymphocytes Absolute 0.89 10*3/mm3      Monocytes Absolute 1.78 10*3/mm3      Crenated RBC's Large/3+     Microcytes Slight/1+     Poikilocytes Slight/1+     Toxic Granulation Slight/1+     Vacuolated Neutrophils Mod/2+     Platelet Morphology Normal    CBC Auto Differential [[id number]]  (Abnormal) Collected: 10/30/24 0626    Specimen: Blood Updated: 10/30/24 0720     WBC 22.31 10*3/mm3      RBC 5.10 10*6/mm3      Hemoglobin 12.5 g/dL      Hematocrit 40.1 %      MCV 78.6 fL      MCH 24.5 pg      MCHC 31.2 g/dL      RDW 21.4 %      RDW-SD 59.4 fl      MPV 9.8 fL      Platelets 192 10*3/mm3     Narrative:      The previously reported component NRBC is no longer being reported. Previous result was 0.0 /100 WBC (Reference Range: 0.0-0.2 /100 WBC) on 10/30/2024 at [facility] EDT.    Ketone Bodies, Serum (Not performed at [place]) [[id number]]  (Normal) Collected: 10/30/24 0626    Specimen: Blood Updated: 10/30/24 0717    Narrative:      The following orders were created for panel order Ketone Bodies, Serum (Not performed at [place]).  Procedure                               Abnormality         Status                     ---------                               -----------         ------                     Acetone[[id number]]                      Normal              Final result                 Please view results for these tests on the individual orders.    Acetone [[id number]]  (Normal) Collected: 10/30/24 0626    Specimen: Blood Updated: 10/30/24 0717     Acetone Negative    Procalcitonin [[id number]]  (Abnormal) Collected: 10/30/24 0626    Specimen: Blood Updated: 10/30/24 0708     Procalcitonin 4.82 ng/mL     Narrative:      As a Marker for Sepsis (Non-Neonates):    1. <0.5 ng/mL represents a low risk of severe sepsis and/or septic shock.  2. >2 ng/mL represents a high risk of  "severe sepsis and/or septic shock.    As a Marker for Lower Respiratory Tract Infections that require antibiotic therapy:    PCT on Admission    Antibiotic Therapy       6-12 Hrs later    >0.5                Strongly Recommended  >0.25 - <0.5        Recommended   0.1 - 0.25          Discouraged              Remeasure/reassess PCT  <0.1                Strongly Discouraged     Remeasure/reassess PCT    As 28 day mortality risk marker: \"Change in Procalcitonin Result\" (>80% or <=80%) if Day 0 (or Day 1) and Day 4 values are available. Refer to http://[email or web address] [email or web address] [email or web address]-pct-[email or web address]    Change in PCT <=80%  A decrease of PCT levels below or equal to 80% defines a positive change in PCT test result representing a higher risk for 28-day all-cause mortality of patients diagnosed with severe sepsis for septic shock.    Change in PCT >80%  A decrease of PCT levels of more than 80% defines a negative change in PCT result representing a lower risk for 28-day all-cause mortality of patients diagnosed with severe sepsis or septic shock.       BNP [[id number]]  (Abnormal) Collected: 10/30/24 0626    Specimen: Blood Updated: 10/30/24 0708     proBNP 1,560.0 pg/mL     Narrative:      This assay is used as an aid in the diagnosis of individuals suspected of having heart failure. It can be used as an aid in the diagnosis of acute decompensated heart failure (ADHF) in patients presenting with signs and symptoms of ADHF to the emergency department (ED). In addition, NT-proBNP of <300 pg/mL indicates ADHF is not likely.    Age Range Result Interpretation  NT-proBNP Concentration (pg/mL:      <50             Positive            >450                   Gray                 300-450                    Negative             <300    50-75           Positive            >900                  Gray                300-900                  Negative            <300      >75             Positive            >1800                  Márquez                " 300-1800                  Negative            <300    TSH [[id number]]  (Abnormal) Collected: 10/30/24 0626    Specimen: Blood Updated: 10/30/24 0708     TSH 4.500 uIU/mL     Blood Culture - Blood, Hand, Left [[id number]] Collected: 10/30/24 0626    Specimen: Blood from Hand, Left Updated: 10/30/24 0656    [place] Draw [[id number]] Collected: 10/30/24 0626    Specimen: Blood Updated: 10/30/24 0646    Narrative:      The following orders were created for panel order [place] Draw.  Procedure                               Abnormality         Status                     ---------                               -----------         ------                     Green Top (Gel)[[id number]]                                  Final result               Lavender Top[[id number]]                                     Final result               Gold Top - SST[[id number]]                                   Final result               Light Blue Top[[id number]]                                   Final result                 Please view results for these tests on the individual orders.    Green Top (Gel) [[id number]] Collected: 10/30/24 0626    Specimen: Blood Updated: 10/30/24 0646     Extra Tube Hold for add-ons.     Comment: Auto resulted.       Lavender Top [[id number]] Collected: 10/30/24 0626    Specimen: Blood Updated: 10/30/24 0646     Extra Tube hold for add-on     Comment: Auto resulted       Gold Top - SST [[id number]] Collected: 10/30/24 0626    Specimen: Blood Updated: 10/30/24 0646     Extra Tube Hold for add-ons.     Comment: Auto resulted.       Light Blue Top [[id number]] Collected: 10/30/24 0626    Specimen: Blood Updated: 10/30/24 0646     Extra Tube Hold for add-ons.     Comment: Auto resulted       POC CHEM 8 [[id number]]  (Abnormal) Collected: 10/30/24 0630    Specimen: Venous Blood Updated: 10/30/24 0642     Glucose 127 mg/dL      BUN 49 mg/dL      Creatinine 8.30 mg/dL      Sodium 125 mmol/L      POC Potassium 3.8 mmol/L       Chloride 99 mmol/L      Total CO2 11 mmol/L      Anion Gap 20.0 mmol/L      Comment: Serial Number: [id number]:  [id number]        Hemoglobin 13.6 g/dL      Hematocrit 40 %      Ionized Calcium 1.08 mmol/L      eGFR 5.1 mL/min/1.73     POC Lactate [[id number]]  (Abnormal) Collected: 10/30/24 0629    Specimen: Blood Updated: 10/30/24 0631     Lactate 4.7 mmol/L      Comment: Serial Number: [id number]:  [id number]       Blood Gas, Arterial - [[id number]]  (Abnormal) Collected: 10/30/24 0544    Specimen: Arterial Blood Updated: 10/30/24 0555     Site Right Radial     Joe's Test Positive     pH, Arterial 7.318 pH units      pCO2, Arterial 17.7 mm Hg      pO2, Arterial 76.3 mm Hg      HCO3, Arterial 9.1 mmol/L      Base Excess, Arterial -14.7 mmol/L      Comment: Serial Number: [id number]:  [id number]        O2 Saturation, Arterial 94.5 %      CO2 Content 9.7 mmol/L      Barometric Pressure for Blood Gas --     Comment: N/A        Modality Cannula     FIO2 44 %      Notified Who --     Read Back --     Notified Time --     Hemodilution No     PO2/FIO2 173             Imaging:  Imaging Results (Last 24 Hours)       Procedure Component Value Units Date/Time    US Renal Bilateral [[id number]] Resulted: 10/30/24 1358     Updated: 10/30/24 1358    CT Chest Without Contrast Diagnostic [[id number]] Collected: 10/30/24 0956     Updated: 10/30/24 1027    Narrative:      CT CHEST WO CONTRAST DIAGNOSTIC, CT ABDOMEN PELVIS WO CONTRAST    Date of Exam: 10/30/2024 9:23 AM EDT    Indication: acute respiratory failure.    Comparison: Chest radiograph from earlier today and CT abdomen pelvis from October 8, 2024 and PET/CT from October 3, 2024    Technique: Axial CT images were obtained of the chest abdomen and pelvis without contrast administration.  Sagittal and coronal reconstructions were performed.  Automated exposure control and iterative reconstruction methods were used.      Findings:  Chest:    The central tracheobronchial  tree is clear. Innumerable bilateral pulmonary metastasis have progressed from prior PET/CT. An index right lower lobe nodule on image 44 measures 2.3 cm, previously 0.8 cm, and multiple new pulmonary metastases are present.   There is a trace right pleural effusion.    The heart is enlarged, with evidence of calcified coronary artery disease. A left-sided PICC has its tip at the cavoatrial junction. The great vessels are normal in caliber. Mediastinal lymphadenopathy has progressed. An index precarinal lymph node on   image 30 measures 2.2 x 2.0 cm, previously 0.9 x 0.8 cm.    No aggressive osseous lesions are identified.    Abdomen/pelvis:    No global hepatic metastasis have progressed from prior exam. An index metastasis within the right inferior hepatic lobe on image 64 measures 2.2 cm, previously 0.7 cm. Retroperitoneal lymphadenopathy has progressed, with an index left para-aortic lymph   node on image 84 measuring 2.6 cm, previously 1.9 cm. Omental carcinomatosis appears unchanged. An enlarged uterus appears smaller measuring 12 cm in craniocaudal dimension, previously 17 cm. An 8.6 cm left adnexal cyst appears unchanged. A urinary West   catheter is in place, the previously identified soft tissue lesions along the right posterior urinary bladder are not well seen. There is mild scattered ascites.    The gallbladder is surgically absent. The unenhanced adrenal glands, kidneys, spleen, and pancreas are unremarkable.    The stomach appears normal. The small bowel appears normal in caliber and configuration. There is wall thickening of the colon. The appendix is not well seen. There is no loculated collection. The rectum is unremarkable.    No aggressive osseous lesions are identified.      Impression:      Impression:  1.No acute trauma identified within chest/abdomen/pelvis.  2.Overall progression of disease. Numerous pulmonary metastasis have progressed, mediastinal and retroperitoneal lymphadenopathy has  progressed, and hepatic metastasis have progressed. Omental carcinomatosis appears unchanged, and the uterus appears   smaller than on prior exam. Soft tissue lesions identified along urinary bladder not well seen, which could be secondary to interval West catheterization.  3.Trace right pleural effusion.  4.Cardiomegaly.  5.Mild scattered ascites.  6.Wall thickening of colon, which could be reactive versus acute colitis.      Electronically Signed: Robinson Heredia MD    10/30/2024 10:25 AM EDT    Workstation ID: [id number]    CT Abdomen Pelvis Without Contrast [[id number]] Collected: 10/30/24 0956     Updated: 10/30/24 1027    Narrative:      CT CHEST WO CONTRAST DIAGNOSTIC, CT ABDOMEN PELVIS WO CONTRAST    Date of Exam: 10/30/2024 9:23 AM EDT    Indication: acute respiratory failure.    Comparison: Chest radiograph from earlier today and CT abdomen pelvis from October 8, 2024 and PET/CT from October 3, 2024    Technique: Axial CT images were obtained of the chest abdomen and pelvis without contrast administration.  Sagittal and coronal reconstructions were performed.  Automated exposure control and iterative reconstruction methods were used.      Findings:  Chest:    The central tracheobronchial tree is clear. Innumerable bilateral pulmonary metastasis have progressed from prior PET/CT. An index right lower lobe nodule on image 44 measures 2.3 cm, previously 0.8 cm, and multiple new pulmonary metastases are present.   There is a trace right pleural effusion.    The heart is enlarged, with evidence of calcified coronary artery disease. A left-sided PICC has its tip at the cavoatrial junction. The great vessels are normal in caliber. Mediastinal lymphadenopathy has progressed. An index precarinal lymph node on   image 30 measures 2.2 x 2.0 cm, previously 0.9 x 0.8 cm.    No aggressive osseous lesions are identified.    Abdomen/pelvis:    No global hepatic metastasis have progressed from prior exam. An index  metastasis within the right inferior hepatic lobe on image 64 measures 2.2 cm, previously 0.7 cm. Retroperitoneal lymphadenopathy has progressed, with an index left para-aortic lymph   node on image 84 measuring 2.6 cm, previously 1.9 cm. Omental carcinomatosis appears unchanged. An enlarged uterus appears smaller measuring 12 cm in craniocaudal dimension, previously 17 cm. An 8.6 cm left adnexal cyst appears unchanged. A urinary West   catheter is in place, the previously identified soft tissue lesions along the right posterior urinary bladder are not well seen. There is mild scattered ascites.    The gallbladder is surgically absent. The unenhanced adrenal glands, kidneys, spleen, and pancreas are unremarkable.    The stomach appears normal. The small bowel appears normal in caliber and configuration. There is wall thickening of the colon. The appendix is not well seen. There is no loculated collection. The rectum is unremarkable.    No aggressive osseous lesions are identified.      Impression:      Impression:  1.No acute trauma identified within chest/abdomen/pelvis.  2.Overall progression of disease. Numerous pulmonary metastasis have progressed, mediastinal and retroperitoneal lymphadenopathy has progressed, and hepatic metastasis have progressed. Omental carcinomatosis appears unchanged, and the uterus appears   smaller than on prior exam. Soft tissue lesions identified along urinary bladder not well seen, which could be secondary to interval West catheterization.  3.Trace right pleural effusion.  4.Cardiomegaly.  5.Mild scattered ascites.  6.Wall thickening of colon, which could be reactive versus acute colitis.      Electronically Signed: Robinson Heredia MD    10/30/2024 10:25 AM EDT    Workstation ID: [id number]    CT Head Without Contrast [[id number]] Collected: 10/30/24 0929     Updated: 10/30/24 0933    Narrative:      CT HEAD WO CONTRAST    Date of Exam: 10/30/2024 9:23 AM EDT    Indication:  trauma.    Comparison: None available.    Technique: Axial CT images were obtained of the head without contrast administration.  Coronal reconstructions were performed.  Automated exposure control and iterative reconstruction methods were used.      Findings: Exam limited due to head tilting  There is no evidence of hemorrhage. There is no mass effect or midline shift.    There is no extracerebral collection.    Ventricles are normal in size and configuration for patient's stated age.     Posterior fossa is within normal limits.    Calvarium and skull base appear intact.  Visualized sinuses show no air fluid levels. Visualized orbits are unremarkable.         Impression:      Impression:    No acute intracranial abnormality      Electronically Signed: Danny Julio MD    10/30/2024 9:31 AM EDT    Workstation ID: [id number]    XR Chest 1 View [[id number]] Collected: 10/30/24 0621     Updated: 10/30/24 0624    Narrative:      XR CHEST 1 VW    Date of Exam: 10/30/2024 5:59 AM EDT    Indication: soa    Comparison: 10/3/2024.    Findings:  Patchy airspace disease is present throughout the lungs bilaterally, new as compared to the previous study.. No pleural fluid. No pneumothorax. The pulmonary vasculature appears within normal limits. The cardiac and mediastinal silhouette appear   unremarkable. No acute osseous abnormality identified.      Impression:      Impression:  Interval development of patchy airspace disease throughout the lungs bilaterally, likely related to pneumonia. Follow-up to resolution recommended..        Electronically Signed: Kia Patino MD    10/30/2024 6:21 AM EDT    Workstation ID: [id number]               Assessment:       Septic shock    Malignant neoplasm of body of uterus      Hydronephrosis  Retention with West catheter  Acute renal failure    Plan:     Cr 8.3  CT images reviewed without signs of urinary obstruction  Renal ultrasound reviewed with bilateral mild to moderate hydronephrosis,  distended urinary bladder.  Lobular mass within the posterior margin of the bladder presumed to be neoplastic.  Keep indwelling West catheter  Follow creatinine  Repeat CT tomorrow, if any significant hydro would recommend bilateral nephrostomy tubes stent placement has high likelihood of failure in the setting of severe malignant obstruction.          SANTI Menendez  [facility]  [address], [address]  [place] [address]  Office: [phone]  Available via Epic Secure Chat  10/30/24  14:36 EDT     Plan reviewed and discussed with Dr. Jackson    Agree with above exam, assessment and plan.      Philipp Jackson MD  [facility]  Office: [phone]  10/30/24  16:31 EDT

## 2024-10-31 PROBLEM — C78.00 CANCER WITH PULMONARY METASTASES: Status: ACTIVE | Noted: 2024-01-01

## 2024-10-31 PROBLEM — Z86.73 HISTORY OF MULTIPLE CEREBROVASCULAR ACCIDENTS (CVAS): Status: ACTIVE | Noted: 2024-01-01

## 2024-10-31 NOTE — PROGRESS NOTES
"      FOLLOW UP NOTE      Name: Felisha Mukherjee ADMIT: 10/30/2024   : 1965  PCP: Hanh Granados APRN    MRN: [id number] LOS: 1 days   AGE/SEX: 59 y.o. female  ROOM: [facility]     Date of Service: 10/31/2024                           CHIEF COMPLIANTS / REASON FOR FOLLOW UP                Subjective:        Patient continuing to decline.  Apparently she had some slurred speech and has been evaluated for acute stroke.  MRI brain showed numerous small scattered acute infarcts in the supratentorial and infratentorial brain, most compatible with embolic phenomenon.  She is now on 3 vasopressors.  She has had almost no urine output.  Apparently given worsening lactic acidosis, CRRT was attempted but access placement was unsuccessful.  This morning the patient is awake but looks acutely ill.  Her speech is still slurred.  Her sister is at bedside.       Review of Systems:       Unable to obtain     OBJECTIVE                                                                        Exam:  /82   Pulse 120   Temp 97.6 °F (36.4 °C) (Axillary)   Resp (!) 33   Ht 160 cm (63\")   Wt 116 kg (255 lb 15.3 oz)   SpO2 95%   BMI 45.34 kg/m²   Intake/Output last 3 shifts:  I/O last 3 completed shifts:  In: 6048 [I.V.:6048]  Out: 10 [Urine:10]  Intake/Output this shift:  No intake/output data recorded.    General Appearance: Acutely ill, tachypneic  Lungs:   Clear to auscultation bilaterally,  Heart:  tachycardia  Abdomen:  Soft, nontender,  Extremities: No edema   Neurologic: Aphasic    Scheduled Meds:[Held by provider] amLODIPine, 5 mg, Oral, Q24H  cefepime, 2,000 mg, Intravenous, Q24H  heparin (porcine), 5,000 Units, Subcutaneous, Q12H  [Held by provider] hydrOXYzine, 50 mg, Oral, Nightly  insulin lispro, 2-9 Units, Subcutaneous, Q6H  [Held by provider] lisinopril, 40 mg, Oral, Daily  [Held by provider] metoprolol succinate XL, 50 mg, Oral, Q24H  montelukast, 10 mg, Oral, Nightly  multivitamin, 1 tablet, Oral, " Daily  mupirocin, 1 Application, Each Nare, BID  PARoxetine, 30 mg, Oral, Daily  risperiDONE, 2 mg, Oral, Q PM  senna-docusate sodium, 2 tablet, Oral, BID  sodium bicarbonate, 100 mEq, Intravenous, Once  sodium chloride, 10 mL, Intravenous, Q12H  sodium chloride, 10 mL, Intravenous, Q12H  sodium chloride, 10 mL, Intravenous, Q12H  vitamin D, 50,000 Units, Oral, Q7 Days  zinc sulfate, 220 mg, Oral, Daily      Continuous Infusions:norepinephrine, 0.02-0.5 mcg/kg/min, Last Rate: 0.5 mcg/kg/min (10/31/24 0956)  phenylephrine, 0.5-6 mcg/kg/min, Last Rate: 5 mcg/kg/min (10/31/24 0956)  Phoxillum BK4/2.5, 1,000 mL/hr  Phoxillum BK4/2.5, 1,000 mL/hr  Phoxillum BK4/2.5, 1,000 mL/hr  sodium bicarbonate 8.4 % 150 mEq in dextrose (D5W) 5 % 1,000 mL infusion (greater than 100 mEq), 150 mEq, Last Rate: 150 mEq (10/31/24 0610)  vasopressin, 0.03 Units/min, Last Rate: 0.03 Units/min (10/31/24 0851)      PRN Meds:  acetaminophen **OR** acetaminophen    aluminum-magnesium hydroxide-simethicone    senna-docusate sodium **AND** polyethylene glycol **AND** bisacodyl **AND** bisacodyl    Calcium Replacement - Follow Nurse / BPA Driven Protocol    dextrose    dextrose    glucagon (human recombinant)    heparin (porcine)    Magnesium Standard Dose Replacement - Follow Nurse / BPA Driven Protocol    nitroglycerin    ondansetron ODT **OR** ondansetron    Phosphorus Replacement - Follow Nurse / BPA Driven Protocol    Potassium Replacement - Follow Nurse / BPA Driven Protocol    sodium chloride    sodium chloride    sodium chloride    sodium chloride    sodium chloride         Data Review:                                                                           Labs reviewed        Imaging:                                                                           Radiology reviewed           ASSESSMENT:                                                                                Septic shock    Malignant neoplasm of body of uterus          Acute kidney injury  Probable baseline CKD: Baseline creatinine seems to be around 1.4 mg/dL.  Hyponatremia  Mixed metabolic acidosis  Lactic acidosis  Metastatic endometrial malignancy  History of obstructive uropathy  Elevated BNP  Shock: Undifferentiated  Probable  sepsis      PLAN:                                                                            HASMUKH multifactorial due to ATN, shock and obstructive uropathy.  Patient is clinically worse, decompensating. Since admission, it appears she has had several hospital complications, ongoing shock/hypotension, now maxed on 3 vasopressors and most recently acute CVA. Her hemodynamic instability has worsened to the point where she is requiring three vasopressors to maintain her blood pressure. Lactic acidosis/hypoperfusion is worsening. HD access for CRRT was attempted last night without success.  At this point, I believe the patient is too unstable for CRRT as her BP is likelier to decline even further with redirecting blood even at a low flow rate into the extracorporeal circuit for CRRT. Further, it appears to me that she is actively declining. She has several other serious nonrenal medical issues, specifically metastatic malignancy that seem to be further affecting her prognosis. Only reversible factor here might be placement of PCNTs to relieve any putative obstructive uropathy, but I am not sure she is stable enough for that either.   With all other above factors mentioned, I strongly recommend palliative care. I conveyed this directly to the patient who seems to be retaining degree of decision making although I question her comprehension especially with recent neurological issues. I also communicated with her sister Andressa at bedside.  Can continue the bicarb infusion with pushes of bicarb PRN.  Have recommended to follow up with other consultants too.    Discussed in detail with ICU team. Prognosis seems poor.      Rosa Wen MD  [facility]  10/31/2024  10:14 EDT

## 2024-10-31 NOTE — NURSING NOTE
Pt alert and oriented throughout shift. No urine output or BM. Afib new onset cardiology aware. 6L NC. Head CT obtained upon arrival with no abnormalities to be noted. PICC present. Levo and bicarb drip infusing. Pt code stroked at shift change d/t new onset facial droop and arm drifting of the left side. Hansa Billingsley nightshift to take over care and Jessica to transport to CT for code stroke. Nothing else needed from this at this time.

## 2024-10-31 NOTE — SIGNIFICANT NOTE
10/31/24 1523   OTHER   Discipline physical therapist   Rehab Time/Intention   Session Not Performed unable to evaluate, medical status change  (Family pursuing Hospice. Will complete PT order at this time. Please re-consult if change in condition.)

## 2024-10-31 NOTE — PROGRESS NOTES
"Critical Care Progress Note     Felisha Mukherjee : 1965 MRN:[id number] LOS:1  Rm: 2308/1     Principal Problem: Septic shock     Reason for follow up: All the medical problems listed below    Summary     59 y.o. female with PMH of diabetes type 2, hypertension, endometrial adenocarcinoma with metastasis presented to the hospital from an acute rehab for ground-level fall, shortness of breath and dry cough x 2 days.  She has diffuse abdominal pain and was recently discharged from this facility on 10/18/2024 after being treated for abdominal wall cellulitis. In the ED she developed new onset AF RVR and was started on a cardizem gtt. CT of the head without acute abnormalities.  CT of the abdomen pelvis was obtained and did show evidence of overall disease progression involving patient's known endometrial adenocarcinoma.  CXR shows bilateral airspace opacities, labs significant for CO2 9.9, sodium 126, BUN 8.3, HS troponin 169, lactate 4.7, WBC 22.3, and 12% bands. Persistent hypotension following the sepsis bolus indicated levophed and she was admitted to the ICU with a principal diagnosis of septic shock.        ACP: CODE STATUS was discussed with patient and she would like CPR and intubation if necessary.  Her decision-makers would be her sisters Andressa and Sandi if she becomes incapacitated.  The patient does have an adult daughter who resides in a behavioral institution.     Patient was seen and examined on 10/30/24 at 18:36 EDT .  She states she is feeling a little bit better overall but her head still feels \"not right\".  She is oriented x 3 but expresses that something is not right with her head and thought processes but is unable to articulate what specifically is wrong.  She repeats some of the words that I say. I suspect there is a uremic component to her mentation as her hypoxia is improving and her head CT was negative for abnormalities. She endorses diffuse abdominal pain.     Significant Events / " Subjective     10/31/24 : As noted.    Assessment / Plan     Metastatic endometrial adenocarcinoma  Diagnosed August 2024  CT chest/ab/pelv: Numerous pulmonary metastasis have progressed, mediastinal and retroperitoneal lymphadenopathy has progressed, and hepatic metastasis have progressed. Omental carcinomatosis appears unchanged, and the uterus appears smaller than on prior exam.   Heme/onc has deemed pt a poor candidate for any treatment  Palliative care consulted      Putative septic shock   Source may be 2/2 community-acquired pneumonia with risk factors  CXR: bilateral scattered airspace opacities, no effusions  WBC: 22.31, procal: 4.82, lactate: 4.7  Blood cultures NGTD  Urine did not reflex to Cx  RVP negative  Urinary Ag's pending  Started on vancomycin and cefepime  Persistent hypotension in spite of adequate fluid resuscitation   C/w additional fluids as needed. Avoid fluid overload.   On pressors, titrate vasopressors for a target MAP of 60.    IVC was >50% collapsible on POCUS following completion of sepsis bolus (2,310mL)  1 additional liter NaCl and 1L albumin 5% ordered       Acute Kidney Injury  Hydronephrosis  Creatinine: 1.27 in August 2024  Anuric, will do bladder scans, kinney in place  Likely prerenal but cannot r/o post-renal cause d/t obstruction from uterine mass  Possible intrinsic component due to ATN from hypotension.   C/w IV fluids as ordered.  Monitor Input/Output very closely.   Avoids NSAIDs, nephrotoxic medications, and hypotension.  Nephrology following  Urology following  IR consulted for palliative nephrostomy tube placement bilaterally--they do not feel this is justified and have canceled procedure       AF RVR  No documented history of atrial fibrillation  On cardizem for a short time in ED, now off  HS troponin: 169  No chest pain  Suspect d/t demand ischemia  Trend troponin  Cardiology following       Hyponatremia  Na: 126 upon admit  On sodium bicarb gtt  Monitor and trend  labs       Mixed respiratory and metabolic acidosis  2/2 sepsis and HASMUKH  Upon admit: 7.31/17.7/76.3/9.1  Received one amp of sodium bicarb and started on a gtt (150meq in 1L D5)  Lactic acidosis, 4.7  Monitor and trend labs     Ground-level fall  CT head: no acute abnormalities  Bedrest  PT when appropriate        Diabetes mellitus Type 2, not insulin-dependent  Hold oral agents while in ICU (Farxiga, metformin)  Not on insulin at home  Accu checks ACHS with sliding scale insulin coverage as needed.   Hgb A1C ordered, no previous on file     HTN  Home meds on hold while on pressors: Amlodipine, lisinopril, metoprolol XL     Anxiety/Depression: continue Paxil, Risperdal, and hydroxyzine          Disposition:  Remains on bicarb gtt, levo, sharon, vaso--ICU    Hospice has been consulted    Critical Care Time:  38 mins.      Code status:   Code Status (Patient has no pulse and is not breathing): CPR (Attempt to Resuscitate)  Medical Interventions (Patient has pulse or is breathing): Full Support       Nutrition:   NPO Diet NPO Type: Strict NPO   Patient isn't on Tube Feeding     VTE Prophylaxis:  Pharmacologic & mechanical VTE prophylaxis orders are present.           Objective / Physical Exam     Vital signs:  Temp: 97.6 °F (36.4 °C)  BP: 140/82  Heart Rate: 120  Resp: (!) 33  SpO2: 95 %  Weight: 116 kg (255 lb 15.3 oz)    Admission Weight: Weight: 114 kg (251 lb 5.2 oz)  Current Weight: Weight: 116 kg (255 lb 15.3 oz)    Input/Output in last 24 hours:    Intake/Output Summary (Last 24 hours) at 10/31/2024 1149  Last data filed at 10/31/2024 0415  Gross per 24 hour   Intake 6048 ml   Output 10 ml   Net 6038 ml      Net IO Since Admission: 6,038 mL [10/31/24 1149]     Physical Exam     GEN: Morbidly obese, pleasant, acute on chronically ill-appearing woman, lying in bed on NC.  NAD.  NEURO:  Brainstem reflexes intact.  No obvious focal deficit.  Moves all 4 ext.  HEENT:  N/AT.  PERRL.  MMM.  Oropharynx non-erythematous.   No drainage from the eyes/ears/nose.  No conjunctival petechiae.  No oral thrush.  Auditory and visual acuity grossly wnl.  Fair dentition.  Voice weak.  NECK:  Supple, NT, trachea midline.  No meningismus.  No ROM limitation.  No torticollis.  No JVD.  No thyromegaly.    CHEST/LUNGS:  Breath sounds are diminished but clear and equal bilaterally.  No w/r/r.  Chest excursion equal bilaterally.    CARDIOVASCULAR:  RRR w/ holosystolic murmur noted.    GI:  Abdomen soft, NT, ND, +BS.  :  Normal external genitalia.  West cath in place--minimal urine in bag--bladder scan shows over 500 mL in the bladder  EXTREMITIES:  No deformity or amputation.  No cyanosis, edema, or asymmetry.  Pulses 2+ and equal in BLE's.    SKIN:  Warm, dry, and pink.  No rash, breakdown or track marks noted.  LYMPHATICS/HEME:  No overt LAD or abnormal bruising.  No lymphedema.  MSK:  Normal ROM.  No joint abnormalities noted.  Strength is 5/5 and equal in BUE and BLE's.  PSYCH:  Pleasant.  A&Ox 1.  Responds appropriately to simple commands.          Radiology and Labs     Results from last 7 days   Lab Units 10/30/24  2359 10/30/24  0630 10/30/24  0626 10/28/24  1132   WBC 10*3/mm3 22.19*  --  22.31* 19.79*   HEMOGLOBIN g/dL 11.4*  --  12.5 12.4   HEMOGLOBIN, POC g/dL  --  13.6  --   --    HEMATOCRIT % 37.4  --  40.1 39.1   HEMATOCRIT POC %  --  40  --   --    PLATELETS 10*3/mm3 195  --  192 248           Results from last 7 days   Lab Units 10/31/24  0827 10/31/24  0642 10/30/24  2359 10/30/24  1720 10/30/24  0630 10/30/24  0626   SODIUM mmol/L 132* 133* 129* 132*  --  126*   POTASSIUM mmol/L 3.7 3.6 4.1 3.6  --  4.1   CHLORIDE mmol/L 93* 93* 93* 100  --  92*   CO2 mmol/L 9.1* 10.0* 11.9* 12.0*  --  9.9*   BUN mg/dL 68* 67* 67* 59*  --  59*   CREATININE mg/dL 7.65* 7.56* 7.35* 6.43* 8.30* 7.47*   GLUCOSE mg/dL 228* 223* 225* 163*  --  132*   MAGNESIUM mg/dL 1.8  --  1.8  --   --  2.1   PHOSPHORUS mg/dL 6.9*  --  7.0*  --   --   --       Results  from last 7 days   Lab Units 10/30/24  2359 10/30/24  0626 10/28/24  1132   ALK PHOS U/L 67 82 84   AST (SGOT) U/L 23 24 23   ALT (SGPT) U/L 13 18 18     Results from last 7 days   Lab Units 10/30/24  0544   PH, ARTERIAL pH units 7.318*   PCO2, ARTERIAL mm Hg 17.7*   PO2 ART mm Hg 76.3*   O2 SATURATION ART % 94.5   FIO2 % 44   HCO3 ART mmol/L 9.1*   BASE EXCESS ART mmol/L -14.7*       MRI Brain Without Contrast    Result Date: 10/30/2024  Impression: Numerous small scattered acute infarcts in the supratentorial and infratentorial brain, most compatible with embolic phenomenon. Unremarkable MRA of the head and neck without abrupt cut off/large vessel occlusion, flow-limiting stenosis, dissection, or aneurysm. Findings discussed with ordering provider Dr. Brothers by Dr. John Ordonez via telephone at 10:30 p.m. 10/30/2024. Electronically Signed: John Ordonez MD  10/30/2024 10:34 PM EDT  Workstation ID: [id number]    MRI Angiogram Head Without Contrast    Result Date: 10/30/2024  Impression: Numerous small scattered acute infarcts in the supratentorial and infratentorial brain, most compatible with embolic phenomenon. Unremarkable MRA of the head and neck without abrupt cut off/large vessel occlusion, flow-limiting stenosis, dissection, or aneurysm. Findings discussed with ordering provider Dr. Brothers by Dr. John Ordonez via telephone at 10:30 p.m. 10/30/2024. Electronically Signed: John Ordonez MD  10/30/2024 10:34 PM EDT  Workstation ID: [id number]    MRI Angiogram Neck Without Contrast    Result Date: 10/30/2024  Impression: Numerous small scattered acute infarcts in the supratentorial and infratentorial brain, most compatible with embolic phenomenon. Unremarkable MRA of the head and neck without abrupt cut off/large vessel occlusion, flow-limiting stenosis, dissection, or aneurysm. Findings discussed with ordering provider Dr. Brothers by Dr. John Ordonez via telephone at 10:30 p.m. 10/30/2024. Electronically  Signed: John Ordonez MD  10/30/2024 10:34 PM EDT  Workstation ID: [id number]    CT Head Without Contrast Stroke Protocol    Result Date: 10/30/2024  Impression: Small vague hypodensity in the right occipital lobe; this may be new from today's earlier head CT. This could reflect a small acute infarct. This could be further evaluated with brain MRI. No acute intracranial hemorrhage. No definite evidence of acute large territory infarct. Electronically Signed: John Ordonez MD  10/30/2024 8:35 PM EDT  Workstation ID: [id number]    US Renal Bilateral    Result Date: 10/30/2024  Impression: Lobulated nodularity in the posterior urinary bladder wall has a similar appearance to 10/16/2024.. Malignant involvement of the urinary bladder cannot be excluded. Normal sonographic appearance of the right kidney. Left kidney could not be visualized. Electronically Signed: Jeanette Huang MD  10/30/2024 3:06 PM EDT  Workstation ID: [id number]    CT Abdomen Pelvis Without Contrast    Result Date: 10/30/2024  Impression: 1.No acute trauma identified within chest/abdomen/pelvis. 2.Overall progression of disease. Numerous pulmonary metastasis have progressed, mediastinal and retroperitoneal lymphadenopathy has progressed, and hepatic metastasis have progressed. Omental carcinomatosis appears unchanged, and the uterus appears smaller than on prior exam. Soft tissue lesions identified along urinary bladder not well seen, which could be secondary to interval West catheterization. 3.Trace right pleural effusion. 4.Cardiomegaly. 5.Mild scattered ascites. 6.Wall thickening of colon, which could be reactive versus acute colitis. Electronically Signed: Robinson Heredia MD  10/30/2024 10:25 AM EDT  Workstation ID: [id number]    CT Chest Without Contrast Diagnostic    Result Date: 10/30/2024  Impression: 1.No acute trauma identified within chest/abdomen/pelvis. 2.Overall progression of disease. Numerous pulmonary metastasis have progressed,  mediastinal and retroperitoneal lymphadenopathy has progressed, and hepatic metastasis have progressed. Omental carcinomatosis appears unchanged, and the uterus appears smaller than on prior exam. Soft tissue lesions identified along urinary bladder not well seen, which could be secondary to interval West catheterization. 3.Trace right pleural effusion. 4.Cardiomegaly. 5.Mild scattered ascites. 6.Wall thickening of colon, which could be reactive versus acute colitis. Electronically Signed: Robinson Heredia MD  10/30/2024 10:25 AM EDT  Workstation ID: [id number]    CT Head Without Contrast    Result Date: 10/30/2024  Impression: No acute intracranial abnormality Electronically Signed: Danny Julio MD  10/30/2024 9:31 AM EDT  Workstation ID: [id number]    XR Chest 1 View    Result Date: 10/30/2024  Impression: Interval development of patchy airspace disease throughout the lungs bilaterally, likely related to pneumonia. Follow-up to resolution recommended.. Electronically Signed: Kia Patino MD  10/30/2024 6:21 AM EDT  Workstation ID: [id number]     Current medications     Scheduled Meds:   [Held by provider] amLODIPine, 5 mg, Oral, Q24H  aspirin, 81 mg, Oral, Daily   Or  aspirin, 300 mg, Rectal, Daily  atorvastatin, 80 mg, Oral, Nightly  cefepime, 2,000 mg, Intravenous, Q24H  heparin (porcine), 5,000 Units, Subcutaneous, Q12H  [Held by provider] hydrOXYzine, 50 mg, Oral, Nightly  insulin lispro, 2-9 Units, Subcutaneous, Q6H  [Held by provider] lisinopril, 40 mg, Oral, Daily  [Held by provider] metoprolol succinate XL, 50 mg, Oral, Q24H  montelukast, 10 mg, Oral, Nightly  multivitamin, 1 tablet, Oral, Daily  mupirocin, 1 Application, Each Nare, BID  PARoxetine, 30 mg, Oral, Daily  risperiDONE, 2 mg, Oral, Q PM  senna-docusate sodium, 2 tablet, Oral, BID  sodium bicarbonate, 100 mEq, Intravenous, Once  sodium chloride, 10 mL, Intravenous, Q12H  sodium chloride, 10 mL, Intravenous, Q12H  sodium chloride, 10 mL,  Intravenous, Q12H  sodium chloride, 10 mL, Intravenous, Q12H  vitamin D, 50,000 Units, Oral, Q7 Days  zinc sulfate, 220 mg, Oral, Daily        Continuous Infusions:   norepinephrine, 0.02-0.5 mcg/kg/min, Last Rate: 0.5 mcg/kg/min (10/31/24 0956)  phenylephrine, 0.5-6 mcg/kg/min, Last Rate: 5 mcg/kg/min (10/31/24 0956)  Phoxillum BK4/2.5, 1,000 mL/hr  Phoxillum BK4/2.5, 1,000 mL/hr  Phoxillum BK4/2.5, 1,000 mL/hr  sodium bicarbonate 8.4 % 150 mEq in dextrose (D5W) 5 % 1,000 mL infusion (greater than 100 mEq), 150 mEq, Last Rate: 150 mEq (10/31/24 0610)  vasopressin, 0.03 Units/min, Last Rate: 0.03 Units/min (10/31/24 0851)          Plan discussed with RN. Reviewed all other data in the last 24 hours, including but not limited to vitals, labs, microbiology, imaging and pertinent notes from other providers.  Plan also discussed with pt's sister at the bedside (pt's daughter reported to be in a mental health facility currently).      Vince Smith,    Critical Care  10/31/24   11:49 EDT

## 2024-10-31 NOTE — SIGNIFICANT NOTE
Attempted right femoral dialysis catheter placement x2.  Able to thread guidewire, however was unable to adequately dialate vessel.  When wire was pulled out, there was noted large bend to it.  Was called called away and unable to try placement.  Electronically signed by SANTI Valdez, 10/31/24, 6:42 AM EDT.

## 2024-10-31 NOTE — CONSULTS
[facility]   Teleneurology Note    Patient Name: Felisha Mukherjee  : 1965  MRN: [id number]  Primary Care Physician: Hanh Granados APRN  Referring Site: [place]  Location of Neurologist: [place]    Subjective   Teleneurology Initial Data     Arrival Date Telestroke Site: 10/30/24 Arrival Time Telestroke Site:    Neurologist Evaluation Date: 10/30/24 Neurologist Evaluation Time: 2012   Date Last Known Well: 10/30/24 Time Last Known Well: 0900     History     Chief Complaint: 59 y.o. female with known endometrial cancer admitted 10/30/2024 after a fall.  Patient with recent hospitalization and discharged with indwelling West. Indwelling West catheter has remained in place since previous hospitalization  HPI: Patient is a 59 year old female with history of endometrial cancer. The patient was noted to have weakness on one side.    Stroke Risk Factors/ Pertinent Data     Stroke risk factors: other (specify) (Endometrial Cancer active.)     Scoring Scales     Modified Leelanau Scale  Pre-Stroke Modified Sundar Scale: 0 - No Symptoms at all.  Intracerebral Hemmorhage (ICH) Score  Anamika Coma Score: 13-15  Age>=80: no  McDonald Coma Scale  Best Eye Response: Spontaneous  Best Verbal Response: Oriented  Best Motor Response: Follows commands  McDonald Coma Scale Score: 15    NIH Stroke Scale           NIHSS Performed Date: 10/30/24 NIHSS Performed Time:    Interval: baseline  1a. Level of Consciousness: 0-->Alert, keenly responsive  1b. LOC Questions: 0-->Answers both questions correctly  1c. LOC Commands: 0-->Performs both tasks correctly  2. Best Gaze: 0-->Normal  3. Visual: 0-->No visual loss  4. Facial Palsy: 1-->Normal symmetrical movements  5a. Motor Arm, Left: 0-->No drift, limb holds 90 (or 45) degrees for full 10 secs  5b. Motor Arm, Right: 0-->No drift, limb holds 90 (or 45) degrees for full 10 secs  6a. Motor Leg, Left: 0-->No drift, leg holds 30 degree position for full 5 secs  6b. Motor  Leg, Right: 0-->No drift, leg holds 30 degree position for full 5 secs  7. Limb Ataxia: 0-->Absent  8. Sensory: 1-->Normal, no sensory loss  9. Best Language: 0-->No aphasia, normal  10. Dysarthria: 1-->Normal  11. Extinction and Inattention (formerly Neglect): 0-->No abnormality  Total (NIH Stroke Scale): 3  Review of Systems     Review of Systems   Constitutional: Negative.    HENT: Negative.     Eyes: Negative.    Respiratory: Negative.     Cardiovascular: Negative.    Gastrointestinal: Negative.    Genitourinary: Negative.    Musculoskeletal: Negative.    Allergic/Immunologic: Negative.    Neurological:  Positive for dizziness.   Hematological: Negative.        Objective   Exam     Exam performed with the help of support staff from the referring site  Neurological Exam    Result Review    Results          Personal review of CNS imaging:(Official report by radiologist pending)    CT head without shows right sided hypodensity.      Thrombolytic   Thrombolytics: thrombolytic not given  Thrombolytic Consent: Patient is unable to give consent and no family member available. Thrombolytics given as emergent life saving treatment.  Thrombolytic Exclusion Criteria: Onset unknown or GREATER than 4.5 hours  Thrombolytic Relative Exclusions for All Patients: Only minor non-disabling symptoms     Assessment & Plan   Assessment/ Plan     Assessment:    59 year old female  with history falls and slurred speech this AM. She has a stroke to the right occipital area that is new from CT scan this AM. No alteplase b/c OOW.     Mri BRAIN AND mra neck without contrast can only be done because her GFR is 5 and we will send her into dialysis. Her deficit at this point is chest pain being worked up by primary and the facial droop and slurred speech on the left.(Fd)    CT head without shows right sided hypodensity.    Plan:      0. Admit to hospital, swallow study, start  mg x 1. Add pvx tomorrow if stroke burden is not too  strong.      Weakness that is noted to the right face and left side of body for the last week, hence oow so #2.  No alteplase for reasons listed in #1  MRI brain no contrast  TTE  A1c, B1, B!2, TSH, T4, THEE, fasting lipid panel - eats red meet screen(oupt) with followup,   Meditteranian Diet   Dash Diet  Nutritoin consult for #6/7  F/U with Neurology tomorrow.  Permissive HTN to 220/110 at this time              Disposition     Disposition: The patient will remain at the referring institution for further evaluation and management    Medical Decision Making  Medical Data Reviewed: Data reviewed including: clinical labs, radiology and/or medical tests  Length of visit: 60 minutes    ISaran MD, saw the patient on 10/30/24 at 2012 for an initial in-patient or emergency room telememedicine face to face consult using interactive technology for 60 minutes. The location of the patient was [place]. I was located at [place].    I have proceeded with this evaluation at the request of the referring practitioner as it is felt to be an emergency setting and no appropriate specialist is available to perform this evaluation. The originating hospital has reported that this is the correct patient and has obtained consent from the patient/surrogate to perform this telemedicine evaluation(including obtaining history, performing examination and reviewing data provided by the patient an/or originating site of care provider)    I have introduced myself to the patient, provided my credentials, disclosed my location, and determined that, based on review of the patient's chart and discussion with the patient's primary team, telemedicine via a HIPAA compliant, real-time, face-to-face two-way, interactive audio and video platform is an appropriate and effective means of providing the service.    The patient/surrogate has a right to refuse this evaluation as they have been explained risks including potential loss of confidentiality,  benefits, alternatives, and the potential need for subsequent face-to-face care. In this evaluation, we will be providing recommendations only.  The ultimate decision to follow or not to follow these recommendations will be left to the bedside treating/requesting practitioner.    The patient/surrogate has been notified that other healthcare professionals including technical person may be involved in this A/V evaluation.  All laws concerning confidentiality and patient access to medical records and copies of medical records apply to telemedicine.  The patient/surrogate has received the originating site's Health Notice of Privacy Practices.    Saran Brothers MD

## 2024-10-31 NOTE — ACP (ADVANCE CARE PLANNING)
Social Work Assessment  [facility]     Patient Name: Felisha Mukherjee  MRN: [id number]  Today's Date: 10/31/2024    Admit Date: 10/30/2024     Demographic Summary       Row Name 10/31/24 1223       General Information    Reason for Consult decision-making      General Information Comments SW was consulted re: identifying legal NOK, pt not lucid to make decisions. SW met with pt and sister Andressa at bedside in room 2308 to discuss further. Pt not appropriate to participate in conversation. Pt has no ADs, is not , and has one adult child - a dtr named Cheryl who is currently admitted to [facility]. Pt’s mother is admitted to [facility] with dementia. SW spoke with pt’s father, Steven, who deferred decision-making to pt’s siblings. Pt has 3 sisters: Andressa, Sandi (who is currently being admitted to hospital, per Andressa) and half-sister, Jocelynn. According to  1119, pt’s legal NOK is pt’s dtr; however, she is currently unreachable by phone 2/2 being admitted to [facility] (however, pt's sister DID attempt to notify pt's dtr by calling Rolando while this writer was in room). With that being said, pt’s sisters should be consulted for decision-making (majority rules) unless/until pt’s dtr comes forward. Contacts updated. Tx team updated via [place].             ROLDAN Hendrix, [facility]  Medical Social Worker  Ph [phone]  Fax [phone]  [email or web address]

## 2024-10-31 NOTE — SIGNIFICANT NOTE
10/31/24 1517   OTHER   Discipline occupational therapist   Rehab Time/Intention   Session Not Performed unable to evaluate, medical status change  (Pt has had medical decline and now with palliative care pending. Will f/u tomorrow and eval as appropriate.)   Recommendation   OT - Next Appointment 11/01/24

## 2024-10-31 NOTE — PLAN OF CARE
Goal Outcome Evaluation:   Clinical swallow evaluation completed. Pt was slightly awake, very lethargic, able to follow some commands, oriented x3. Able to verbalize some. Upon room entry, pt was lying in bed awake. Sister at bedside. Pt on 6L O2NC. SLP adjusted pt to sitting at 90 degrees for swallow evaluation. Pt was NPO at the time of evaluation. Adequate natural dentition, some missing teeth. Sister reported pt's baseline diet is regular/ thin liquids. No prior ST notes in chart from past. Oral motor exam revealed overall generalized oral motor weakness, reduced lingual/labial ROM. Trials assessed: ice chips x1, thin by cup x2, thin by straw x2. Pt decent poor bolus control of ice chip, all trials given via SLP. Reduced labial seal w/straw. Slight anterior loss.Oral transit appeared delayed.  Pt demonstrated cough x3 during thin liquid trials. Pt respiratory and HR began to increase and no further trials given. Pt reamins at risk of aspiration given s/s at bedside and SOL of stroke. SLP provided recommendations to pt and sister. Nursing staff made aware. Pt would eventually benefit from VFSS if improvement is shown. Per above, pt presents w/ MOD oral stage dysphagia and suspecting pharyngeal phase at this time. It is recommended pt remain NPO w/ FWP. Meds via alternate route. ST will continue to follow for swallow re-evaluation.     SLP Plan & Recommendation:      - NPO w/ exception of FWP- see below   - Water/ice only when pt is fully awake and alert  - Meds: via alternate route  - Safe swallow strategies: HOB at a 90 degree angle, slow rate of intake, small sips  -Oral care at least x2 daily     The rationale to recommend water/ice chips when a PO diet cannot appropriately/functionally sustain nutrition is because water is low risk for aspiration pna when compared to aspiration of food or other liquids.       Benefits of a water protocol include but are not limited to:      Oral gratification   Engagement of  oropharyngeal swallow musculature   Decrease likelihood of dehydration       Guidelines for proper implementation include:  Thorough oral care prior to consuming water  Upright at 90 degree hip flexion  Small sips at slow rate     Monitor for any changes in respiratory status and discontinue if distress noted     The Castillo Free Water Protocol is a research based protocol established in 1984.       Anticipated Discharge Disposition (SLP): unknown          SLP Swallowing Diagnosis: suspected pharyngeal dysphagia, oral dysphagia (10/31/24 1000)        Plan for Continued Treatment (SLP): continue treatment per plan of care (10/31/24 1000)

## 2024-10-31 NOTE — PLAN OF CARE
Goal Outcome Evaluation:            Pt stable yet hemodynamically challenging the entire shift. Pt remained sinus tachy around 120, with soft BP entire shift. Levo gtt maxxed, vaso gtt started at set rate, and sharon gtt maxxed at the end of the shift. SANTI Caballero aware and updated throughout the shift, multiple verbal orders placed. A-line placed, BP more stable now. Despite low pressures, pt alert and oriented entire shift. At beginning of shift during report, code stroke initiated with NIH of 11. CT and MRI/ MRA obtained. Sister at bedside. Pt alert and verbalized consent for all procedures. Pt on 6L humidified high flow nasal cannula with high, shallow respirations throughout the shift. Pt had scant urine output throughout the shift, nephrology aware. Kidney function worsened and CRRT orders placed. Access was attempted prior to this shift ending without success, pt consented herself. Bicarb gtt in place, with 3 amps given to supplement. Pt had several loose mucousy BM s this shift. Pt had no pain or complaints this shift.

## 2024-10-31 NOTE — PROGRESS NOTES
"Palliative Care Daily Progress Note     C/C: fall    S: Felisha Mukherjee is a 59 y.o. female who presented to [facility] ED on 10/30 with reports of a fall while using the bathroom. Patient struck the right side of her head. No reporeted LOC. Patient also complaints of a moderate headache, and some shortness of breath with a dry cough. No noted nausea, vomiting, or diarrhea.      Of note, patient has metastatic endometrial cancer.      In ED: Creatinine 8.3     Patient noted with continued low BP and afib in ED. Started on pressor support. Cardiology consulted. Admitted to ICU. Cultures obtained. Started on IV antibiotics.     10/30 Palliative consult for goals of care discussion in an acutely ill patient with cancer.    10/31 Now requiring pressor support and changes in mental status.    O: Code Status:   Code Status and Medical Interventions: CPR (Attempt to Resuscitate); Full Support   Ordered at: 10/30/24 0803     Code Status (Patient has no pulse and is not breathing):    CPR (Attempt to Resuscitate)     Medical Interventions (Patient has pulse or is breathing):    Full Support      Advanced Directives: Advance Directive Status: Patient does not have advance directive   Goals of Care: Ongoing.   Palliative Performance Scale Score:       /82   Pulse 120   Temp 97.6 °F (36.4 °C) (Axillary)   Resp (!) 33   Ht 160 cm (63\")   Wt 116 kg (255 lb 15.3 oz)   SpO2 95%   BMI 45.34 kg/m²     Intake/Output Summary (Last 24 hours) at 10/31/2024 1012  Last data filed at 10/31/2024 0415  Gross per 24 hour   Intake 6048 ml   Output 10 ml   Net 6038 ml         Review of Systems   Unable to perform ROS: Mental status change   All other systems reviewed and are negative.        Physical Exam  Vitals and nursing note reviewed.   Constitutional:       Appearance: She is obese. She is ill-appearing.   HENT:      Head: Normocephalic and atraumatic.      Nose: Nose normal.      Comments: Nasal canula      Mouth/Throat:      " Mouth: Mucous membranes are dry.      Pharynx: Oropharynx is clear.   Eyes:      Conjunctiva/sclera: Conjunctivae normal.   Cardiovascular:      Rate and Rhythm: Normal rate.      Pulses: Normal pulses.   Pulmonary:      Effort: Pulmonary effort is normal.   Abdominal:      Palpations: Abdomen is soft.   Musculoskeletal:         General: Normal range of motion.      Cervical back: Normal range of motion.   Skin:     General: Skin is dry.      Coloration: Skin is pale.   Neurological:      General: No focal deficit present.      Mental Status: She is disoriented.         Labs:   Results from last 7 days   Lab Units 10/30/24  2359   WBC 10*3/mm3 22.19*   HEMOGLOBIN g/dL 11.4*   HEMATOCRIT % 37.4   PLATELETS 10*3/mm3 195     Results from last 7 days   Lab Units 10/31/24  0827   SODIUM mmol/L 132*   POTASSIUM mmol/L 3.7   CHLORIDE mmol/L 93*   CO2 mmol/L 9.1*   BUN mg/dL 68*   CREATININE mg/dL 7.65*   GLUCOSE mg/dL 228*   CALCIUM mg/dL 7.8*     Results from last 7 days   Lab Units 10/31/24  0827 10/31/24  0642 10/30/24  2359   SODIUM mmol/L 132*   < > 129*   POTASSIUM mmol/L 3.7   < > 4.1   CHLORIDE mmol/L 93*   < > 93*   CO2 mmol/L 9.1*   < > 11.9*   BUN mg/dL 68*   < > 67*   CREATININE mg/dL 7.65*   < > 7.35*   CALCIUM mg/dL 7.8*   < > 7.8*   BILIRUBIN mg/dL  --   --  0.2   ALK PHOS U/L  --   --  67   ALT (SGPT) U/L  --   --  13   AST (SGOT) U/L  --   --  23   GLUCOSE mg/dL 228*   < > 225*    < > = values in this interval not displayed.         Diagnostics and medications: Reviewed      A: [unfilled]    ADVANCED CARE PLANNING    10/30 Met with patient and sister at bedside. Patient is awake and alert. We discussed her current clinical status and overall goals of care. Patient tells me that at this time, she wants to continue with full aggressive treatment. We discussed the possibility of her not being able to treat her metastatic cancer based on her functional status. Sister acknowledges that this may ultimately change  their plan of care, but are hoping to hear from MD Del Real regarding recommendations. Patient does share that if dialysis was necessary, she would be agreeable. Wanting to remain a full code with full intervention at this time. Patient has no advanced directives but tells me she would not want to live on machines. She states that she has one daughter that has been in and out of psychiatric facilities. If she were  unable to make decisions on her own, she would want us to talk to her sisters for medical decisions. This information was shared with nursing and NP. I will have  follow up with health care representative documentation.    10/31 Met with patient and sister at bedside. Patient is not alert and oriented. Spoke with  this am who affirmed that sisters are legal decision makers. Spoke with sister at bedside who shares that they believe comfort is a reasonable approach for the patient after speaking with multiple providers. She asks that I place a referral to [facility] to assist with possible GIP admission vs home with hospice. She affirms DNR/DNI status. Patients other sister is currently being admitted in our ED but will be available to assist in decision making as able. Third sister is a half sister and is not involved in patients care, per Andressa, is accessible if needed. Referral to [facility] placed. Liaison aware. This information was shared with nursing.     Assessment:  Sepsis?: With low BP and elevated WbC. Started on Iv antibiotics. Cultures obtained. Requiring pressor support.      Endometrial cancer: Recently completed palliative radiation. Pending the start of chemotherapy.      HASMUKH: With creatinine 8.3 in ED. Nephrology consulted for management.     AMS: with acute changes. Neurology consulted. CT head suggestive of embolic strokes.      Afib: with RVR. Cardiology consulted. Echo obtained.      DM/HTN: chronic conditions per primary.      These  illnesses and their  management contribute to the need for a palliative consult and advanced care planning.      P:   Palliative Care Team will continue to follow patient.         Haley Henao, APRN  10/31/2024    I spent 25 minutes reviewing providers documentation, medication records, assessing and examining patient, discussing with family, answering questions, providing guidance about a plan of care, and coordinating care with other healthcare members. More than 50% of time spent face to face discussing disease education, current clinical status, and medication managemen    I spent an additional 22 minutes on advanced care planning, goals of care, and code status discussion. I obtained consent for ACP discussion.

## 2024-10-31 NOTE — SIGNIFICANT NOTE
10/31/24 1319   OTHER   Discipline physical therapist   Rehab Time/Intention   Session Not Performed unable to evaluate, medical status change  (Pt with medical decline and palliative consult. Will attempt PT tomorrow if condition warrants.)

## 2024-10-31 NOTE — CONSULTS
IR consulted for possible bilateral percutaneous nephrostomy tubes in patient with worsening multifactorial HASMUKH. Unable to tolerate CRRT due to hypotension, so nephrostomy tubes were considered to alleviate any obstructive cause related to pelvic mass.     CT and US images reviewed. Patient does not have collecting system dilatation on either study to suggest a significant obstructive component.     I do not currently see an indication for nephrostomy placement. Attempting to place nephrostomy tubes in the setting of a non-dilated collecting system would be difficult, if not impossible, and would place the patient at risk of additional potential bleeding and respiratory complications.     Please call with any questions to IR.

## 2024-10-31 NOTE — THERAPY EVALUATION
Acute Care - Speech Language Pathology   Swallow Initial Evaluation  Jasbir     Patient Name: Felisha Mukherjee  : 1965  MRN: [id number]  Today's Date: 10/31/2024               Admit Date: 10/30/2024    Visit Dx:     ICD-10-CM ICD-9-CM   1. Septic shock  A41.9 038.9    R65.21 785.52     995.92   2. Atrial fibrillation with rapid ventricular response  I48.91 427.31   3. Injury of head, initial encounter  S09.90XA 959.01   4. HASMUKH (acute kidney injury)  N17.9 584.9   5. Metabolic acidosis  E87.20 276.2   6. Hyponatremia  E87.1 276.1     Patient Active Problem List   Diagnosis    Malignant neoplasm of body of uterus    Iron deficiency anemia due to chronic blood loss    Cellulitis of abdominal wall    Septic shock     Past Medical History:   Diagnosis Date    Diabetes mellitus     Hypertension     Uterine mass      Past Surgical History:   Procedure Laterality Date    CHOLECYSTECTOMY         SLP Recommendation and Plan  SLP Swallowing Diagnosis: suspected pharyngeal dysphagia, oral dysphagia (10/31/24 1000)  SLP Diet Recommendation: ice chips between meals after oral care, with supervision, water between meals after oral care, with supervision, NPO (10/31/24 1000)  Recommended Precautions and Strategies: general aspiration precautions, upright posture during/after eating (10/31/24 1000)  SLP Rec. for Method of Medication Administration: meds via alternate route (10/31/24 1000)     Monitor for Signs of Aspiration: yes, notify SLP if any concerns (10/31/24 1000)  Recommended Diagnostics: reassess via clinical swallow evaluation (10/31/24 1000)  Swallow Criteria for Skilled Therapeutic Interventions Met: demonstrates skilled criteria (10/31/24 1000)  Anticipated Discharge Disposition (SLP): unknown (10/31/24 1000)  Rehab Potential/Prognosis, Swallowing: good, to achieve stated therapy goals (10/31/24 1000)  Therapy Frequency (Swallow): PRN (10/31/24 1000)  Predicted Duration Therapy Intervention (Days): until  discharge (10/31/24 1000)  Oral Care Recommendations: Oral Care before breakfast, after meals and PRN (10/31/24 1000)                              Plan for Continued Treatment (SLP): continue treatment per plan of care (10/31/24 1000)                SWALLOW EVALUATION (Last 72 Hours)       SLP Adult Swallow Evaluation       Row Name 10/31/24 1000       Rehab Evaluation    Document Type evaluation  -MS    Subjective Information no complaints  -MS    Patient Observations decreased LOC;lethargic  -MS    Patient Effort adequate  -MS    Symptoms Noted During/After Treatment none  -MS    Oral Care dental appliance cleaned  -MS       General Information    Patient Profile Reviewed yes  -MS    Pertinent History Of Current Problem Felisha Mukherjee is a 59 y.o. female with PMH of diabetes type 2, hypertension, endometrial adenocarcinoma with metastasis presented to the hospital from an acute rehab for ground-level fall, shortness of breath and dry cough x 2 days.  She has diffuse abdominal pain and was recently discharged from this facility on 10/18/2024 after being treated for abdominal wall cellulitis. In the ED she developed new onset AF RVR and was started on a cardizem gtt. CT of the head without acute abnormalities.  CT of the abdomen pelvis was obtained and did show evidence of overall disease progression involving patient's known endometrial adenocarcinoma.  CXR shows bilateral airspace opacities, labs significant for CO2 9.9, sodium 126, BUN 8.3, HS troponin 169, lactate 4.7, WBC 22.3, and 12% bands. Persistent hypotension following the sepsis bolus indicated levophed and she was admitted to the ICU with a principal diagnosis of septic shock.     MRA head:  No abrupt cut off/large vessel occlusion, flow-limiting stenosis, dissection, or aneurysm.     MRA neck:  No abrupt cut off/large vessel occlusion, flow-limiting stenosis, dissection, or aneurysm. No significant luminal irregularity to suggest significant  atherosclerosis.     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     Unremarkable MRA of the head and neck without abrupt cut off/large vessel occlusion, flow-limiting stenosis, dissection, or aneurysm.    ST consulted for dysphagia evaluation.                -MS    Current Method of Nutrition NPO  -MS    Precautions/Limitations, Vision WFL  -MS    Precautions/Limitations, Hearing WFL  -MS    Prior Level of Function-Communication WFL  -MS    Prior Level of Function-Swallowing no diet consistency restrictions;safe, efficient swallowing in all situations  -MS    Plans/Goals Discussed with patient and family  -MS    Patient's Goals for Discharge patient did not state  -MS    Family Goals for Discharge family did not state  -MS       Pain    Pretreatment Pain Rating 0/10 - no pain  -MS    Posttreatment Pain Rating 0/10 - no pain  -MS    Additional Documentation Pain Scale: FACES Pre/Post-Treatment (Group)  -MS       Pain Scale: FACES Pre/Post-Treatment    Pain: FACES Scale, Pretreatment 0-->no hurt  -MS    Posttreatment Pain Rating 0-->no hurt  -MS       Oral Motor Structure and Function    Dentition Assessment missing teeth;poor oral hygiene;natural, present and adequate  -MS    Secretion Management WNL/WFL  -MS    Mucosal Quality dry  -MS    Volitional Swallow weak  -MS    Volitional Cough non-productive;weak  -MS       Oral Musculature and Cranial Nerve Assessment    Oral Motor General Assessment generalized oral motor weakness;oral labial or buccal impairment  -MS    Mandibular Impairment Detail, Cranial Nerve V (Trigeminal) reduced mandibular ROM  -MS    Oral Labial or Buccal Impairment, Detail, Cranial Nerve VII (Facial): reduced ROM  -MS    Lingual Impairment, Detail. Cranial Nerves IX, XII (Glossopharyngeal and Hypoglossal) reduced lingual ROM  -MS       General Eating/Swallowing Observations     Respiratory Support Currently in Use nasal cannula  -MS    O2 Liters 6L  -MS    Eating/Swallowing Skills fed by SLP  -MS    Positioning During Eating upright 90 degree;upright in bed  -MS    Utensils Used spoon;cup;straw  -MS    Consistencies Trialed thin liquids;ice chips  -MS       Respiratory    Respiratory Status increase in respiratory rate  -MS       Clinical Swallow Eval    Oral Prep Phase impaired  -MS    Oral Transit impaired  -MS    Oral Residue impaired  -MS    Pharyngeal Phase suspected pharyngeal impairment  -MS    Esophageal Phase unremarkable  -MS    Clinical Swallow Evaluation Summary Clinical swallow evaluation completed. Pt was slightly awake, very lethargic, able to follow some commands, oriented x3. Able to verbalize some. Upon room entry, pt was lying in bed awake. Sister at bedside. Pt on 6L O2NC. SLP adjusted pt to sitting at 90 degrees for swallow evaluation. Pt was NPO at the time of evaluation. Adequate natural dentition, some missing teeth. Sister reported pt's baseline diet is regular/ thin liquids. No prior ST notes in chart from past. Oral motor exam revealed overall generalized oral motor weakness, reduced lingual/labial ROM. Trials assessed: ice chips x1, thin by cup x2, thin by straw x2. Pt decent poor bolus control of ice chip, all trials given via SLP. Reduced labial seal w/straw. Slight anterior loss.Oral transit appeared delayed.  Pt demonstrated cough x3 during thin liquid trials. Pt respiratory and HR began to increase and no further trials given. Pt reamins at risk of aspiration given s/s at bedside and SOL of stroke. SLP provided recommendations to pt and sister. Nursing staff made aware. Pt would eventually benefit from VFSS if improvement is shown. Per above, pt presents w/ MOD oral stage dysphagia and suspecting pharyngeal phase at this time. It is recommended pt remain NPO w/ FWP. Meds via alternate route. ST will continue to follow for swallow re-evaluation.     SLP Plan  & Recommendation:      - NPO w/ exception of FWP- see below   - Water/ice only when pt is fully awake and alert  - Meds: via alternate route  - Safe swallow strategies: HOB at a 90 degree angle, slow rate of intake, small sips  -Oral care at least x2 daily     The rationale to recommend water/ice chips when a PO diet cannot appropriately/functionally sustain nutrition is because water is low risk for aspiration pna when compared to aspiration of food or other liquids.       Benefits of a water protocol include but are not limited to:      Oral gratification   Engagement of oropharyngeal swallow musculature   Decrease likelihood of dehydration       Guidelines for proper implementation include:  Thorough oral care prior to consuming water  Upright at 90 degree hip flexion  Small sips at slow rate     Monitor for any changes in respiratory status and discontinue if distress noted     The Jonathan Free Water Protocol is a research based protocol established in 1984.          -MS       Oral Prep Concerns    Oral Prep Concerns oral holding;incomplete or weak lip closure around spoon  -MS    Oral Holding thin  -MS    Incomplete or Weak Lip Closure Around Spoon thin  -MS       Oral Transit Concerns    Oral Transit Concerns delayed initiation of bolus transit  -MS    Delayed Intiation of Bolus Transit thin  -MS       Pharyngeal Phase Concerns    Pharyngeal Phase Concerns cough;throat clear  -MS       SLP Evaluation Clinical Impression    SLP Swallowing Diagnosis suspected pharyngeal dysphagia;oral dysphagia  -MS    Functional Impact risk of aspiration/pneumonia;risk of malnutrition;risk of dehydration  -MS    Rehab Potential/Prognosis, Swallowing good, to achieve stated therapy goals  -MS    Swallow Criteria for Skilled Therapeutic Interventions Met demonstrates skilled criteria  -MS       SLP Treatment Clinical Impressions    Barriers to Overall Progress (SLP) Lethargy;Fatigue  -MS    Plan for Continued Treatment (SLP)  continue treatment per plan of care  -MS    Care Plan Review evaluation/treatment results reviewed  -MS    Care Plan Review, Other Participant(s) sibling  -MS       Recommendations    Therapy Frequency (Swallow) PRN  -MS    Predicted Duration Therapy Intervention (Days) until discharge  -MS    SLP Diet Recommendation ice chips between meals after oral care, with supervision;water between meals after oral care, with supervision;NPO  -MS    Recommended Diagnostics reassess via clinical swallow evaluation  -MS    Recommended Precautions and Strategies general aspiration precautions;upright posture during/after eating  -MS    Oral Care Recommendations Oral Care before breakfast, after meals and PRN  -MS    SLP Rec. for Method of Medication Administration meds via alternate route  -MS    Monitor for Signs of Aspiration yes;notify SLP if any concerns  -MS    Anticipated Discharge Disposition (SLP) unknown  -MS       Swallow Goals (SLP)    Swallow LTGs Patient will demonstrate functional swallow for  -MS    Swallow STGs diet tolerance goal selection (SLP)  -MS    Diet Tolerance Goal Selection (SLP) Patient will tolerate trials of  -MS       (LTG) Patient will demonstrate functional swallow for    Diet Texture (Demonstrate functional swallow) pureed textures  -MS    Liquid viscosity (Demonstrate functional swallow) thin liquids  -MS    [place] (Demonstrate functional swallow) independently (over 90% accuracy)  -MS    Time Frame (Demonstrate functional swallow) by discharge  -MS    Progress/Outcomes (Demonstrate functional swallow) new goal  -MS       (STG) Patient will tolerate trials of    Consistencies Trialed (Tolerate trials) pureed textures;thin liquids  -MS    Desired Outcome (Tolerate trials) without signs/symptoms of aspiration;without signs of distress;with adequate oral prep/transit/clearance  -MS    [place] (Tolerate trials) with minimal cues (75-90% accuracy)  -MS    Time Frame (Tolerate trials) by  discharge  -MS              User Key  (r) = Recorded By, (t) = Taken By, (c) = Cosigned By      Initials Name Effective Dates    Kyrie Duran SLP 04/22/24 -                     EDUCATION  The patient has been educated in the following areas:   Dysphagia (Swallowing Impairment) Oral Care/Hydration NPO rationale.        SLP GOALS       Row Name 10/31/24 1000       (LTG) Patient will demonstrate functional swallow for    Diet Texture (Demonstrate functional swallow) pureed textures  -MS    Liquid viscosity (Demonstrate functional swallow) thin liquids  -MS    [place] (Demonstrate functional swallow) independently (over 90% accuracy)  -MS    Time Frame (Demonstrate functional swallow) by discharge  -MS    Progress/Outcomes (Demonstrate functional swallow) new goal  -MS       (STG) Patient will tolerate trials of    Consistencies Trialed (Tolerate trials) pureed textures;thin liquids  -MS    Desired Outcome (Tolerate trials) without signs/symptoms of aspiration;without signs of distress;with adequate oral prep/transit/clearance  -MS    [place] (Tolerate trials) with minimal cues (75-90% accuracy)  -MS    Time Frame (Tolerate trials) by discharge  -MS              User Key  (r) = Recorded By, (t) = Taken By, (c) = Cosigned By      Initials Name Provider Type    Kyrie Duran SLP Speech and Language Pathologist                  MERLENE Sheehan  10/31/2024

## 2024-10-31 NOTE — DISCHARGE PLACEMENT REQUEST
"Enedina Feng (59 y.o. Female)       Date of Birth   1965    Social Security Number       Address   [address] [place] [address]    Home Phone   [phone]    MRN   [id number]       Holiness   Unknown    Marital Status                               Admission Date   10/30/24    Admission Type   Emergency    Admitting Provider   Vince Smith DO    Attending Provider   Vince Smith DO    Department, Room/Bed   [facility] INTENSIVE CARE UNIT, 2308/1       Discharge Date       Discharge Disposition       Discharge Destination                                 Attending Provider: Vince Smith DO    Allergies: No Known Allergies    Isolation: None   Infection: None   Code Status: CPR    Ht: 160 cm (63\")   Wt: 116 kg (255 lb 15.3 oz)    Admission Cmt: None   Principal Problem: Septic shock [A41.9,R65.21]                   Active Insurance as of 10/30/2024       Primary Coverage       Payor Plan Insurance Group Employer/Plan Group    MEDICARE MEDICARE A & B        Payor Plan Address Payor Plan Phone Number Payor Plan Fax Number Effective Dates    [address] [phone]  6/1/1995 - None Entered    [facility] [id number]         Subscriber Name Subscriber Birth Date Member ID       ENEDINA FENG 1965 [id number]               Secondary Coverage       Payor Plan Insurance Group Employer/Plan Group    [facility] COMMUNITY PLAN PATHWAYS IN        Payor Plan Address Payor Plan Phone Number Payor Plan Fax Number Effective Dates    [address]   8/1/2024 - None Entered    [facility] [address]         Subscriber Name Subscriber Birth Date Member ID       ENEDINA FENG 1965 [id number]                     Emergency Contacts        (Rel.) Home Phone Work Phone Mobile Phone    Sandi Paul (Sister) -- -- [phone]    Andressa Momin (Sister) -- -- [phone]    " Hamzah Crowley () -- [phone] --

## 2024-10-31 NOTE — CONSULTS
Diabetes Education    Patient Name:  Felisha Mukherjee  YOB: 1965  MRN: [id number]  Admit Date:  10/30/2024    Consult received per stroke protocol being ordered. Pt does have hx of diabetes. Per home med list, pt taking Farxiga 10 mg daily and Metformin 500 mg bid. Pt does have sliding scale insulin ordered this adm. Adm bs 132. A1c has been ordered but has not been resulted. Per H&P, pt with metestatic endometrial CA. Met with pt at bedside. Pt states was dx with diabetes about 1 year ago. She states has bs meter at home (unsure of name of meter) and checking bs 2-3 times/day. Pt states meter <1 year old. Per nurse note, pt with confusion today. Palliative is following pt. Pt condition not appropriate for any further assessment/education. No further follow up needed from education standpoint.      Electronically signed by:  Alessia Alcanatra RN  10/31/24 14:16 EDT

## 2024-10-31 NOTE — PROGRESS NOTES
[facility] DAILY PROGRESS NOTE    Patient Identification  Name: Felisha Mukherjee  Age: 59 y.o.  Sex: female  :  1965  MRN: [id number]    Date: 10/31/2024             Subjective:  Interval History: ICU, resting comfortably, palliative care consulted    Objective:    Scheduled Meds:[Held by provider] amLODIPine, 5 mg, Oral, Q24H  aspirin, 81 mg, Oral, Daily   Or  aspirin, 300 mg, Rectal, Daily  atorvastatin, 80 mg, Oral, Nightly  cefepime, 2,000 mg, Intravenous, Q24H  heparin (porcine), 5,000 Units, Subcutaneous, Q12H  [Held by provider] hydrOXYzine, 50 mg, Oral, Nightly  insulin lispro, 2-9 Units, Subcutaneous, Q6H  [Held by provider] lisinopril, 40 mg, Oral, Daily  [Held by provider] metoprolol succinate XL, 50 mg, Oral, Q24H  montelukast, 10 mg, Oral, Nightly  multivitamin, 1 tablet, Oral, Daily  mupirocin, 1 Application, Each Nare, BID  PARoxetine, 30 mg, Oral, Daily  risperiDONE, 2 mg, Oral, Q PM  senna-docusate sodium, 2 tablet, Oral, BID  sodium bicarbonate, 100 mEq, Intravenous, Once  sodium chloride, 10 mL, Intravenous, Q12H  sodium chloride, 10 mL, Intravenous, Q12H  sodium chloride, 10 mL, Intravenous, Q12H  sodium chloride, 10 mL, Intravenous, Q12H  vitamin D, 50,000 Units, Oral, Q7 Days  zinc sulfate, 220 mg, Oral, Daily      Continuous Infusions:norepinephrine, 0.02-0.5 mcg/kg/min, Last Rate: 0.5 mcg/kg/min (10/31/24 0956)  phenylephrine, 0.5-6 mcg/kg/min, Last Rate: 5 mcg/kg/min (10/31/24 1330)  sodium bicarbonate 8.4 % 150 mEq in dextrose (D5W) 5 % 1,000 mL infusion (greater than 100 mEq), 150 mEq, Last Rate: 150 mEq (10/31/24 0610)  vasopressin, 0.03 Units/min, Last Rate: 0.03 Units/min (10/31/24 0851)      PRN Meds:  acetaminophen **OR** acetaminophen    aluminum-magnesium hydroxide-simethicone    senna-docusate sodium **AND** polyethylene glycol **AND** bisacodyl **AND** bisacodyl    dextrose    dextrose    glucagon (human recombinant)    nitroglycerin    ondansetron ODT **OR**  "ondansetron    sodium chloride    sodium chloride    sodium chloride    sodium chloride    sodium chloride    sodium chloride    sodium chloride    Vital signs in last 24 hours:  Temp:  [97.6 °F (36.4 °C)-99 °F (37.2 °C)] 98 °F (36.7 °C)  Heart Rate:  [107-129] 122  Resp:  [31-41] 33  BP: ()/(25-88) 140/82    Intake/Output:    Intake/Output Summary (Last 24 hours) at 10/31/2024 1403  Last data filed at 10/31/2024 0415  Gross per 24 hour   Intake 6048 ml   Output 10 ml   Net 6038 ml       Exam:  /82   Pulse (!) 122   Temp 98 °F (36.7 °C) (Axillary)   Resp (!) 33   Ht 160 cm (63\")   Wt 116 kg (255 lb 15.3 oz)   SpO2 96%   BMI 45.34 kg/m²     General Appearance:    Alert, cooperative, NAD   Lungs:     Respirations unlabored, no audible wheezing    Heart:    No cyanosis   Abdomen:     Soft, ND    :    No suprapubic distention. West catheter in place with minimal yellow urine out.         Data Review:  All labs (24hrs):   Recent Results (from the past 24 hours)   STAT Lactic Acid, Reflex    Collection Time: 10/30/24  2:37 PM    Specimen: Blood   Result Value Ref Range    Lactate 2.6 (C) 0.5 - 2.0 mmol/L   Basic Metabolic Panel    Collection Time: 10/30/24  4:05 PM    Specimen: Blood   Result Value Ref Range    Glucose      BUN      Creatinine      Sodium      Potassium      Chloride      CO2      Calcium      BUN/Creatinine Ratio      Anion Gap      eGFR     POC Glucose Once    Collection Time: 10/30/24  4:44 PM    Specimen: Blood   Result Value Ref Range    Glucose 179 (H) 70 - 105 mg/dL   STAT Lactic Acid, Reflex    Collection Time: 10/30/24  5:20 PM    Specimen: Blood   Result Value Ref Range    Lactate 2.9 (C) 0.5 - 2.0 mmol/L   Basic Metabolic Panel    Collection Time: 10/30/24  5:20 PM    Specimen: Blood   Result Value Ref Range    Glucose 163 (H) 65 - 99 mg/dL    BUN 59 (H) 8 - 23 mg/dL    Creatinine 6.43 (H) 0.57 - 1.00 mg/dL    Sodium 132 (L) 136 - 145 mmol/L    Potassium 3.6 3.5 - 5.2 " mmol/L    Chloride 100 98 - 107 mmol/L    CO2 12.0 (L) 22.0 - 29.0 mmol/L    Calcium 7.1 (L) 8.6 - 10.5 mg/dL    BUN/Creatinine Ratio 9.2 7.0 - 25.0    Anion Gap 20.0 (H) 5.0 - 15.0 mmol/L    eGFR 6.9 (L) >60.0 mL/min/1.73   POC Glucose Once    Collection Time: 10/30/24  6:39 PM    Specimen: Blood   Result Value Ref Range    Glucose 191 (H) 70 - 105 mg/dL   POC Glucose 4x Daily Before Meals & at Bedtime    Collection Time: 10/30/24 11:10 PM    Specimen: Blood   Result Value Ref Range    Glucose 218 (H) 70 - 105 mg/dL   STAT Lactic Acid, Reflex    Collection Time: 10/30/24 11:59 PM    Specimen: Blood   Result Value Ref Range    Lactate 6.2 (C) 0.5 - 2.0 mmol/L   Lipid Panel    Collection Time: 10/30/24 11:59 PM    Specimen: Blood   Result Value Ref Range    Total Cholesterol 66 0 - 200 mg/dL    Triglycerides 150 0 - 150 mg/dL    HDL Cholesterol 12 (L) 40 - 60 mg/dL    LDL Cholesterol  28 0 - 100 mg/dL    VLDL Cholesterol 26 5 - 40 mg/dL    LDL/HDL Ratio 2.00    Magnesium    Collection Time: 10/30/24 11:59 PM    Specimen: Blood   Result Value Ref Range    Magnesium 1.8 1.6 - 2.4 mg/dL   Phosphorus    Collection Time: 10/30/24 11:59 PM    Specimen: Blood   Result Value Ref Range    Phosphorus 7.0 (H) 2.5 - 4.5 mg/dL   Comprehensive Metabolic Panel    Collection Time: 10/30/24 11:59 PM    Specimen: Blood   Result Value Ref Range    Glucose 225 (H) 65 - 99 mg/dL    BUN 67 (H) 8 - 23 mg/dL    Creatinine 7.35 (H) 0.57 - 1.00 mg/dL    Sodium 129 (L) 136 - 145 mmol/L    Potassium 4.1 3.5 - 5.2 mmol/L    Chloride 93 (L) 98 - 107 mmol/L    CO2 11.9 (L) 22.0 - 29.0 mmol/L    Calcium 7.8 (L) 8.6 - 10.5 mg/dL    Total Protein 4.6 (L) 6.0 - 8.5 g/dL    Albumin 2.4 (L) 3.5 - 5.2 g/dL    ALT (SGPT) 13 1 - 33 U/L    AST (SGOT) 23 1 - 32 U/L    Alkaline Phosphatase 67 39 - 117 U/L    Total Bilirubin 0.2 0.0 - 1.2 mg/dL    Globulin 2.2 gm/dL    A/G Ratio 1.1 g/dL    BUN/Creatinine Ratio 9.1 7.0 - 25.0    Anion Gap 24.1 (H) 5.0 - 15.0  mmol/L    eGFR 5.9 (L) >60.0 mL/min/1.73   Digoxin Level    Collection Time: 10/30/24 11:59 PM    Specimen: Blood   Result Value Ref Range    Digoxin 0.57 (L) 0.60 - 1.20 ng/mL   High Sensitivity Troponin T    Collection Time: 10/30/24 11:59 PM    Specimen: Blood   Result Value Ref Range    HS Troponin T 151 (C) <14 ng/L   CBC Auto Differential    Collection Time: 10/30/24 11:59 PM    Specimen: Blood   Result Value Ref Range    WBC 22.19 (H) 3.40 - 10.80 10*3/mm3    RBC 4.76 3.77 - 5.28 10*6/mm3    Hemoglobin 11.4 (L) 12.0 - 15.9 g/dL    Hematocrit 37.4 34.0 - 46.6 %    MCV 78.6 (L) 79.0 - 97.0 fL    MCH 23.9 (L) 26.6 - 33.0 pg    MCHC 30.5 (L) 31.5 - 35.7 g/dL    RDW 21.6 (H) 12.3 - 15.4 %    RDW-SD 60.8 (H) 37.0 - 54.0 fl    MPV 10.5 6.0 - 12.0 fL    Platelets 195 140 - 450 10*3/mm3   Scan Slide    Collection Time: 10/30/24 11:59 PM    Specimen: Blood   Result Value Ref Range    Scan Slide     Manual Differential    Collection Time: 10/30/24 11:59 PM    Specimen: Blood   Result Value Ref Range    Neutrophil % 86.0 (H) 42.7 - 76.0 %    Lymphocyte % 2.0 (L) 19.6 - 45.3 %    Monocyte % 3.0 (L) 5.0 - 12.0 %    Bands %  8.0 (H) 0.0 - 5.0 %    Myelocyte % 1.0 (H) 0.0 - 0.0 %    Neutrophils Absolute 20.86 (H) 1.70 - 7.00 10*3/mm3    Lymphocytes Absolute 0.44 (L) 0.70 - 3.10 10*3/mm3    Monocytes Absolute 0.67 0.10 - 0.90 10*3/mm3    nRBC 1.0 (H) 0.0 - 0.2 /100 WBC    Anisocytosis Slight/1+ None Seen    WBC Morphology Normal Normal    Large Platelets Slight/1+ None Seen    Giant Platelets Slight/1+ None Seen   Basic Metabolic Panel    Collection Time: 10/31/24  6:42 AM    Specimen: Blood   Result Value Ref Range    Glucose 223 (H) 65 - 99 mg/dL    BUN 67 (H) 8 - 23 mg/dL    Creatinine 7.56 (H) 0.57 - 1.00 mg/dL    Sodium 133 (L) 136 - 145 mmol/L    Potassium 3.6 3.5 - 5.2 mmol/L    Chloride 93 (L) 98 - 107 mmol/L    CO2 10.0 (L) 22.0 - 29.0 mmol/L    Calcium 7.8 (L) 8.6 - 10.5 mg/dL    BUN/Creatinine Ratio 8.9 7.0 - 25.0     Anion Gap 30.0 (H) 5.0 - 15.0 mmol/L    eGFR 5.7 (L) >60.0 mL/min/1.73   STAT Lactic Acid, Reflex    Collection Time: 10/31/24  6:42 AM    Specimen: Blood   Result Value Ref Range    Lactate 10.0 (C) 0.5 - 2.0 mmol/L   Renal Function Panel    Collection Time: 10/31/24  8:27 AM    Specimen: Blood   Result Value Ref Range    Glucose 228 (H) 65 - 99 mg/dL    BUN 68 (H) 8 - 23 mg/dL    Creatinine 7.65 (H) 0.57 - 1.00 mg/dL    Sodium 132 (L) 136 - 145 mmol/L    Potassium 3.7 3.5 - 5.2 mmol/L    Chloride 93 (L) 98 - 107 mmol/L    CO2 9.1 (C) 22.0 - 29.0 mmol/L    Calcium 7.8 (L) 8.6 - 10.5 mg/dL    Albumin 2.3 (L) 3.5 - 5.2 g/dL    Phosphorus 6.9 (H) 2.5 - 4.5 mg/dL    Anion Gap 29.9 (H) 5.0 - 15.0 mmol/L    BUN/Creatinine Ratio 8.9 7.0 - 25.0    eGFR 5.6 (L) >60.0 mL/min/1.73   Magnesium    Collection Time: 10/31/24  8:27 AM    Specimen: Blood   Result Value Ref Range    Magnesium 1.8 1.6 - 2.4 mg/dL   Calcium, Ionized    Collection Time: 10/31/24  8:27 AM    Specimen: Blood   Result Value Ref Range    Ionized Calcium 0.94 (L) 1.15 - 1.30 mmol/L   POC Glucose Once    Collection Time: 10/31/24  1:22 PM    Specimen: Blood   Result Value Ref Range    Glucose 222 (H) 70 - 105 mg/dL      Imaging Results (Last 24 Hours)       Procedure Component Value Units Date/Time    MRI Brain Without Contrast [[id number]] Collected: 10/30/24 2221     Updated: 10/30/24 2236    Narrative:      MRI BRAIN WO CONTRAST, MRI ANGIOGRAM NECK WO CONTRAST, MRI ANGIOGRAM HEAD WO CONTRAST    Date of Exam: 10/30/2024 9:13 PM EDT    Indication: Stroke.     Comparison: Head CT 10/30/2024    Technique:  Routine multiplanar/multisequence sequence images of the brain were obtained without contrast administration.    Time-of-flight MRA of the head and neck was performed without IV contrast. Rotational 3D volume rendered rotational reformats were created in independent workstation. Assessment of vessel narrowing performed per NASCET criteria of similar  method.      Findings:  Brain MRI:  There are numerous scattered small and punctate foci of diffusion restriction of the supratentorial and infratentorial brain bilaterally. Supratentorial infarcts are seen at the periphery/cortex and also in the deeper white matter structures. The largest   infarct is seen within the inferior paramedian right parietal lobe (series 5 image 49), reflecting the hypodensity seen on preceding head CT; this infarct measures 1.2 cm in diameter. Many of these foci of diffusion restriction demonstrate corresponding   ADC hypointensity, confirming acuity.    There is a tiny curvilinear SWI susceptibility at the above-described infarct in the inferior right paramedian parietal lobe (series 11 image 44), likely reflecting tiny petechial hemorrhage or cortical laminar necrosis. No acute intracranial hemorrhage.   No intracranial mass. No extra-axial collections. No midline shift or herniation. Normal size and configuration of the ventricles. The intracranial vascular flow voids appear preserved. Normal appearance of the orbits. The paranasal sinuses and mastoid   air cells are grossly clear. The cerebellopontine angles and midline structures appear unremarkable. No acute or suspicious bony findings.    MRA head:  No abrupt cut off/large vessel occlusion, flow-limiting stenosis, dissection, or aneurysm.    MRA neck:  No abrupt cut off/large vessel occlusion, flow-limiting stenosis, dissection, or aneurysm. No significant luminal irregularity to suggest significant atherosclerosis.      Impression:      Impression:  Numerous small scattered acute infarcts in the supratentorial and infratentorial brain, most compatible with embolic phenomenon.    Unremarkable MRA of the head and neck without abrupt cut off/large vessel occlusion, flow-limiting stenosis, dissection, or aneurysm.    Findings discussed with ordering provider Dr. Brothers by Dr. John Ordonez via telephone at 10:30 p.m.  10/30/2024.        Electronically Signed: John Ordonez MD    10/30/2024 10:34 PM EDT    Workstation ID: [id number]    MRI Angiogram Head Without Contrast [[id number]] Collected: 10/30/24 2221     Updated: 10/30/24 2236    Narrative:      MRI BRAIN WO CONTRAST, MRI ANGIOGRAM NECK WO CONTRAST, MRI ANGIOGRAM HEAD WO CONTRAST    Date of Exam: 10/30/2024 9:13 PM EDT    Indication: Stroke.     Comparison: Head CT 10/30/2024    Technique:  Routine multiplanar/multisequence sequence images of the brain were obtained without contrast administration.    Time-of-flight MRA of the head and neck was performed without IV contrast. Rotational 3D volume rendered rotational reformats were created in independent workstation. Assessment of vessel narrowing performed per NASCET criteria of similar method.      Findings:  Brain MRI:  There are numerous scattered small and punctate foci of diffusion restriction of the supratentorial and infratentorial brain bilaterally. Supratentorial infarcts are seen at the periphery/cortex and also in the deeper white matter structures. The largest   infarct is seen within the inferior paramedian right parietal lobe (series 5 image 49), reflecting the hypodensity seen on preceding head CT; this infarct measures 1.2 cm in diameter. Many of these foci of diffusion restriction demonstrate corresponding   ADC hypointensity, confirming acuity.    There is a tiny curvilinear SWI susceptibility at the above-described infarct in the inferior right paramedian parietal lobe (series 11 image 44), likely reflecting tiny petechial hemorrhage or cortical laminar necrosis. No acute intracranial hemorrhage.   No intracranial mass. No extra-axial collections. No midline shift or herniation. Normal size and configuration of the ventricles. The intracranial vascular flow voids appear preserved. Normal appearance of the orbits. The paranasal sinuses and mastoid   air cells are grossly clear. The cerebellopontine angles  and midline structures appear unremarkable. No acute or suspicious bony findings.    MRA head:  No abrupt cut off/large vessel occlusion, flow-limiting stenosis, dissection, or aneurysm.    MRA neck:  No abrupt cut off/large vessel occlusion, flow-limiting stenosis, dissection, or aneurysm. No significant luminal irregularity to suggest significant atherosclerosis.      Impression:      Impression:  Numerous small scattered acute infarcts in the supratentorial and infratentorial brain, most compatible with embolic phenomenon.    Unremarkable MRA of the head and neck without abrupt cut off/large vessel occlusion, flow-limiting stenosis, dissection, or aneurysm.    Findings discussed with ordering provider Dr. Brothers by Dr. John Ordonez via telephone at 10:30 p.m. 10/30/2024.        Electronically Signed: John Ordonez MD    10/30/2024 10:34 PM EDT    Workstation ID: [id number]    MRI Angiogram Neck Without Contrast [[id number]] Collected: 10/30/24 2221     Updated: 10/30/24 2236    Narrative:      MRI BRAIN WO CONTRAST, MRI ANGIOGRAM NECK WO CONTRAST, MRI ANGIOGRAM HEAD WO CONTRAST    Date of Exam: 10/30/2024 9:13 PM EDT    Indication: Stroke.     Comparison: Head CT 10/30/2024    Technique:  Routine multiplanar/multisequence sequence images of the brain were obtained without contrast administration.    Time-of-flight MRA of the head and neck was performed without IV contrast. Rotational 3D volume rendered rotational reformats were created in independent workstation. Assessment of vessel narrowing performed per NASCET criteria of similar method.      Findings:  Brain MRI:  There are numerous scattered small and punctate foci of diffusion restriction of the supratentorial and infratentorial brain bilaterally. Supratentorial infarcts are seen at the periphery/cortex and also in the deeper white matter structures. The largest   infarct is seen within the inferior paramedian right parietal lobe (series 5 image 49),  reflecting the hypodensity seen on preceding head CT; this infarct measures 1.2 cm in diameter. Many of these foci of diffusion restriction demonstrate corresponding   ADC hypointensity, confirming acuity.    There is a tiny curvilinear SWI susceptibility at the above-described infarct in the inferior right paramedian parietal lobe (series 11 image 44), likely reflecting tiny petechial hemorrhage or cortical laminar necrosis. No acute intracranial hemorrhage.   No intracranial mass. No extra-axial collections. No midline shift or herniation. Normal size and configuration of the ventricles. The intracranial vascular flow voids appear preserved. Normal appearance of the orbits. The paranasal sinuses and mastoid   air cells are grossly clear. The cerebellopontine angles and midline structures appear unremarkable. No acute or suspicious bony findings.    MRA head:  No abrupt cut off/large vessel occlusion, flow-limiting stenosis, dissection, or aneurysm.    MRA neck:  No abrupt cut off/large vessel occlusion, flow-limiting stenosis, dissection, or aneurysm. No significant luminal irregularity to suggest significant atherosclerosis.      Impression:      Impression:  Numerous small scattered acute infarcts in the supratentorial and infratentorial brain, most compatible with embolic phenomenon.    Unremarkable MRA of the head and neck without abrupt cut off/large vessel occlusion, flow-limiting stenosis, dissection, or aneurysm.    Findings discussed with ordering provider Dr. Brothers by Dr. John Ordonez via telephone at 10:30 p.m. 10/30/2024.        Electronically Signed: John Ordonez MD    10/30/2024 10:34 PM EDT    Workstation ID: [id number]    CT Head Without Contrast Stroke Protocol [[id number]] Collected: 10/30/24 2021     Updated: 10/30/24 2037    Narrative:      CT HEAD WO CONTRAST STROKE PROTOCOL    Date of Exam: 10/30/2024 8:15 PM EDT    Indication: code stroke.    Comparison: Head CT 10/30/2024   Technique:  Axial CT images were obtained of the head without contrast administration.  Reconstructed coronal and sagittal images were also obtained. Automated exposure control and iterative construction methods were used.    Scan Time: 8:16 p.m. 10/30/2024  Results discussed with Dr. Brothers by Dr. John Ordonez via telephone at 8:25 p.m 10/30/2024.      Findings:  No acute intracranial hemorrhage. There is a small vague hypodensity in the medial right occipital lobe (series 2 image 25, series 4 image 56); this is questionably new from today's earlier head CT. No findings to suggest large territorial infarct. No   extra-axial collections. No midline shift or herniation. Normal size and configuration of the ventricles. The paranasal sinuses and mastoid air cells are clear. No acute or suspicious bony findings.        Impression:      Impression:  Small vague hypodensity in the right occipital lobe; this may be new from today's earlier head CT. This could reflect a small acute infarct. This could be further evaluated with brain MRI.    No acute intracranial hemorrhage. No definite evidence of acute large territory infarct.            Electronically Signed: John Ordonez MD    10/30/2024 8:35 PM EDT    Workstation ID: [id number]    US Renal Bilateral [[id number]] Collected: 10/30/24 1503     Updated: 10/30/24 1508    Narrative:      US RENAL BILATERAL    Date of Exam: 10/30/2024 1:58 PM EDT    Indication: nirmal, pelvic malignancy, rule out hydronephrosis. History of endometrial malignancy.    Comparison: CT abdomen pelvis without contrast 10/30/2024. Bilateral renal ultrasound 10/16/2024.    Technique: Grayscale and color Doppler ultrasound evaluation of the kidneys and urinary bladder was performed.      Findings:  The left kidney could not be visualized, presumably obscured by bowel gas.    The right kidney measures 11.2 x 6.5 x 6.0 cm. The right kidney maintains normal cortical thickness and cortical echotexture. No cystic or  solid right renal mass, shadowing stone or hydronephrosis is seen.    The urinary bladder has a prevoid volume of 212 cc. 5 cm lobulated nodularity is seen within the posterior wall of the urinary bladder (image 21), and malignancy cannot be excluded. This has a similar appearance to the recent renal ultrasound study.          Impression:      Impression:  Lobulated nodularity in the posterior urinary bladder wall has a similar appearance to 10/16/2024.. Malignant involvement of the urinary bladder cannot be excluded.  Normal sonographic appearance of the right kidney. Left kidney could not be visualized.        Electronically Signed: Jeanette Huang MD    10/30/2024 3:06 PM EDT    Workstation ID: [id number]             Assessment:    Septic shock    Malignant neoplasm of body of uterus    History of multiple cerebrovascular accidents (CVAs)    Malignant neoplasm metastatic to lung      Hydronephrosis  Retention with kinney catheter  Acute renal failure    Plan:    Cr 7.65  Continue indwelling kinney catheter  Patient not a candidate for stent placement and with recent change to palliative care will not proceed with nephrostomy tube placement.  Urology will sign off, please call with any questions or concerns.    SANTI Menendez  [facility]  [facility], [address]  [place] [address]    Plan reviewed and discussed with Dr. Jackson  Office: [phone]  Available via [employer] Secure Chat  10/31/24  14:03 EDT

## 2024-10-31 NOTE — SIGNIFICANT NOTE
10/31/24 1257   Readmission Indications   Is the patient and/or family able to complete the readmission assessment questions? Yes   Is this hospitalization related to the prior hospital diagnosis? Yes   Recommendation for rehospitalization   Did you speak with your physician prior to coming to the hospital Yes   If yes, what physician did you speak with? facility NP   Follow-up Appointments   Do you have a PCP? Yes   Did you have an appointment with PCP after your hospitalization? No   Did you go to appointment? No  (not yet)   Did you have an appointment with a Specialist? Yes  (Dr Joseph (Gen Sx) 11/4, Rad Onc 10/22, Onc 10/28)   Did you go to appointment? Yes   Are you current with the Pulmonary Clinic? No   Are you current with the CHF Clinic? No   Medications   Did you have newly prescribed medications at discharge? Yes  (Amlodipine, Magic Cream, Vits, Nystatin, Zinc Sulfate)   Did you understand the reasons for your medications at discharge and how to take them? Yes   Did you understand the side effects of your medications? Yes   Are you taking all of you prescribed medications? Yes   What pharmacy was used to fill prescription(s)? Facility   Were medications picked up? Yes   Discharge Instructions   Did you understand your discharge instructions? Yes   Did your family/caregiver hear your instructions? Yes   Were you told to eat a special diet? No   Were you given a number of someone to call if you had questions or concerns? Yes   Index discharge location/services   Where did you go upon discharge? Skilled Nursing Facility   Do you have supportive family or friends in the home? Yes   Was the provider seen at the facility? Yes   What actions were taken to avoid a readmit? Fell - EMS called   Which Skilled Nursing Facility were your admitted from? [facility]   Discharge Readiness   On a scale of 1-5 (5 being well prepared), how ready were you for discharge 5   Palliative Care/Hospice   Are you current  with Palliative Care? No   Are you current with Hospice Care? No   Advance Directives (For Healthcare)   Patient Requests Assistance on Advance Directives Yes, consult for Advance Care Planning   Readmission Assessment Final Comments   Final Comments PREVIOUS ADMIT 10/8-10/18/24: Recent Dx Uterine Ca - admit for cellulitis rash on abdomen/legs. Tx with IV Abxs. Rad Onc treating with radiation to Uterine Ca while inpatient. HASMUKH - ifeanyi hydronephrosis, FC placed to decompress and HASMUKH resolved. DC to [facility] with FC and wound care. PRESENT ADMIT 10/30/24:  Fall at facility, SOA, cough, A-fib RVR, Abd pain.  CT head negative, MRI Brain shows scattered clot. CT A/P shows progressive endometrial CA with mets to Liver.  Admitted for Sepsis, lactic 4.7, WBC 22. Bun/cr 68/7.65, not a candidate for HD or surgery - possible IR drains for symptom relief.  Levo/Vaso/Temo gtts for b/p support. Family speaking with [facility] for possible Hospice Care/GIP.

## 2024-10-31 NOTE — CONSULTS
"Primary Care Provider: Hanh Granados*     Consult requested by: ICU service    Reason for Consultation: Neurological evaluation-scattered infarcts on MRI    History taken from: chart family RN    Chief complaint: Fall with trauma to the head, shortness of breath x 2 days       SUBJECTIVE:    History of present illness: Background per H&P: Felisha Mukherjee is a 59 y.o. female who has a past medical history of type 2 diabetes, hypertension, endometrial adenocarcinoma with metastasis presenting to the hospital from acute rehab for ground-level fall.  She reportedly has had shortness of breath with a cough for the last 2 days.  She was recently discharged from this facility 10/18/2024 after being treated for abdominal wall cellulitis.  Patient was evaluated by teleneurology overnight after repeat CT head showed a hypodensity in the right occipital area.  Reportedly she has had weakness of the right face and left side of the body for the last week and was out of any intervention window.  Found to have NIHSS of 3 for facial droop, dysarthria, and sensory abnormality.      In the ED she developed new onset AF RVR and was started on a cardizem gtt. CT of the head without acute abnormalities.  CT of the abdomen pelvis was obtained and did show evidence of overall disease progression involving patient's known endometrial adenocarcinoma.  CXR shows bilateral airspace opacities, labs significant for CO2 9.9, sodium 126, BUN 8.3, HS troponin 169, lactate 4.7, WBC 22.3, and 12% bands. Persistent hypotension following the sepsis bolus indicated levophed and she was admitted to the ICU with a principal diagnosis of septic shock.     She will after being admitted the patient reportedly was subjectively feeling better but still did not feel \"right open\" but was oriented x 3 but felt like something was not right in her head.  She was unable to articulate in detail what she was experiencing and was initially thought to have a " uremic component to her mentation changes.  She is complaining of diffuse abdominal pain.  At the time MRI brain was obtained and showed numerous small acute infarcts in the supratentorial infratentorial brain with an embolic pattern.  MRA head and neck was negative.    On my examination patient is an NIHSS of 2 (RFD-1, dysarthria-1).    Left sided motor strength 3/5. Right side 5/5.     Not a TNK candidate due to LKW                - Portions of the above HPI were copied from previous encounters and edited as appropriate. PMH as detailed below.     Review of Systems   Constitutional:  Negative for fatigue and fever.   HENT:  Negative for ear discharge, ear pain, tinnitus, trouble swallowing and voice change.    Eyes:  Negative for photophobia, pain and visual disturbance.   Respiratory:  Negative for chest tightness and shortness of breath.    Cardiovascular:  Negative for chest pain.   Gastrointestinal:  Negative for nausea and vomiting.   Musculoskeletal:  Negative for back pain, neck pain and neck stiffness.   Neurological:  Positive for facial asymmetry, weakness and numbness. Negative for dizziness, tremors, seizures, syncope, speech difficulty, light-headedness and headaches.   All other systems reviewed and are negative.         PATIENT HISTORY:  Past Medical History:   Diagnosis Date    Diabetes mellitus     Hypertension     Uterine mass    ,   Past Surgical History:   Procedure Laterality Date    CHOLECYSTECTOMY     ,   Family History   Problem Relation Age of Onset    Diabetes Mother    ,   Social History     Tobacco Use    Smoking status: Never   Vaping Use    Vaping status: Never Used   Substance Use Topics    Alcohol use: Never    Drug use: Never   ,   Prior to Admission medications    Medication Sig Start Date End Date Taking? Authorizing Provider   amLODIPine (NORVASC) 5 MG tablet Take 1 tablet by mouth Daily. 10/16/24   Harry Pryor MD   Josr Carb-Mag Hydrox-Simeth 1200-270-80 MG/10ML suspension  Take 10 mL by mouth Every 6 (Six) Hours As Needed.    William Irwin MD   docusate sodium 100 MG capsule Take 1 capsule by mouth Daily. 6/26/24   William Irwin MD   Farxiga 10 MG tablet Take 10 mg by mouth Daily. 6/26/24   William Irwin MD   ferrous sulfate 325 (65 FE) MG tablet Take 1 tablet by mouth 5 (Five) Times a Week. Monday, Tuesday, Wednesday, Thursday and Friday    William Irwin MD   hydrocortisone-bacitracin-zinc oxide-nystatin (MAGIC BARRIER) Apply 1 Application topically to the appropriate area as directed 4 (Four) Times a Day. 10/16/24   Harry Pryor MD   hydrOXYzine (ATARAX) 50 MG tablet Take 1 tablet by mouth Every Night. 6/27/24   William Irwin MD   lisinopril (PRINIVIL,ZESTRIL) 40 MG tablet Take 1 tablet by mouth Daily.    William Irwin MD   metFORMIN (GLUCOPHAGE) 500 MG tablet Take 1 tablet by mouth 2 (Two) Times a Day. 5/28/24   William Irwin MD   metoprolol succinate XL (TOPROL-XL) 50 MG 24 hr tablet Take 1 tablet by mouth every night at bedtime.    William Irwin MD   montelukast (SINGULAIR) 10 MG tablet Take 1 tablet by mouth Every Night.    William Irwin MD   multivitamin (Thera) tablet tablet Take 1 tablet by mouth Daily. 10/16/24   Harry Pryor MD   nystatin (MYCOSTATIN) 646674 UNIT/GM powder Apply  topically to the appropriate area as directed Every 12 (Twelve) Hours. 10/16/24   Harry Pryor MD   PARoxetine (PAXIL) 30 MG tablet Take 1 tablet by mouth Daily. 6/12/24   William Irwin MD   risperiDONE (risperDAL) 2 MG tablet Take 2 tablets by mouth Every Evening. 5/30/24   William Irwin MD   traMADol (ULTRAM) 50 MG tablet Take 1 tablet by mouth Every 6 (Six) Hours As Needed for Moderate Pain.    William Irwin MD   vitamin D (ERGOCALCIFEROL) 1.25 MG (74297 UT) capsule capsule Take 1 capsule by mouth Every 7 (Seven) Days. Wednesdays    William Irwin MD   zinc sulfate (ZINCATE) 220 (50  Zn) MG capsule Take 1 capsule by mouth Daily. 10/16/24   Harry Pryor MD    Allergies:  Patient has no known allergies.    Current Facility-Administered Medications   Medication Dose Route Frequency Provider Last Rate Last Admin    acetaminophen (TYLENOL) tablet 650 mg  650 mg Oral Q4H PRN Early, SANTI Craig        Or    acetaminophen (TYLENOL) suppository 650 mg  650 mg Rectal Q4H PRN Early, SANTI Craig        aluminum-magnesium hydroxide-simethicone (MAALOX MAX) 400-400-40 MG/5ML suspension 15 mL  15 mL Oral Q6H PRN Early, SANTI Craig        [Held by provider] amLODIPine (NORVASC) tablet 5 mg  5 mg Oral Q24H Early, SANTI Craig        aspirin chewable tablet 81 mg  81 mg Oral Daily Ivonne Sidhu MD        Or    aspirin suppository 300 mg  300 mg Rectal Daily Ivonne Sidhu MD   300 mg at 10/31/24 1330    atorvastatin (LIPITOR) tablet 80 mg  80 mg Oral Nightly Ivonne Sidhu MD        sennosides-docusate (PERICOLACE) 8.6-50 MG per tablet 2 tablet  2 tablet Oral BID Early, SANTI Craig        And    polyethylene glycol (MIRALAX) packet 17 g  17 g Oral Daily PRN Early, SANTI Craig        And    bisacodyl (DULCOLAX) EC tablet 5 mg  5 mg Oral Daily PRN Early, SANTI Craig        And    bisacodyl (DULCOLAX) suppository 10 mg  10 mg Rectal Daily PRN Early, SANTI Craig        cefepime 2000 mg IVPB in 100 mL NS (MBP)  2,000 mg Intravenous Q24H Early, SANTI Craig   2,000 mg at 10/31/24 1623    dextrose (D50W) (25 g/50 mL) IV injection 25 g  25 g Intravenous Q15 Min PRN Early, SANTI Craig        dextrose (GLUTOSE) oral gel 15 g  15 g Oral Q15 Min PRN Early, SANTI Craig        glucagon (GLUCAGEN) injection 1 mg  1 mg Intramuscular Q15 Min PRN Early, SANTI Craig        heparin (porcine) 5000 UNIT/ML injection 5,000 Units  5,000 Units Subcutaneous Q12H Early, SANTI Craig   5,000 Units at 10/31/24 1048    [Held by provider] hydrOXYzine (ATARAX) tablet 50 mg  50 mg Oral Nightly Nely Baird  SANTI        insulin lispro (HUMALOG/ADMELOG) injection 2-9 Units  2-9 Units Subcutaneous Q6H Vince Smiht DO   4 Units at 10/31/24 1330    [Held by provider] lisinopril (PRINIVIL,ZESTRIL) tablet 40 mg  40 mg Oral Daily Early, SANTI Craig        [Held by provider] metoprolol succinate XL (TOPROL-XL) 24 hr tablet 50 mg  50 mg Oral Q24H Early, SANTI Craig        montelukast (SINGULAIR) tablet 10 mg  10 mg Oral Nightly Early, SANTI Craig        multivitamin (THERAGRAN) tablet 1 tablet  1 tablet Oral Daily Early, SANTI Craig   1 tablet at 10/30/24 1553    mupirocin (BACTROBAN) 2 % nasal ointment 1 Application  1 Application Each Nare BID Early, SANIT Craig   1 Application at 10/31/24 1048    nitroglycerin (NITROSTAT) SL tablet 0.4 mg  0.4 mg Sublingual Q5 Min PRN Early, SANTI Craig        norepinephrine (LEVOPHED) 16 mg in 250 mL NS infusion  0.02-0.5 mcg/kg/min Intravenous Titrated Michelle Lynne APRN 53.4 mL/hr at 10/31/24 0956 0.5 mcg/kg/min at 10/31/24 0956    ondansetron ODT (ZOFRAN-ODT) disintegrating tablet 4 mg  4 mg Oral Q6H PRN Early, SANTI Craig        Or    ondansetron (ZOFRAN) injection 4 mg  4 mg Intravenous Q6H PRN Early, SANTI Craig        PARoxetine (PAXIL) tablet 30 mg  30 mg Oral Daily Early, SANTI Craig   30 mg at 10/30/24 1553    phenylephrine (FABRICE-SYNEPHRINE) 100 mg in 250 mL NS infusion  0.5-6 mcg/kg/min Intravenous Titrated Michelle Lynne APRN 85.5 mL/hr at 10/31/24 1330 5 mcg/kg/min at 10/31/24 1330    risperiDONE (risperDAL) tablet 2 mg  2 mg Oral Q PM Early, SANTI Craig   2 mg at 10/30/24 1711    sodium bicarbonate 8.4 % 150 mEq in dextrose (D5W) 5 % 1,000 mL infusion (greater than 100 mEq)  150 mEq Intravenous Continuous Raffi López APRN 125 mL/hr at 10/31/24 1456 150 mEq at 10/31/24 1456    sodium bicarbonate injection 8.4% 100 mEq  100 mEq Intravenous Once Early, SANTI Craig        sodium chloride 0.9 % flush 10 mL  10 mL Intravenous PRN  Steven Solitario MD        sodium chloride 0.9 % flush 10 mL  10 mL Intravenous PRN Early, SANTI Craig        sodium chloride 0.9 % flush 10 mL  10 mL Intravenous Q12H [place]Vince plunkett, DO   10 mL at 10/31/24 1049    sodium chloride 0.9 % flush 10 mL  10 mL Intravenous Q12H [place], Vince L, DO   10 mL at 10/31/24 1049    sodium chloride 0.9 % flush 10 mL  10 mL Intravenous Q12H [place], DO   10 mL at 10/31/24 1048    sodium chloride 0.9 % flush 10 mL  10 mL Intravenous PRN Vince Smith, DO        sodium chloride 0.9 % flush 10 mL  10 mL Intravenous Q12H Ivonne Sidhu MD   10 mL at 10/31/24 1048    sodium chloride 0.9 % flush 10 mL  10 mL Intravenous PRN Ivonne Sidhu MD        sodium chloride 0.9 % flush 20 mL  20 mL Intravenous PRN Vince Smith, DO        sodium chloride 0.9 % infusion 40 mL  40 mL Intravenous PRN Vince Smith, DO        sodium chloride 0.9 % infusion 40 mL  40 mL Intravenous PRN Ivonne Sidhu MD        Vasopressin (VASOSTRICT) 20-5 UT/100ML-%  0.03 Units/min Intravenous Continuous Michelle Lynne APRN 9 mL/hr at 10/31/24 0851 0.03 Units/min at 10/31/24 0851    vitamin D (ERGOCALCIFEROL) capsule 50,000 Units  50,000 Units Oral Q7 Days Early, SANTI Craig        zinc sulfate (ZINCATE) capsule 220 mg  220 mg Oral Daily Early, Nely FERRARA APRN   220 mg at 10/30/24 1553        ________________________________________________________        OBJECTIVE:  Upon today's exam    GEN: NAD, pleasant, cooperative  CHEST: No signs of resp distress, on room air    NEURO  MENTAL STATUS: AAOx3, memory intact, fund of knowledge appropriate  LANG/SPEECH: Naming and repetition intact, fluent, follows 3-step commands    CRANIAL NERVES:  II-XII grossly intact    MOTOR:  Motor strength 5/5 throughout, symmetric.     REFLEXES: 2/4 throughout    SENSORY:  Normal to touch, temp all limbs  No hemineglect, no extinction to double-sided stimulation (visual &  tactile)  COORD: Normal finger to nose      NIH Stroke Scale  Interval: baseline  1a. Level of Consciousness: 0-->Alert, keenly responsive  1b. LOC Questions: 0-->Answers both questions correctly  1c. LOC Commands: 0-->Performs both tasks correctly  2. Best Gaze: 0-->Normal  3. Visual: 0-->No visual loss  4. Facial Palsy: 1-->Minor paralysis (flattened nasolabial fold, asymmetry on smiling)  5a. Motor Arm, Left: 0-->No drift, limb holds 90 (or 45) degrees for full 10 secs  5b. Motor Arm, Right: 0-->No drift, limb holds 90 (or 45) degrees for full 10 secs  6a. Motor Leg, Left: 0-->No drift, leg holds 30 degree position for full 5 secs  6b. Motor Leg, Right: 0-->No drift, leg holds 30 degree position for full 5 secs  7. Limb Ataxia: 0-->Absent  8. Sensory: 0-->Normal, no sensory loss  9. Best Language: 0-->No aphasia, normal  10. Dysarthria: 1-->Mild-to-moderate dysarthria, patient slurs at least some words and, at worst, can be understood with some difficulty  11. Extinction and Inattention (formerly Neglect): 0-->No abnormality  Total (NIH Stroke Scale): 2      [place] Coma Scale  Best Eye Response: Spontaneous  Best Verbal Response: Oriented  Best Motor Response: Follows commands  [place] Coma Scale Score: 15    Modified Sundar Scale  Pre-Stroke Modified Williams Scale: 0 - No Symptoms at all.    ABCD2 Score for TIA  Age is 60+: Yes  Systolic BP >=140 mmHg or Diastolic BP >=90 mmHg: Yes  Diabetes History: Y    CHADS2 Score  CHF History: N  Hypertension History: Y  Diabetes History: Y  Stroke/TIA/Thromboembolism History: N    [id number] Score for Atrial Fibrillation Stroke Risk  Age in Years: <65  Sex: Female  CHF History: N  Hypertension History: Y  Stroke/TIA/Thromboembolism History: N  Vascular Disease History: N  Diabetes History: Y  [id number] Score: 3          [place] Coma Scale  Best Eye Response: Spontaneous  Best Verbal Response: Oriented  Best Motor Response: Follows commands  [place] Coma Scale Score:  15    Modified Sundar Scale  Pre-Stroke Modified Hamler Scale: 0 - No Symptoms at all.    [id number] Score for Atrial Fibrillation Stroke Risk  Age in Years: <65  Sex: Female  CHF History: N  Hypertension History: Y  Stroke/TIA/Thromboembolism History: N  Vascular Disease History: N  Diabetes History: Y  [id number] Score: 3         ________________________________________________________   RESULTS REVIEW:    VITAL SIGNS:   Temp:  [97.6 °F (36.4 °C)-99 °F (37.2 °C)] 98 °F (36.7 °C)  Heart Rate:  [111-129] 124  Resp:  [33-41] 33  BP: ()/(25-88) 132/71     LABS:      Lab 10/31/24  1348 10/31/24  0642 10/30/24  2359 10/30/24  1720 10/30/24  1437 10/30/24  1011 10/30/24  0630 10/30/24  0629 10/30/24  0626 10/28/24  1132   WBC  --   --  22.19*  --   --   --   --   --  22.31* 19.79*   HEMOGLOBIN  --   --  11.4*  --   --   --   --   --  12.5 12.4   HEMOGLOBIN, POC  --   --   --   --   --   --  13.6  --   --   --    HEMATOCRIT  --   --  37.4  --   --   --   --   --  40.1 39.1   HEMATOCRIT POC  --   --   --   --   --   --  40  --   --   --    PLATELETS  --   --  195  --   --   --   --   --  192 248   NEUTROS ABS  --   --  20.86*  --   --   --   --   --  18.52* 17.59*   LYMPHS ABS  --   --   --   --   --   --   --   --   --  0.66*   MONOS ABS  --   --   --   --   --   --   --   --   --  1.47*   EOS ABS  --   --   --   --   --   --   --   --   --  0.06   MCV  --   --  78.6*  --   --   --   --   --  78.6* 77.3*   PROCALCITONIN 5.10*  --   --   --   --   --   --   --  4.82*  --    LACTATE  --  10.0* 6.2* 2.9* 2.6* 4.2*  --    < >  --   --     < > = values in this interval not displayed.         Lab 10/31/24  0827 10/31/24  0642 10/30/24  2359 10/30/24  1720 10/30/24  0630 10/30/24  0626   SODIUM 132* 133* 129* 132*  --  126*   POTASSIUM 3.7 3.6 4.1 3.6  --  4.1   CHLORIDE 93* 93* 93* 100  --  92*   CO2 9.1* 10.0* 11.9* 12.0*  --  9.9*   POC ANION GAP ISTAT  --   --   --   --  20.0  --    ANION GAP 29.9* 30.0* 24.1*  20.0*  --  24.1*   BUN 68* 67* 67* 59*  --  59*   CREATININE 7.65* 7.56* 7.35* 6.43* 8.30* 7.47*   EGFR 5.6* 5.7* 5.9* 6.9* 5.1* 5.8*   GLUCOSE 228* 223* 225* 163*  --  132*   CALCIUM 7.8* 7.8* 7.8* 7.1*  --  9.0   IONIZED CALCIUM 0.94*  --   --   --   --   --    MAGNESIUM 1.8  --  1.8  --   --  2.1   PHOSPHORUS 6.9*  --  7.0*  --   --   --    TSH  --   --   --   --   --  4.500*         Lab 10/31/24  0827 10/30/24  2359 10/30/24  0626 10/28/24  1132   TOTAL PROTEIN  --  4.6* 5.0* 4.9*   ALBUMIN 2.3* 2.4* 2.3* 2.6*   GLOBULIN  --  2.2 2.7 2.3   ALT (SGPT)  --  13 18 18   AST (SGOT)  --  23 24 23   BILIRUBIN  --  0.2 0.2 0.3   ALK PHOS  --  67 82 84         Lab 10/30/24  2359 10/30/24  1011 10/30/24  0626   PROBNP  --   --  1,560.0*   HSTROP T 151* 129* 169*         Lab 10/30/24  2359   CHOLESTEROL 66   LDL CHOL 28   HDL CHOL 12*   TRIGLYCERIDES 150             Lab 10/30/24  0544   PH, ARTERIAL 7.318*   PCO2, ARTERIAL 17.7*   PO2 ART 76.3*   O2 SATURATION ART 94.5   FIO2 44   HCO3 ART 9.1*   BASE EXCESS ART -14.7*     UA          10/17/2024    10:30   Urinalysis   Squamous Epithelial Cells, UA 0-2    Specific Gravity, UA 1.009    Ketones, UA Negative    Blood, UA Negative    Leukocytes, UA Negative    Nitrite, UA Negative    RBC, UA None Seen    WBC, UA 6-10    Bacteria, UA None Seen        Lab Results   Component Value Date    TSH 4.500 (H) 10/30/2024    LDL 28 10/30/2024    [id number] 331 08/20/2024       IMAGING STUDIES:  MRI Brain Without Contrast    Result Date: 10/30/2024  Impression: Numerous small scattered acute infarcts in the supratentorial and infratentorial brain, most compatible with embolic phenomenon. Unremarkable MRA of the head and neck without abrupt cut off/large vessel occlusion, flow-limiting stenosis, dissection, or aneurysm. Findings discussed with ordering provider Dr. Brothers by Dr. John Ordonez via telephone at 10:30 p.m. 10/30/2024. Electronically Signed: John Ordonez MD  10/30/2024  10:34 PM EDT  Workstation ID: [id number]    MRI Angiogram Head Without Contrast    Result Date: 10/30/2024  Impression: Numerous small scattered acute infarcts in the supratentorial and infratentorial brain, most compatible with embolic phenomenon. Unremarkable MRA of the head and neck without abrupt cut off/large vessel occlusion, flow-limiting stenosis, dissection, or aneurysm. Findings discussed with ordering provider Dr. Brothers by Dr. John Ordonez via telephone at 10:30 p.m. 10/30/2024. Electronically Signed: John Ordonez MD  10/30/2024 10:34 PM EDT  Workstation ID: [id number]    MRI Angiogram Neck Without Contrast    Result Date: 10/30/2024  Impression: Numerous small scattered acute infarcts in the supratentorial and infratentorial brain, most compatible with embolic phenomenon. Unremarkable MRA of the head and neck without abrupt cut off/large vessel occlusion, flow-limiting stenosis, dissection, or aneurysm. Findings discussed with ordering provider Dr. Brothers by Dr. John Ordonez via telephone at 10:30 p.m. 10/30/2024. Electronically Signed: John Ordonez MD  10/30/2024 10:34 PM EDT  Workstation ID: [id number]    CT Head Without Contrast Stroke Protocol    Result Date: 10/30/2024  Impression: Small vague hypodensity in the right occipital lobe; this may be new from today's earlier head CT. This could reflect a small acute infarct. This could be further evaluated with brain MRI. No acute intracranial hemorrhage. No definite evidence of acute large territory infarct. Electronically Signed: John Ordonez MD  10/30/2024 8:35 PM EDT  Workstation ID: [id number]    US Renal Bilateral    Result Date: 10/30/2024  Impression: Lobulated nodularity in the posterior urinary bladder wall has a similar appearance to 10/16/2024.. Malignant involvement of the urinary bladder cannot be excluded. Normal sonographic appearance of the right kidney. Left kidney could not be visualized. Electronically Signed: Jeanette Huang MD   10/30/2024 3:06 PM EDT  Workstation ID: [id number]    CT Abdomen Pelvis Without Contrast    Result Date: 10/30/2024  Impression: 1.No acute trauma identified within chest/abdomen/pelvis. 2.Overall progression of disease. Numerous pulmonary metastasis have progressed, mediastinal and retroperitoneal lymphadenopathy has progressed, and hepatic metastasis have progressed. Omental carcinomatosis appears unchanged, and the uterus appears smaller than on prior exam. Soft tissue lesions identified along urinary bladder not well seen, which could be secondary to interval [place] catheterization. 3.Trace right pleural effusion. 4.Cardiomegaly. 5.Mild scattered ascites. 6.Wall thickening of colon, which could be reactive versus acute colitis. Electronically Signed: Robinson Heredia MD  10/30/2024 10:25 AM EDT  Workstation ID: [id number]    CT Chest Without Contrast Diagnostic    Result Date: 10/30/2024  Impression: 1.No acute trauma identified within chest/abdomen/pelvis. 2.Overall progression of disease. Numerous pulmonary metastasis have progressed, mediastinal and retroperitoneal lymphadenopathy has progressed, and hepatic metastasis have progressed. Omental carcinomatosis appears unchanged, and the uterus appears smaller than on prior exam. Soft tissue lesions identified along urinary bladder not well seen, which could be secondary to interval [place] catheterization. 3.Trace right pleural effusion. 4.Cardiomegaly. 5.Mild scattered ascites. 6.Wall thickening of colon, which could be reactive versus acute colitis. Electronically Signed: Robinson Heredia MD  10/30/2024 10:25 AM EDT  Workstation ID: [id number]    CT Head Without Contrast    Result Date: 10/30/2024  Impression: No acute intracranial abnormality Electronically Signed: Danny Julio MD  10/30/2024 9:31 AM EDT  Workstation ID: [id number]    XR Chest 1 View    Result Date: 10/30/2024  Impression: Interval development of patchy airspace disease throughout the lungs  bilaterally, likely related to pneumonia. Follow-up to resolution recommended.. Electronically Signed: Kia Patino MD  10/30/2024 6:21 AM EDT  Workstation ID: [id number]     I reviewed the patient's new clinical results.    ________________________________________________________     PROBLEM LIST:    Septic shock    Malignant neoplasm of body of uterus    History of multiple cerebrovascular accidents (CVAs)    Malignant neoplasm metastatic to lung            ASSESSMENT/PLAN:      New onset A-fib with RVR and hypotension (55/30) receiving IV fluid resuscitation for sepsis protocol  Central embolic appearing strokes in the supratentorial and infratentorial compartments likely secondary to new onset A-fib.    [id number] score 3  NIHSS: 3    Imaging reviewed  NCCT: No acute intracranial abnormality.  Repeat CT head same day showing small hypodensity within the right occipital lobe.  MRA H/N: Negative for LVO, flow-limiting stenosis, dissection, or abnormal vascular malformations.  MRI Brain WO: Central embolic appearing bilateral supra tentorial and infratentorial infarcts without hemorrhagic transformation.  Baseline EKG and chest x-ray  Echocardiogram with bubble study pending   Continuous cardiac telemetry to monitor for arrhythmia  Aspirin 325 mg load recommended by teleneurology.  Recommend continuing with ASA 81 mg daily PO and Plavix 75 mg daily PO x21 days then transition to ASA 81 mg daily PO monotherapy.  If the patient is to be started on anticoagulation, recommend discontinuing Plavix and continuing with aspirin.  Permissive hypertension for 24 hours.  Labetalol 10 mg IV, may repeat x1. PRN BP >220/120 (non-tenecteplase candidate)  Stroke labwork: HgbA1C, lipid panel, urine drug screen  Acetaminophen 640 mg PO every 4 hours for temperature >99F  High intensity statin therapy for LDL >70 (to be started prior to discharge)  Tight glucose control (long-term goal HgbA1c < 6%)  Physical/Occupational/Speech  therapy: Evaluate and treat  Vital signs per hospital protocol  Neuroassessment per hospital protocol  Head of bed > 30 degrees for aspiration prevention  Please document NIHSS on admission and with any neurochanges  Strict NPO until bedside swallow study, advance diet as appropriate  Oxygen therapy (titrate to keep SpO2 greater than 94%  Activity as tolerated  Stroke education and counseling      Modification of stroke risk factors:   - Blood pressure should be less than 130/80 outpatient, HbA1c less than 6.5, LDL less than 70; b12>500 and smoking cessation if applicable. We would be grateful if the primary team / primary care physician would keep a close watch on the above targets.  - Stroke education  - Follow up with neurologist of choice      I discussed the patient's findings and my recommendations with patient, nursing staff, and primary care team    Ivonne Sidhu MD  10/31/24  16:34 EDT

## 2024-10-31 NOTE — DISCHARGE PLACEMENT REQUEST
"Enedina Feng (59 y.o. Female)       Date of Birth   1965    Social Security Number       Address   [address] [place] [address]    Home Phone   [phone]    MRN   [id number]       Rastafarian   Unknown    Marital Status                               Admission Date   10/30/24    Admission Type   Emergency    Admitting Provider   Vince Smith DO    Attending Provider   Vince Smith DO    Department, Room/Bed   [facility] INTENSIVE CARE UNIT, 2308/1       Discharge Date       Discharge Disposition       Discharge Destination                                 Attending Provider: Vince Smith DO    Allergies: No Known Allergies    Isolation: None   Infection: None   Code Status: No CPR    Ht: 160 cm (63\")   Wt: 116 kg (255 lb 15.3 oz)    Admission Cmt: None   Principal Problem: Septic shock [A41.9,R65.21]                   Active Insurance as of 10/30/2024       Primary Coverage       Payor Plan Insurance Group Employer/Plan Group    MEDICARE MEDICARE A & B        Payor Plan Address Payor Plan Phone Number Payor Plan Fax Number Effective Dates    [address] [phone]  6/1/1995 - None Entered    Formerly [facility] [id number]         Subscriber Name Subscriber Birth Date Member ID       ENEDINA FENG 1965 [id number]               Secondary Coverage       Payor Plan Insurance Group Employer/Plan Group    [facility] PATHWAYS IN        Payor Plan Address Payor Plan Phone Number Payor Plan Fax Number Effective Dates    [address]   8/1/2024 - None Entered    [facility] [address]         Subscriber Name Subscriber Birth Date Member ID       ENEDINA FENG 1965 [id number]                     Emergency Contacts        (Rel.) Home Phone Work Phone Mobile Phone    Sandi Paul (Sister) -- -- [phone]    Andressa Momin (Sister) -- -- [phone] "    Hamzah Crowley () -- [phone] --    Steven Ross (Father) -- -- [phone]    Jocelynn Ross (Sister) -- -- [phone]

## 2024-11-01 NOTE — PROGRESS NOTES
"Critical Care Progress Note     Felisha Mukherjee : 1965 MRN:[id number] LOS:2  Rm: 2308/1     Principal Problem: Septic shock     Reason for follow up: All the medical problems listed below    Summary     59 y.o. female with PMH of diabetes type 2, hypertension, endometrial adenocarcinoma with metastasis presented to the hospital from an acute rehab for ground-level fall, shortness of breath and dry cough x 2 days.  She has diffuse abdominal pain and was recently discharged from this facility on 10/18/2024 after being treated for abdominal wall cellulitis. In the ED she developed new onset AF RVR and was started on a cardizem gtt. CT of the head without acute abnormalities.  CT of the abdomen pelvis was obtained and did show evidence of overall disease progression involving patient's known endometrial adenocarcinoma.  CXR shows bilateral airspace opacities, labs significant for CO2 9.9, sodium 126, BUN 8.3, HS troponin 169, lactate 4.7, WBC 22.3, and 12% bands. Persistent hypotension following the sepsis bolus indicated levophed and she was admitted to the ICU with a principal diagnosis of septic shock.        ACP: CODE STATUS was discussed with patient and she would like CPR and intubation if necessary.  Her decision-makers would be her sisters Andressa and Sandi if she becomes incapacitated.  The patient does have an adult daughter who resides in a behavioral institution.     Patient was seen and examined on 10/30/24 at 18:36 EDT .  She states she is feeling a little bit better overall but her head still feels \"not right\".  She is oriented x 3 but expresses that something is not right with her head and thought processes but is unable to articulate what specifically is wrong.  She repeats some of the words that I say. I suspect there is a uremic component to her mentation as her hypoxia is improving and her head CT was negative for abnormalities. She endorses diffuse abdominal pain.     Significant Events / " Subjective     11/01/24 : Patient remains on bicarb drip, vaso, sharon-, and Levophed.  Remains afebrile.  Only 200 mL of urine output for the last 24 hours, net +12 L.  Creatinine is essentially stable at 7.52, glucose running in the low 200s, HDL very low at 10, triglycerides elevated at 225.  White blood cell count further increased at 25, 18% bandemia on differential.  Urine antigen for Legionella was positive.  She is on azithromycin and also on cefepime.  Cefepime can be discontinued.  Will add doxycycline.  Per report, family wanting to likely go to hospice support; however, pt has not been transitioned yet, so will treat aggressively  until that time.    Assessment / Plan     Metastatic endometrial adenocarcinoma  Diagnosed August 2024  CT chest/ab/pelv: Numerous pulmonary metastasis have progressed, mediastinal and retroperitoneal lymphadenopathy has progressed, and hepatic metastasis have progressed. Omental carcinomatosis appears unchanged, and the uterus appears smaller than on prior exam.   Heme/onc has deemed pt a poor candidate for any treatment  Palliative care consulted      Putative septic shock  Putative Legionella PNA   CXR: bilateral scattered airspace opacities, no effusions  WBC: 22.31, procal: 4.82, lactate: 4.7  Blood cultures NGTD  Urine did not reflex to Cx  RVP negative  Urinary Ag's pending  Started on vancomycin and cefepime  Persistent hypotension in spite of adequate fluid resuscitation   C/w additional fluids as needed. Avoid fluid overload.   On pressors, titrate vasopressors for a target MAP of 60.    IVC was >50% collapsible on POCUS following completion of sepsis bolus (2,310mL)  1 additional liter NaCl and 1L albumin 5% ordered  Antibiotics changed to doxycycline and azithromycin on 11/1       Acute Kidney Injury  Hydronephrosis  Creatinine: 1.27 in August 2024  Anuric, will do bladder scans, kinney in place  Likely prerenal but cannot r/o post-renal cause d/t obstruction from  uterine mass  Possible intrinsic component due to ATN from hypotension.   C/w IV fluids as ordered.  Monitor Input/Output very closely.   Avoids NSAIDs, nephrotoxic medications, and hypotension.  Nephrology following  Urology following  IR consulted for palliative nephrostomy tube placement bilaterally--they do not feel this is justified and have canceled procedure       AF RVR  No documented history of atrial fibrillation  On cardizem for a short time in ED, now off  HS troponin: 169  No chest pain  Suspect d/t demand ischemia  Trend troponin  Cardiology following       Hyponatremia  Na: 126 upon admit  On sodium bicarb gtt  Monitor and trend labs       Mixed respiratory and metabolic acidosis  2/2 sepsis and HASMUKH  Upon admit: 7.31/17.7/76.3/9.1  Received one amp of sodium bicarb and started on a gtt (150meq in 1L D5)  Lactic acidosis, 4.7  Monitor and trend labs     Ground-level fall  CT head: no acute abnormalities  Bedrest  PT when appropriate        Diabetes mellitus Type 2, not insulin-dependent  Hold oral agents while in ICU (Farxiga, metformin)  Not on insulin at home  Accu checks ACHS with sliding scale insulin coverage as needed.   Hgb A1C ordered, no previous on file     HTN  Home meds on hold while on pressors: Amlodipine, lisinopril, metoprolol XL     Anxiety/Depression: continue Paxil, Risperdal, and hydroxyzine          Disposition:  Remains on bicarb gtt, levo, sharon, vaso--ICU    Hospice has been consulted    Critical Care Time:  38 mins.      Code status:   Medical Intervention Limits: No intubation (DNI)  Code Status (Patient has no pulse and is not breathing): No CPR (Do Not Attempt to Resuscitate)  Medical Interventions (Patient has pulse or is breathing): Limited Support       Nutrition:   NPO Diet NPO Type: Strict NPO   Patient isn't on Tube Feeding     VTE Prophylaxis:  Pharmacologic & mechanical VTE prophylaxis orders are present.           Objective / Physical Exam     Vital signs:  Temp: 97.2  °F (36.2 °C)  BP: 124/79  Heart Rate: 116  Resp: (!) 32  SpO2: 95 %  Weight: 120 kg (264 lb)    Admission Weight: Weight: 114 kg (251 lb 5.2 oz)  Current Weight: Weight: 120 kg (264 lb)    Input/Output in last 24 hours:    Intake/Output Summary (Last 24 hours) at 11/1/2024 1356  Last data filed at 11/1/2024 0400  Gross per 24 hour   Intake 6130 ml   Output 200 ml   Net 5930 ml      Net IO Since Admission: 11,968 mL [11/01/24 1356]     Physical Exam     GEN: Morbidly obese, ill-appearing, very lethargic woman, lying in bed on NC.  NAD.  NEURO:  Brainstem reflexes intact.  No obvious focal deficit.  Moves all 4 ext.  HEENT:  N/AT.  PERRL.  MMM.  Oropharynx non-erythematous.  No drainage from the eyes/ears/nose.  No conjunctival petechiae.  No oral thrush.  Auditory and visual acuity grossly wnl.  Fair dentition.  Voice weak.  NECK:  Supple, NT, trachea midline.  No meningismus.  No ROM limitation.  No torticollis.  No JVD.  No thyromegaly.    CHEST/LUNGS:  Breath sounds are diminished with coarse bases.  Chest excursion equal bilaterally.    CARDIOVASCULAR:  RRR w/ holosystolic murmur noted.    GI:  Abdomen soft, NT, ND, +BS.  :  Normal external genitalia.  West cath in place--minimal urine in bag--bladder scan shows over 500 mL in the bladder  EXTREMITIES:  No deformity or amputation.  No cyanosis, edema, or asymmetry.  Pulses 2+ and equal in BLE's.    SKIN:  Warm, dry, and pink.  No rash, breakdown or track marks noted.  LYMPHATICS/HEME:  No overt LAD or abnormal bruising.  No lymphedema.  MSK:  Normal ROM.  No joint abnormalities noted.  Strength is 5/5 and equal in BUE and BLE's.  PSYCH:  Pleasant.  A&Ox 1.  Responds appropriately to simple commands.          Radiology and Labs     Results from last 7 days   Lab Units 11/01/24  0635 10/30/24  2359 10/30/24  0630 10/30/24  0626 10/28/24  1132   WBC 10*3/mm3 25.87* 22.19*  --  22.31* 19.79*   HEMOGLOBIN g/dL 12.6 11.4*  --  12.5 12.4   HEMOGLOBIN, POC g/dL  --    --  13.6  --   --    HEMATOCRIT % 37.8 37.4  --  40.1 39.1   HEMATOCRIT POC %  --   --  40  --   --    PLATELETS 10*3/mm3 143 195  --  192 248           Results from last 7 days   Lab Units 11/01/24  0635 10/31/24  0827 10/31/24  0642 10/30/24  2359 10/30/24  1720 10/30/24  0630 10/30/24  0626   SODIUM mmol/L 136 132* 133* 129* 132*  --  126*   POTASSIUM mmol/L 3.6 3.7 3.6 4.1 3.6  --  4.1   CHLORIDE mmol/L 95* 93* 93* 93* 100  --  92*   CO2 mmol/L 21.3* 9.1* 10.0* 11.9* 12.0*  --  9.9*   BUN mg/dL 77* 68* 67* 67* 59*  --  59*   CREATININE mg/dL 7.52* 7.65* 7.56* 7.35* 6.43*   < > 7.47*   GLUCOSE mg/dL 243* 228* 223* 225* 163*  --  132*   MAGNESIUM mg/dL 1.8 1.8  --  1.8  --   --  2.1   PHOSPHORUS mg/dL 6.8* 6.9*  --  7.0*  --   --   --     < > = values in this interval not displayed.      Results from last 7 days   Lab Units 11/01/24  0635 10/30/24  2359 10/30/24  0626 10/28/24  1132   ALK PHOS U/L 77 67 82 84   AST (SGOT) U/L 27 23 24 23   ALT (SGPT) U/L 14 13 18 18     Results from last 7 days   Lab Units 10/30/24  0544   PH, ARTERIAL pH units 7.318*   PCO2, ARTERIAL mm Hg 17.7*   PO2 ART mm Hg 76.3*   O2 SATURATION ART % 94.5   FIO2 % 44   HCO3 ART mmol/L 9.1*   BASE EXCESS ART mmol/L -14.7*       MRI Brain Without Contrast    Result Date: 10/30/2024  Impression: Numerous small scattered acute infarcts in the supratentorial and infratentorial brain, most compatible with embolic phenomenon. Unremarkable MRA of the head and neck without abrupt cut off/large vessel occlusion, flow-limiting stenosis, dissection, or aneurysm. Findings discussed with ordering provider Dr. Brothers by Dr. John Ordonez via telephone at 10:30 p.m. 10/30/2024. Electronically Signed: John Ordonez MD  10/30/2024 10:34 PM EDT  Workstation ID: [id number]    MRI Angiogram Head Without Contrast    Result Date: 10/30/2024  Impression: Numerous small scattered acute infarcts in the supratentorial and infratentorial brain, most compatible with  embolic phenomenon. Unremarkable MRA of the head and neck without abrupt cut off/large vessel occlusion, flow-limiting stenosis, dissection, or aneurysm. Findings discussed with ordering provider Dr. Brothers by Dr. John Ordonez via telephone at 10:30 p.m. 10/30/2024. Electronically Signed: John Ordonez MD  10/30/2024 10:34 PM EDT  Workstation ID: [id number]    MRI Angiogram Neck Without Contrast    Result Date: 10/30/2024  Impression: Numerous small scattered acute infarcts in the supratentorial and infratentorial brain, most compatible with embolic phenomenon. Unremarkable MRA of the head and neck without abrupt cut off/large vessel occlusion, flow-limiting stenosis, dissection, or aneurysm. Findings discussed with ordering provider Dr. Brothers by Dr. John Ordonez via telephone at 10:30 p.m. 10/30/2024. Electronically Signed: John Ordonez MD  10/30/2024 10:34 PM EDT  Workstation ID: [id number]    CT Head Without Contrast Stroke Protocol    Result Date: 10/30/2024  Impression: Small vague hypodensity in the right occipital lobe; this may be new from today's earlier head CT. This could reflect a small acute infarct. This could be further evaluated with brain MRI. No acute intracranial hemorrhage. No definite evidence of acute large territory infarct. Electronically Signed: John Ordonez MD  10/30/2024 8:35 PM EDT  Workstation ID: [id number]    US Renal Bilateral    Result Date: 10/30/2024  Impression: Lobulated nodularity in the posterior urinary bladder wall has a similar appearance to 10/16/2024.. Malignant involvement of the urinary bladder cannot be excluded. Normal sonographic appearance of the right kidney. Left kidney could not be visualized. Electronically Signed: Jeanette Huang MD  10/30/2024 3:06 PM EDT  Workstation ID: [id number]     Current medications     Scheduled Meds:   [Held by provider] amLODIPine, 5 mg, Oral, Q24H  aspirin, 81 mg, Oral, Daily   Or  aspirin, 300 mg, Rectal, Daily  atorvastatin, 80  mg, Oral, Nightly  azithromycin (ZITHROMAX) 500 mg in sodium chloride 0.9 % 250 mL IVPB-VTB, 500 mg, Intravenous, Q24H  cefepime, 2,000 mg, Intravenous, Q24H  heparin (porcine), 5,000 Units, Subcutaneous, Q12H  [Held by provider] hydrOXYzine, 50 mg, Oral, Nightly  insulin lispro, 2-9 Units, Subcutaneous, Q6H  [Held by provider] lisinopril, 40 mg, Oral, Daily  [Held by provider] metoprolol succinate XL, 50 mg, Oral, Q24H  montelukast, 10 mg, Oral, Nightly  multivitamin, 1 tablet, Oral, Daily  mupirocin, 1 Application, Each Nare, BID  PARoxetine, 30 mg, Oral, Daily  risperiDONE, 2 mg, Oral, Q PM  senna-docusate sodium, 2 tablet, Oral, BID  sodium bicarbonate, 100 mEq, Intravenous, Once  sodium chloride, 10 mL, Intravenous, Q12H  sodium chloride, 10 mL, Intravenous, Q12H  sodium chloride, 10 mL, Intravenous, Q12H  sodium chloride, 10 mL, Intravenous, Q12H  vitamin D, 50,000 Units, Oral, Q7 Days  zinc sulfate, 220 mg, Oral, Daily        Continuous Infusions:   norepinephrine, 0.05-0.5 mcg/kg/min, Last Rate: 0.25 mcg/kg/min (11/01/24 0739)  phenylephrine, 0.5-6 mcg/kg/min, Last Rate: 2 mcg/kg/min (11/01/24 1148)  sodium bicarbonate 8.4 % 150 mEq in dextrose (D5W) 5 % 1,000 mL infusion (greater than 100 mEq), 150 mEq, Last Rate: 150 mEq (11/01/24 0750)  vasopressin, 0.03 Units/min, Last Rate: 0.03 Units/min (11/01/24 0835)          Plan discussed with RN. Reviewed all other data in the last 24 hours, including but not limited to vitals, labs, microbiology, imaging and pertinent notes from other providers.  Plan also discussed with pt's sister at the bedside.    Vince Smith,    Critical Care  11/01/24   13:56 EDT

## 2024-11-01 NOTE — SIGNIFICANT NOTE
11/01/24 1338   OTHER   Discipline occupational therapist   Rehab Time/Intention   Session Not Performed unable to evaluate, medical status change  (Pt/family Hospice decision pending. Pt declined OT and family requested OT to follow to consult on ADL support needs if appropriate. Decision to pursue hospice should be official in the next 24 hours.)   Therapy Assessment/Plan (PT)   Criteria for Skilled Interventions Met (PT) yes   Recommendation   OT - Next Appointment 11/02/24

## 2024-11-01 NOTE — PLAN OF CARE
Goal Outcome Evaluation:         Pt status and vital signs stable entire shift. Pt drowsy yet easily arousable throughout the shift. Compared to last shift, pt status similar, if not slightly improved. Pt oriented for duration of shift. Neuro checks intact. NIH improvement. Vasopressors remain on, although has been titrated downwards some. Pt made roughly 150mL of concentrated urine throughout the shift. Sister at bedside and updated towards beginning of shift. Sinus tachy intermittent with more afib this shift. Pt remains breathing shallow, fast, and labored. O2 demands increased, currently on 15L high flow NC with some supplemental nonrebreather mask as well. Multiple BM s throughout the shift. Pt stated feeling better towards the end of the shift and had no pain throughout the shift. Tentative plan is for pt to transition to palliative/ hospice/ comfort care later on today.

## 2024-11-01 NOTE — PLAN OF CARE
Goal Outcome Evaluation:            According to documentation, plan is to transition to comfort care. Therefore, ST will hold services at this time.

## 2024-11-01 NOTE — PROGRESS NOTES
"      FOLLOW UP NOTE      Name: Felisha Mukherjee ADMIT: 10/30/2024   : 1965  PCP: Hanh Granados APRN    MRN: [id number] LOS: 2 days   AGE/SEX: 59 y.o. female  ROOM: [facility]     Date of Service: 2024                           CHIEF COMPLIANTS / REASON FOR FOLLOW UP                Subjective:      Patient resting.  200 mL urine output.  She is still on 3 vasopressors.  +5.9 L.  She tells me she has decided to proceed with hospice.     Review of Systems:       Unable to obtain     OBJECTIVE                                                                        Exam:  /75   Pulse 119   Temp 97.5 °F (36.4 °C) (Oral)   Resp (!) 32   Ht 160 cm (62.99\")   Wt 120 kg (264 lb 1.8 oz)   SpO2 95%   BMI 46.80 kg/m²   Intake/Output last 3 shifts:  I/O last 3 completed shifts:  In: 32482 [I.V.:83887]  Out: 210 [Urine:210]  Intake/Output this shift:  No intake/output data recorded.    General Appearance: Acutely ill, less tachypneic  Lungs:   Clear to auscultation bilaterally,  Heart:  tachycardia  Abdomen:  Soft, nontender,  Extremities: No edema   Neurologic: Aphasic, but alert    Scheduled Meds:[Held by provider] amLODIPine, 5 mg, Oral, Q24H  aspirin, 81 mg, Oral, Daily   Or  aspirin, 300 mg, Rectal, Daily  atorvastatin, 80 mg, Oral, Nightly  azithromycin (ZITHROMAX) 500 mg in sodium chloride 0.9 % 250 mL IVPB-VTB, 500 mg, Intravenous, Q24H  cefepime, 2,000 mg, Intravenous, Q24H  heparin (porcine), 5,000 Units, Subcutaneous, Q12H  [Held by provider] hydrOXYzine, 50 mg, Oral, Nightly  insulin lispro, 2-9 Units, Subcutaneous, Q6H  [Held by provider] lisinopril, 40 mg, Oral, Daily  [Held by provider] metoprolol succinate XL, 50 mg, Oral, Q24H  montelukast, 10 mg, Oral, Nightly  multivitamin, 1 tablet, Oral, Daily  mupirocin, 1 Application, Each Nare, BID  PARoxetine, 30 mg, Oral, Daily  risperiDONE, 2 mg, Oral, Q PM  senna-docusate sodium, 2 tablet, Oral, BID  sodium bicarbonate, 100 mEq, " Intravenous, Once  sodium chloride, 10 mL, Intravenous, Q12H  sodium chloride, 10 mL, Intravenous, Q12H  sodium chloride, 10 mL, Intravenous, Q12H  sodium chloride, 10 mL, Intravenous, Q12H  vitamin D, 50,000 Units, Oral, Q7 Days  zinc sulfate, 220 mg, Oral, Daily      Continuous Infusions:norepinephrine, 0.05-0.5 mcg/kg/min, Last Rate: 0.25 mcg/kg/min (11/01/24 0739)  phenylephrine, 0.5-6 mcg/kg/min, Last Rate: 3 mcg/kg/min (11/01/24 1814)  sodium bicarbonate 8.4 % 150 mEq in dextrose (D5W) 5 % 1,000 mL infusion (greater than 100 mEq), 150 mEq, Last Rate: 150 mEq (11/01/24 1860)  vasopressin, 0.03 Units/min, Last Rate: 0.03 Units/min (11/01/24 1331)      PRN Meds:  acetaminophen **OR** acetaminophen    aluminum-magnesium hydroxide-simethicone    senna-docusate sodium **AND** polyethylene glycol **AND** bisacodyl **AND** bisacodyl    dextrose    dextrose    glucagon (human recombinant)    nitroglycerin    ondansetron ODT **OR** ondansetron    sodium chloride    sodium chloride    sodium chloride    sodium chloride    sodium chloride    sodium chloride    sodium chloride         Data Review:                                                                           Labs reviewed        Imaging:                                                                           Radiology reviewed           ASSESSMENT:                                                                                Septic shock    Malignant neoplasm of body of uterus    History of multiple cerebrovascular accidents (CVAs)    Malignant neoplasm metastatic to lung         Acute kidney injury  Probable baseline CKD: Baseline creatinine seems to be around 1.4 mg/dL.  Hyponatremia  Mixed metabolic acidosis  Lactic acidosis  Metastatic endometrial malignancy  History of obstructive uropathy  Elevated BNP  Shock: Undifferentiated  Probable  sepsis      PLAN:                                                                            HASMUKH multifactorial due  to ATN, shock and obstructive uropathy.  Labs reviewed, acidosis better but still oligoanuric.  Not a candidate for PCNT per urology. Oncology also seeing for metastatic endometrial carcinoma.    Patient has decided to proceed with palliative care.  Nothing further to add from renal standpoint. Nephrology will sign off. Please call with any questions.     Rosa Wen MD  [facility]  11/1/2024  09:56 EDT

## 2024-11-01 NOTE — PROGRESS NOTES
"Palliative Care Daily Progress Note     C/C: fall    S: Felisha Mukherjee is a 59 y.o. female who presented to [facility] ED on 10/30 with reports of a fall while using the bathroom. Patient struck the right side of her head. No reporeted LOC. Patient also complaints of a moderate headache, and some shortness of breath with a dry cough. No noted nausea, vomiting, or diarrhea.      Of note, patient has metastatic endometrial cancer.      In ED: Creatinine 8.3     Patient noted with continued low BP and afib in ED. Started on pressor support. Cardiology consulted. Admitted to ICU. Cultures obtained. Started on IV antibiotics.      10/30 Palliative consult for goals of care discussion in an acutely ill patient with cancer.     10/31 Now requiring pressor support and changes in mental status.    11/1 Patient resting in bed, no acute issues overnight.    O: Code Status:   Code Status and Medical Interventions: No CPR (Do Not Attempt to Resuscitate); Limited Support; No intubation (DNI)   Ordered at: 10/31/24 1205     Medical Intervention Limits:    No intubation (DNI)     Code Status (Patient has no pulse and is not breathing):    No CPR (Do Not Attempt to Resuscitate)     Medical Interventions (Patient has pulse or is breathing):    Limited Support      Advanced Directives: Advance Directive Status: Patient does not have advance directive   Goals of Care: Ongoing.   Palliative Performance Scale Score:       /83   Pulse 118   Temp 97.2 °F (36.2 °C) (Oral)   Resp (!) 32   Ht 160 cm (63\")   Wt 120 kg (264 lb)   SpO2 95%   BMI 46.77 kg/m²     Intake/Output Summary (Last 24 hours) at 11/1/2024 1344  Last data filed at 11/1/2024 0400  Gross per 24 hour   Intake 6130 ml   Output 200 ml   Net 5930 ml         Review of Systems   Constitutional:  Positive for fatigue.   Neurological:  Positive for weakness.   Psychiatric/Behavioral:  Positive for confusion.    All other systems reviewed and are negative.        Physical " Exam  Vitals and nursing note reviewed.   Constitutional:       Appearance: She is obese. She is ill-appearing.   HENT:      Head: Normocephalic and atraumatic.      Nose: Nose normal.      Comments: Nasal canula      Mouth/Throat:      Mouth: Mucous membranes are dry.      Pharynx: Oropharynx is clear.   Eyes:      Conjunctiva/sclera: Conjunctivae normal.   Cardiovascular:      Rate and Rhythm: Normal rate.      Pulses: Normal pulses.   Pulmonary:      Effort: Pulmonary effort is normal.   Abdominal:      Palpations: Abdomen is soft.   Musculoskeletal:         General: Normal range of motion.      Cervical back: Normal range of motion.   Skin:     General: Skin is dry.      Coloration: Skin is pale.   Neurological:      General: No focal deficit present.         Labs:   Results from last 7 days   Lab Units 11/01/24  0635   WBC 10*3/mm3 25.87*   HEMOGLOBIN g/dL 12.6   HEMATOCRIT % 37.8   PLATELETS 10*3/mm3 143     Results from last 7 days   Lab Units 11/01/24  0635   SODIUM mmol/L 136   POTASSIUM mmol/L 3.6   CHLORIDE mmol/L 95*   CO2 mmol/L 21.3*   BUN mg/dL 77*   CREATININE mg/dL 7.52*   GLUCOSE mg/dL 243*   CALCIUM mg/dL 7.8*     Results from last 7 days   Lab Units 11/01/24  0635   SODIUM mmol/L 136   POTASSIUM mmol/L 3.6   CHLORIDE mmol/L 95*   CO2 mmol/L 21.3*   BUN mg/dL 77*   CREATININE mg/dL 7.52*   CALCIUM mg/dL 7.8*   BILIRUBIN mg/dL 0.3   ALK PHOS U/L 77   ALT (SGPT) U/L 14   AST (SGOT) U/L 27   GLUCOSE mg/dL 243*         Diagnostics and medications: Reviewed      A: [unfilled]    ADVANCED CARE PLANNING    10/30 Met with patient and sister at bedside. Patient is awake and alert. We discussed her current clinical status and overall goals of care. Patient tells me that at this time, she wants to continue with full aggressive treatment. We discussed the possibility of her not being able to treat her metastatic cancer based on her functional status. Sister acknowledges that this may ultimately change their plan  of care, but are hoping to hear from MD Del Real regarding recommendations. Patient does share that if dialysis was necessary, she would be agreeable. Wanting to remain a full code with full intervention at this time. Patient has no advanced directives but tells me she would not want to live on machines. She states that she has one daughter that has been in and out of psychiatric facilities. If she were  unable to make decisions on her own, she would want us to talk to her sisters for medical decisions. This information was shared with nursing and NP. I will have  follow up with health care representative documentation.    10/31 Met with patient and sister at bedside. Patient is not alert and oriented. Spoke with  this am who affirmed that sisters are legal decision makers. Spoke with sister at bedside who shares that they believe comfort is a reasonable approach for the patient after speaking with multiple providers. She asks that I place a referral to [facility] to assist with possible GIP admission vs home with hospice. She affirms DNR/DNI status. Patients other sister is currently being admitted in our ED but will be available to assist in decision making as able. Third sister is a half sister and is not involved in patients care, per Andressa, is accessible if needed. Referral to [facility] placed. Liaison aware. This information was shared with nursing.   11/1 Patient drowsy at time of rounds. Sister at bedside. Plan is to transition to comfort care this afternoon when the patients father is able to be present. [facility] liaison is aware and following. NP Day notified.       Assessment:    Sepsis?: With low BP and elevated WbC. Started on Iv antibiotics. Cultures obtained. Requiring pressor support.      Endometrial cancer: Recently completed palliative radiation. Hem onc consulted. Not recommending treatment.     HASMUKH: With creatinine 8.3 in ED. Nephrology consulted for management.      AMS:  with acute changes. Neurology consulted. CT head suggestive of embolic strokes.      Afib: with RVR. Cardiology consulted. Echo obtained.      DM/HTN: chronic conditions per primary.     These  illnesses and their management contribute to the need for a palliative consult and advanced care planning.      P:   Palliative Care Team will continue to follow patient.         Haley Henao, APRN  11/1/2024    I spent 26 minutes reviewing providers documentation, medication records, assessing and examining patient, discussing with family, answering questions, providing guidance about a plan of care, and coordinating care with other healthcare members. More than 50% of time spent face to face discussing disease education, current clinical status, and medication managemen

## 2024-11-01 NOTE — CASE MANAGEMENT/SOCIAL WORK
Continued Stay Note  [facility]     Patient Name: Felisha Mukherjee  MRN: [id number]  Today's Date: 11/1/2024    Admit Date: 10/30/2024    Plan: Lives in apartment alone, caregiver 8-12 daily. Plan to go GIP with [facility] once father arrives.   Discharge Plan       Row Name 11/01/24 0929       Plan    Plan Lives in apartment alone, caregiver 8-12 daily. Plan to go GIP with [facility] once father arrives.    Plan Comments Plans to go GIP [facility] once father arrives. DC Barriers: NPO, PICC, A-line, FC (chronic), IV Abxs, 15L HFNC, Levo/Temo/Vaso gtt, no HD per Nephro (68/7.65), WBC 22, Palliative/[facility]/Neuro/HemOnc/Uro/Nephro/Cardio following.                 Expected Discharge Date and Time       Expected Discharge Date Expected Discharge Time    Nov 4, 2024               NILO Mariscal RN  ICU/CVU   O: [phone]  C: [phone]  [email or web address]

## 2024-11-01 NOTE — PROGRESS NOTES
Hematology/Oncology Inpatient Progress Note    PATIENT NAME: Felisha Mukherjee  : 1965  MRN: [id number]    CHIEF COMPLAINT: Metastatic endometrial carcinoma.    HISTORY OF PRESENT ILLNESS:      2024: Ms. Hwang was referred for the investigation and treatment of anemia.  Heme in 2024 for blood count revealed a hemoglobin of 7.1 g/dL with microcytic red cells.  There was clear evidence of iron deficiency with a total iron binding capacity saturation of 2.3%.  She was started on oral iron and was in early 2024 identified as having endometrial adenocarcinoma of the endometrioid type with a FIGO grade 1.  The iron deficiency was attributed to menorrhagia that was felt to be the result of the malignancy.  With persistent anemia a decision was made to treat her with intravenous iron.     9/10/2024: For the first time at the [place] office with the above.  She has yet to receive intravenous iron but is scheduled to do so in the near future.  She is also being followed by Dr. Matthews at the [facility] gynecology/oncology program.  Apparently surgery is not in the near future plans.  Generally she feels well at this time although she continues to be fatigued and weak at times.  She describes a good appetite for the most part and eats frequently during the day.  Her weight is stable.  She has not experienced any chest pains, cough or dyspnea.  She has intermittent abdominal pain and describes abnormal defecations that happen every 2 or 3 days but several times during that day at times with soft stools but never liquid stools.  She has some dysuria.  She has continued to have vaginal bleeding but it has not been as intense as before.  She has no peripheral edema.  On exam she seems chronically ill.  She is not in distress.  She is conversant and oriented.  No jaundice.  The lungs are diminished bilaterally and the heart regular.  The abdomen is soft nontender and there is no  edema.  Laboratory exams reviewed.  She has yet to receive intravenous iron.  I have asked her to see me again after completing the intravenous iron with new laboratory exams.     10/10/2024: Ms. Mukherjee is a patient of mine. She was referred for iron deficiency that seemed to be the result of postmenopausal vaginal bleeding. This second was the result of endometrial carcinoma, which was being staged by Dr. Matthews at the [facility]. At the time of this admission she had been determined to have evidence of metastatic disease. She was seen by Dr. Garzon who sent her to the hospital with cellulitis of the skin of the lower abdomen. Today, she reports she feels better and has had less pain at the place of the cellulitis. She has not been very active and she is weak. She has been afebrile. She has not had any dyspnea and has been eating well. On exam she is oriented. No jaundice and no oral lesions. The lungs are clear and the heart regular. The abdomen is protuberant and soft. There is indeed a large band of erythematous skin on the lover abdomen with areas of breakdown that is very tender. I have reviewed the imaging studies and discussed with Dr. Garzon. I believe that histologic confirmation of metastatic disease with an image guided biopsy is necessary. Will allow for the cellulitis to improve prior to the biopsy. I will continue to follow her.      10/17/2024: Received treatment with antibiotics with slow but sure improvement. She had a biopsy of the omentum. The final report of pathology reported metastatic moderately differentiated adenocarcinoma. The tumor was positive for cytokeratin 7 and patchy positivity for PAX8. The tumor was negative for cytokeratin 20, GATA3, TTF-1, Napsin A, estrogen receptor, progesterone receptor, CDX2 and CA 19.9. This was not felt to be entirely consistent with endometrioid endometrial carcinoma, given the negativity for estrogen and progesterone receptors.  She was discharged to continue recovery and start palliative treatment with radiation therapy.      10/28/2024: In the office for follow up. She has completed palliative radiation to the pelvis.  She has been feeling somewhat better, but she's still extremely weak and still can walk a few steps at a time. She has been intermittently incontinent of stool. She has been eating some. Spends the majority of the time in bed; her sister is clear in that at this point she would not be able to return home. On exam pale, ill appearing but in no distress. Transported in a wheel chair. No jaundice. Lungs diminished. Heart regular. Abdomen protuberant.There is edema. Reviewed the laboratory exams. Discussed the report of the biopsy with her and her sister. Her poor performance status is concerning. She might not benefit from antineoplastic treatment. She is unsure but would like to at least try. On this basis she is to receive a port. Next generation sequencing was requested. She is to see me in 2 weeks. For now continue physical therapy.      10/31/2024: I recently saw Ms. Tai event in follow-up after her discharge from the hospital.  The infection on the skin of her abdomen had nearly completely resolved and she was feeling better from this point of view.  However, she was transported in a wheelchair and felt that she could not walk more than a few steps at a time.  She had been very weak.  We had a discussion regarding the report of the biopsy done on during the hospitalization, that had confirmed metastatic adenocarcinoma.  I was concerned that her performance status suggest that she would not benefit from treatment with chemotherapy but she was interested in pursuing treatment, if at all possible.  Plans to continue with physical therapy and to insert a port were made.  She was admitted, however, after she had a fall at the nursing home where she is currently residing.  She could not get up on her own.  She had some  "trauma to the head.  She seemed septic.  She was admitted for continued treatment.  At this point she is prostrated.  She is able to answer some of my questions but her speech is slurred and difficult to understand.  She seems oriented.  She is not in distress.  She is not pale or jaundiced.  She is tachycardic but the lungs are clear.  The abdomen is protuberant but not tender.  She has some edema.  Whereas at the time of the last visit to the office I was concerned that she may not benefit from antineoplastic treatment, at this time I am convinced that she would not.  Antineoplastic treatment would result probably only in side effects.  I do not believe that she will be strong enough following these recent events to even come to the office.  Discussions regarding hospice have been had with her and her family.  She has decided to go with hospice.  I believe this is an adequate decision given her circumstances.    Subjective   11/1/2024: Unable to provide any information.  ROS:  Review of Systems   Unable to perform ROS: Other        MEDICATIONS:    Scheduled Meds:  [Held by provider] amLODIPine, 5 mg, Oral, Q24H  LORazepam, 1 mg, Intravenous, Once       Continuous Infusions:      PRN Meds:    fentaNYL citrate (PF)    LORazepam    ondansetron ODT **OR** ondansetron    sodium chloride     ALLERGIES:  No Known Allergies    Objective    VITALS:   /59   Pulse 116   Temp 97.6 °F (36.4 °C) (Axillary)   Resp (!) 32   Ht 160 cm (63\")   Wt 120 kg (264 lb)   SpO2 96%   BMI 46.77 kg/m²     PHYSICAL EXAM: (performed by MD)  Physical Exam  Constitutional:       General: She is not in acute distress.     Appearance: She is ill-appearing and toxic-appearing. She is not diaphoretic.   HENT:      Head: Normocephalic and atraumatic.      Right Ear: External ear normal.      Left Ear: External ear normal.      Nose: Nose normal.      Mouth/Throat:      Mouth: Mucous membranes are moist.      Pharynx: Oropharynx is clear. " No oropharyngeal exudate or posterior oropharyngeal erythema.   Eyes:      General: No scleral icterus.        Right eye: No discharge.         Left eye: No discharge.      Conjunctiva/sclera: Conjunctivae normal.      Pupils: Pupils are equal, round, and reactive to light.   Cardiovascular:      Rate and Rhythm: Normal rate and regular rhythm.      Pulses: Normal pulses.      Heart sounds: No murmur heard.     No friction rub. No gallop.   Pulmonary:      Effort: No respiratory distress.      Breath sounds: No stridor. No wheezing, rhonchi or rales.   Abdominal:      General: Bowel sounds are normal. There is no distension.      Palpations: Abdomen is soft. There is no mass.      Tenderness: There is no abdominal tenderness. There is no right CVA tenderness, left CVA tenderness, guarding or rebound.      Hernia: No hernia is present.      Comments: Protuberant, soft and nontender.  I cannot palpate any discrete tumors.   Musculoskeletal:         General: No tenderness, deformity or signs of injury.      Cervical back: No rigidity.      Right lower leg: Edema present.      Left lower leg: Edema present.   Lymphadenopathy:      Cervical: No cervical adenopathy.   Skin:     Coloration: Skin is not jaundiced or pale.      Findings: No bruising, lesion or rash.   Neurological:      Mental Status: She is alert.     SHAN Del Real MD performed the physical exam on 11/1/2024 as documented above.    RECENT LABS:  Lab Results (last 24 hours)       Procedure Component Value Units Date/Time    Protein / Creatinine Ratio, Urine - Urine, Clean Catch [[id number]]  (Abnormal) Collected: 10/31/24 2226    Specimen: Urine, Clean Catch Updated: 11/01/24 1248     Protein/Creatinine Ratio, Urine 10,000.0 mg/G Crea      Creatinine, Urine 68.8 mg/dL      Total Protein, Urine 688.0 mg/dL     Urine Culture - Urine, Indwelling Urethral Catheter [[id number]]  (Normal) Collected: 10/31/24 2226    Specimen: Urine from Indwelling Urethral  Catheter Updated: 11/01/24 1209     Urine Culture No growth    POC Glucose Once [[id number]]  (Abnormal) Collected: 11/01/24 1139    Specimen: Blood Updated: 11/01/24 1141     Glucose 216 mg/dL      Comment: Serial Number: [id number]:  [id number]       Lipid Panel [[id number]]  (Abnormal) Collected: 11/01/24 0635    Specimen: Blood Updated: 11/01/24 0803     Total Cholesterol 71 mg/dL      Triglycerides 225 mg/dL      HDL Cholesterol 10 mg/dL      LDL Cholesterol  26 mg/dL      VLDL Cholesterol 35 mg/dL      LDL/HDL Ratio 1.60    Narrative:      Cholesterol Reference Ranges  (U.S. Department of Health and Human Services ATP III Classifications)    Desirable          <200 mg/dL  Borderline High    200-239 mg/dL  High Risk          >240 mg/dL      Triglyceride Reference Ranges  (U.S. Department of Health and Human Services ATP III Classifications)    Normal           <150 mg/dL  Borderline High  150-199 mg/dL  High             200-499 mg/dL  Very High        >500 mg/dL    HDL Reference Ranges  (U.S. Department of Health and Human Services ATP III Classifications)    Low     <40 mg/dl (major risk factor for CHD)  High    >60 mg/dl ('negative' risk factor for CHD)        LDL Reference Ranges  (U.S. Department of Health and Human Services ATP III Classifications)    Optimal          <100 mg/dL  Near Optimal     100-129 mg/dL  Borderline High  130-159 mg/dL  High             160-189 mg/dL  Very High        >189 mg/dL    Blood Culture - Blood, Hand, Left [[id number]]  (Normal) Collected: 10/30/24 0733    Specimen: Blood from Hand, Left Updated: 11/01/24 0800     Blood Culture No growth at 2 days    Narrative:      Less than seven (7) mL's of blood was collected.  Insufficient quantity may yield false negative results.    Hemoglobin A1c [[id number]]  (Abnormal) Collected: 11/01/24 0635    Specimen: Blood Updated: 11/01/24 0748     Hemoglobin A1C 6.68 %     CBC & Differential [[id number]]  (Abnormal) Collected: 11/01/24  0635    Specimen: Blood Updated: 11/01/24 0731    Narrative:      The following orders were created for panel order CBC & Differential.  Procedure                               Abnormality         Status                     ---------                               -----------         ------                     CBC Auto Differential[[id number]]        Abnormal            Final result               Scan Slide[[id number]]                                       Final result                 Please view results for these tests on the individual orders.    CBC Auto Differential [[id number]]  (Abnormal) Collected: 11/01/24 0635    Specimen: Blood Updated: 11/01/24 0731     WBC 25.87 10*3/mm3      RBC 5.15 10*6/mm3      Hemoglobin 12.6 g/dL      Hematocrit 37.8 %      MCV 73.4 fL      MCH 24.5 pg      MCHC 33.3 g/dL      RDW 21.7 %      RDW-SD 55.3 fl      MPV --     Comment: Unable to Calculate          Platelets 143 10*3/mm3     Scan Slide [[id number]] Collected: 11/01/24 0635    Specimen: Blood Updated: 11/01/24 0731     Scan Slide --     Comment: See Manual Differential Results       Manual Differential [[id number]]  (Abnormal) Collected: 11/01/24 0635    Specimen: Blood Updated: 11/01/24 0731     Neutrophil % 77.0 %      Lymphocyte % 0.0 %      Monocyte % 3.0 %      Bands %  18.0 %      Metamyelocyte % 2.0 %      Neutrophils Absolute 24.58 10*3/mm3      Lymphocytes Absolute 0.00 10*3/mm3      Monocytes Absolute 0.78 10*3/mm3      Elliptocytes Slight/1+     RBC Fragments Slight/1+     WBC Morphology Normal     Platelet Estimate Adequate     Large Platelets Slight/1+    Magnesium [[id number]]  (Normal) Collected: 11/01/24 0635    Specimen: Blood Updated: 11/01/24 0702     Magnesium 1.8 mg/dL     Comprehensive Metabolic Panel [[id number]]  (Abnormal) Collected: 11/01/24 0635    Specimen: Blood Updated: 11/01/24 0702     Glucose 243 mg/dL      BUN 77 mg/dL      Creatinine 7.52 mg/dL      Sodium 136 mmol/L      Potassium 3.6 mmol/L       Chloride 95 mmol/L      CO2 21.3 mmol/L      Calcium 7.8 mg/dL      Total Protein 4.3 g/dL      Albumin 2.1 g/dL      ALT (SGPT) 14 U/L      AST (SGOT) 27 U/L      Alkaline Phosphatase 77 U/L      Total Bilirubin 0.3 mg/dL      Globulin 2.2 gm/dL      A/G Ratio 1.0 g/dL      BUN/Creatinine Ratio 10.2     Anion Gap 19.7 mmol/L      eGFR 5.7 mL/min/1.73      Comment: <15 Indicative of kidney failure       Narrative:      GFR Normal >60  Chronic Kidney Disease <60  Kidney Failure <15      Phosphorus [[id number]]  (Abnormal) Collected: 11/01/24 0635    Specimen: Blood Updated: 11/01/24 0702     Phosphorus 6.8 mg/dL     Blood Culture - Blood, Hand, Left [[id number]]  (Normal) Collected: 10/30/24 0626    Specimen: Blood from Hand, Left Updated: 11/01/24 0700     Blood Culture No growth at 2 days    POC Glucose Once [[id number]]  (Abnormal) Collected: 11/01/24 0635    Specimen: Blood Updated: 11/01/24 0636     Glucose 228 mg/dL      Comment: Serial Number: [id number]:  [id number]       Osmolality, Urine - Urine, Clean Catch [[id number]]  (Abnormal) Collected: 10/31/24 2226    Specimen: Urine, Clean Catch Updated: 10/31/24 2326     Osmolality, Urine 244 mOsm/kg     POC Glucose 4x Daily Before Meals & at Bedtime [[id number]]  (Abnormal) Collected: 10/31/24 2306    Specimen: Blood Updated: 10/31/24 2307     Glucose 196 mg/dL      Comment: Serial Number: [id number]:  [id number]       Sodium, Urine, Random - Urine, Clean Catch [[id number]] Collected: 10/31/24 2226    Specimen: Urine, Clean Catch Updated: 10/31/24 2306     Sodium, Urine 67 mmol/L     Narrative:      Reference intervals for random urine have not been established.  Clinical usage is dependent upon physician's interpretation in combination with other laboratory tests.       S. Pneumo Ag Urine or CSF - Urine, Urine, Clean Catch [[id number]]  (Normal) Collected: 10/31/24 2226    Specimen: Urine, Clean Catch Updated: 10/31/24 2301     Strep Pneumo Ag Negative     Legionella Antigen, Urine - Urine, Urine, Clean Catch [[id number]]  (Abnormal) Collected: 10/31/24 2226    Specimen: Urine, Clean Catch Updated: 10/31/24 2301     LEGIONELLA ANTIGEN, URINE Positive    Urinalysis, Microscopic Only - Indwelling Urethral Catheter [[id number]]  (Abnormal) Collected: 10/31/24 2226    Specimen: Urine from Indwelling Urethral Catheter Updated: 10/31/24 2259     RBC, UA 21-50 /HPF      WBC, UA Too Numerous to Count /HPF      Bacteria, UA 4+ /HPF      Squamous Epithelial Cells, UA 0-2 /HPF      Transitional Epithelial Cells, UA 0-2 /HPF      Renal Epithelial Cells, UA 0-2 /HPF      Hyaline Casts, UA 0-2 /LPF      Methodology Manual Light Microscopy    Urinalysis With Culture If Indicated - Indwelling Urethral Catheter [[id number]]  (Abnormal) Collected: 10/31/24 2226    Specimen: Urine from Indwelling Urethral Catheter Updated: 10/31/24 2249     Color, UA Dark Yellow     Appearance, UA Turbid     pH, UA 5.5     Specific Gravity, UA 1.016     Glucose,  mg/dL (Trace)     Ketones, UA Trace     Bilirubin, UA Negative     Blood, UA Large (3+)     Protein, UA >=300 mg/dL (3+)     Leuk Esterase, UA Large (3+)     Nitrite, UA Negative     Urobilinogen, UA 0.2 E.U./dL    Narrative:      In absence of clinical symptoms, the presence of pyuria, bacteria, and/or nitrites on the urinalysis result does not correlate with infection.          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 Unremarkable MRA of the head and neck without abrupt cut off/large vessel occlusion, flow-limiting stenosis, dissection, or aneurysm. Findings discussed with ordering provider Dr. Brothers by Dr. John Ordonez via telephone at 10:30 p.m. 10/30/2024. 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 Unremarkable MRA of the head and neck without abrupt cut off/large vessel occlusion, flow-limiting stenosis, dissection, or aneurysm. Findings discussed with ordering provider Dr. Brothers by Dr. John Ordonez via telephone at 10:30 p.m. 10/30/2024. Electronically Signed: John Ordonez MD  10/30/2024 10:34 PM EDT  Workstation ID: [id number]    MRI Angiogram Neck Without Contrast    Result Date: 10/30/2024  Impression: Numerous small scattered acute infarcts in the supratentorial and infratentorial brain, most compatible with embolic phenomenon. Unremarkable MRA of the head and neck without abrupt cut off/large vessel occlusion, flow-limiting stenosis, dissection, or aneurysm. Findings discussed with ordering provider Dr. Brothers by Dr. John Ordonez via telephone at 10:30 p.m. 10/30/2024. Electronically Signed: John Ordonez MD  10/30/2024 10:34 PM EDT  Workstation ID: [id number]    CT Head Without Contrast Stroke Protocol    Result Date: 10/30/2024  Impression: Small vague hypodensity in the right occipital lobe; this may be new from today's earlier head CT. This could reflect a small acute infarct. This could be further evaluated with brain MRI. No acute intracranial hemorrhage. No definite evidence of acute large territory infarct. Electronically Signed: John Ordonez MD  10/30/2024 8:35 PM EDT  Workstation ID: [id number]     Assessment & Plan   ASSESSMENT:  Metastatic endometrial carcinoma: No good options for treatment because her performance status is so poor.  At this time she is to continue to follow with hospice.  No additional intervention from my part.    PLAN:  As above.    Jacinto Del Real MD on 11/1/2024 at 1849.

## 2024-11-02 PROBLEM — Z51.5 END OF LIFE CARE: Status: ACTIVE | Noted: 2024-01-01

## 2024-11-02 NOTE — H&P
History and Physical   Felisha Mukherjee : 1965 MRN:[id number] LOS:0     Reason for admission: End of life care     Assessment / Plan     #Comfort care  -comfort measure ordered   -hospice is following     Code Status (Patient has no pulse and is not breathing): No CPR (Do Not Attempt to Resuscitate)  Medical Interventions (Patient has pulse or is breathing): Comfort Measures       Nutrition: NPO Diet NPO Type: Strict NPO     DVT Prophylaxis: Documented VTE Prophylaxis Not Indicated  Reason:        Start        24 1339  VTE Prophylaxis Not Indicated: Comfort Care  Once                              History of Present illness     he patient is a 59 y.o. female with PMH of diabetes type 2, hypertension, endometrial adenocarcinoma with metastasis who presented to the hospital from an acute rehab for evaluation after a ground-level fall. She had an additional complaint of shortness of breath and dry cough x 2 days. She also complained of diffuse abdominal pain and was recently discharged from this facility on 10/18/2024 after being treated for abdominal wall cellulitis. In the ED she developed new onset AF RVR and was started on a cardizem gtt. CT of the head without acute abnormalities. CT of the abdomen pelvis was obtained and revealed evidence of overall disease progression involving patient's known endometrial adenocarcinoma despite having undergone palliative radiation to the pelvis. CXR showed bilateral airspace opacities, labs significant for CO2 9.9, sodium 126, BUN 8.3, HS troponin 169, lactate 4.7, WBC 22.3, and 12% bands. The patient had persistent hypotension following the sepsis IVF bolus and pressor support was initiated with levophed.  She was admitted to the ICU with a principal diagnosis of septic shock secondary to pneumonia.     On 2024, the patient made the difficult decision to change her plan of care to Comfort Care measures only.  She was lucid and oriented at the time of this  decision.  She was able to acknowledge the understanding that comfort care will result in natural death and that all other medical support will be discontinued at this time.  Her family was at bedside at the time of this discussion and they are in agreement with her choice.    Hospitalist team is consulted for inpatient hospice management.        Physical Exam  HENT:      Head: Normocephalic and atraumatic.      Nose: Nose normal.   Cardiovascular:      tachycardia  Pulmonary:      Gurgling sound   Neurological:      Not awake alert          Past Medical/Surgical/Social/Family History & Allergies     Past Medical History:   Diagnosis Date    Diabetes mellitus     Hypertension     Uterine mass       Past Surgical History:   Procedure Laterality Date    CHOLECYSTECTOMY        Social History     Socioeconomic History    Marital status:    Tobacco Use    Smoking status: Never   Vaping Use    Vaping status: Never Used   Substance and Sexual Activity    Alcohol use: Never    Drug use: Never      Family History   Problem Relation Age of Onset    Diabetes Mother       No Known Allergies     Home Medications     Prior to Admission medications    Medication Sig Start Date End Date Taking? Authorizing Provider   HYDROmorphone (DILAUDID) 1 MG/ML injection Infuse 1 mL into a venous catheter Every 15 (Fifteen) Minutes As Needed for Moderate Pain or Severe Pain for up to 4 days. 11/2/24 11/6/24  DayJojo APRN   LORazepam (ATIVAN) 2 MG/ML injection Infuse 0.5 mL into a venous catheter Every 30 (Thirty) Minutes As Needed for Anxiety or Seizures (prn dyspnea) for up to 4 days. 11/2/24 11/6/24  Jojo Nj APRN   ondansetron (ZOFRAN) 2 mg/mL injection Infuse 2 mL into a venous catheter Every 6 (Six) Hours As Needed for Nausea or Vomiting. 11/2/24   Jojo Nj APRN   ondansetron ODT (ZOFRAN-ODT) 4 MG disintegrating tablet Take 1 tablet by mouth Every 6 (Six) Hours As Needed for Nausea or Vomiting. 11/2/24   Jojo Nj  SANTI   amLODIPine (NORVASC) 5 MG tablet Take 1 tablet by mouth Daily. 10/16/24 11/2/24  Harry Pryor MD   Josr Carb-Mag Hydrox-Simeth 1200-270-80 MG/10ML suspension Take 10 mL by mouth Every 6 (Six) Hours As Needed.  11/2/24  William Irwin MD   docusate sodium 100 MG capsule Take 1 capsule by mouth Daily. 6/26/24 11/2/24  William Irwin MD   Farxiga 10 MG tablet Take 10 mg by mouth Daily. 6/26/24 11/2/24  William Irwin MD   ferrous sulfate 325 (65 FE) MG tablet Take 1 tablet by mouth 5 (Five) Times a Week. Monday, Tuesday, Wednesday, Thursday and Friday 11/2/24  William Irwin MD   hydrocortisone-bacitracin-zinc oxide-nystatin (MAGIC BARRIER) Apply 1 Application topically to the appropriate area as directed 4 (Four) Times a Day. 10/16/24 11/2/24  Harry Pryor MD   hydrOXYzine (ATARAX) 50 MG tablet Take 1 tablet by mouth Every Night. 6/27/24 11/2/24  William Irwin MD   lisinopril (PRINIVIL,ZESTRIL) 40 MG tablet Take 1 tablet by mouth Daily.  11/2/24  William Irwin MD   metFORMIN (GLUCOPHAGE) 500 MG tablet Take 1 tablet by mouth 2 (Two) Times a Day. 5/28/24 11/2/24  William Irwin MD   metoprolol succinate XL (TOPROL-XL) 50 MG 24 hr tablet Take 1 tablet by mouth every night at bedtime.  11/2/24  William Irwin MD   montelukast (SINGULAIR) 10 MG tablet Take 1 tablet by mouth Every Night.  11/2/24  William Irwin MD   multivitamin (Thera) tablet tablet Take 1 tablet by mouth Daily. 10/16/24 11/2/24  Harry Pryor MD   nystatin (MYCOSTATIN) 481401 UNIT/GM powder Apply  topically to the appropriate area as directed Every 12 (Twelve) Hours. 10/16/24 11/2/24  Harry Pryor MD   PARoxetine (PAXIL) 30 MG tablet Take 1 tablet by mouth Daily. 6/12/24 11/2/24  Provider, MD William   risperiDONE (risperDAL) 2 MG tablet Take 2 tablets by mouth Every Evening. 5/30/24 11/2/24  Provider, MD William   traMADol (ULTRAM) 50 MG tablet Take 1 tablet by  mouth Every 6 (Six) Hours As Needed for Moderate Pain.  11/2/24  William Irwin MD   vitamin D (ERGOCALCIFEROL) 1.25 MG (62995 UT) capsule capsule Take 1 capsule by mouth Every 7 (Seven) Days. Wednesdays 11/2/24  William Irwin MD   zinc sulfate (ZINCATE) 220 (50 Zn) MG capsule Take 1 capsule by mouth Daily. 10/16/24 11/2/24  Harry Pryor MD     Labs     Results from last 7 days   Lab Units 11/01/24  0635 10/30/24  2359 10/30/24  0630 10/30/24  0626 10/28/24  1132   WBC 10*3/mm3 25.87* 22.19*  --  22.31* 19.79*   HEMATOCRIT % 37.8 37.4  --  40.1 39.1   HEMATOCRIT POC %  --   --  40  --   --    PLATELETS 10*3/mm3 143 195  --  192 248      Results from last 7 days   Lab Units 11/01/24  0635 10/31/24  0827 10/31/24  0642 10/30/24  2359 10/30/24  1720   SODIUM mmol/L 136 132* 133* 129* 132*   POTASSIUM mmol/L 3.6 3.7 3.6 4.1 3.6   CHLORIDE mmol/L 95* 93* 93* 93* 100   CO2 mmol/L 21.3* 9.1* 10.0* 11.9* 12.0*   BUN mg/dL 77* 68* 67* 67* 59*   CREATININE mg/dL 7.52* 7.65* 7.56* 7.35* 6.43*        Current Medications   Scheduled Meds:      Continuous Infusions:      Ziyad Tran MD  [facility]   11/02/24   15:36 EDT

## 2024-11-02 NOTE — DISCHARGE SUMMARY
CRITICAL CARE DISCHARGE SUMMARY           PATIENT NAME:  Felisha Mukherjee             :  1965            MRN:  [id number]    DATE OF ADMISSION: 10/30/2024     DATE OF DISCHARGE: 2024    DISCHARGE DIAGNOSES:   Metastatic endometrial adenocarcinoma   Putative septic shock  Legionella PNA    Acute Kidney Injury  Hydronephrosis   Atrial fibrillation RVR  Hyponatremia   Mixed respiratory and metabolic acidosis  Ground-level fall  Diabetes mellitus Type 2, not insulin-dependent  HTN   Anxiety/Depression      HOSPITAL COURSE  The patient is a 59 y.o. female with PMH of diabetes type 2, hypertension, endometrial adenocarcinoma with metastasis who presented to the hospital from an acute rehab for evaluation after a ground-level fall. She had an additional complaint of shortness of breath and dry cough x 2 days. She also complained of diffuse abdominal pain and was recently discharged from this facility on 10/18/2024 after being treated for abdominal wall cellulitis. In the ED she developed new onset AF RVR and was started on a cardizem gtt. CT of the head without acute abnormalities. CT of the abdomen pelvis was obtained and revealed evidence of overall disease progression involving patient's known endometrial adenocarcinoma despite having undergone palliative radiation to the pelvis. CXR showed bilateral airspace opacities, labs significant for CO2 9.9, sodium 126, BUN 8.3, HS troponin 169, lactate 4.7, WBC 22.3, and 12% bands. The patient had persistent hypotension following the sepsis IVF bolus and pressor support was initiated with levophed.  She was admitted to the ICU with a principal diagnosis of septic shock secondary to pneumonia.    On 2024, the patient made the difficult decision to change her plan of care to Comfort Care measures only.  She was lucid and oriented at the time of this decision.  She was able to acknowledge the understanding that comfort care will result in natural death and that  all other medical support will be discontinued at this time.  Her family was at bedside at the time of this discussion and they are in agreement with her choice.    PROCEDURES PERFORMED    10/31 0637 Insert Arterial Line    LABS/RADIOLOGICAL STUDIES  Lab Results (last 72 hours)       Procedure Component Value Units Date/Time    Blood Culture - Blood, Hand, Left [[id number]]  (Normal) Collected: 10/30/24 0733    Specimen: Blood from Hand, Left Updated: 11/02/24 0801     Blood Culture No growth at 3 days    Narrative:      Less than seven (7) mL's of blood was collected.  Insufficient quantity may yield false negative results.    Blood Culture - Blood, Hand, Left [[id number]]  (Normal) Collected: 10/30/24 0626    Specimen: Blood from Hand, Left Updated: 11/02/24 0700     Blood Culture No growth at 3 days    Protein / Creatinine Ratio, Urine - Urine, Clean Catch [[id number]]  (Abnormal) Collected: 10/31/24 2226    Specimen: Urine, Clean Catch Updated: 11/01/24 1248     Protein/Creatinine Ratio, Urine 10,000.0 mg/G Crea      Creatinine, Urine 68.8 mg/dL      Total Protein, Urine 688.0 mg/dL     Urine Culture - Urine, Indwelling Urethral Catheter [[id number]]  (Normal) Collected: 10/31/24 2226    Specimen: Urine from Indwelling Urethral Catheter Updated: 11/01/24 1209     Urine Culture No growth    POC Glucose Once [[id number]]  (Abnormal) Collected: 11/01/24 1139    Specimen: Blood Updated: 11/01/24 1141     Glucose 216 mg/dL      Comment: Serial Number: [id number]:  [id number]       Lipid Panel [[id number]]  (Abnormal) Collected: 11/01/24 0635    Specimen: Blood Updated: 11/01/24 0803     Total Cholesterol 71 mg/dL      Triglycerides 225 mg/dL      HDL Cholesterol 10 mg/dL      LDL Cholesterol  26 mg/dL      VLDL Cholesterol 35 mg/dL      LDL/HDL Ratio 1.60    Narrative:      Cholesterol Reference Ranges  (U.S. Department of Health and Human Services ATP III Classifications)    Desirable          <200 mg/dL  Borderline  High    200-239 mg/dL  High Risk          >240 mg/dL      Triglyceride Reference Ranges  (U.S. Department of Health and Human Services ATP III Classifications)    Normal           <150 mg/dL  Borderline High  150-199 mg/dL  High             200-499 mg/dL  Very High        >500 mg/dL    HDL Reference Ranges  (U.S. Department of Health and Human Services ATP III Classifications)    Low     <40 mg/dl (major risk factor for CHD)  High    >60 mg/dl ('negative' risk factor for CHD)        LDL Reference Ranges  (U.S. Department of Health and Human Services ATP III Classifications)    Optimal          <100 mg/dL  Near Optimal     100-129 mg/dL  Borderline High  130-159 mg/dL  High             160-189 mg/dL  Very High        >189 mg/dL    Hemoglobin A1c [[id number]]  (Abnormal) Collected: 11/01/24 0635    Specimen: Blood Updated: 11/01/24 0748     Hemoglobin A1C 6.68 %     CBC & Differential [[id number]]  (Abnormal) Collected: 11/01/24 0635    Specimen: Blood Updated: 11/01/24 0731    Narrative:      The following orders were created for panel order CBC & Differential.  Procedure                               Abnormality         Status                     ---------                               -----------         ------                     CBC Auto Differential[[id number]]        Abnormal            Final result               Scan Slide[[id number]]                                       Final result                 Please view results for these tests on the individual orders.    CBC Auto Differential [[id number]]  (Abnormal) Collected: 11/01/24 0635    Specimen: Blood Updated: 11/01/24 0731     WBC 25.87 10*3/mm3      RBC 5.15 10*6/mm3      Hemoglobin 12.6 g/dL      Hematocrit 37.8 %      MCV 73.4 fL      MCH 24.5 pg      MCHC 33.3 g/dL      RDW 21.7 %      RDW-SD 55.3 fl      MPV --     Comment: Unable to Calculate          Platelets 143 10*3/mm3     Scan Slide [[id number]] Collected: 11/01/24 0635    Specimen: Blood Updated:  11/01/24 0731     Scan Slide --     Comment: See Manual Differential Results       Manual Differential [[id number]]  (Abnormal) Collected: 11/01/24 0635    Specimen: Blood Updated: 11/01/24 0731     Neutrophil % 77.0 %      Lymphocyte % 0.0 %      Monocyte % 3.0 %      Bands %  18.0 %      Metamyelocyte % 2.0 %      Neutrophils Absolute 24.58 10*3/mm3      Lymphocytes Absolute 0.00 10*3/mm3      Monocytes Absolute 0.78 10*3/mm3      Elliptocytes Slight/1+     RBC Fragments Slight/1+     WBC Morphology Normal     Platelet Estimate Adequate     Large Platelets Slight/1+    Magnesium [[id number]]  (Normal) Collected: 11/01/24 0635    Specimen: Blood Updated: 11/01/24 0702     Magnesium 1.8 mg/dL     Comprehensive Metabolic Panel [[id number]]  (Abnormal) Collected: 11/01/24 0635    Specimen: Blood Updated: 11/01/24 0702     Glucose 243 mg/dL      BUN 77 mg/dL      Creatinine 7.52 mg/dL      Sodium 136 mmol/L      Potassium 3.6 mmol/L      Chloride 95 mmol/L      CO2 21.3 mmol/L      Calcium 7.8 mg/dL      Total Protein 4.3 g/dL      Albumin 2.1 g/dL      ALT (SGPT) 14 U/L      AST (SGOT) 27 U/L      Alkaline Phosphatase 77 U/L      Total Bilirubin 0.3 mg/dL      Globulin 2.2 gm/dL      A/G Ratio 1.0 g/dL      BUN/Creatinine Ratio 10.2     Anion Gap 19.7 mmol/L      eGFR 5.7 mL/min/1.73      Comment: <15 Indicative of kidney failure       Narrative:      GFR Normal >60  Chronic Kidney Disease <60  Kidney Failure <15      Phosphorus [[id number]]  (Abnormal) Collected: 11/01/24 0635    Specimen: Blood Updated: 11/01/24 0702     Phosphorus 6.8 mg/dL     POC Glucose Once [[id number]]  (Abnormal) Collected: 11/01/24 0635    Specimen: Blood Updated: 11/01/24 0636     Glucose 228 mg/dL      Comment: Serial Number: [id number]:  [id number]       Osmolality, Urine - Urine, Clean Catch [[id number]]  (Abnormal) Collected: 10/31/24 2226    Specimen: Urine, Clean Catch Updated: 10/31/24 2326     Osmolality, Urine 244 mOsm/kg     POC  Glucose 4x Daily Before Meals & at Bedtime [[id number]]  (Abnormal) Collected: 10/31/24 2306    Specimen: Blood Updated: 10/31/24 2307     Glucose 196 mg/dL      Comment: Serial Number: [id number]:  [id number]       Sodium, Urine, Random - Urine, Clean Catch [[id number]] Collected: 10/31/24 2226    Specimen: Urine, Clean Catch Updated: 10/31/24 2306     Sodium, Urine 67 mmol/L     Narrative:      Reference intervals for random urine have not been established.  Clinical usage is dependent upon physician's interpretation in combination with other laboratory tests.       S. Pneumo Ag Urine or CSF - Urine, Urine, Clean Catch [[id number]]  (Normal) Collected: 10/31/24 2226    Specimen: Urine, Clean Catch Updated: 10/31/24 2301     Strep Pneumo Ag Negative    Legionella Antigen, Urine - Urine, Urine, Clean Catch [[id number]]  (Abnormal) Collected: 10/31/24 2226    Specimen: Urine, Clean Catch Updated: 10/31/24 2301     LEGIONELLA ANTIGEN, URINE Positive    Urinalysis, Microscopic Only - Indwelling Urethral Catheter [[id number]]  (Abnormal) Collected: 10/31/24 2226    Specimen: Urine from Indwelling Urethral Catheter Updated: 10/31/24 2259     RBC, UA 21-50 /HPF      WBC, UA Too Numerous to Count /HPF      Bacteria, UA 4+ /HPF      Squamous Epithelial Cells, UA 0-2 /HPF      Transitional Epithelial Cells, UA 0-2 /HPF      Renal Epithelial Cells, UA 0-2 /HPF      Hyaline Casts, UA 0-2 /LPF      Methodology Manual Light Microscopy    Urinalysis With Culture If Indicated - Indwelling Urethral Catheter [[id number]]  (Abnormal) Collected: 10/31/24 2226    Specimen: Urine from Indwelling Urethral Catheter Updated: 10/31/24 2249     Color, UA Dark Yellow     Appearance, UA Turbid     pH, UA 5.5     Specific Gravity, UA 1.016     Glucose,  mg/dL (Trace)     Ketones, UA Trace     Bilirubin, UA Negative     Blood, UA Large (3+)     Protein, UA >=300 mg/dL (3+)     Leuk Esterase, UA Large (3+)     Nitrite, UA Negative      "Urobilinogen, UA 0.2 E.U./dL    Narrative:      In absence of clinical symptoms, the presence of pyuria, bacteria, and/or nitrites on the urinalysis result does not correlate with infection.    POC Glucose 4x Daily Before Meals & at Bedtime [[id number]]  (Abnormal) Collected: 10/31/24 1713    Specimen: Blood Updated: 10/31/24 1715     Glucose 197 mg/dL      Comment: Serial Number: [id number]:  [id number]       Procalcitonin [[id number]]  (Abnormal) Collected: 10/31/24 1348    Specimen: Blood Updated: 10/31/24 1424     Procalcitonin 5.10 ng/mL     Narrative:      As a Marker for Sepsis (Non-Neonates):    1. <0.5 ng/mL represents a low risk of severe sepsis and/or septic shock.  2. >2 ng/mL represents a high risk of severe sepsis and/or septic shock.    As a Marker for Lower Respiratory Tract Infections that require antibiotic therapy:    PCT on Admission    Antibiotic Therapy       6-12 Hrs later    >0.5                Strongly Recommended  >0.25 - <0.5        Recommended   0.1 - 0.25          Discouraged              Remeasure/reassess PCT  <0.1                Strongly Discouraged     Remeasure/reassess PCT    As 28 day mortality risk marker: \"Change in Procalcitonin Result\" (>80% or <=80%) if Day 0 (or Day 1) and Day 4 values are available. Refer to [email or web address]://[email or web address]    Change in PCT <=80%  A decrease of PCT levels below or equal to 80% defines a positive change in PCT test result representing a higher risk for 28-day all-cause mortality of patients diagnosed with severe sepsis for septic shock.    Change in PCT >80%  A decrease of PCT levels of more than 80% defines a negative change in PCT result representing a lower risk for 28-day all-cause mortality of patients diagnosed with severe sepsis or septic shock.       POC Glucose Once [[id number]]  (Abnormal) Collected: 10/31/24 1322    Specimen: Blood Updated: 10/31/24 1324     Glucose 222 mg/dL      Comment: Serial Number: " [id number]:  [id number]       Renal Function Panel [[id number]]  (Abnormal) Collected: 10/31/24 0827    Specimen: Blood Updated: 10/31/24 0912     Glucose 228 mg/dL      BUN 68 mg/dL      Creatinine 7.65 mg/dL      Sodium 132 mmol/L      Potassium 3.7 mmol/L      Chloride 93 mmol/L      CO2 9.1 mmol/L      Calcium 7.8 mg/dL      Albumin 2.3 g/dL      Phosphorus 6.9 mg/dL      Anion Gap 29.9 mmol/L      BUN/Creatinine Ratio 8.9     eGFR 5.6 mL/min/1.73      Comment: <15 Indicative of kidney failure       Narrative:      GFR Normal >60  Chronic Kidney Disease <60  Kidney Failure <15      Magnesium [[id number]]  (Normal) Collected: 10/31/24 0827    Specimen: Blood Updated: 10/31/24 0909     Magnesium 1.8 mg/dL     Calcium, Ionized [[id number]]  (Abnormal) Collected: 10/31/24 0827    Specimen: Blood Updated: 10/31/24 0832     Ionized Calcium 0.94 mmol/L     STAT Lactic Acid, Reflex [[id number]]  (Abnormal) Collected: 10/31/24 0642    Specimen: Blood Updated: 10/31/24 0719     Lactate 10.0 mmol/L     Basic Metabolic Panel [[id number]]  (Abnormal) Collected: 10/31/24 0642    Specimen: Blood Updated: 10/31/24 0712     Glucose 223 mg/dL      BUN 67 mg/dL      Creatinine 7.56 mg/dL      Sodium 133 mmol/L      Potassium 3.6 mmol/L      Chloride 93 mmol/L      CO2 10.0 mmol/L      Calcium 7.8 mg/dL      BUN/Creatinine Ratio 8.9     Anion Gap 30.0 mmol/L      eGFR 5.7 mL/min/1.73      Comment: <15 Indicative of kidney failure       Narrative:      GFR Normal >60  Chronic Kidney Disease <60  Kidney Failure <15      CBC & Differential [[id number]]  (Abnormal) Collected: 10/30/24 2359    Specimen: Blood Updated: 10/31/24 0156    Narrative:      The following orders were created for panel order CBC & Differential.  Procedure                               Abnormality         Status                     ---------                               -----------         ------                     CBC Auto Differential[[id number]]         Abnormal            Final result               Scan Slide[[id number]]                                       Final result                 Please view results for these tests on the individual orders.    CBC Auto Differential [[id number]]  (Abnormal) Collected: 10/30/24 2359    Specimen: Blood Updated: 10/31/24 0156     WBC 22.19 10*3/mm3      RBC 4.76 10*6/mm3      Hemoglobin 11.4 g/dL      Hematocrit 37.4 %      MCV 78.6 fL      MCH 23.9 pg      MCHC 30.5 g/dL      RDW 21.6 %      RDW-SD 60.8 fl      MPV 10.5 fL      Platelets 195 10*3/mm3     Scan Slide [[id number]] Collected: 10/30/24 2359    Specimen: Blood Updated: 10/31/24 0156     Scan Slide --    Manual Differential [[id number]]  (Abnormal) Collected: 10/30/24 2359    Specimen: Blood Updated: 10/31/24 0156     Neutrophil % 86.0 %      Lymphocyte % 2.0 %      Monocyte % 3.0 %      Bands %  8.0 %      Myelocyte % 1.0 %      Neutrophils Absolute 20.86 10*3/mm3      Lymphocytes Absolute 0.44 10*3/mm3      Monocytes Absolute 0.67 10*3/mm3      nRBC 1.0 /100 WBC      Anisocytosis Slight/1+     WBC Morphology Normal     Large Platelets Slight/1+     Giant Platelets Slight/1+    Comprehensive Metabolic Panel [[id number]]  (Abnormal) Collected: 10/30/24 2359    Specimen: Blood Updated: 10/31/24 0046     Glucose 225 mg/dL      BUN 67 mg/dL      Creatinine 7.35 mg/dL      Sodium 129 mmol/L      Potassium 4.1 mmol/L      Comment: Slight hemolysis detected by analyzer. Result may be falsely elevated.        Chloride 93 mmol/L      CO2 11.9 mmol/L      Calcium 7.8 mg/dL      Total Protein 4.6 g/dL      Albumin 2.4 g/dL      ALT (SGPT) 13 U/L      AST (SGOT) 23 U/L      Alkaline Phosphatase 67 U/L      Total Bilirubin 0.2 mg/dL      Globulin 2.2 gm/dL      A/G Ratio 1.1 g/dL      BUN/Creatinine Ratio 9.1     Anion Gap 24.1 mmol/L      eGFR 5.9 mL/min/1.73      Comment: <15 Indicative of kidney failure       Narrative:      GFR Normal >60  Chronic Kidney Disease <60  Kidney  Failure <15      Magnesium [[id number]]  (Normal) Collected: 10/30/24 2359    Specimen: Blood Updated: 10/31/24 0046     Magnesium 1.8 mg/dL     High Sensitivity Troponin T [[id number]]  (Abnormal) Collected: 10/30/24 2359    Specimen: Blood Updated: 10/31/24 0046     HS Troponin T 151 ng/L     Narrative:      High Sensitive Troponin T Reference Range:  <14.0 ng/L- Negative Female for AMI  <22.0 ng/L- Negative Male for AMI  >=14 - Abnormal Female indicating possible myocardial injury.  >=22 - Abnormal Male indicating possible myocardial injury.   Clinicians would have to utilize clinical acumen, EKG, Troponin, and serial changes to determine if it is an Acute Myocardial Infarction or myocardial injury due to an underlying chronic condition.         STAT Lactic Acid, Reflex [[id number]]  (Abnormal) Collected: 10/30/24 2359    Specimen: Blood Updated: 10/31/24 0046     Lactate 6.2 mmol/L     Lipid Panel [[id number]]  (Abnormal) Collected: 10/30/24 2359    Specimen: Blood Updated: 10/31/24 0044     Total Cholesterol 66 mg/dL      Triglycerides 150 mg/dL      HDL Cholesterol 12 mg/dL      LDL Cholesterol  28 mg/dL      VLDL Cholesterol 26 mg/dL      LDL/HDL Ratio 2.00    Narrative:      Cholesterol Reference Ranges  (U.S. Department of Health and Human Services ATP III Classifications)    Desirable          <200 mg/dL  Borderline High    200-239 mg/dL  High Risk          >240 mg/dL      Triglyceride Reference Ranges  (U.S. Department of Health and Human Services ATP III Classifications)    Normal           <150 mg/dL  Borderline High  150-199 mg/dL  High             200-499 mg/dL  Very High        >500 mg/dL    HDL Reference Ranges  (U.S. Department of Health and Human Services ATP III Classifications)    Low     <40 mg/dl (major risk factor for CHD)  High    >60 mg/dl ('negative' risk factor for CHD)        LDL Reference Ranges  (U.S. Department of Health and Human Services ATP III Classifications)    Optimal          <100  mg/dL  Near Optimal     100-129 mg/dL  Borderline High  130-159 mg/dL  High             160-189 mg/dL  Very High        >189 mg/dL    Phosphorus [[id number]]  (Abnormal) Collected: 10/30/24 2359    Specimen: Blood Updated: 10/31/24 0044     Phosphorus 7.0 mg/dL     Digoxin Level [[id number]]  (Abnormal) Collected: 10/30/24 2359    Specimen: Blood Updated: 10/31/24 0044     Digoxin 0.57 ng/mL     POC Glucose 4x Daily Before Meals & at Bedtime [[id number]]  (Abnormal) Collected: 10/30/24 2310    Specimen: Blood Updated: 10/30/24 2311     Glucose 218 mg/dL      Comment: Serial Number: [id number]:  [id number]       POC Glucose Once [[id number]]  (Abnormal) Collected: 10/30/24 1839    Specimen: Blood Updated: 10/30/24 1841     Glucose 191 mg/dL      Comment: Serial Number: [id number]:  [id number]       STAT Lactic Acid, Reflex [[id number]]  (Abnormal) Collected: 10/30/24 1720    Specimen: Blood Updated: 10/30/24 1758     Lactate 2.9 mmol/L     Basic Metabolic Panel [[id number]]  (Abnormal) Collected: 10/30/24 1720    Specimen: Blood Updated: 10/30/24 1756     Glucose 163 mg/dL      BUN 59 mg/dL      Creatinine 6.43 mg/dL      Sodium 132 mmol/L      Potassium 3.6 mmol/L      Chloride 100 mmol/L      CO2 12.0 mmol/L      Calcium 7.1 mg/dL      BUN/Creatinine Ratio 9.2     Anion Gap 20.0 mmol/L      eGFR 6.9 mL/min/1.73      Comment: <15 Indicative of kidney failure       Narrative:      GFR Normal >60  Chronic Kidney Disease <60  Kidney Failure <15      Basic Metabolic Panel [[id number]] Collected: 10/30/24 1605    Specimen: Blood Updated: 10/30/24 1656     Glucose --     Comment: Specimen contaminated, Redraw requested  Corrected result. Previous result was 633 mg/dL on 10/30/2024 at 1643 EDT.        BUN --     Comment: Specimen contaminated, Redraw requested  Corrected result. Previous result was 51 mg/dL on 10/30/2024 at 1643 EDT.        Creatinine --     Comment: Specimen contaminated, Redraw requested  Corrected  result. Previous result was 5.29 mg/dL on 10/30/2024 at 1643 EDT.        Sodium --     Comment: Specimen contaminated, Redraw requested  Corrected result. Previous result was 134 mmol/L on 10/30/2024 at 1643 EDT.        Potassium --     Comment: Specimen contaminated, Redraw requested  Corrected result. Previous result was 3.0 mmol/L on 10/30/2024 at 1643 EDT.        Chloride --     Comment: Specimen contaminated, Redraw requested  Corrected result. Previous result was 92 mmol/L on 10/30/2024 at 1643 EDT.        CO2 --     Comment: Specimen contaminated, Redraw requested  Corrected result. Previous result was 29.3 mmol/L on 10/30/2024 at 1643 EDT.        Calcium --     Comment: Specimen contaminated, Redraw requested  Corrected result. Previous result was 5.9 mg/dL on 10/30/2024 at 1643 EDT.        BUN/Creatinine Ratio --     Comment: Specimen contaminated, Redraw requested  Corrected result. Previous result was 9.6 on 10/30/2024 at 1643 EDT.        Anion Gap --     Comment: Specimen contaminated, Redraw requested  Corrected result. Previous result was 12.7 mmol/L on 10/30/2024 at 1643 EDT.        eGFR --     Comment: <15 Indicative of kidney failure  Corrected result. Previous result was 8.8 mL/min/1.73 on 10/30/2024 at 1643 EDT.       Narrative:      GFR Normal >60  Chronic Kidney Disease <60  Kidney Failure <15      POC Glucose Once [[id number]]  (Abnormal) Collected: 10/30/24 1644    Specimen: Blood Updated: 10/30/24 1646     Glucose 179 mg/dL      Comment: Serial Number: [id number]:  [id number]       STAT Lactic Acid, Reflex [[id number]]  (Abnormal) Collected: 10/30/24 1437    Specimen: Blood Updated: 10/30/24 1520     Lactate 2.6 mmol/L     Uric Acid [[id number]]  (Abnormal) Collected: 10/30/24 1011    Specimen: Blood Updated: 10/30/24 1158     Uric Acid 9.4 mg/dL     High Sensitivity Troponin T 2Hr [[id number]]  (Abnormal) Collected: 10/30/24 1011    Specimen: Blood Updated: 10/30/24 1107     HS Troponin T  129 ng/L      Troponin T Delta -40 ng/L     Narrative:      High Sensitive Troponin T Reference Range:  <14.0 ng/L- Negative Female for AMI  <22.0 ng/L- Negative Male for AMI  >=14 - Abnormal Female indicating possible myocardial injury.  >=22 - Abnormal Male indicating possible myocardial injury.   Clinicians would have to utilize clinical acumen, EKG, Troponin, and serial changes to determine if it is an Acute Myocardial Infarction or myocardial injury due to an underlying chronic condition.         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 Unremarkable MRA of the head and neck without abrupt cut off/large vessel occlusion, flow-limiting stenosis, dissection, or aneurysm. Findings discussed with ordering provider Dr. Brothers by Dr. John Ordonez via telephone at 10:30 p.m. 10/30/2024. Electronically Signed: John Ordonez MD  10/30/2024 10:34 PM EDT  Workstation ID: [id number]    MRI Angiogram Head Without Contrast    Result Date: 10/30/2024  Impression: Numerous small scattered acute infarcts in the supratentorial and infratentorial brain, most compatible with embolic phenomenon. Unremarkable MRA of the head and neck without abrupt cut off/large vessel occlusion, flow-limiting stenosis, dissection, or aneurysm. Findings discussed with ordering provider Dr. Brothers by Dr. John Ordonez via telephone at  10:30 p.m. 10/30/2024. Electronically Signed: John Ordonez MD  10/30/2024 10:34 PM EDT  Workstation ID: [id number]    MRI Angiogram Neck Without Contrast    Result Date: 10/30/2024  Impression: Numerous small scattered acute infarcts in the supratentorial and infratentorial brain, most compatible with embolic phenomenon. Unremarkable MRA of the head and neck without abrupt cut off/large vessel occlusion, flow-limiting stenosis, dissection, or aneurysm. Findings discussed with ordering provider Dr. Brothers by Dr. John Ordonez via telephone at 10:30 p.m. 10/30/2024. Electronically Signed: John Ordonez MD  10/30/2024 10:34 PM EDT  Workstation ID: [id number]    CT Head Without Contrast Stroke Protocol    Result Date: 10/30/2024  Impression: Small vague hypodensity in the right occipital lobe; this may be new from today's earlier head CT. This could reflect a small acute infarct. This could be further evaluated with brain MRI. No acute intracranial hemorrhage. No definite evidence of acute large territory infarct. Electronically Signed: John Ordonez MD  10/30/2024 8:35 PM EDT  Workstation ID: [id number]    US Renal Bilateral    Result Date: 10/30/2024  Impression: Lobulated nodularity in the posterior urinary bladder wall has a similar appearance to 10/16/2024.. Malignant involvement of the urinary bladder cannot be excluded. Normal sonographic appearance of the right kidney. Left kidney could not be visualized. Electronically Signed: Jeanette Huang MD  10/30/2024 3:06 PM EDT  Workstation ID: [id number]    CT Abdomen Pelvis Without Contrast    Result Date: 10/30/2024  Impression: 1.No acute trauma identified within chest/abdomen/pelvis. 2.Overall progression of disease. Numerous pulmonary metastasis have progressed, mediastinal and retroperitoneal lymphadenopathy has progressed, and hepatic metastasis have progressed. Omental carcinomatosis appears unchanged, and the uterus appears smaller than on prior exam. Soft  tissue lesions identified along urinary bladder not well seen, which could be secondary to interval West catheterization. 3.Trace right pleural effusion. 4.Cardiomegaly. 5.Mild scattered ascites. 6.Wall thickening of colon, which could be reactive versus acute colitis. Electronically Signed: Robinson Heredia MD  10/30/2024 10:25 AM EDT  Workstation ID: [id number]    CT Chest Without Contrast Diagnostic    Result Date: 10/30/2024  Impression: 1.No acute trauma identified within chest/abdomen/pelvis. 2.Overall progression of disease. Numerous pulmonary metastasis have progressed, mediastinal and retroperitoneal lymphadenopathy has progressed, and hepatic metastasis have progressed. Omental carcinomatosis appears unchanged, and the uterus appears smaller than on prior exam. Soft tissue lesions identified along urinary bladder not well seen, which could be secondary to interval Wets catheterization. 3.Trace right pleural effusion. 4.Cardiomegaly. 5.Mild scattered ascites. 6.Wall thickening of colon, which could be reactive versus acute colitis. Electronically Signed: Robinson Heredia MD  10/30/2024 10:25 AM EDT  Workstation ID: [id number]    CT Head Without Contrast    Result Date: 10/30/2024  Impression: No acute intracranial abnormality Electronically Signed: Danny Julio MD  10/30/2024 9:31 AM EDT  Workstation ID: [id number]    XR Chest 1 View    Result Date: 10/30/2024  Impression: Interval development of patchy airspace disease throughout the lungs bilaterally, likely related to pneumonia. Follow-up to resolution recommended.. Electronically Signed: Kia Patino MD  10/30/2024 6:21 AM EDT  Workstation ID: [id number]    US Renal Bilateral    Result Date: 10/16/2024  Impression: 1.Bilateral mild to moderate hydronephrosis. 2.Distended urinary bladder. There is a lobular mass within the posterior margin of the bladder presumably neoplastic. Electronically Signed: Kishore Stapleton MD  10/16/2024 5:19 PM EDT  Workstation ID:  [id number]    CT Biopsy Abdomen Retroperitoneal    Result Date: 10/14/2024  1. Successful CT-guided biopsy of omental mass Electronically Signed: Patrick Lucas MD  10/14/2024 2:48 PM EDT  Workstation ID: [id number]    US Renal Bilateral    Result Date: 10/9/2024  1. Moderate dilation of the right kidney and mild dilation of the left kidney 2. Evaluation of the bladder is limited by placement of West catheter Electronically Signed: Twan Garza MD  10/9/2024 2:25 PM EDT  Workstation ID: [id number]    CT Abdomen Pelvis With Contrast    Result Date: 10/8/2024  Impression: 1.Superficial soft tissue edema/induration within the lower abdominal wall and left flank, compatible with cellulitis in the appropriate clinical setting. No subcutaneous gas is seen. No associated discrete fluid collection is seen. 2.Large, heterogeneous mass of the uterus/cervix with probable invasion of the right posterior urinary bladder. Please refer to the PET/CT from 5 days prior. 3.Mild bilateral hydroureteronephrosis with moderately distended urinary bladder. Ureteral obstruction or bladder outlet obstruction cannot be excluded. 4.Numerous pulmonary nodules, retroperitoneal/iliac chain lymphadenopathy, and left-sided omental metastatic implants as noted on recent PET/CT. 5.Multiple small hypoattenuating liver lesions, particularly within the right hepatic lobe, concerning for metastatic disease. These may be further characterized with liver MRI if clinically indicated. 6.9 cm hypodense lesion within the left adnexa. Please refer to the MRI of the pelvis from 8/19/2024. 7.Additional findings as detailed above. Electronically Signed: Sushil Benítez MD  10/8/2024 2:51 PM EDT  Workstation ID: [id number]    NM PET/CT Skull Base to Mid Thigh    Result Date: 10/7/2024  Impression: 1. Large hypermetabolic mass involving uterus extending into cervix and vagina consistent with known endometrial malignancy. 2. New hypermetabolic soft tissue involving  "posterior bladder wall concerning for direct tumor invasion of the bladder. 3. New moderate bilateral hydroureteronephrosis likely secondary to mass involving left and right UVJ. 4. Hypermetabolic iliac and retroperitoneal adenopathy consistent with metastatic adenopathy. Hypermetabolic small subcarinal node also concerning for metastatic hypermetabolic adenopathy. 5. Multiple pulmonary metastatic nodules. 6. Omental carcinomatosis. 7. Cystic 8.6 x 8.4 cm left adnexal lesion with hypermetabolic peripheral nodule which could relate to ovarian metastasis or primary ovarian neoplasm as described on prior pelvic MRI. Electronically Signed: Tom Bennett MD  10/7/2024 12:42 PM EDT  Workstation ID: [id number]     CONSULTS   Consults       Date and Time Order Name Status Description    10/31/2024  1:10 AM Inpatient Neurology Consult Stroke Completed     10/30/2024  6:48 PM Hematology & Oncology Inpatient Consult Completed     10/30/2024 11:10 AM Inpatient Urology Consult Completed     10/30/2024  9:48 AM Inpatient Nephrology Consult Completed     10/30/2024  6:50 AM Inpatient Cardiology Consult Completed     10/9/2024 10:02 AM Inpatient Urology Consult Completed     10/9/2024  9:53 AM Hematology & Oncology Inpatient Consult Completed     10/9/2024  9:44 AM Inpatient Infectious Diseases Consult Completed             CONDITION ON DISCHARGE    Stable    VITAL SIGNS  BP (!) 48/20   Pulse 107   Temp 97.6 °F (36.4 °C) (Axillary)   Resp (!) 6   Ht 160 cm (63\")   Wt 120 kg (264 lb)   SpO2 (!) 85%   BMI 46.77 kg/m²     PHYSICAL EXAM  Constitutional: Acutely and chronically ill-appearing.  No acute distress.  Drowsy  Eyes:  PERRL, anicteric  HENT:  Atraumatic, external ears normal, nose normal. Neck-trachea midline, no rigidity, no JVD  Respiratory: Equal expansion and excursion of the chest wall, breath sounds diminished with shallow respirations, non-labored respirations without accessory muscle use  Cardiovascular: A-fib with " RVR, no murmurs, no gallops, no rubs   GI:  Soft, nondistended, hypoactive bowel sounds, nontender, no guarding  : Deferred  Musculoskeletal: No clubbing or cyanosis.  Generalized edema  Neurologic: Drowsy, minimally conversant    DISCHARGE DISPOSITION      DISCHARGE MEDICATIONS     Discharge Medications        ASK your doctor about these medications        Instructions Start Date   amLODIPine 5 MG tablet  Commonly known as: NORVASC   5 mg, Oral, Every 24 Hours Scheduled      Josr Carb-Mag Hydrox-Simeth 1200-270-80 MG/10ML suspension   10 mL, Oral, Every 6 Hours PRN      docusate sodium 100 MG capsule   100 mg, Daily      Farxiga 10 MG tablet  Generic drug: dapagliflozin Propanediol   10 mg, Daily      ferrous sulfate 325 (65 FE) MG tablet   325 mg, 5 Times Weekly      hydrocortisone-bacitracin-zinc oxide-nystatin  Commonly known as: MAGIC BARRIER   1 Application, Topical, 4 Times Daily      hydrOXYzine 50 MG tablet  Commonly known as: ATARAX   1 tablet, Nightly      lisinopril 40 MG tablet  Commonly known as: PRINIVIL,ZESTRIL   40 mg, Daily      metFORMIN 500 MG tablet  Commonly known as: GLUCOPHAGE   500 mg, 2 Times Daily      metoprolol succinate XL 50 MG 24 hr tablet  Commonly known as: TOPROL-XL   50 mg, Every Night at Bedtime      montelukast 10 MG tablet  Commonly known as: SINGULAIR   10 mg, Nightly      multivitamin tablet tablet   1 tablet, Oral, Daily      nystatin 174871 UNIT/GM powder  Commonly known as: MYCOSTATIN   Topical, Every 12 Hours Scheduled      PARoxetine 30 MG tablet  Commonly known as: PAXIL   30 mg, Daily      risperiDONE 2 MG tablet  Commonly known as: risperDAL   4 mg, Every Evening      traMADol 50 MG tablet  Commonly known as: ULTRAM   50 mg, Oral, Every 6 Hours PRN      vitamin D 1.25 MG (95935 UT) capsule capsule  Commonly known as: ERGOCALCIFEROL   Take 1 capsule by mouth Every 7 (Seven) Days. Wednesdays      zinc sulfate 220 (50 Zn) MG capsule  Commonly known as: ZINCATE   220 mg,  Oral, Daily                 DISCHARGE DIET NPO      ACTIVITY AT DISCHARGE as tolerated        THERAPY RECOMMENDATIONS AT DISCHARGE none, hospice care      FOLLOW-UP APPOINTMENTS  Future Appointments   Date Time Provider Department Center   11/13/2024  1:30 PM LAB MD [facility]   11/13/2024  1:45 PM Jacinto Del Real MD [facility] NA [facility]   11/20/2024  3:00 PM Pete Garzon MD [facility] None   1/17/2025 11:30 AM [facility] CC CT  [facility]   1/23/2025  1:00 PM Pete Garzon MD [facility] None           TEST RESULTS PENDING AT DISCHARGE  Pending Labs       Order Current Status    Blood Culture - Blood, Hand, Left Preliminary result    Blood Culture - Blood, Hand, Left Preliminary result    Urine Culture - Urine, Indwelling Urethral Catheter Preliminary result                Time: Discharge 37 min    Jojo G Day, APRN 11/2/2024

## 2024-11-02 NOTE — PLAN OF CARE
Goal Outcome Evaluation:            Pressors were turned off @1845 on 11/01. Pt transitioned to comfort care. Crystal from hospitals came and met with the family. Pt is not a hospice pt yet, just comfort care. Crystal stated she was going to come back and re-evaluate in the morning.     Pt was non-responsive, pupils fixed at an upward gaze with agonal breathing noted all throughout shift.     Ativan and Dilaudid pushes given throughout shift.     Sister spent the night at her bedside.

## 2024-11-04 LAB
BACTERIA SPEC AEROBE CULT: NORMAL
BACTERIA SPEC AEROBE CULT: NORMAL

## 2024-11-04 NOTE — CASE MANAGEMENT/SOCIAL WORK
Case Management Discharge Note      Final Note:                     Final Discharge Disposition Code: 20 -

## 2024-11-04 NOTE — CASE MANAGEMENT/SOCIAL WORK
Case Management Discharge Note      Final Note:             Final Discharge Disposition Code: 20 -     O. Noemi Mariscal RN  ICU/CVU   O: [phone]  C: [phone]  [email or web address]

## 2024-11-08 LAB
DNA RANGE(S) EXAMINED NAR: NORMAL
GENE DIS ANL INTERP-IMP: POSITIVE
GENE DIS ASSESSED: NORMAL
GENE MUT TESTED BLD/T: 5.3 M/MB
MSI CA SPEC-IMP: NORMAL
PD-L1 BY 22C3 TISS IMSTN DOC: NEGATIVE
PD-L1 BY 28-8 TISS IMSTN DOC: NEGATIVE
PD-L1 BY SP142 TISS IMSTN DOC: NEGATIVE
PD-L1 BY SP142 TISS QL IMSTN: <1 %
PD-L1 BY SP263 TISS DOC: NEGATIVE
REASON FOR STUDY: NORMAL
TEMPUS GERMLINE NOTE: NORMAL
TEMPUS LCA: NORMAL
TEMPUS PD-L1 (22C3) COMBINED POSITIVE SCORE: <1
TEMPUS PD-L1 (22C3) TUMOR PROPORTION SCORE: <1 %
TEMPUS PD-L1 (28-8) CELLS.PD-L1/100 VIAB TUM NFR TISS IMSTN: <1 %
TEMPUS PD-L1 (SP142) CELLS.PD-L1/100 VIAB TUM NFR TISS IMSTN: <1 %
TEMPUS PD-L1 (SP263) CELLS. PD-L1/100 VIAB TUM NFR TISS IMSTN: <1 %
TEMPUS PORTAL: NORMAL
TEMPUS TREATMENT IMPLICATIONS NOTE: NORMAL
TEMPUS TRIALCOUNT: 2
TEMPUS TRIALMATCHES1: NORMAL
TEMPUS TRIALMATCHES2: NORMAL

## 2024-11-11 NOTE — DISCHARGE SUMMARY
Discharge Note   Felisha Mukherjee 1965 [id number] 1     Date of Admission:2024     Date of Discharge: 24     Admission Diagnosis: End of life care [Z51.5]     Discharge Diagnosis:     End of life care       Consults: none    Hospital Course: 59 y.o. female with PMH of diabetes type 2, hypertension, endometrial adenocarcinoma with metastasis who presented to the hospital from an acute rehab for evaluation after a ground-level fall. She had an additional complaint of shortness of breath and dry cough x 2 days. She also complained of diffuse abdominal pain and was recently discharged from this facility on 10/18/2024 after being treated for abdominal wall cellulitis. In the ED she developed new onset AF RVR and was started on a cardizem gtt. CT of the head without acute abnormalities. CT of the abdomen pelvis was obtained and revealed evidence of overall disease progression involving patient's known endometrial adenocarcinoma despite having undergone palliative radiation to the pelvis. CXR showed bilateral airspace opacities, labs significant for CO2 9.9, sodium 126, BUN 8.3, HS troponin 169, lactate 4.7, WBC 22.3, and 12% bands. The patient had persistent hypotension following the sepsis IVF bolus and pressor support was initiated with levophed.  She was admitted to the ICU with a principal diagnosis of septic shock secondary to pneumonia. On 2024, the patient made the difficult decision to change her plan of care to Comfort Care measures only.  She was lucid and oriented at the time of this decision.  She was able to acknowledge the understanding that comfort care will result in natural death and that all other medical support will be discontinued at this time.  Her family was at bedside at the time of this discussion and they are in agreement with her choice.  Patient will discharge to inpatient hospice and was  during the IP hospice care.    Vitals:    24 1915   BP: (!) 60/27    Pulse: 111   Resp:    Temp:    SpO2: (!) 83%        Disposition:       Discharged Condition:         Diet:  No active diet order       Labs:                           Discharge Medications        ASK your doctor about these medications        Instructions Start Date   HYDROmorphone 1 MG/ML injection  Commonly known as: DILAUDID  Ask about: Should I take this medication?   1 mg, Intravenous, Every 15 Minutes PRN      LORazepam 2 MG/ML injection  Commonly known as: ATIVAN  Ask about: Should I take this medication?   1 mg, Intravenous, Every 30 Minutes PRN      ondansetron 2 mg/mL injection  Commonly known as: ZOFRAN   4 mg, Intravenous, Every 6 Hours PRN      ondansetron ODT 4 MG disintegrating tablet  Commonly known as: ZOFRAN-ODT   4 mg, Oral, Every 6 Hours PRN                Spent at least 35 minutes in the management of patient's care including but not limited to physical exam, review of vital signs, labs, cultures and imaging studies, discussing the hospital stay along with plan of care at home, preparation and coordinating of discharge, arranging follow up care and referrals as indicated. Plan also discussed with ROME.    Ziyad Tran MD  Hospitalist  24   12:44 EST

## 2024-11-24 NOTE — PROGRESS NOTES
Radiation Treatment Summary Note      Patient Name: Felisha Mukherjee  : 1965    Attending Provider: No care team member to display      Diagnosis:     ICD-10-CM ICD-9-CM   1. Malignant neoplasm of endometrium  C54.1 182.0       Radiation Start Date: 10/16/2024    Radiation Completion Date: 10/23/2024      Prescription:     Site: PTVPelvis  Laterality: N/A  Total Dose: 2000 cGy  Dose per Fraction: 400 cGy  Total Fractions: 5  Daily or BID: Daily  Modality: Photon  Technique: 3DCRT  Bolus: No    Final Delivered Dose Deviated From Initially Prescribed Dose: No    Concurrent Chemotherapy: No    Patient Tolerated Treatment Without Unexpected Side Effects/Complications: Yes    ECOG: Ambulatory and capable of all selfcare but unable to carry out any work activities; up and about more than 50% of waking hours = 2    Pain Management Plan: None Indicated/PRN OTC    Follow-Up Plan: 4-6 weeks    Imaging Ordered for Follow-Up: None/NA        Pete Garzon MD

## 2024-11-27 NOTE — PROGRESS NOTES
Enter Query Response Below      Query Response: Yes        Patient: Felisha Mukherjee        : 1965  Account: [id number]           Admit Date: 10/8/2024      Please update your Progress Note with the answer to the following query:        [place]  Date: 2024    Based on Inpatient coding guidelines [facility] are not allowed to use diagnoses from pathology reports as the sole basis for billing. Any findings noted on a pathology report must be affirmed by the treating physician before billing.      CT Biopsy of Abdomen Retroperitoneal    The pathologist reported that the specimen shows Metastatic moderately differentiated adenocarcinoma     Is this finding consistent with your clinical impression and treatment plan for this patient?    Yes  No  Other explanation for clinical impression and treatment plan_________  Clinically indeterminable    If you have questions about this query, please contact me at     Sincerely,  [facility] Coding Department          If applicable, please update the problem list.

## 2025-02-21 NOTE — PROGRESS NOTES
Copied from CRM #[id number]. Topic: MW Messaging - MW Patient Request  >> Feb 21, 2025  7:56 AM Lizbeth MCKEON wrote:  [employer] called requesting to send a general message to clinician.   Verified issue is NOT regarding a symptom(s) requiring routine or emergent triage. Verified another message template type and CRM does not apply.    Selected 'Wrap Up CRM' and created new Telephone Encounter after clicking 'Convert to Clinical Call'. Selected appropriate Reason for Call.  Sent Pt message template and routed as routine priority per Clinician KB page to appropriate clinician pool. Readback full message.-- DO NOT REPLY / DO NOT REPLY ALL --  -- This inbox is not monitored. If this was sent to the wrong provider or department, reroute message to P ECO Reroute pool. --  -- Message is from Engagement Center Operations (ECO) --    General Patient Message: patient is returning a missed call to the office, no response from secure chat cdl was not available, please contact as soon as possible, please send text message to patient  Caller Information       Contact Date/Time Type Contact Phone/Fax    02/21/2025 07:53 AM CST Phone (Incoming) [employer] [phone]            Alternative phone number: [phone]    Can a detailed message be left? Yes - Voicemail   Patient has been advised the message will be addressed within 2-3 business days.                 The patient had a biopsy of an omental lesion on 10/14/2024.  I spoke with pathology and the preliminary report is metastatic endometrioid adenocarcinoma. Based on pathologic confirmation of metastatic endometrioid adenocarcinoma, we will proceed with radiation therapy. The patient is still recovering from her cellulitis. Her plan is being modified from quad shot to 20 Gy in 5 fractions due to skin concerns and bladder volume. The patient and family were made aware. After completing palliative radiation, she will discuss systemic options with Dr. Del Real.

## 2025-06-04 NOTE — PROCEDURES
Problem: At Risk for Falls  Goal: Patient does not fall  Outcome: Monitoring/Evaluating progress  Goal: Patient takes action to control fall-related risks  Outcome: Monitoring/Evaluating progress     Problem: At Risk for Injury Due to Fall  Goal: Patient does not fall  Outcome: Monitoring/Evaluating progress  Goal: Takes action to control condition specific risks  Outcome: Monitoring/Evaluating progress  Goal: Verbalizes understanding of fall-related injury personal risks  Description: Document education using the patient education activity  Outcome: Monitoring/Evaluating progress     Problem: Mental Status, Alterations (Non-Delirium)  Goal: Mental Status is maintained/improved from status at baseline  Outcome: Monitoring/Evaluating progress      Insert Arterial Line    Date/Time: 10/31/2024 6:38 AM    Performed by: Michelle Lynne APRN  Authorized by: Michelle Lynne APRN    Universal Protocol:     Verbal consent obtained?: Yes      Risks and benefits: Risks, benefits and alternatives were discussed      Consent given by:  Patient    Patient's understanding of procedure matches consent: Yes      Procedure consent matches procedure scheduled: Yes      Relevant documents present and verified: Yes      Test results available and properly labeled: Yes      Site marked: Yes      Imaging studies available: Yes      Required items: Required blood products, implants, devices and special equipment available      Patient identity confirmed:  Verbally with patient and hospital-assigned identification number    Time out: Immediately prior to the procedure a time out was called    A time out verifies correct patient, procedure, equipment, support staff and site/side marked as required:   Preparation:     Preparation: Patient was prepped and draped in usual sterile fashion    Indications:     Indications: hemodynamic monitoring    Location:     Location:  Right femoral  Anesthesia:     Local anesthetic:  Lidocaine 1% without epinephrine    Patient sedated: No    Procedure Details:     Ultrasound Guidance: yes  The ultrasound was used for evaluation of possible access sites.  Vessel patency was confirmed with the ultrasound.  Needle entry into vessel was visualized in realtime with the ultrasound.   Permanent recorded image(s) of the vascular access site will be included in the patient record.      Needle gauge:  22    Number of attempts:  1  Post-procedure:     Post-procedure:  Line sutured and dressing applied    Post-procedure CMS:  Normal     patient tolerated the procedure well with no immediate complications